# Patient Record
Sex: MALE | Race: WHITE | NOT HISPANIC OR LATINO | Employment: UNEMPLOYED | URBAN - METROPOLITAN AREA
[De-identification: names, ages, dates, MRNs, and addresses within clinical notes are randomized per-mention and may not be internally consistent; named-entity substitution may affect disease eponyms.]

---

## 2023-10-15 ENCOUNTER — HOSPITAL ENCOUNTER (INPATIENT)
Facility: HOSPITAL | Age: 53
LOS: 4 days | Discharge: LEFT BEFORE DISCHARGE | DRG: 560 | End: 2023-10-19
Attending: STUDENT IN AN ORGANIZED HEALTH CARE EDUCATION/TRAINING PROGRAM | Admitting: INTERNAL MEDICINE
Payer: COMMERCIAL

## 2023-10-15 ENCOUNTER — APPOINTMENT (OUTPATIENT)
Dept: RADIOLOGY | Facility: HOSPITAL | Age: 53
DRG: 560 | End: 2023-10-15
Payer: COMMERCIAL

## 2023-10-15 DIAGNOSIS — R53.81 DEBILITY: ICD-10-CM

## 2023-10-15 DIAGNOSIS — T84.59XS CHRONIC INFECTION OF KNEE JOINT PROSTHESIS, SEQUELA: ICD-10-CM

## 2023-10-15 DIAGNOSIS — Z96.659 CHRONIC INFECTION OF KNEE JOINT PROSTHESIS, SEQUELA: ICD-10-CM

## 2023-10-15 DIAGNOSIS — M00.9: Primary | ICD-10-CM

## 2023-10-15 LAB
ALBUMIN SERPL BCP-MCNC: 3 G/DL (ref 3.4–5)
ALP SERPL-CCNC: 150 U/L (ref 33–120)
ALT SERPL W P-5'-P-CCNC: 10 U/L (ref 7–52)
ANION GAP BLDV CALCULATED.4IONS-SCNC: 11 MMOL/L (ref 10–25)
ANION GAP SERPL CALC-SCNC: 16 MMOL/L (ref 10–20)
AST SERPL W P-5'-P-CCNC: 9 U/L (ref 9–39)
BASE EXCESS BLDV CALC-SCNC: -0.4 MMOL/L (ref -2–3)
BASOPHILS # BLD AUTO: 0.09 X10*3/UL (ref 0–0.1)
BASOPHILS NFR BLD AUTO: 0.7 %
BILIRUB SERPL-MCNC: 0.2 MG/DL (ref 0–1.2)
BODY TEMPERATURE: 37 DEGREES CELSIUS
BUN SERPL-MCNC: 30 MG/DL (ref 6–23)
CA-I BLDV-SCNC: 1.18 MMOL/L (ref 1.1–1.33)
CALCIUM SERPL-MCNC: 8.5 MG/DL (ref 8.6–10.6)
CHLORIDE BLDV-SCNC: 97 MMOL/L (ref 98–107)
CHLORIDE SERPL-SCNC: 92 MMOL/L (ref 98–107)
CO2 SERPL-SCNC: 23 MMOL/L (ref 21–32)
CREAT SERPL-MCNC: 1.87 MG/DL (ref 0.5–1.3)
CRP SERPL-MCNC: 8.52 MG/DL
EOSINOPHIL # BLD AUTO: 0.28 X10*3/UL (ref 0–0.7)
EOSINOPHIL NFR BLD AUTO: 2.2 %
ERYTHROCYTE [DISTWIDTH] IN BLOOD BY AUTOMATED COUNT: 15.5 % (ref 11.5–14.5)
ERYTHROCYTE [SEDIMENTATION RATE] IN BLOOD BY WESTERGREN METHOD: >130 MM/H (ref 0–30)
EST. AVERAGE GLUCOSE BLD GHB EST-MCNC: 375 MG/DL
GFR SERPL CREATININE-BSD FRML MDRD: 31 ML/MIN/1.73M*2
GLUCOSE BLD MANUAL STRIP-MCNC: 480 MG/DL (ref 74–99)
GLUCOSE BLDV-MCNC: 370 MG/DL (ref 74–99)
GLUCOSE SERPL-MCNC: 660 MG/DL (ref 74–99)
HBA1C MFR BLD: 14.7 %
HCO3 BLDV-SCNC: 24.8 MMOL/L (ref 22–26)
HCT VFR BLD AUTO: 36.6 % (ref 36–52)
HCT VFR BLD EST: 35 % (ref 36–52)
HGB BLD-MCNC: 11.4 G/DL (ref 12–17.5)
HGB BLDV-MCNC: 11.8 G/DL (ref 12–17.5)
IMM GRANULOCYTES # BLD AUTO: 0.17 X10*3/UL (ref 0–0.7)
IMM GRANULOCYTES NFR BLD AUTO: 1.3 % (ref 0–0.9)
LACTATE BLDV-SCNC: 1.5 MMOL/L (ref 0.4–2)
LYMPHOCYTES # BLD AUTO: 1.09 X10*3/UL (ref 1.2–4.8)
LYMPHOCYTES NFR BLD AUTO: 8.5 %
MCH RBC QN AUTO: 22.8 PG (ref 26–34)
MCHC RBC AUTO-ENTMCNC: 31.1 G/DL (ref 32–36)
MCV RBC AUTO: 73 FL (ref 80–100)
MONOCYTES # BLD AUTO: 0.86 X10*3/UL (ref 0.1–1)
MONOCYTES NFR BLD AUTO: 6.7 %
NEUTROPHILS # BLD AUTO: 10.39 X10*3/UL (ref 1.2–7.7)
NEUTROPHILS NFR BLD AUTO: 80.6 %
NRBC BLD-RTO: 0 /100 WBCS (ref 0–0)
OXYHGB MFR BLDV: 56.6 % (ref 45–75)
PCO2 BLDV: 42 MM HG (ref 41–51)
PH BLDV: 7.38 PH (ref 7.33–7.43)
PLATELET # BLD AUTO: 430 X10*3/UL (ref 150–450)
PMV BLD AUTO: 10.3 FL (ref 7.5–11.5)
PO2 BLDV: 35 MM HG (ref 35–45)
POTASSIUM BLDV-SCNC: 3.7 MMOL/L (ref 3.5–5.3)
POTASSIUM SERPL-SCNC: 4.7 MMOL/L (ref 3.5–5.3)
PROT SERPL-MCNC: 6.3 G/DL (ref 6.4–8.2)
RBC # BLD AUTO: 5.01 X10*6/UL (ref 4–5.9)
SAO2 % BLDV: 57 % (ref 45–75)
SODIUM BLDV-SCNC: 129 MMOL/L (ref 136–145)
SODIUM SERPL-SCNC: 126 MMOL/L (ref 136–145)
WBC # BLD AUTO: 12.9 X10*3/UL (ref 4.4–11.3)

## 2023-10-15 PROCEDURE — 80053 COMPREHEN METABOLIC PANEL: CPT | Mod: CMCLAB | Performed by: STUDENT IN AN ORGANIZED HEALTH CARE EDUCATION/TRAINING PROGRAM

## 2023-10-15 PROCEDURE — 1210000001 HC SEMI-PRIVATE ROOM DAILY

## 2023-10-15 PROCEDURE — 2500000001 HC RX 250 WO HCPCS SELF ADMINISTERED DRUGS (ALT 637 FOR MEDICARE OP)

## 2023-10-15 PROCEDURE — 89060 EXAM SYNOVIAL FLUID CRYSTALS: CPT | Mod: CMCLAB

## 2023-10-15 PROCEDURE — 85025 COMPLETE CBC W/AUTO DIFF WBC: CPT | Performed by: STUDENT IN AN ORGANIZED HEALTH CARE EDUCATION/TRAINING PROGRAM

## 2023-10-15 PROCEDURE — 82947 ASSAY GLUCOSE BLOOD QUANT: CPT | Mod: CMCLAB

## 2023-10-15 PROCEDURE — 36415 COLL VENOUS BLD VENIPUNCTURE: CPT | Mod: CMCLAB | Performed by: STUDENT IN AN ORGANIZED HEALTH CARE EDUCATION/TRAINING PROGRAM

## 2023-10-15 PROCEDURE — 73552 X-RAY EXAM OF FEMUR 2/>: CPT | Mod: RT,FY

## 2023-10-15 PROCEDURE — 2500000004 HC RX 250 GENERAL PHARMACY W/ HCPCS (ALT 636 FOR OP/ED)

## 2023-10-15 PROCEDURE — 87075 CULTR BACTERIA EXCEPT BLOOD: CPT | Mod: CMCLAB | Performed by: STUDENT IN AN ORGANIZED HEALTH CARE EDUCATION/TRAINING PROGRAM

## 2023-10-15 PROCEDURE — 2580000001 HC RX 258 IV SOLUTIONS: Performed by: STUDENT IN AN ORGANIZED HEALTH CARE EDUCATION/TRAINING PROGRAM

## 2023-10-15 PROCEDURE — 96360 HYDRATION IV INFUSION INIT: CPT

## 2023-10-15 PROCEDURE — 87040 BLOOD CULTURE FOR BACTERIA: CPT | Mod: CMCLAB | Performed by: STUDENT IN AN ORGANIZED HEALTH CARE EDUCATION/TRAINING PROGRAM

## 2023-10-15 PROCEDURE — 99285 EMERGENCY DEPT VISIT HI MDM: CPT | Mod: 25 | Performed by: STUDENT IN AN ORGANIZED HEALTH CARE EDUCATION/TRAINING PROGRAM

## 2023-10-15 PROCEDURE — 2500000002 HC RX 250 W HCPCS SELF ADMINISTERED DRUGS (ALT 637 FOR MEDICARE OP, ALT 636 FOR OP/ED): Performed by: STUDENT IN AN ORGANIZED HEALTH CARE EDUCATION/TRAINING PROGRAM

## 2023-10-15 PROCEDURE — 73564 X-RAY EXAM KNEE 4 OR MORE: CPT | Mod: RT,FY

## 2023-10-15 PROCEDURE — 83036 HEMOGLOBIN GLYCOSYLATED A1C: CPT | Mod: CMCLAB | Performed by: NURSE PRACTITIONER

## 2023-10-15 PROCEDURE — 73564 X-RAY EXAM KNEE 4 OR MORE: CPT | Mod: RIGHT SIDE | Performed by: STUDENT IN AN ORGANIZED HEALTH CARE EDUCATION/TRAINING PROGRAM

## 2023-10-15 PROCEDURE — 87205 SMEAR GRAM STAIN: CPT | Mod: CMCLAB

## 2023-10-15 PROCEDURE — 86140 C-REACTIVE PROTEIN: CPT | Mod: CMCLAB | Performed by: STUDENT IN AN ORGANIZED HEALTH CARE EDUCATION/TRAINING PROGRAM

## 2023-10-15 PROCEDURE — 99285 EMERGENCY DEPT VISIT HI MDM: CPT | Performed by: STUDENT IN AN ORGANIZED HEALTH CARE EDUCATION/TRAINING PROGRAM

## 2023-10-15 PROCEDURE — 85018 HEMOGLOBIN: CPT | Mod: CMCLAB

## 2023-10-15 PROCEDURE — 85652 RBC SED RATE AUTOMATED: CPT | Mod: CMCLAB | Performed by: STUDENT IN AN ORGANIZED HEALTH CARE EDUCATION/TRAINING PROGRAM

## 2023-10-15 PROCEDURE — 73552 X-RAY EXAM OF FEMUR 2/>: CPT | Mod: RIGHT SIDE | Performed by: RADIOLOGY

## 2023-10-15 PROCEDURE — 87075 CULTR BACTERIA EXCEPT BLOOD: CPT | Mod: CMCLAB

## 2023-10-15 PROCEDURE — 2500000001 HC RX 250 WO HCPCS SELF ADMINISTERED DRUGS (ALT 637 FOR MEDICARE OP): Performed by: STUDENT IN AN ORGANIZED HEALTH CARE EDUCATION/TRAINING PROGRAM

## 2023-10-15 RX ORDER — ACETAMINOPHEN 325 MG/1
650 TABLET ORAL EVERY 6 HOURS PRN
Status: DISCONTINUED | OUTPATIENT
Start: 2023-10-15 | End: 2023-10-19 | Stop reason: HOSPADM

## 2023-10-15 RX ORDER — DEXTROSE 50 % IN WATER (D50W) INTRAVENOUS SYRINGE
25
Status: DISCONTINUED | OUTPATIENT
Start: 2023-10-15 | End: 2023-10-19 | Stop reason: HOSPADM

## 2023-10-15 RX ORDER — SULFAMETHOXAZOLE AND TRIMETHOPRIM 800; 160 MG/1; MG/1
1 TABLET ORAL EVERY 12 HOURS
Status: DISCONTINUED | OUTPATIENT
Start: 2023-10-15 | End: 2023-10-19 | Stop reason: HOSPADM

## 2023-10-15 RX ORDER — CEPHALEXIN 500 MG/1
500 CAPSULE ORAL EVERY 12 HOURS SCHEDULED
Status: DISCONTINUED | OUTPATIENT
Start: 2023-10-15 | End: 2023-10-19 | Stop reason: HOSPADM

## 2023-10-15 RX ORDER — POLYETHYLENE GLYCOL 3350 17 G/17G
17 POWDER, FOR SOLUTION ORAL DAILY
Status: DISCONTINUED | OUTPATIENT
Start: 2023-10-16 | End: 2023-10-19 | Stop reason: HOSPADM

## 2023-10-15 RX ORDER — INSULIN LISPRO 100 [IU]/ML
0-10 INJECTION, SOLUTION INTRAVENOUS; SUBCUTANEOUS
Status: DISCONTINUED | OUTPATIENT
Start: 2023-10-16 | End: 2023-10-19 | Stop reason: HOSPADM

## 2023-10-15 RX ORDER — INSULIN GLARGINE 100 [IU]/ML
60 INJECTION, SOLUTION SUBCUTANEOUS NIGHTLY
Status: DISCONTINUED | OUTPATIENT
Start: 2023-10-15 | End: 2023-10-15

## 2023-10-15 RX ORDER — INSULIN LISPRO 100 [IU]/ML
10 INJECTION, SOLUTION INTRAVENOUS; SUBCUTANEOUS ONCE
Status: COMPLETED | OUTPATIENT
Start: 2023-10-15 | End: 2023-10-15

## 2023-10-15 RX ORDER — AMLODIPINE BESYLATE 5 MG/1
5 TABLET ORAL DAILY
Status: DISCONTINUED | OUTPATIENT
Start: 2023-10-16 | End: 2023-10-17

## 2023-10-15 RX ORDER — TRAMADOL HYDROCHLORIDE 50 MG/1
50 TABLET ORAL EVERY 6 HOURS PRN
Status: DISCONTINUED | OUTPATIENT
Start: 2023-10-15 | End: 2023-10-18

## 2023-10-15 RX ORDER — DEXTROSE MONOHYDRATE 100 MG/ML
0.3 INJECTION, SOLUTION INTRAVENOUS ONCE AS NEEDED
Status: DISCONTINUED | OUTPATIENT
Start: 2023-10-15 | End: 2023-10-19 | Stop reason: HOSPADM

## 2023-10-15 RX ORDER — PANTOPRAZOLE SODIUM 40 MG/1
40 TABLET, DELAYED RELEASE ORAL
Status: DISCONTINUED | OUTPATIENT
Start: 2023-10-16 | End: 2023-10-19 | Stop reason: HOSPADM

## 2023-10-15 RX ORDER — INSULIN LISPRO 100 [IU]/ML
0-20 INJECTION, SOLUTION INTRAVENOUS; SUBCUTANEOUS EVERY 4 HOURS
Status: DISCONTINUED | OUTPATIENT
Start: 2023-10-15 | End: 2023-10-16

## 2023-10-15 RX ORDER — GUAIFENESIN 100 MG/5ML
200 SOLUTION ORAL ONCE
Status: COMPLETED | OUTPATIENT
Start: 2023-10-15 | End: 2023-10-15

## 2023-10-15 RX ORDER — TRAMADOL HYDROCHLORIDE 50 MG/1
50 TABLET ORAL ONCE
Status: COMPLETED | OUTPATIENT
Start: 2023-10-15 | End: 2023-10-15

## 2023-10-15 RX ORDER — INSULIN GLARGINE 100 [IU]/ML
100 INJECTION, SOLUTION SUBCUTANEOUS NIGHTLY
Status: DISCONTINUED | OUTPATIENT
Start: 2023-10-16 | End: 2023-10-16

## 2023-10-15 RX ORDER — LIDOCAINE HYDROCHLORIDE AND EPINEPHRINE 10; 10 MG/ML; UG/ML
INJECTION, SOLUTION INFILTRATION; PERINEURAL
Status: DISPENSED
Start: 2023-10-15 | End: 2023-10-16

## 2023-10-15 RX ADMIN — INSULIN LISPRO 10 UNITS: 100 INJECTION, SOLUTION INTRAVENOUS; SUBCUTANEOUS at 19:07

## 2023-10-15 RX ADMIN — TRAMADOL HYDROCHLORIDE 50 MG: 50 TABLET, COATED ORAL at 19:07

## 2023-10-15 RX ADMIN — INSULIN GLARGINE 100 UNITS: 100 INJECTION, SOLUTION SUBCUTANEOUS at 21:03

## 2023-10-15 RX ADMIN — SODIUM CHLORIDE, POTASSIUM CHLORIDE, SODIUM LACTATE AND CALCIUM CHLORIDE 500 ML: 600; 310; 30; 20 INJECTION, SOLUTION INTRAVENOUS at 19:06

## 2023-10-15 RX ADMIN — SULFAMETHOXAZOLE AND TRIMETHOPRIM 1 TABLET: 800; 160 TABLET ORAL at 23:25

## 2023-10-15 RX ADMIN — CEPHALEXIN 500 MG: 250 CAPSULE ORAL at 23:25

## 2023-10-15 RX ADMIN — GUAIFENESIN 200 MG: 200 SOLUTION ORAL at 20:12

## 2023-10-15 ASSESSMENT — ENCOUNTER SYMPTOMS
NERVOUS/ANXIOUS: 1
NAUSEA: 0
SHORTNESS OF BREATH: 0
HEADACHES: 0
NUMBNESS: 0
SEIZURES: 0
PALPITATIONS: 0
CONSTIPATION: 0
ABDOMINAL PAIN: 0
COLOR CHANGE: 1
EYE PAIN: 1
CHILLS: 1
VOMITING: 0
HYPERACTIVE: 1
CHEST TIGHTNESS: 0
FLANK PAIN: 0
LIGHT-HEADEDNESS: 0
DIARRHEA: 0
AGITATION: 1
FREQUENCY: 0
COUGH: 0
FEVER: 1
JOINT SWELLING: 1

## 2023-10-15 ASSESSMENT — PAIN DESCRIPTION - LOCATION: LOCATION: KNEE

## 2023-10-15 ASSESSMENT — COLUMBIA-SUICIDE SEVERITY RATING SCALE - C-SSRS
1. IN THE PAST MONTH, HAVE YOU WISHED YOU WERE DEAD OR WISHED YOU COULD GO TO SLEEP AND NOT WAKE UP?: NO
2. HAVE YOU ACTUALLY HAD ANY THOUGHTS OF KILLING YOURSELF?: NO
6. HAVE YOU EVER DONE ANYTHING, STARTED TO DO ANYTHING, OR PREPARED TO DO ANYTHING TO END YOUR LIFE?: NO

## 2023-10-15 ASSESSMENT — PAIN - FUNCTIONAL ASSESSMENT: PAIN_FUNCTIONAL_ASSESSMENT: 0-10

## 2023-10-15 ASSESSMENT — PAIN SCALES - GENERAL: PAINLEVEL_OUTOF10: 10 - WORST POSSIBLE PAIN

## 2023-10-15 NOTE — ED TRIAGE NOTES
Chronic R. Knee pain x a long time states he consistently has pain and MRSA in that knee 2 yrs ago and was tx for it

## 2023-10-16 LAB
ABO GROUP (TYPE) IN BLOOD: NORMAL
ANION GAP BLDV CALCULATED.4IONS-SCNC: 10 MMOL/L (ref 10–25)
ANION GAP SERPL CALC-SCNC: 16 MMOL/L (ref 10–20)
ANTIBODY SCREEN: NORMAL
BASE EXCESS BLDV CALC-SCNC: 0.7 MMOL/L (ref -2–3)
BODY TEMPERATURE: 37 DEGREES CELSIUS
BUN SERPL-MCNC: 33 MG/DL (ref 6–23)
CA-I BLDV-SCNC: 1.17 MMOL/L (ref 1.1–1.33)
CALCIUM SERPL-MCNC: 8.9 MG/DL (ref 8.6–10.6)
CHLORIDE BLDV-SCNC: 100 MMOL/L (ref 98–107)
CHLORIDE SERPL-SCNC: 98 MMOL/L (ref 98–107)
CO2 SERPL-SCNC: 21 MMOL/L (ref 21–32)
CREAT SERPL-MCNC: 1.73 MG/DL (ref 0.5–1.3)
CRYSTALS FLD MICRO: NORMAL
ERYTHROCYTE [DISTWIDTH] IN BLOOD BY AUTOMATED COUNT: 15.7 % (ref 11.5–14.5)
GFR SERPL CREATININE-BSD FRML MDRD: 35 ML/MIN/1.73M*2
GLUCOSE BLD MANUAL STRIP-MCNC: 248 MG/DL (ref 74–99)
GLUCOSE BLD MANUAL STRIP-MCNC: 274 MG/DL (ref 74–99)
GLUCOSE BLD MANUAL STRIP-MCNC: 359 MG/DL (ref 74–99)
GLUCOSE BLD MANUAL STRIP-MCNC: 397 MG/DL (ref 74–99)
GLUCOSE BLD MANUAL STRIP-MCNC: 477 MG/DL (ref 74–99)
GLUCOSE BLD MANUAL STRIP-MCNC: 498 MG/DL (ref 74–99)
GLUCOSE BLDV-MCNC: 352 MG/DL (ref 74–99)
GLUCOSE SERPL-MCNC: 255 MG/DL (ref 74–99)
HCO3 BLDV-SCNC: 25.4 MMOL/L (ref 22–26)
HCT VFR BLD AUTO: 39.5 % (ref 36–52)
HCT VFR BLD EST: 37 % (ref 36–52)
HGB BLD-MCNC: 11.6 G/DL (ref 12–17.5)
HGB BLDV-MCNC: 12.2 G/DL (ref 12–17.5)
LACTATE BLDV-SCNC: 1.9 MMOL/L (ref 0.4–2)
MCH RBC QN AUTO: 22.9 PG (ref 26–34)
MCHC RBC AUTO-ENTMCNC: 29.4 G/DL (ref 32–36)
MCV RBC AUTO: 78 FL (ref 80–100)
NRBC BLD-RTO: 0 /100 WBCS (ref 0–0)
OXYHGB MFR BLDV: 71.3 % (ref 45–75)
PCO2 BLDV: 40 MM HG (ref 41–51)
PH BLDV: 7.41 PH (ref 7.33–7.43)
PLATELET # BLD AUTO: 379 X10*3/UL (ref 150–450)
PMV BLD AUTO: 11.1 FL (ref 7.5–11.5)
PO2 BLDV: 48 MM HG (ref 35–45)
POTASSIUM BLDV-SCNC: 4 MMOL/L (ref 3.5–5.3)
POTASSIUM SERPL-SCNC: 4.1 MMOL/L (ref 3.5–5.3)
RBC # BLD AUTO: 5.07 X10*6/UL (ref 4–5.9)
RH FACTOR (ANTIGEN D): NORMAL
SAO2 % BLDV: 73 % (ref 45–75)
SODIUM BLDV-SCNC: 131 MMOL/L (ref 136–145)
SODIUM SERPL-SCNC: 131 MMOL/L (ref 136–145)
WBC # BLD AUTO: 12.5 X10*3/UL (ref 4.4–11.3)

## 2023-10-16 PROCEDURE — 2500000002 HC RX 250 W HCPCS SELF ADMINISTERED DRUGS (ALT 637 FOR MEDICARE OP, ALT 636 FOR OP/ED): Performed by: INTERNAL MEDICINE

## 2023-10-16 PROCEDURE — 2500000004 HC RX 250 GENERAL PHARMACY W/ HCPCS (ALT 636 FOR OP/ED)

## 2023-10-16 PROCEDURE — 86901 BLOOD TYPING SEROLOGIC RH(D): CPT

## 2023-10-16 PROCEDURE — 82947 ASSAY GLUCOSE BLOOD QUANT: CPT | Mod: CMCLAB

## 2023-10-16 PROCEDURE — 2500000005 HC RX 250 GENERAL PHARMACY W/O HCPCS: Performed by: INTERNAL MEDICINE

## 2023-10-16 PROCEDURE — 84132 ASSAY OF SERUM POTASSIUM: CPT | Mod: CMCLAB

## 2023-10-16 PROCEDURE — 99221 1ST HOSP IP/OBS SF/LOW 40: CPT | Performed by: INTERNAL MEDICINE

## 2023-10-16 PROCEDURE — 36415 COLL VENOUS BLD VENIPUNCTURE: CPT | Mod: CMCLAB

## 2023-10-16 PROCEDURE — 85018 HEMOGLOBIN: CPT | Mod: CMCLAB

## 2023-10-16 PROCEDURE — 96372 THER/PROPH/DIAG INJ SC/IM: CPT

## 2023-10-16 PROCEDURE — 2500000001 HC RX 250 WO HCPCS SELF ADMINISTERED DRUGS (ALT 637 FOR MEDICARE OP)

## 2023-10-16 PROCEDURE — 99233 SBSQ HOSP IP/OBS HIGH 50: CPT | Performed by: INTERNAL MEDICINE

## 2023-10-16 PROCEDURE — 82805 BLOOD GASES W/O2 SATURATION: CPT | Mod: CMCLAB

## 2023-10-16 PROCEDURE — 36415 COLL VENOUS BLD VENIPUNCTURE: CPT

## 2023-10-16 PROCEDURE — 1100000001 HC PRIVATE ROOM DAILY

## 2023-10-16 PROCEDURE — 97166 OT EVAL MOD COMPLEX 45 MIN: CPT | Mod: GO

## 2023-10-16 PROCEDURE — 97530 THERAPEUTIC ACTIVITIES: CPT | Mod: GP

## 2023-10-16 PROCEDURE — 2500000002 HC RX 250 W HCPCS SELF ADMINISTERED DRUGS (ALT 637 FOR MEDICARE OP, ALT 636 FOR OP/ED)

## 2023-10-16 PROCEDURE — 82330 ASSAY OF CALCIUM: CPT | Mod: CMCLAB

## 2023-10-16 PROCEDURE — 86900 BLOOD TYPING SEROLOGIC ABO: CPT

## 2023-10-16 PROCEDURE — 2500000001 HC RX 250 WO HCPCS SELF ADMINISTERED DRUGS (ALT 637 FOR MEDICARE OP): Performed by: INTERNAL MEDICINE

## 2023-10-16 PROCEDURE — 96372 THER/PROPH/DIAG INJ SC/IM: CPT | Performed by: INTERNAL MEDICINE

## 2023-10-16 PROCEDURE — 97161 PT EVAL LOW COMPLEX 20 MIN: CPT | Mod: GP

## 2023-10-16 PROCEDURE — 83605 ASSAY OF LACTIC ACID: CPT | Mod: CMCLAB

## 2023-10-16 PROCEDURE — 85027 COMPLETE CBC AUTOMATED: CPT

## 2023-10-16 PROCEDURE — 80048 BASIC METABOLIC PNL TOTAL CA: CPT | Mod: CMCLAB

## 2023-10-16 RX ORDER — ASPIRIN 81 MG/1
1 TABLET ORAL DAILY
Status: ON HOLD | COMMUNITY
Start: 2011-05-20 | End: 2024-02-28 | Stop reason: ALTCHOICE

## 2023-10-16 RX ORDER — GUAIFENESIN 600 MG/1
1200 TABLET, EXTENDED RELEASE ORAL 2 TIMES DAILY PRN
Status: DISCONTINUED | OUTPATIENT
Start: 2023-10-16 | End: 2023-10-19 | Stop reason: HOSPADM

## 2023-10-16 RX ORDER — FLUTICASONE PROPIONATE 50 MCG
2 SPRAY, SUSPENSION (ML) NASAL DAILY PRN
COMMUNITY

## 2023-10-16 RX ORDER — TRAZODONE HYDROCHLORIDE 50 MG/1
50 TABLET ORAL NIGHTLY
Status: ON HOLD | COMMUNITY
End: 2024-02-17 | Stop reason: ALTCHOICE

## 2023-10-16 RX ORDER — INSULIN GLARGINE 100 [IU]/ML
100 INJECTION, SOLUTION SUBCUTANEOUS EVERY MORNING
COMMUNITY
End: 2024-02-01 | Stop reason: HOSPADM

## 2023-10-16 RX ORDER — ATORVASTATIN CALCIUM 40 MG/1
40 TABLET, FILM COATED ORAL DAILY
Status: ON HOLD | COMMUNITY
Start: 2019-08-15 | End: 2024-02-20 | Stop reason: SDUPTHER

## 2023-10-16 RX ORDER — LIDOCAINE 560 MG/1
1 PATCH PERCUTANEOUS; TOPICAL; TRANSDERMAL DAILY
Status: DISCONTINUED | OUTPATIENT
Start: 2023-10-16 | End: 2023-10-19 | Stop reason: HOSPADM

## 2023-10-16 RX ORDER — INSULIN LISPRO 100 [IU]/ML
10 INJECTION, SOLUTION INTRAVENOUS; SUBCUTANEOUS
Status: DISCONTINUED | OUTPATIENT
Start: 2023-10-16 | End: 2023-10-17

## 2023-10-16 RX ORDER — TRAZODONE HYDROCHLORIDE 50 MG/1
50 TABLET ORAL NIGHTLY PRN
Status: DISCONTINUED | OUTPATIENT
Start: 2023-10-16 | End: 2023-10-19 | Stop reason: HOSPADM

## 2023-10-16 RX ORDER — ORAL SEMAGLUTIDE 7 MG/1
7 TABLET ORAL
COMMUNITY
End: 2024-02-23 | Stop reason: HOSPADM

## 2023-10-16 RX ORDER — OMEPRAZOLE 20 MG/1
20 CAPSULE, DELAYED RELEASE ORAL
COMMUNITY
Start: 2021-03-30

## 2023-10-16 RX ORDER — INSULIN GLARGINE 100 [IU]/ML
100 INJECTION, SOLUTION SUBCUTANEOUS DAILY
Status: DISCONTINUED | OUTPATIENT
Start: 2023-10-16 | End: 2023-10-19 | Stop reason: HOSPADM

## 2023-10-16 RX ADMIN — TRAMADOL HYDROCHLORIDE 50 MG: 50 TABLET, COATED ORAL at 09:09

## 2023-10-16 RX ADMIN — TRAMADOL HYDROCHLORIDE 50 MG: 50 TABLET, COATED ORAL at 16:33

## 2023-10-16 RX ADMIN — INSULIN LISPRO 10 UNITS: 100 INJECTION, SOLUTION INTRAVENOUS; SUBCUTANEOUS at 17:42

## 2023-10-16 RX ADMIN — INSULIN LISPRO 12 UNITS: 100 INJECTION, SOLUTION INTRAVENOUS; SUBCUTANEOUS at 01:22

## 2023-10-16 RX ADMIN — INSULIN LISPRO 8 UNITS: 100 INJECTION, SOLUTION INTRAVENOUS; SUBCUTANEOUS at 04:36

## 2023-10-16 RX ADMIN — TRAMADOL HYDROCHLORIDE 50 MG: 50 TABLET, COATED ORAL at 22:42

## 2023-10-16 RX ADMIN — EMPAGLIFLOZIN 25 MG: 25 TABLET, FILM COATED ORAL at 14:02

## 2023-10-16 RX ADMIN — CEPHALEXIN 500 MG: 250 CAPSULE ORAL at 09:09

## 2023-10-16 RX ADMIN — TRAMADOL HYDROCHLORIDE 50 MG: 50 TABLET, COATED ORAL at 02:39

## 2023-10-16 RX ADMIN — INSULIN GLARGINE 100 UNITS: 100 INJECTION, SOLUTION SUBCUTANEOUS at 10:25

## 2023-10-16 RX ADMIN — SULFAMETHOXAZOLE AND TRIMETHOPRIM 1 TABLET: 800; 160 TABLET ORAL at 20:09

## 2023-10-16 RX ADMIN — LIDOCAINE 1 PATCH: 4 PATCH TOPICAL at 14:02

## 2023-10-16 RX ADMIN — PANTOPRAZOLE SODIUM 40 MG: 40 TABLET, DELAYED RELEASE ORAL at 09:09

## 2023-10-16 RX ADMIN — INSULIN LISPRO 10 UNITS: 100 INJECTION, SOLUTION INTRAVENOUS; SUBCUTANEOUS at 12:36

## 2023-10-16 RX ADMIN — INSULIN LISPRO 10 UNITS: 100 INJECTION, SOLUTION INTRAVENOUS; SUBCUTANEOUS at 14:02

## 2023-10-16 RX ADMIN — CEPHALEXIN 500 MG: 250 CAPSULE ORAL at 20:09

## 2023-10-16 RX ADMIN — SULFAMETHOXAZOLE AND TRIMETHOPRIM 1 TABLET: 800; 160 TABLET ORAL at 09:09

## 2023-10-16 RX ADMIN — Medication: at 06:30

## 2023-10-16 SDOH — SOCIAL STABILITY: SOCIAL INSECURITY: DO YOU FEEL UNSAFE GOING BACK TO THE PLACE WHERE YOU ARE LIVING?: NO

## 2023-10-16 SDOH — SOCIAL STABILITY: SOCIAL INSECURITY: ARE YOU OR HAVE YOU BEEN THREATENED OR ABUSED PHYSICALLY, EMOTIONALLY, OR SEXUALLY BY ANYONE?: NO

## 2023-10-16 SDOH — SOCIAL STABILITY: SOCIAL INSECURITY: ABUSE: ADULT

## 2023-10-16 SDOH — SOCIAL STABILITY: SOCIAL INSECURITY: ARE THERE ANY APPARENT SIGNS OF INJURIES/BEHAVIORS THAT COULD BE RELATED TO ABUSE/NEGLECT?: NO

## 2023-10-16 SDOH — SOCIAL STABILITY: SOCIAL INSECURITY: HAVE YOU HAD THOUGHTS OF HARMING ANYONE ELSE?: NO

## 2023-10-16 SDOH — SOCIAL STABILITY: SOCIAL INSECURITY: DOES ANYONE TRY TO KEEP YOU FROM HAVING/CONTACTING OTHER FRIENDS OR DOING THINGS OUTSIDE YOUR HOME?: NO

## 2023-10-16 SDOH — SOCIAL STABILITY: SOCIAL INSECURITY: WERE YOU ABLE TO COMPLETE ALL THE BEHAVIORAL HEALTH SCREENINGS?: YES

## 2023-10-16 SDOH — SOCIAL STABILITY: SOCIAL INSECURITY: HAS ANYONE EVER THREATENED TO HURT YOUR FAMILY OR YOUR PETS?: NO

## 2023-10-16 SDOH — SOCIAL STABILITY: SOCIAL INSECURITY: DO YOU FEEL ANYONE HAS EXPLOITED OR TAKEN ADVANTAGE OF YOU FINANCIALLY OR OF YOUR PERSONAL PROPERTY?: NO

## 2023-10-16 ASSESSMENT — LIFESTYLE VARIABLES
HOW OFTEN DO YOU HAVE A DRINK CONTAINING ALCOHOL: NEVER
AUDIT-C TOTAL SCORE: 0
HOW OFTEN DO YOU HAVE 6 OR MORE DRINKS ON ONE OCCASION: NEVER
HOW MANY STANDARD DRINKS CONTAINING ALCOHOL DO YOU HAVE ON A TYPICAL DAY: PATIENT DOES NOT DRINK
SKIP TO QUESTIONS 9-10: 1
AUDIT-C TOTAL SCORE: 0

## 2023-10-16 ASSESSMENT — ACTIVITIES OF DAILY LIVING (ADL)
DRESSING YOURSELF: NEEDS ASSISTANCE
ASSISTIVE_DEVICE: WALKER
HEARING - LEFT EAR: FUNCTIONAL
ADEQUATE_TO_COMPLETE_ADL: YES
WALKS IN HOME: NEEDS ASSISTANCE
ADL_ASSISTANCE: NEEDS ASSISTANCE
BATHING_ASSISTANCE: MODERATE
LACK_OF_TRANSPORTATION: NO
FEEDING YOURSELF: INDEPENDENT
TOILETING: NEEDS ASSISTANCE
BATHING: INDEPENDENT
JUDGMENT_ADEQUATE_SAFELY_COMPLETE_DAILY_ACTIVITIES: YES
PATIENT'S MEMORY ADEQUATE TO SAFELY COMPLETE DAILY ACTIVITIES?: YES
TOILETING_ASSISTANCE: ASSISTIVE DEVICE
HEARING - RIGHT EAR: FUNCTIONAL
BATHING_ASSISTANCE: MODERATE
GROOMING: INDEPENDENT

## 2023-10-16 ASSESSMENT — PAIN - FUNCTIONAL ASSESSMENT
PAIN_FUNCTIONAL_ASSESSMENT: 0-10

## 2023-10-16 ASSESSMENT — COGNITIVE AND FUNCTIONAL STATUS - GENERAL
DRESSING REGULAR LOWER BODY CLOTHING: A LOT
PATIENT BASELINE BEDBOUND: NO
TOILETING: A LOT
STANDING UP FROM CHAIR USING ARMS: A LITTLE
DAILY ACTIVITIY SCORE: 23
HELP NEEDED FOR BATHING: A LOT
WALKING IN HOSPITAL ROOM: A LITTLE
WALKING IN HOSPITAL ROOM: A LITTLE
MOBILITY SCORE: 18
WALKING IN HOSPITAL ROOM: A LITTLE
CLIMB 3 TO 5 STEPS WITH RAILING: TOTAL
CLIMB 3 TO 5 STEPS WITH RAILING: A LITTLE
PATIENT BASELINE BEDBOUND: NO
DAILY ACTIVITIY SCORE: 18
MOBILITY SCORE: 20
MOVING TO AND FROM BED TO CHAIR: A LITTLE
MOBILITY SCORE: 18
STANDING UP FROM CHAIR USING ARMS: A LITTLE
TOILETING: A LITTLE
MOVING TO AND FROM BED TO CHAIR: A LITTLE
STANDING UP FROM CHAIR USING ARMS: A LITTLE
MOVING TO AND FROM BED TO CHAIR: A LITTLE
CLIMB 3 TO 5 STEPS WITH RAILING: TOTAL

## 2023-10-16 ASSESSMENT — PAIN SCALES - GENERAL
PAINLEVEL_OUTOF10: 5 - MODERATE PAIN
PAINLEVEL_OUTOF10: 6
PAINLEVEL_OUTOF10: 7
PAINLEVEL_OUTOF10: 5 - MODERATE PAIN
PAINLEVEL_OUTOF10: 6
PAINLEVEL_OUTOF10: 8
PAINLEVEL_OUTOF10: 10 - WORST POSSIBLE PAIN

## 2023-10-16 ASSESSMENT — PATIENT HEALTH QUESTIONNAIRE - PHQ9
SUM OF ALL RESPONSES TO PHQ9 QUESTIONS 1 & 2: 0
2. FEELING DOWN, DEPRESSED OR HOPELESS: NOT AT ALL
1. LITTLE INTEREST OR PLEASURE IN DOING THINGS: NOT AT ALL

## 2023-10-16 ASSESSMENT — PAIN DESCRIPTION - DESCRIPTORS
DESCRIPTORS: THROBBING
DESCRIPTORS: THROBBING

## 2023-10-16 NOTE — CARE PLAN
The patient's goals for the shift include      The clinical goals for the shift include pain control    Over the shift, the patient remained safe and free of injury during night. Pain controlled with tramadol. Pt refused NPO even though there can be possible intervention explained. Pt said he will go home if not feeding him. Notified team and diet order changed to regular diet. No other distress noted. Resting quietly at this time.     Lisette Cardoza RN

## 2023-10-16 NOTE — PROGRESS NOTES
I met with Kody at the bedside regarding discharge planning and home going needs. Patient lives in his apartment alone, states that he had a home health aide from [facility] but he fired her. Patient states that he does use a wheelchair to help him get around.  Patient is pending PT/OT for recommendations. Will continue to follow with a safe discharge plan.

## 2023-10-16 NOTE — H&P
"History Of Present Illness  Kody Choi is a 53 y.o. adult presenting with right knee pain .     KODY CHOI is a 53 year old Male with T2DM, CAD s/p stenting and CABG (11/2020), Afib previously on Eliquis, HTN, DLD, RUPESH (not on CPAP), TIA, CKD, R TKA c/b recurrent  prosthetic joint infections s/p explant and antibiotic spacer placement (6/2019) and history of MSSA/MRSA chronic R knee infections on chronic  bactrim/keflex suppression who presented to  ED with right knee pain . The patient was here a few months ago 06/27/2023 for same issue and ortho recommended AKA which he refused and eventually cx were obtained and regimen was to start bactrim/keflex suppression.    He reported he is coming for his right knee infection \"he knows it\", he was tough to questions and kept mentioning he doesn;t care about who I am or what I do and he is not interested in talking through what happened and he only wants to go out of the ED to a room upstairs. From what I was able to obtain it seems like he had worsening pain and redness in his knee along with worsening swelling for the past few days. He also says he is not sure what medications he is on, he takes his aspirin on/off, his insulin on/off, not sure if he is on statin. He is on oral antibiotics and that is what he knows. On ROS he mentioned he had chest pain last week for which he mentioned to his PCP but nothing other than that happened, it was central, sharp, mild, constant that lasted for a week and resolved 3 days ago and is currently chest pain free.     PMHX: As mentioned  PSHX: As mentioned  FH: Cancer and DM runs in the family  SH:  Lives alone, just fired his C and is trying to set up new one  Denies tobacco, ETOH or illicit drug use    ED course:   Vitals:    10/15/23 1327   BP: 165/85   Pulse: 102   Resp: 18   Temp: 36.9 °C (98.4 °F)   SpO2: 94%     CBC: WBC 12.9, Hgb 11.4,    Chem: Glucose 660 (Na 126 corrected 134)  Cr 1.87, BUN 30   CRP 8.52 (baseline " around 8)   ESR >130 Was above 130 3 months ago   Femur/Knee XR:   IMPRESSION:  Redemonstration of findings concerning for hardware failure with  disassociation of the articulating arthroplasty components to there  intramedullary components. Findings are consistent with known chronic  infection.      Extensive soft tissue swelling, likely reflecting cellulitis.    IMPRESSION:  1. Postsurgical changes of total knee arthroplasty status post  explant with antibiotic spacer placement. Stable appearance of bony  fragmentation of the tibial and femoral intramedullary components  with posterior dislocation. No significant interval change. Large  joint effusion with diffuse soft tissue swelling again noted. Please  refer to separate report of dedicated same day knee x-rays for detail.        Past Medical History  Past Medical History:   Diagnosis Date    Personal history of other endocrine, nutritional and metabolic disease     History of type 2 diabetes mellitus    Unspecified astigmatism, bilateral 01/04/2016    Astigmatism, bilateral       Surgical History  Past Surgical History:   Procedure Laterality Date    KNEE SURGERY  10/29/2014    Knee Surgery        Social History  He has no history on file for tobacco use, alcohol use, and drug use.    Family History  No family history on file.     Allergies  Loratadine and Lisinopril    Review of Systems   Constitutional:  Positive for chills and fever.   HENT:  Negative for ear discharge and ear pain.    Eyes:  Positive for pain (has implants that he needs to fix).   Respiratory:  Negative for cough, chest tightness and shortness of breath.    Cardiovascular:  Negative for chest pain and palpitations.   Gastrointestinal:  Negative for abdominal pain, constipation, diarrhea, nausea and vomiting.   Genitourinary:  Negative for flank pain, frequency and urgency.   Musculoskeletal:  Positive for joint swelling.   Skin:  Positive for color change.   Neurological:  Negative for  "seizures, light-headedness, numbness and headaches.   Psychiatric/Behavioral:  Positive for agitation and behavioral problems. The patient is nervous/anxious and is hyperactive.           Physical Exam  Constitutional:       Appearance: Normal appearance. He is normal weight.   HENT:      Head: Normocephalic and atraumatic.      Mouth/Throat:      Mouth: Mucous membranes are moist.      Pharynx: Oropharynx is clear.   Eyes:      Extraocular Movements: Extraocular movements intact.      Conjunctiva/sclera: Conjunctivae normal.   Cardiovascular:      Rate and Rhythm: Normal rate and regular rhythm.      Heart sounds: No murmur heard.     No gallop.   Pulmonary:      Effort: Pulmonary effort is normal. No respiratory distress.      Breath sounds: Normal breath sounds. No wheezing.   Abdominal:      General: Bowel sounds are normal. There is no distension.      Palpations: Abdomen is soft.      Tenderness: There is no abdominal tenderness.   Musculoskeletal:      Cervical back: Normal range of motion and neck supple.   Skin:     General: Skin is warm and dry.      Findings: Erythema (swelling and severe tenderness along with redness noted on the right knee) present.   Neurological:      General: No focal deficit present.      Mental Status: He is alert and oriented to person, place, and time.            Last Recorded Vitals  Blood pressure 165/85, pulse 102, temperature 36.9 °C (98.4 °F), temperature source Oral, resp. rate 18, height 1.676 m (5' 6\"), weight 150 kg (330 lb), SpO2 94 %.    Relevant Results             Assessment/Plan   Active Problems:  There are no active Hospital Problems.      SAM GONZALEZ is a 53 year old Male with T2DM, CAD s/p stenting and CABG (11/2020), Afib previously on Eliquis, HTN, DLD, RUPESH (not on CPAP), TIA, CKD, R TKA c/b recurrent  prosthetic joint infections s/p explant and antibiotic spacer placement (6/2019) and history of MSSA/MRSA chronic R knee infections on chronic  bactrim/keflex " suppression who presented to  ED with right knee pain. Labs showed elevated ESR and CRP and XR knee showed Redemonstration of findings concerning for hardware failure with disassociation of the articulating arthroplasty components to there intramedullary components. Findings are consistent with known chronic infection.     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   - Admit to medicine  - Monitor VS  - Tylenol for mild pain  - Tramadol for moderate to severe  - PT/OT consult  - Ortho consult  - ID consult  - Keep keflex and bactrim for now, would not broaden (it would be beneficial if we can get a sample tomorrow if ortho decides to go for OR and the patient is currently stable with no reason to acutely broaden)    # Uncontrolled DM with hyperglycemia  - Insulin lantus 100 Units at bed time, will resume tonight with 50 units (will keep him NPO in case he needs surgery in the AM)  - ISS and accucheks  - Hold jardiuance    # GERD  - c/w omeprazole --> Pantoprazole    # HTN  - c/w amlodipine 5 mg po daily    F: prn   E: prn   N: Regular (refused diabetic and threatened to leave AMA(   A: PIV  DVT Ppx: Hold perioperatively   Code status: Full code  NOK: Friend Lyla Gaona (Other)  [phone]               Don Boss MD

## 2023-10-16 NOTE — PROGRESS NOTES
Occupational Therapy    Evaluation    Patient Name: Kody Choi  MRN: [id number]  Today's Date: 10/16/2023  Time Calculation  Start Time: 0842  Stop Time: 0900  Time Calculation (min): 18 min    Assessment  IP OT Assessment  OT Assessment: Pt presents with difficulty with ADLs, decreased strength, decreased balance, decreased endurance, and decreased functional mobility. Pt would benefit from continued OT services to maximize independence in all of these areas. Will continue to follow.  Prognosis: Good  Barriers to Discharge: Decreased caregiver support  Evaluation/Treatment Tolerance: Patient limited by fatigue, Patient limited by pain  Medical Staff Made Aware: Yes  End of Session Communication: Bedside nurse  End of Session Patient Position: Bed, 3 rail up, Alarm on  Plan:  Treatment Interventions: ADL retraining, Functional transfer training, Endurance training, Equipment evaluation/education, Compensatory technique education  OT Frequency: 2 times per week  OT Discharge Recommendations: Moderate intensity level of continued care  Equipment Recommended upon Discharge:  (hip kit)  OT Recommended Transfer Status: Moderate assist, Assist of 1  OT - OK to Discharge: Yes    Subjective   Current Problem:  1. Chronic infection of knee (CMS/HCC)        2. Chronic infection of knee joint prosthesis, sequela        3. Debility          General:  General  Reason for Referral: who presented to  ED with right knee pain  Past Medical History Relevant to Rehab: T2DM, CAD s/p stenting and CABG (11/2020), Afib,HTN, DLD, RUPESH (not on CPAP), TIA, CKD, R TKA c/b recurrent  prosthetic joint infections s/p explant and antibiotic spacer placement (6/2019) and history of MSSA/MRSA chronic R knee infections on chronic  bactrim/keflex suppression . The patient was here a few months ago 06/27/2023 for same issue and ortho recommended AKA which he refused  Family/Caregiver Present: No  Prior to Session Communication: Bedside nurse  Patient  Position Received: Bed, 3 rail up, Alarm off, not on at start of session  General Comment: EOB end of session, refused to wear gown  Precautions:  Medical Precautions: Fall precautions  Vital Signs:     Pain:  Pain Assessment  Pain Assessment: 0-10  Pain Score: 10 - Worst possible pain  Pain Location:  (RLE)    Objective   Cognition:  Overall Cognitive Status: Within Functional Limits  Orientation Level: Disoriented X4           Home Living:  Type of Home: Apartment  Lives With: Alone  Home Adaptive Equipment: Wheelchair-manual (shower chair, grab bars in bathroom)  Home Layout: One level (elevator)  Bathroom Shower/Tub: Tub only (step to enter tub, reports difficulty getting in and out of shower)   Prior Function:  Level of [place]: Independent with ADLs and functional transfers, slides to/from wheelchair  Receives Help From:  (reported had homecare RN but 'fired them')  ADL Assistance: Needs assistance, reports difficulty with LBD and bathing  Bath: Moderate  Toileting: Assistive device  Dressing: Moderate  Grooming:  (I)  Vocational: Unemployed  Leisure:  (wants to go to concert this week)  Hand Dominance: Right  IADL History:  IADL Comments: reports can order groceries thorugh insurance but they are hard to get, A driving, - pets  ADL:  Eating Assistance: Independent  Grooming Assistance: Independent (anticipated seated)  Bathing Assistance: Moderate (anticipated)  UE Dressing Assistance: Independent (anticipated)  LE Dressing Assistance: Minimal (anticipated AE)  Toileting Assistance with Device: Moderate (anticipated)  ADL Comments: reports cannot transfer to wheelchair this date 2/2 pain       Bed Mobility/Transfers: Bed Mobility  Bed Mobility: Yes  Bed Mobility 1  Level of Assistance 1:  (sup/sit CGA refused scooting or transfer to wheelchair)    IADL's:   IADL Comments: reports can order groceries thorugh insurance but they are hard to get, A driving, - pets  Vision: Vision - Basic Assessment  Current  Vision:  (reports visual deficits, no glasses present +blurry)  Sensation:  Light Touch:  (hypersensitivity RLE, +red, +edema)  Strength:  Strength Comments:  (BUE WFL)  Perception:     Coordination:  Movements are Fluid and Coordinated: Yes   Hand Function:  Hand Function  Gross Grasp: Functional  Extremities: RUE   RUE : Within Functional Limits and LUE   LUE: Within Functional Limits      Outcome Measures: [facility] Daily Activity  Putting on and taking off regular lower body clothing: A lot  Bathing (including washing, rinsing, drying): A lot  Putting on and taking off regular upper body clothing: None  Toileting, which includes using toilet, bedpan or urinal: A lot  Taking care of personal grooming such as brushing teeth: None  Eating Meals: None  Daily Activity - Total Score: 18         and Brief Confusion Assessment Method (bCAM)  CAM Result: CAM -  Education Documentation  Precautions, taught by Mariana Bello OT at 10/16/2023 10:24 AM.  Learner: Patient  Readiness: Acceptance  Method: Explanation  Response: Verbalizes Understanding, Needs Reinforcement    ADL Training, taught by Mariana Bello OT at 10/16/2023 10:24 AM.  Learner: Patient  Readiness: Acceptance  Method: Explanation  Response: Verbalizes Understanding, Needs Reinforcement    Education Comments  No comments found.      Goals:   Encounter Problems       Encounter Problems (Active)       ADLs       Patient will perform UB and LB bathing  with SBA , AE  (Progressing)       Start:  10/16/23    Expected End:  11/06/23            Patient with complete upper body dressing with independent level of assistance donning and doffing all UE clothes with no adaptive equipment  (Progressing)       Start:  10/16/23    Expected End:  11/06/23            Patient with complete lower body dressing with modified independent level of assistance donning and doffing all LE clothes  with PRN adaptive equipment  (Progressing)       Start:  10/16/23    Expected End:   11/06/23            Patient will complete daily grooming with independent level of assistance and PRN adaptive equipment  (Progressing)       Start:  10/16/23    Expected End:  11/06/23            Patient will complete toileting including hygiene clothing management/hygiene with stand by assist level of assistance and LRD. (Progressing)       Start:  10/16/23    Expected End:  11/06/23               MOBILITY       Patient will perform Functional mobility max Household distances/Community Distances with modified independent level of assistance and least restrictive device in order to improve safety and functional mobility. (Progressing)       Start:  10/16/23    Expected End:  11/06/23               TRANSFERS       Patient will perform bed mobility independent level of assistance and bed rails in order to improve safety and independence with mobility (Progressing)       Start:  10/16/23    Expected End:  11/06/23            Patient will complete functional transfer with least restrictive device with modified independent level of assistance. (Progressing)       Start:  10/16/23    Expected End:  11/06/23

## 2023-10-16 NOTE — PROGRESS NOTES
"Kody Choi is a 53 y.o. adult on day 1 of admission presenting with Chronic infection of knee (CMS/HCC).    Subjective   Sitting up at bedside. No distress.  Reports acute on chronic right knee pain and new redness.  Once again states that he will not have surgery.  States that he does not want changes in his diet.      Objective     General: Sitting up in bed without distress.  Moderate to severely obese.  Cooperative.  Skin: Erythema around right knee noted.  HEENT: Sclera is white.  Mucous membranes moist.  Cardiac: Regular rate and rhythm, S1/S2 somewhat distant.  Lungs: Clear to auscultation bilaterally, no wheezing or crackles, no accessory muscle use at rest.  Abdomen: Soft, nontender, severely obese abdomen, BS +  Extremities: No cyanosis.  Swelling of right leg, around knee, noted more compared to left.  Neurologic: Alert and oriented x3.  No focal deficits.  Psychiatric: Appropriate mood and behavior.  Currently no agitation.    Last Recorded Vitals  Blood pressure 153/83, pulse 107, temperature 36.4 °C (97.6 °F), temperature source Temporal, resp. rate 17, height 1.676 m (5' 6\"), weight 150 kg (330 lb), SpO2 94 %.  On room air.    Intake/Output last 3 Shifts:  I/O last 3 completed shifts:  In: 600 (4 mL/kg) [P.O.:600]  Out: - (0 mL/kg)   Weight: 149.7 kg     Relevant Results  amLODIPine, 5 mg, oral, Daily  cephalexin, 500 mg, oral, q12h SUDHIR  empagliflozin, 25 mg, oral, Daily  insulin glargine, 100 Units, subcutaneous, Daily  insulin lispro, 0-10 Units, subcutaneous, TID with meals  insulin lispro, 10 Units, subcutaneous, TID with meals  lidocaine, 1 patch, transdermal, Daily  pantoprazole, 40 mg, oral, Daily before breakfast  polyethylene glycol, 17 g, oral, Daily  sulfamethoxazole-trimethoprim, 1 tablet, oral, q12h           PRN medications: acetaminophen, dextrose 10 % in water (D10W), dextrose 10 % in water (D10W), dextrose, dextrose, glucagon, glucagon, guaiFENesin, traMADol, traZODone      [facility] " course:  SAM GONZALEZ is a 53 year old Male with T2DM, CAD s/p stenting and CABG (11/2020), Afib previously on Eliquis, HTN, DLD, RUPESH (not on CPAP), TIA, CKD, R TKA c/b recurrent  prosthetic joint infections s/p explant and antibiotic spacer placement (6/2019) and history of MSSA/MRSA chronic R knee infections on chronic  bactrim/keflex suppression who presented to  ED with right knee pain .  Orthopedic surgery saw the patient, aspirated the knee and sent for culture.  Their recommendation remains the same as previous which is recommended for AKA.  Patient still refusing surgery.  Infectious disease consulted.    Assessment/Plan     Possibly acute on chronic right knee prosthetic joint infection  -Continued on home oral Keflex and Bactrim.  Consult infectious disease for recommendations.  -Monitor for culture from knee aspirate orthopedic surgery had done.  -Tylenol as needed, add lidocaine patch to right knee.  Can continue tramadol as needed for severe pain for now.  -Repeat CBC in the morning.    Diabetes mellitus type 2  -Very poorly controlled.  Patient refuses to do diet control.  -Change home basal insulin Lantus back to 100 units in the morning.  Patient agreeable to scheduling prandial insulin with meals, start Humalog 10 units with meals.  Continue sliding scale insulin.  Restarted Jardiance 25 mg daily.    Hypertension  -Continue amlodipine 5 mg daily, restart Jardiance 25 mg daily.  -Systolic has been ranging 150s to 170s.  Will continue to monitor.    Chronic kidney disease stage IIIb  -Baseline creatinine for the last 2 years has been between 1.6-1.8.  Patient is currently at baseline with last creatinine 1.73.  Monitor as needed.    Hyponatremia  -Mild, sodium level 131.  Likely secondary to uncontrolled hyperglycemia.  -Monitor as needed.    Physical deconditioning  -Therapy has recommended skilled nursing facility, patient states that he is refusing.      Best Murphy MD

## 2023-10-16 NOTE — ED NOTES
Pharmacy Medication History Review    Kody Choi is a 53 y.o. adult admitted for Chronic infection of knee (CMS/[facility]). Pharmacy reviewed the patient's [id number] medications and allergies for accuracy.    The list below reflectives the updated PTA list. Please review each medication in order reconciliation for additional clarification and justification.    Prior to Admission Medications   Prescriptions Last Dose Informant Patient Reported? Taking?   aspirin 81 mg EC tablet  Other No No   Sig: Take 1 tablet (81 mg) by mouth once daily.   atorvastatin (Lipitor) 40 mg tablet Past Week Other, Self Yes Yes   Sig: Take 1 tablet (40 mg) by mouth once daily. Take in evening.   cephalexin (Keflex) 500 mg capsule Past Week Other, Self Yes Yes   Sig: TAKE 1 CAPSULE BY MOUTH EVERY 6 HOURS UNTIL 07/31/23. START TAKING TWICE DAILY STARTING ON 08/01/23. *Last filled 08/07/2023, 90 day supply; directions: 1 capsule PO BID*   empagliflozin (Jardiance) 25 mg Past Week Other, Self Yes Yes   Sig: Take 1 tablet (25 mg) by mouth once daily.   fluticasone (Flonase) 50 mcg/actuation nasal spray  Other, Self Yes Yes   Sig: Administer 2 sprays into each nostril once daily as needed for rhinitis. Shake gently. Before first use, prime pump. After use, clean tip and replace cap.   insulin glargine (Lantus) 100 unit/mL (3 mL) pen Past Week Self, Other Yes Yes   Sig: Inject 100 Units under the skin once daily in the morning. Take as directed per insulin instructions. *Unsure of official RX sig. Patient reported directions. Last filled 09/13/2023; 90 day supply*   omeprazole (PriLOSEC) 20 mg DR capsule   Yes Yes   Sig: Take 1 capsule (20 mg) by mouth once daily in the morning. Take before meals.   semaglutide (Rybelsus) 7 mg tablet Past Week Self, Other Yes Yes   Sig: Take 1 tablet (7 mg) by mouth early in the morning.. Take on empty stomach.   sulfamethoxazole-trimethoprim (Bactrim DS) 800-160 mg tablet Past Week Other, Self Yes Yes   Sig:  "TAKE 1 TABLET BY MOUTH EVERY 12 HOURS   traMADol (Ultram) 50 mg tablet Past Week Other, Self Yes Yes   Sig: TAKE 1 TABLET BY MOUTH EVERY 8 HOURS AS NEEDED FOR MODERATE PAIN   traZODone (Desyrel) 50 mg tablet Past Week Other, Self Yes Yes   Sig: Take 1 tablet (50 mg) by mouth once daily at bedtime. *Unsure of official RX sig. Patient reported directions; last filled 08/07/2023, 90 day supply*      Facility-Administered Medications: None      The list below reflectives the updated allergy list. Please review each documented allergy for additional clarification and justification.  Allergies  Reviewed by Chuy LugoD on 10/16/2023        Severity Reactions Comments    Loratadine High Cough, Palpitations A fib Palpitations    Pollen Extracts Not Specified Other, Runny nose Sneezing, nasal congestion    Lisinopril Low Cough \"Really bad cough\"        Sources:  ID follow up- [facility] 08/24/2023,  Discharge Summary 07/02/2023, Medication Dispense History (surescripts), [facility] (tramadol 50 mg-30 day supply 09/06/2023 and 07/11/2023), Patient Interview (theodore historian, showed home prescription medication bottles)      Below are additional concerns with the patient's PTA list.  -All medications, directions, and dosages were verified using patient recent prescription fill history records (most filled with [place] pharmacy on 08/24/2023 or later; 90 day supply) or  Discharge summary from 07/02/2023. Patient provided home prescription bottles to pharmacy, except trazodone and atorvastatin)  -Cough reported by patient as unrelieved with Mucinex given in ED. Requested different medication to alleviate productive-type cough symptoms   -Refills due for Jardiance 25 mg and omeprazole 20 mg (last filled 03/16/2023, 90 day supply; possibly non-compliant). Patient indicated they are still taking both medications daily.  -Tramadol recently filled for patient, as 90 tablets for 30 day supply on 09/06/2023. No " documents supporting change (past directions: 1 tablet Q6H PRN moderate pain). *Patient reported they are taking 3 tablets Q8H some days for severe pain, possible OVERUSE; OARRS fill history looks acceptable. Educate patient on safe/appropriate use of opioids.*  -Atorvastatin is taken in the evening per patient, and Lantus Solostar pens are injected daily in the morning.   -Aspirin is not being taken; patient willing to take if prescribed again.   -Last doses of medications taken reported by patient as the morning of   10/15/2023.     Edda Allen PharmD, [facility] PGY1 Pharmacy Resident  Pleas reach out via Epic Chat for questions, or if no response call [employer] or enosiX  [facility]

## 2023-10-16 NOTE — CARE PLAN
Problem: Balance  Goal: Sitting EOB 20 minutes with 0 UE support, Ind for static and dynamic sitting.  Standing 5 minutes with FWW, Ind.  Outcome: Progressing     Problem: Mobility  Goal: Ambulate 50 feet with FWW and Ind  Outcome: Progressing     Problem: Transfers  Goal: sit<>stand, bed<>chair with FWW, Ind  Outcome: Progressing

## 2023-10-16 NOTE — PROGRESS NOTES
Emergency Medicine Transition of Care Note.    I received Kody Choi in signout from Dr. Christiansen.  Please see the previous ED provider note for all HPI, PE and MDM up to the time of signout at 1900. This is in addition to the primary record.    In brief Kody Choi is an 53 y.o. adult presenting for   Chief Complaint   Patient presents with    Knee Pain     At the time of signout we were awaiting: ortho recs and labs    Diagnoses as of 10/16/23 0459   Chronic infection of knee (CMS/[facility])   Chronic infection of knee joint prosthesis, sequela   Debility       Medical Decision Making  Patient was seen in the ED for evaluation of worsening of chronic right knee prosthesis infection with acute pain and swelling.  Patient was acutely hypoglycemic upon being taken in my care.  He is not in DKA or HHS.  Patient had Chinese food for lunch and does not follow diabetic diet.  Patient became very upset that we were not giving him fluid and stated that we are doing nothing for him if we were not feeding him.  Explained to patient that he has been evaluated by orthopedics and they recommend ongoing IV antibiotics, he needs to be admitted for further evaluation and work-up however must have glycemic control of at least less than 400 before he is considered stable for bed in the floor.  Patient was given his home insulin regimen of 100 units Lantus as well as sliding scale after which she achieved blood glucose less than 400.  Patient remained asymptomatic throughout all of this.  Patient was given Robitussin for cough at patient's request and will be followed with Carprofen sliding scale.  Patient is agreeable with plan for admission, though expresses concerns that diabetic diet would not be a preferred.  Explained that importance of good glycemic control to assist with wound healing.  Patient expresses understanding and is agreeable for admission with no further questions.        Final diagnoses:   [M00.9] Chronic infection of knee  (CMS/[facility])   [T84.59XS, Z96.659] Chronic infection of knee joint prosthesis, sequela   [R53.81] Debility           Procedure  Procedures    Nikkie Judd, DO Nikkie Judd, DO  PGY-2 Emergency Medicine

## 2023-10-16 NOTE — CARE PLAN
Problem: ADLs  Goal: Patient will perform UB and LB bathing  with SBA , AE   Outcome: Progressing  Goal: Patient with complete upper body dressing with independent level of assistance donning and doffing all UE clothes with no adaptive equipment   Outcome: Progressing  Goal: Patient with complete lower body dressing with modified independent level of assistance donning and doffing all LE clothes  with PRN adaptive equipment   Outcome: Progressing  Goal: Patient will complete daily grooming with independent level of assistance and PRN adaptive equipment   Outcome: Progressing  Goal: Patient will complete toileting including hygiene clothing management/hygiene with stand by assist level of assistance and LRD.  Outcome: Progressing     Problem: TRANSFERS  Goal: Patient will perform bed mobility independent level of assistance and bed rails in order to improve safety and independence with mobility  Outcome: Progressing  Goal: Patient will complete functional transfer with least restrictive device with modified independent level of assistance.  Outcome: Progressing     Problem: MOBILITY  Goal: Patient will perform Functional mobility max Household distances/Community Distances with modified independent level of assistance and least restrictive device in order to improve safety and functional mobility.  Outcome: Progressing

## 2023-10-16 NOTE — CONSULTS
"Reason For Consult  Injury: Chronic R knee PJI    History Of Present Illness  Injury: Chronic R knee PJI  HPI: 53M (CAD, CANG, IDDM, AFib, CKD, sternum OM) s/p R TKA w Dr. 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 Oreilly p/w worsening R knee pain. History of draining sinus tract; closed at today’s presentation. Last seen by orthopaedics 6/27 R knee aspirate positive for GBS; refused R AKA at that time.      Past Medical History  He has a past medical history of Personal history of other endocrine, nutritional and metabolic disease and Unspecified astigmatism, bilateral (01/04/2016).    Surgical History  He has a past surgical history that includes Knee surgery (10/29/2014).     Social History  He has no history on file for tobacco use, alcohol use, and drug use.    Family History  No family history on file.     Allergies  Loratadine and Lisinopril     Physical Exam  Constitutional: Comfortable, NAD  HEENT: hearing and vision grossly intact, MMM  Resp: breathing comfortably   CV: extremities warm, well perfused  GI: abd soft  Neuro: AAOx3, sensory and motor grossly intact  Psych: Appropriate mood and affect    RLE:    -Skin intact, evidence of previously open draining sinus tracts (not draining at today's presentation). Prior incisions closed  - Moderate erythema over R knee.   -Tender to palpation over R knee  -Motor intact in DF/PF/EHL/FHL  -SILT in saph/sural/SPN/DPN distributions  -Foot warm, well perfused  -Compartments soft and compressible       Last Recorded Vitals  Blood pressure 155/78, pulse 78, temperature 36.9 °C (98.4 °F), temperature source Oral, resp. rate 12, height 1.676 m (5' 6\"), weight 150 kg (330 lb), SpO2 95 %.    Relevant Results      Scheduled medications  lidocaine-epinephrine, , ,   amLODIPine, 5 mg, oral, Daily  cephalexin, 500 mg, oral, q12h SUDHIR  insulin glargine, 100 Units, subcutaneous, Nightly  insulin lispro, 0-10 Units, subcutaneous, TID with " meals  insulin lispro, 0-20 Units, subcutaneous, q4h  pantoprazole, 40 mg, oral, Daily before breakfast  polyethylene glycol, 17 g, oral, Daily  sulfamethoxazole-trimethoprim, 1 tablet, oral, q12h      Continuous medications     PRN medications  PRN medications: lidocaine-epinephrine, acetaminophen, dextrose 10 % in water (D10W), dextrose 10 % in water (D10W), dextrose, dextrose, glucagon, glucagon, traMADol  Results for orders placed or performed during the hospital encounter of 10/15/23 (from the past 24 hour(s))   CBC and Auto Differential   Result Value Ref Range    WBC 12.9 (H) 4.4 - 11.3 x10*3/uL    nRBC 0.0 0.0 - 0.0 /100 WBCs    RBC 5.01 4.00 - 5.90 x10*6/uL    Hemoglobin 11.4 (L) 12.0 - 17.5 g/dL    Hematocrit 36.6 36.0 - 52.0 %    MCV 73 (L) 80 - 100 fL    MCH 22.8 (L) 26.0 - 34.0 pg    MCHC 31.1 (L) 32.0 - 36.0 g/dL    RDW 15.5 (H) 11.5 - 14.5 %    Platelets 430 150 - 450 x10*3/uL    MPV 10.3 7.5 - 11.5 fL    Neutrophils % 80.6 40.0 - 80.0 %    Immature Granulocytes %, Automated 1.3 (H) 0.0 - 0.9 %    Lymphocytes % 8.5 13.0 - 44.0 %    Monocytes % 6.7 2.0 - 10.0 %    Eosinophils % 2.2 0.0 - 6.0 %    Basophils % 0.7 0.0 - 2.0 %    Neutrophils Absolute 10.39 (H) 1.20 - 7.70 x10*3/uL    Immature Granulocytes Absolute, Automated 0.17 0.00 - 0.70 x10*3/uL    Lymphocytes Absolute 1.09 (L) 1.20 - 4.80 x10*3/uL    Monocytes Absolute 0.86 0.10 - 1.00 x10*3/uL    Eosinophils Absolute 0.28 0.00 - 0.70 x10*3/uL    Basophils Absolute 0.09 0.00 - 0.10 x10*3/uL   Comprehensive metabolic panel   Result Value Ref Range    Glucose 660 (HH) 74 - 99 mg/dL    Sodium 126 (L) 136 - 145 mmol/L    Potassium 4.7 3.5 - 5.3 mmol/L    Chloride 92 (L) 98 - 107 mmol/L    Bicarbonate 23 21 - 32 mmol/L    Anion Gap 16 10 - 20 mmol/L    Urea Nitrogen 30 (H) 6 - 23 mg/dL    Creatinine 1.87 (H) 0.50 - 1.30 mg/dL    eGFR 31 (L) >60 mL/min/1.73m*2    Calcium 8.5 (L) 8.6 - 10.6 mg/dL    Albumin 3.0 (L) 3.4 - 5.0 g/dL    Alkaline Phosphatase  150 (H) 33 - 120 U/L    Total Protein 6.3 (L) 6.4 - 8.2 g/dL    AST 9 9 - 39 U/L    Bilirubin, Total 0.2 0.0 - 1.2 mg/dL    ALT 10 7 - 52 U/L   C-reactive protein   Result Value Ref Range    C-Reactive Protein 8.52 (H) <1.00 mg/dL   Sedimentation rate, automated   Result Value Ref Range    Sedimentation Rate >130 (H) 0 - 30 mm/h   Blood Culture    Specimen: Peripheral Venipuncture; Blood culture   Result Value Ref Range    Blood Culture Loaded on Instrument - Culture in progress    Blood Culture    Specimen: Peripheral Venipuncture; Blood culture   Result Value Ref Range    Blood Culture Loaded on Instrument - Culture in progress    Hemoglobin A1c   Result Value Ref Range    Hemoglobin A1C 14.7 (H) see below %    Estimated Average Glucose 375 Not Established mg/dL   POCT GLUCOSE   Result Value Ref Range    POCT Glucose 480 (H) 74 - 99 mg/dL   Blood Gas Venous Full Panel Unsolicited   Result Value Ref Range    POCT pH, Venous 7.38 7.33 - 7.43 pH    POCT pCO2, Venous 42 41 - 51 mm Hg    POCT pO2, Venous 35 35 - 45 mm Hg    POCT SO2, Venous 57 45 - 75 %    POCT Oxy Hemoglobin, Venous 56.6 45.0 - 75.0 %    POCT Hematocrit Calculated, Venous 35.0 (L) 36.0 - 52.0 %    POCT Sodium, Venous 129 (L) 136 - 145 mmol/L    POCT Potassium, Venous 3.7 3.5 - 5.3 mmol/L    POCT Chloride, Venous 97 (L) 98 - 107 mmol/L    POCT Ionized Calicum, Venous 1.18 1.10 - 1.33 mmol/L    POCT Glucose, Venous 370 (H) 74 - 99 mg/dL    POCT Lactate, Venous 1.5 0.4 - 2.0 mmol/L    POCT Base Excess, Venous -0.4 -2.0 - 3.0 mmol/L    POCT HCO3 Calculated, Venous 24.8 22.0 - 26.0 mmol/L    POCT Hemoglobin, Venous 11.8 (L) 12.0 - 17.5 g/dL    POCT Anion Gap, Venous 11.0 10.0 - 25.0 mmol/L    Patient Temperature 37.0 degrees Celsius        Assessment/Plan     Injury: Chronic R knee PJI  HPI: 53M (CAD, CANG, IDDM, AFib, CKD, sternum OM) s/p R TKA w Dr. 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 Oreilly p/w worsening R  knee pain. History of draining sinus tract; closed at today’s presentation. Last seen by orthopaedics 6/27 R knee aspirate positive for GBS; refused R AKA at that time. Closed, NVI. WBC 12.9 CRP 8.5 ESR >130. XRs w failure of spacer. Aspirated for 2 cc bloody tinged fluid, crystals neg. Cell count canceled due to volume. Culture pending.     Plan:   Medicine admission for IV abx.   Continue previous recommendation of R AKA; patient refusing any operative intervention at this time.   Ortho available if patient elects for operative intervention  Ortho Trauma to follow peripherally      Keri Griffin MD    Patient will be followed by the Orthopaedic Trauma Team:  Padilla Gaona  Available via [employer]

## 2023-10-16 NOTE — CONSULTS
Consults  Referred by Best Murphy MD    Primary MD: Number Reassigned to [id number] (Inactive)    Reason For Consult  acute on chronic right prosthetic knee infection    History Of Present Illness  Kody Choi is a 53 y.o. 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 Oreilly p/w a couple of days hx of worsening R knee pain.      Past Medical History  He has a past medical history of Personal history of other endocrine, nutritional and metabolic disease and Unspecified astigmatism, bilateral (01/04/2016).    Surgical History  He has a past surgical history that includes Knee surgery (10/29/2014).     Social History     Occupational History    Not on file   Tobacco Use    Smoking status: Never     Passive exposure: Never    Smokeless tobacco: Never   Substance and Sexual Activity    Alcohol use: Not on file    Drug use: Not on file    Sexual activity: Not on file   Allergies  Loratadine, Pollen extracts, and Lisinopril      Objective  Range of Vitals (last 24 hours)  Heart Rate:  []   Temp:  [36.4 °C (97.6 °F)-36.6 °C (97.9 °F)]   Resp:  [12-17]   BP: (153-175)/()   SpO2:  [94 %-95 %]   Daily Weight  10/15/23 : 150 kg (330 lb)    Body mass index is 53.26 kg/m².     Physical Exam  General: alert, able to answer questions  HEENT: no conjunctival injection. anicteric.  CVS: RRR. Normal S1 and S2. No m/r/g.   Ext: No swelling of the LLE. Swelling and tender R knee without any erythema. No tenderness or erythema in R thigh or R below knee.  No discharge.  Neuro: Answers questions appropriately.  Integumentary: no obvious lesions   Rheumatologic: No joint swelling or edema     Relevant Results  Labs  Results from last 72 hours   Lab Units 10/16/23  0403 10/15/23  1728   WBC AUTO x10*3/uL 12.5* 12.9*   HEMOGLOBIN g/dL 11.6* 11.4*   HEMATOCRIT % 39.5 36.6   PLATELETS AUTO x10*3/uL 379 430   NEUTROS PCT AUTO %  --  80.6   LYMPHS PCT AUTO %  --  8.5   MONOS  "PCT AUTO %  --  6.7   EOS PCT AUTO %  --  2.2     Results from last 72 hours   Lab Units 10/16/23  0403 10/15/23  1728   SODIUM mmol/L 131* 126*   POTASSIUM mmol/L 4.1 4.7   CHLORIDE mmol/L 98 92*   CO2 mmol/L 21 23   BUN mg/dL 33* 30*   CREATININE mg/dL 1.73* 1.87*   GLUCOSE mg/dL 255* 660*   CALCIUM mg/dL 8.9 8.5*   ANION GAP mmol/L 16 16   EGFR mL/min/1.73m*2 35* 31*     Results from last 72 hours   Lab Units 10/15/23  1728   ALK PHOS U/L 150*   BILIRUBIN TOTAL mg/dL 0.2   PROTEIN TOTAL g/dL 6.3*   ALT U/L 10   AST U/L 9   ALBUMIN g/dL 3.0*     Estimated Creatinine Clearance (by C-G formula based on SCr of 1.73 mg/dL (H))  Female: 55.5 mL/min (A)  Male: 67.1 mL/min (A)  The patient&#39;s sex/gender information is inconclusive; review their information before proceeding.  C-Reactive Protein   Date Value Ref Range Status   10/15/2023 8.52 (H) <1.00 mg/dL Final     CRP   Date Value Ref Range Status   07/01/2023 8.40 (A) mg/dL Final     Comment:     REF VALUE  < 1.00     06/26/2023 15.57 (A) mg/dL Final     Comment:     REF VALUE  < 1.00       Sedimentation Rate   Date Value Ref Range Status   10/15/2023 >130 (H) 0 - 30 mm/h Final   06/28/2023 >130 (H) 0 - 20 mm/h Final   06/28/2023 CANCELED       Comment:     Result canceled by the ancillary.     No results found for: \"HIV1X2\", \"HIVCONF\", \"FUKQOX8AZ\"  No results found for: \"HEPCABINIT\", \"HEPCAB\", \"HCVPCRQUANT\"  Microbiology  10/15 Synovial culture Gram stain showing no organism: cell count canceled due to volume  10/15 Bcx NGTD    Assessment/Plan  #C/f R PJI   #Hx of MRSA PJI in 2019 with chronic suppression chepharaxin and bactrim    Patient developed worsening R knee in the last couple of days. Synovial tap and blood cultures have done. Primary team previously recommended R AKA, but patient has been not interested in surgical intervention at this point. Would recommend start IV vancomycin and will await the culture.    Recommendations  -Please start IV vancomycin " (pharmacy protocol)  -Will await the cultures    Discussed with Dr. Jalloh. Please text me via Epic chat if you have any questions or concerns regarding this patient. ID will continue to follow up this patient.    Baudilio Stallings MD  ID fellow [id number]  Team B Pager [phone]

## 2023-10-16 NOTE — NURSING NOTE
Patient refused NPO after admission. RN explained possible surgery or intervention but patient strongly wants to eat something threatening to leave if not. Informed the team and diet order changed into regular diet(not DM diet). 2 Sandwiches and several diet coke provided. Call light also refused.    Lisette Cardoza RN

## 2023-10-16 NOTE — PROGRESS NOTES
Physical Therapy    Physical Therapy Evaluation & Treatment    Patient Name: Kody Choi  MRN: [id number]  Today's Date: 10/16/2023   Time Calculation  Start Time: 1605  Stop Time: 1629  Time Calculation (min): 24 min    Assessment/Plan   PT Assessment  PT Assessment Results: Decreased strength, Decreased mobility, Impaired balance  Rehab Prognosis: Good  Evaluation/Treatment Tolerance: Patient tolerated treatment well  Medical Staff Made Aware: Yes  End of Session Communication: Bedside nurse  Assessment Comment: Pt appears close to his functional baseline, transferring and ambulating short distance with SBA. Would benefit from continued PT while in hospital to improve strength and balance, for full return to PLOF.  End of Session Patient Position:  (Sitting EOB, RN present)  IP OR SWING BED PT PLAN  Inpatient or Swing Bed: Inpatient  PT Plan  Treatment/Interventions: Bed mobility, Transfer training, Gait training, Balance training, Strengthening, Range of motion, Therapeutic exercise, Therapeutic activity  PT Plan: Skilled PT  PT Frequency: 3 times per week  PT Discharge Recommendations: Low intensity level of continued care  PT Recommended Transfer Status: Assist x1, Assistive device, Stand by assist  PT - OK to Discharge: Yes (Meaning pt has been evaluated and has a dc recc.  Pt should meet home going goals prior to dc home.)      Subjective     General Visit Information:  General  Reason for Referral: R knee pain, pt is again being reccomended for amputation but refusing.  Past Medical History Relevant to Rehab: T2DM, CAD s/p stenting and CABG (11/2020), Afib,HTN, DLD, RUPESH (not on CPAP), TIA, CKD, R TKA c/b recurrent  prosthetic joint infections s/p explant and antibiotic spacer placement (6/2019) and history of MSSA/MRSA chronic R knee infections . The patient was here a few months ago 06/27/2023 for same issue and ortho recommended AKA which he refused  Family/Caregiver Present: No  Prior to Session  Communication: Bedside nurse, Physician (Messaged ortho consult for WB status (WBAT))  Patient Position Received: Bed, 3 rail up, Alarm off, not on at start of session  General Comment: Supine.  Pt very pleasant, cooperative and agreeable to PT.  Reports feeling better since being restarted on his antibiotics.  Admits to missing some doses, and blames himself for his R knee pain getting aggravated.  Reports feeling he just needs a few more days to feel ok going home.  Today able to perform bed mobility, transfers and short distance ambultion with FWW and SBA.  Tolerated well.  Home Living:  Home Living  Type of Home: Apartment  Lives With: Alone  Home Adaptive Equipment: Wheelchair-manual  Home Layout: One level  Prior Level of Function:  Prior Function Per Pt/Caregiver Report  Level of [place]: Independent with ADLs and functional transfers  Ambulatory Assistance: Independent (household distances)  Vocational: On disability  Hand Dominance: Right  Precautions:  Precautions  Medical Precautions: Fall precautions    Objective   Pain:  Pain Assessment  Pain Assessment: 0-10  Pain Score: 6  Pain Type:  (Reporting pain manageable for mobility)  Cognition:  Cognition  Overall Cognitive Status: Within Functional Limits  Orientation Level: Oriented X4    General Assessments:    Activity Tolerance  Endurance: Tolerates 10 - 20 min exercise with multiple rests    Strength  Strength Comments: BUE, LLE WFL, RLE not formally tested but >or=3/5 based on active movement    Coordination  Movements are Fluid and Coordinated: Yes    Static Sitting Balance  Static Sitting-Level of Assistance: Independent  Dynamic Sitting Balance  Dynamic Sitting-Comments: Close Supervision    Static Standing Balance  Static Standing-Level of Assistance: Close supervision  Dynamic Standing Balance  Dynamic Standing-Comments: Close Supervision  Functional Assessments:       Bed Mobility  Bed Mobility: Yes  Bed Mobility 1  Bed Mobility 1: Supine to  sitting  Level of Assistance 1: Independent    Transfers  Transfer: Yes  Transfer 1  Transfer From 1: Sit to, Stand to  Transfer to 1: Sit  Transfer Device 1: Walker  Transfer Level of Assistance 1: Close supervision    Ambulation/Gait Training  Ambulation/Gait Training Performed: Yes  Ambulation/Gait Training 1  Surface 1: Level tile  Device 1: Rolling walker  Assistance 1: Close supervision  Quality of Gait 1:  (Decreased step length, pt with RLE contact on floor, but primarily keeping self NWB to limit pain)  Comments/Distance (ft) 1: 8 feet  Treatments:    Bed Mobility  Bed Mobility: Yes  Bed Mobility 1  Bed Mobility 1: Supine to sitting  Level of Assistance 1: Independent    Ambulation/Gait Training  Ambulation/Gait Training Performed: Yes  Ambulation/Gait Training 1  Surface 1: Level tile  Device 1: Rolling walker  Assistance 1: Close supervision  Quality of Gait 1:  (Decreased step length, pt with RLE contact on floor, but primarily keeping self NWB to limit pain)  Comments/Distance (ft) 1: 8 feet  Transfers  Transfer: Yes  Transfer 1  Transfer From 1: Sit to, Stand to  Transfer to 1: Sit  Transfer Device 1: Walker  Transfer Level of Assistance 1: Close supervision    Outcome Measures:    [facility] Basic Mobility  Turning from your back to your side while in a flat bed without using bedrails: None  Moving from lying on your back to sitting on the side of a flat bed without using bedrails: None  Moving to and from bed to chair (including a wheelchair): A little  Standing up from a chair using your arms (e.g. wheelchair or bedside chair): A little  To walk in hospital room: A little  Climbing 3-5 steps with railing: Total  Basic Mobility - Total Score: 18    Encounter Problems       Encounter Problems (Active)       Balance       Sitting EOB 20 minutes with 0 UE support, Ind for static and dynamic sitting.  Standing 5 minutes with FWW, Ind. (Progressing)       Start:  10/16/23    Expected End:  10/30/23                Mobility       Ambulate 50 feet with FWW and Ind (Progressing)       Start:  10/16/23    Expected End:  10/30/23            Goal 2       Start:  10/16/23    Expected End:  10/30/23               Safety          Transfers       sit<>stand, bed<>chair with FWW, Ind (Progressing)       Start:  10/16/23    Expected End:  10/30/23            Goal 2       Start:  10/16/23    Expected End:  10/30/23                   Education Documentation  Mobility Training, taught by Luis Cleary, PT at 10/16/2023  6:23 PM.  Learner: Patient  Readiness: Eager  Method: Explanation  Response: Verbalizes Understanding, Demonstrated Understanding    Education Comments  No comments found.

## 2023-10-17 LAB
GLUCOSE BLD MANUAL STRIP-MCNC: 332 MG/DL (ref 74–99)
GLUCOSE BLD MANUAL STRIP-MCNC: 373 MG/DL (ref 74–99)
GLUCOSE BLD MANUAL STRIP-MCNC: 398 MG/DL (ref 74–99)
GLUCOSE BLD MANUAL STRIP-MCNC: 515 MG/DL (ref 74–99)

## 2023-10-17 PROCEDURE — 2500000001 HC RX 250 WO HCPCS SELF ADMINISTERED DRUGS (ALT 637 FOR MEDICARE OP): Performed by: INTERNAL MEDICINE

## 2023-10-17 PROCEDURE — 99232 SBSQ HOSP IP/OBS MODERATE 35: CPT | Performed by: INTERNAL MEDICINE

## 2023-10-17 PROCEDURE — 1100000001 HC PRIVATE ROOM DAILY

## 2023-10-17 PROCEDURE — 82947 ASSAY GLUCOSE BLOOD QUANT: CPT | Mod: CMCLAB

## 2023-10-17 PROCEDURE — 80202 ASSAY OF VANCOMYCIN: CPT | Mod: CMCLAB | Performed by: INTERNAL MEDICINE

## 2023-10-17 PROCEDURE — 2500000001 HC RX 250 WO HCPCS SELF ADMINISTERED DRUGS (ALT 637 FOR MEDICARE OP)

## 2023-10-17 PROCEDURE — 96372 THER/PROPH/DIAG INJ SC/IM: CPT | Performed by: INTERNAL MEDICINE

## 2023-10-17 PROCEDURE — 2500000002 HC RX 250 W HCPCS SELF ADMINISTERED DRUGS (ALT 637 FOR MEDICARE OP, ALT 636 FOR OP/ED): Performed by: INTERNAL MEDICINE

## 2023-10-17 PROCEDURE — 2500000004 HC RX 250 GENERAL PHARMACY W/ HCPCS (ALT 636 FOR OP/ED): Performed by: INTERNAL MEDICINE

## 2023-10-17 PROCEDURE — 2500000005 HC RX 250 GENERAL PHARMACY W/O HCPCS: Performed by: INTERNAL MEDICINE

## 2023-10-17 PROCEDURE — 99233 SBSQ HOSP IP/OBS HIGH 50: CPT | Performed by: INTERNAL MEDICINE

## 2023-10-17 PROCEDURE — A4217 STERILE WATER/SALINE, 500 ML: HCPCS | Performed by: INTERNAL MEDICINE

## 2023-10-17 PROCEDURE — 2500000004 HC RX 250 GENERAL PHARMACY W/ HCPCS (ALT 636 FOR OP/ED)

## 2023-10-17 RX ORDER — AMLODIPINE BESYLATE 10 MG/1
10 TABLET ORAL DAILY
Status: DISCONTINUED | OUTPATIENT
Start: 2023-10-18 | End: 2023-10-19 | Stop reason: HOSPADM

## 2023-10-17 RX ORDER — INSULIN LISPRO 100 [IU]/ML
20 INJECTION, SOLUTION INTRAVENOUS; SUBCUTANEOUS
Status: DISCONTINUED | OUTPATIENT
Start: 2023-10-17 | End: 2023-10-18

## 2023-10-17 RX ORDER — INSULIN LISPRO 100 [IU]/ML
15 INJECTION, SOLUTION INTRAVENOUS; SUBCUTANEOUS
Status: DISCONTINUED | OUTPATIENT
Start: 2023-10-17 | End: 2023-10-17

## 2023-10-17 RX ADMIN — INSULIN LISPRO 10 UNITS: 100 INJECTION, SOLUTION INTRAVENOUS; SUBCUTANEOUS at 12:15

## 2023-10-17 RX ADMIN — INSULIN GLARGINE 100 UNITS: 100 INJECTION, SOLUTION SUBCUTANEOUS at 09:20

## 2023-10-17 RX ADMIN — TRAMADOL HYDROCHLORIDE 50 MG: 50 TABLET, COATED ORAL at 07:01

## 2023-10-17 RX ADMIN — CEPHALEXIN 500 MG: 250 CAPSULE ORAL at 09:24

## 2023-10-17 RX ADMIN — LIDOCAINE 1 PATCH: 4 PATCH TOPICAL at 09:24

## 2023-10-17 RX ADMIN — ACETAMINOPHEN 650 MG: 325 TABLET ORAL at 04:18

## 2023-10-17 RX ADMIN — INSULIN LISPRO 10 UNITS: 100 INJECTION, SOLUTION INTRAVENOUS; SUBCUTANEOUS at 17:00

## 2023-10-17 RX ADMIN — INSULIN LISPRO 8 UNITS: 100 INJECTION, SOLUTION INTRAVENOUS; SUBCUTANEOUS at 09:18

## 2023-10-17 RX ADMIN — PANTOPRAZOLE SODIUM 40 MG: 40 TABLET, DELAYED RELEASE ORAL at 07:00

## 2023-10-17 RX ADMIN — SULFAMETHOXAZOLE AND TRIMETHOPRIM 1 TABLET: 800; 160 TABLET ORAL at 09:24

## 2023-10-17 RX ADMIN — VANCOMYCIN HYDROCHLORIDE 1500 MG: 5 INJECTION, POWDER, LYOPHILIZED, FOR SOLUTION INTRAVENOUS at 17:41

## 2023-10-17 RX ADMIN — INSULIN LISPRO 10 UNITS: 100 INJECTION, SOLUTION INTRAVENOUS; SUBCUTANEOUS at 12:16

## 2023-10-17 RX ADMIN — INSULIN LISPRO 10 UNITS: 100 INJECTION, SOLUTION INTRAVENOUS; SUBCUTANEOUS at 09:20

## 2023-10-17 RX ADMIN — CEPHALEXIN 500 MG: 250 CAPSULE ORAL at 21:43

## 2023-10-17 RX ADMIN — EMPAGLIFLOZIN 25 MG: 25 TABLET, FILM COATED ORAL at 09:25

## 2023-10-17 RX ADMIN — SULFAMETHOXAZOLE AND TRIMETHOPRIM 1 TABLET: 800; 160 TABLET ORAL at 21:43

## 2023-10-17 RX ADMIN — INSULIN LISPRO 20 UNITS: 100 INJECTION, SOLUTION INTRAVENOUS; SUBCUTANEOUS at 17:40

## 2023-10-17 RX ADMIN — POLYETHYLENE GLYCOL 3350 17 G: 17 POWDER, FOR SOLUTION ORAL at 09:25

## 2023-10-17 ASSESSMENT — COGNITIVE AND FUNCTIONAL STATUS - GENERAL
DRESSING REGULAR LOWER BODY CLOTHING: A LITTLE
WALKING IN HOSPITAL ROOM: A LITTLE
MOVING TO AND FROM BED TO CHAIR: A LITTLE
MOBILITY SCORE: 20
STANDING UP FROM CHAIR USING ARMS: A LITTLE
TOILETING: A LITTLE
DRESSING REGULAR UPPER BODY CLOTHING: A LITTLE
CLIMB 3 TO 5 STEPS WITH RAILING: A LITTLE
HELP NEEDED FOR BATHING: A LITTLE

## 2023-10-17 ASSESSMENT — PAIN SCALES - GENERAL: PAINLEVEL_OUTOF10: 6

## 2023-10-17 ASSESSMENT — ACTIVITIES OF DAILY LIVING (ADL): LACK_OF_TRANSPORTATION: NO

## 2023-10-17 ASSESSMENT — PAIN - FUNCTIONAL ASSESSMENT: PAIN_FUNCTIONAL_ASSESSMENT: 0-10

## 2023-10-17 NOTE — CARE PLAN
The patient's goals for the shift include      The clinical goals for the shift include Pt's blood sugar will remain between 150-200 during night.    Over the shift, pt refused accucheck, v/s and blood work. Pain controlled with tylenol and tramadol. No other distress noted.     Lisette Cardoza RN

## 2023-10-17 NOTE — CARE PLAN
The patient's goals for the shift include      The clinical goals for the shift include patient will remain safe throughout shift

## 2023-10-17 NOTE — PROGRESS NOTES
Kody Choi is a 53 y.o. male on day 2 of admission presenting with Chronic infection of knee (CMS/HCC).    Subjective   Interval History: Patient was afebrile over the last 24 hours. No rash or diarrhea. Looked pt understood our micro process. Agreed with IV vancomycin when seen by our team.    Review of Systems    Objective   Range of Vitals (last 24 hours)  Heart Rate:  []   Temp:  [36.5 °C (97.7 °F)-36.6 °C (97.9 °F)]   Resp:  [17]   BP: (140-151)/(82-92)   SpO2:  [95 %-96 %]   Daily Weight  10/15/23 : 150 kg (330 lb)    Body mass index is 53.26 kg/m².    No erythema and tender surrounding R knee.    Microbiology  10/15 Synovial culture Gram stain showing no organism: cell count canceled due to volume  10/15 Bcx NGTD     Assessment/Plan  #C/f R PJI   #Hx of MRSA PJI in 2019 with chronic suppression chepharaxin and bactrim     Patient developed worsening R knee in the last couple of days. Synovial tap and blood cultures have done. Primary team previously recommended R AKA, but patient has been not interested in surgical intervention at this point. Would recommend continue IV vancomycin and will await the culture until morning on 10/18. We can stop antibiotics if the culture is negative at that time given no evidence of PJI.      Recommendations  -Please continue IV vancomycin (pharmacy protocol) until we check the update of the culture in the morning on 10/18      Discussed with Dr. Jalloh. Please text me via Epic chat if you have any questions or concerns regarding this patient. ID will continue to follow up this patient by 10/18.     Baudilio Stallings MD  ID fellow [id number]  Team B Pager [phone]

## 2023-10-17 NOTE — PROGRESS NOTES
"Kody Choi is a 53 y.o. adult on day 2 of admission presenting with Chronic infection of knee (CMS/HCC).    Subjective   Lying in bed. No distress.  Patient reports that his knee feels better after the lidocaine patch and Ace wrap.      Objective     General: Sitting up in bed without distress.  Moderate to severely obese.  Cooperative.  Skin: Erythema around right knee noted.  HEENT: Sclera is white.  Mucous membranes moist.  Cardiac: Regular rate and rhythm, S1/S2 somewhat distant.  Lungs: Clear to auscultation bilaterally, no wheezing or crackles, no accessory muscle use at rest.  Abdomen: Soft, nontender, severely obese abdomen, BS +  Extremities: No cyanosis.  Swelling of right leg, around knee, noted more compared to left.  Neurologic: Alert and oriented x3.  No focal deficits.  Psychiatric: Appropriate mood and behavior.  Currently no agitation.    Last Recorded Vitals  Blood pressure (!) 151/92, pulse 94, temperature 36.5 °C (97.7 °F), temperature source Temporal, resp. rate 17, height 1.676 m (5' 6\"), weight 150 kg (330 lb), SpO2 95 %.  On room air.    Intake/Output last 3 Shifts:  I/O last 3 completed shifts:  In: 600 (4 mL/kg) [P.O.:600]  Out: - (0 mL/kg)   Weight: 149.7 kg     Relevant Results  [START ON 10/18/2023] amLODIPine, 10 mg, oral, Daily  cephalexin, 500 mg, oral, q12h SUDHIR  empagliflozin, 25 mg, oral, Daily  insulin glargine, 100 Units, subcutaneous, Daily  insulin lispro, 0-10 Units, subcutaneous, TID with meals  insulin lispro, 20 Units, subcutaneous, TID with meals  lidocaine, 1 patch, transdermal, Daily  pantoprazole, 40 mg, oral, Daily before breakfast  polyethylene glycol, 17 g, oral, Daily  sulfamethoxazole-trimethoprim, 1 tablet, oral, q12h  vancomycin, 1,500 mg, intravenous, q24h           PRN medications: acetaminophen, dextrose 10 % in water (D10W), dextrose 10 % in water (D10W), dextrose, dextrose, glucagon, glucagon, guaiFENesin, traMADol, traZODone      Hospital course:  LISA, " SAM is a 53 year old Male with T2DM, CAD s/p stenting and CABG (11/2020), Afib previously on Eliquis, HTN, DLD, RUPESH (not on CPAP), TIA, CKD, R TKA c/b recurrent  prosthetic joint infections s/p explant and antibiotic spacer placement (6/2019) and history of MSSA/MRSA chronic R knee infections on chronic  bactrim/keflex suppression who presented to  ED with right knee pain .  Orthopedic surgery saw the patient, aspirated the knee and sent for culture.  Their recommendation remains the same as previous which is recommended for AKA.  Patient still refusing surgery.  Infectious disease consulted.  Pharmacy to dose IV vancomycin started.    Assessment/Plan     Possibly acute on chronic right knee prosthetic joint infection  -Continued on home oral Keflex and Bactrim.  Consult infectious disease.  Pharmacy to dose IV vancomycin ordered.  -Blood cultures x2 from 10/15 no growth to date.  Knee aspirate culture from 10/15 so far no growth.  -Tylenol as needed, add lidocaine patch to right knee.  Can continue tramadol as needed for severe pain for now.  -Repeat CBC tomorrow.  -Patient reports that he is refusing to go to a skilled nursing facility and he is refusing to have home IV antibiotics.    Diabetes mellitus type 2  -Very poorly controlled.  Patient refuses to do diet control.  -Continue home basal insulin Lantus back to 100 units in the morning.  Patient agreeable to scheduling prandial insulin with meals, change Humalog to 20 units with meals.  Continue sliding scale insulin.  Continue Jardiance 25 mg daily.    Hypertension  -Increase amlodipine to 10 mg daily, Jardiance 25 mg daily.   -Systolic has been ranging 150s to 160s.  Will continue to monitor.    Chronic kidney disease stage IIIb  -Baseline creatinine for the last 2 years has been between 1.6-1.8.  Patient is currently at baseline with last creatinine 1.73.  Monitor as needed.    Hyponatremia  -Mild, sodium level 131.  Likely secondary to uncontrolled  hyperglycemia.  -Monitor as needed.    Physical deconditioning  -Therapy has recommended home health care, but patient states that he does not need help setting it up at this time.      Best Murphy MD

## 2023-10-17 NOTE — PROGRESS NOTES
"Vancomycin Dosing by Pharmacy- INITIAL    Kody Choi is a 53 y.o. year old adult who Pharmacy has been consulted for vancomycin dosing for other prosthetic device infection . Based on the patient's indication and renal status this patient will be dosed based on a goal AUC of 500-600.     Renal function is currently CKD III.    Visit Vitals  BP (!) 151/92 (BP Location: Left arm, Patient Position: Sitting)   Pulse 94   Temp 36.5 °C (97.7 °F) (Temporal)   Resp 17        Lab Results   Component Value Date    CREATININE 1.73 (H) 10/16/2023    CREATININE 1.87 (H) 10/15/2023    CREATININE 1.60 (H) 07/01/2023    CREATININE 1.86 (H) 06/30/2023    CREATININE CANCELED 06/29/2023    CREATININE 1.72 (H) 06/29/2023        Patient weight is No results found for: \"PTWEIGHT\"    No results found for: \"CULTURE\"     I/O last 3 completed shifts:  In: 600 (4 mL/kg) [P.O.:600]  Out: - (0 mL/kg)   Weight: 149.7 kg   [unfilled]    Lab Results   Component Value Date    PATIENTTEMP 37.0 10/16/2023    PATIENTTEMP 37.0 10/15/2023          Assessment/Plan     Patient will not be given a loading dose.  Will initiate vancomycin maintenance,  1500 mg every 24 hours.    This dosing regimen is predicted by [employer] to result in the following pharmacokinetic parameters:  Loading dose: N/A  Regimen: 1500 mg IV every 24 hours.  Start time: 13:54 on 10/17/2023  Exposure target: AUC24 (range)400-600 mg/L.hr   AUC24,ss: 566 mg/L.hr  Probability of AUC24 > 400: 77 %  Ctrough,ss: 14.6 mg/L  Probability of Ctrough,ss > 20: 32 %  Probability of nephrotoxicity (Lodise EDIE 2009): 10 %    Follow-up level will be ordered on 10/18/23  at 1st AM labs unless clinically indicated sooner.  Will continue to monitor renal function daily while on vancomycin and order serum creatinine at least every 48 hours if not already ordered.  Follow for continued vancomycin needs, clinical response, and signs/symptoms of toxicity.       Yassine Washington, PharmD       "

## 2023-10-18 LAB
CREAT SERPL-MCNC: 2.09 MG/DL (ref 0.5–1.3)
ERYTHROCYTE [DISTWIDTH] IN BLOOD BY AUTOMATED COUNT: 15.9 % (ref 11.5–14.5)
GFR SERPL CREATININE-BSD FRML MDRD: 28 ML/MIN/1.73M*2
GLUCOSE BLD MANUAL STRIP-MCNC: 345 MG/DL (ref 74–99)
GLUCOSE BLD MANUAL STRIP-MCNC: 349 MG/DL (ref 74–99)
GLUCOSE BLD MANUAL STRIP-MCNC: 433 MG/DL (ref 74–99)
GLUCOSE BLD MANUAL STRIP-MCNC: 453 MG/DL (ref 74–99)
HCT VFR BLD AUTO: 37.6 % (ref 36–52)
HGB BLD-MCNC: 11 G/DL (ref 12–17.5)
MCH RBC QN AUTO: 22.6 PG (ref 26–34)
MCHC RBC AUTO-ENTMCNC: 29.3 G/DL (ref 32–36)
MCV RBC AUTO: 77 FL (ref 80–100)
NRBC BLD-RTO: 0 /100 WBCS (ref 0–0)
PLATELET # BLD AUTO: 459 X10*3/UL (ref 150–450)
PMV BLD AUTO: 10.5 FL (ref 7.5–11.5)
RBC # BLD AUTO: 4.86 X10*6/UL (ref 4–5.9)
VANCOMYCIN SERPL-MCNC: 7.8 UG/ML (ref 5–20)
VANCOMYCIN TROUGH SERPL-MCNC: 18 UG/ML (ref 5–20)
WBC # BLD AUTO: 11.4 X10*3/UL (ref 4.4–11.3)

## 2023-10-18 PROCEDURE — 2500000004 HC RX 250 GENERAL PHARMACY W/ HCPCS (ALT 636 FOR OP/ED)

## 2023-10-18 PROCEDURE — 82565 ASSAY OF CREATININE: CPT | Mod: CMCLAB | Performed by: INTERNAL MEDICINE

## 2023-10-18 PROCEDURE — 36415 COLL VENOUS BLD VENIPUNCTURE: CPT | Performed by: INTERNAL MEDICINE

## 2023-10-18 PROCEDURE — 99233 SBSQ HOSP IP/OBS HIGH 50: CPT | Performed by: INTERNAL MEDICINE

## 2023-10-18 PROCEDURE — 2500000002 HC RX 250 W HCPCS SELF ADMINISTERED DRUGS (ALT 637 FOR MEDICARE OP, ALT 636 FOR OP/ED): Performed by: STUDENT IN AN ORGANIZED HEALTH CARE EDUCATION/TRAINING PROGRAM

## 2023-10-18 PROCEDURE — 36415 COLL VENOUS BLD VENIPUNCTURE: CPT | Mod: CMCLAB | Performed by: INTERNAL MEDICINE

## 2023-10-18 PROCEDURE — 96372 THER/PROPH/DIAG INJ SC/IM: CPT | Performed by: STUDENT IN AN ORGANIZED HEALTH CARE EDUCATION/TRAINING PROGRAM

## 2023-10-18 PROCEDURE — 97116 GAIT TRAINING THERAPY: CPT | Mod: GP,CQ

## 2023-10-18 PROCEDURE — 80202 ASSAY OF VANCOMYCIN: CPT | Mod: CMCLAB | Performed by: INTERNAL MEDICINE

## 2023-10-18 PROCEDURE — 2500000005 HC RX 250 GENERAL PHARMACY W/O HCPCS: Performed by: INTERNAL MEDICINE

## 2023-10-18 PROCEDURE — 97110 THERAPEUTIC EXERCISES: CPT | Mod: GP,CQ

## 2023-10-18 PROCEDURE — 2500000001 HC RX 250 WO HCPCS SELF ADMINISTERED DRUGS (ALT 637 FOR MEDICARE OP): Performed by: INTERNAL MEDICINE

## 2023-10-18 PROCEDURE — 2500000001 HC RX 250 WO HCPCS SELF ADMINISTERED DRUGS (ALT 637 FOR MEDICARE OP)

## 2023-10-18 PROCEDURE — 82947 ASSAY GLUCOSE BLOOD QUANT: CPT

## 2023-10-18 PROCEDURE — 76937 US GUIDE VASCULAR ACCESS: CPT

## 2023-10-18 PROCEDURE — 96372 THER/PROPH/DIAG INJ SC/IM: CPT | Performed by: INTERNAL MEDICINE

## 2023-10-18 PROCEDURE — 2500000004 HC RX 250 GENERAL PHARMACY W/ HCPCS (ALT 636 FOR OP/ED): Performed by: INTERNAL MEDICINE

## 2023-10-18 PROCEDURE — 2500000002 HC RX 250 W HCPCS SELF ADMINISTERED DRUGS (ALT 637 FOR MEDICARE OP, ALT 636 FOR OP/ED): Performed by: INTERNAL MEDICINE

## 2023-10-18 PROCEDURE — 1100000001 HC PRIVATE ROOM DAILY

## 2023-10-18 PROCEDURE — 85027 COMPLETE CBC AUTOMATED: CPT | Mod: CMCLAB | Performed by: INTERNAL MEDICINE

## 2023-10-18 RX ORDER — OXYCODONE HYDROCHLORIDE 5 MG/1
5 TABLET ORAL EVERY 6 HOURS PRN
Status: DISCONTINUED | OUTPATIENT
Start: 2023-10-18 | End: 2023-10-18

## 2023-10-18 RX ORDER — HYDRALAZINE HYDROCHLORIDE 25 MG/1
25 TABLET, FILM COATED ORAL 2 TIMES DAILY
Status: DISCONTINUED | OUTPATIENT
Start: 2023-10-18 | End: 2023-10-19 | Stop reason: HOSPADM

## 2023-10-18 RX ORDER — OXYCODONE HYDROCHLORIDE 5 MG/1
5 TABLET ORAL EVERY 4 HOURS PRN
Status: DISCONTINUED | OUTPATIENT
Start: 2023-10-18 | End: 2023-10-19 | Stop reason: HOSPADM

## 2023-10-18 RX ORDER — INSULIN LISPRO 100 [IU]/ML
30 INJECTION, SOLUTION INTRAVENOUS; SUBCUTANEOUS
Status: DISCONTINUED | OUTPATIENT
Start: 2023-10-18 | End: 2023-10-19 | Stop reason: HOSPADM

## 2023-10-18 RX ORDER — INSULIN LISPRO 100 [IU]/ML
10 INJECTION, SOLUTION INTRAVENOUS; SUBCUTANEOUS ONCE
Status: COMPLETED | OUTPATIENT
Start: 2023-10-18 | End: 2023-10-18

## 2023-10-18 RX ADMIN — LIDOCAINE 1 PATCH: 4 PATCH TOPICAL at 04:34

## 2023-10-18 RX ADMIN — ACETAMINOPHEN 650 MG: 325 TABLET ORAL at 19:39

## 2023-10-18 RX ADMIN — INSULIN LISPRO 8 UNITS: 100 INJECTION, SOLUTION INTRAVENOUS; SUBCUTANEOUS at 12:21

## 2023-10-18 RX ADMIN — PANTOPRAZOLE SODIUM 40 MG: 40 TABLET, DELAYED RELEASE ORAL at 09:14

## 2023-10-18 RX ADMIN — INSULIN LISPRO 10 UNITS: 100 INJECTION, SOLUTION INTRAVENOUS; SUBCUTANEOUS at 23:36

## 2023-10-18 RX ADMIN — CEPHALEXIN 500 MG: 250 CAPSULE ORAL at 09:14

## 2023-10-18 RX ADMIN — INSULIN LISPRO 8 UNITS: 100 INJECTION, SOLUTION INTRAVENOUS; SUBCUTANEOUS at 17:26

## 2023-10-18 RX ADMIN — SULFAMETHOXAZOLE AND TRIMETHOPRIM 1 TABLET: 800; 160 TABLET ORAL at 09:14

## 2023-10-18 RX ADMIN — INSULIN LISPRO 30 UNITS: 100 INJECTION, SOLUTION INTRAVENOUS; SUBCUTANEOUS at 17:24

## 2023-10-18 RX ADMIN — INSULIN LISPRO 10 UNITS: 100 INJECTION, SOLUTION INTRAVENOUS; SUBCUTANEOUS at 09:18

## 2023-10-18 RX ADMIN — CEPHALEXIN 500 MG: 250 CAPSULE ORAL at 20:10

## 2023-10-18 RX ADMIN — EMPAGLIFLOZIN 25 MG: 25 TABLET, FILM COATED ORAL at 09:14

## 2023-10-18 RX ADMIN — DEXTROSE MONOHYDRATE 1.25 G: 50 INJECTION, SOLUTION INTRAVENOUS at 15:49

## 2023-10-18 RX ADMIN — INSULIN LISPRO 20 UNITS: 100 INJECTION, SOLUTION INTRAVENOUS; SUBCUTANEOUS at 12:18

## 2023-10-18 RX ADMIN — INSULIN LISPRO 20 UNITS: 100 INJECTION, SOLUTION INTRAVENOUS; SUBCUTANEOUS at 09:16

## 2023-10-18 RX ADMIN — SULFAMETHOXAZOLE AND TRIMETHOPRIM 1 TABLET: 800; 160 TABLET ORAL at 20:10

## 2023-10-18 RX ADMIN — TRAMADOL HYDROCHLORIDE 50 MG: 50 TABLET, COATED ORAL at 09:21

## 2023-10-18 RX ADMIN — INSULIN GLARGINE 100 UNITS: 100 INJECTION, SOLUTION SUBCUTANEOUS at 09:15

## 2023-10-18 ASSESSMENT — COGNITIVE AND FUNCTIONAL STATUS - GENERAL
TOILETING: A LITTLE
WALKING IN HOSPITAL ROOM: A LITTLE
DRESSING REGULAR LOWER BODY CLOTHING: A LITTLE
MOVING TO AND FROM BED TO CHAIR: A LITTLE
MOBILITY SCORE: 19
PERSONAL GROOMING: A LITTLE
DRESSING REGULAR UPPER BODY CLOTHING: A LITTLE
CLIMB 3 TO 5 STEPS WITH RAILING: A LITTLE
STANDING UP FROM CHAIR USING ARMS: A LITTLE
MOBILITY SCORE: 22
HELP NEEDED FOR BATHING: A LITTLE
WALKING IN HOSPITAL ROOM: A LITTLE
DAILY ACTIVITIY SCORE: 19
CLIMB 3 TO 5 STEPS WITH RAILING: A LOT

## 2023-10-18 ASSESSMENT — PAIN SCALES - GENERAL
PAINLEVEL_OUTOF10: 6
PAINLEVEL_OUTOF10: 5 - MODERATE PAIN
PAINLEVEL_OUTOF10: 10 - WORST POSSIBLE PAIN
PAINLEVEL_OUTOF10: 0 - NO PAIN
PAINLEVEL_OUTOF10: 10 - WORST POSSIBLE PAIN
PAINLEVEL_OUTOF10: 10 - WORST POSSIBLE PAIN

## 2023-10-18 ASSESSMENT — PAIN - FUNCTIONAL ASSESSMENT
PAIN_FUNCTIONAL_ASSESSMENT: 0-10
PAIN_FUNCTIONAL_ASSESSMENT: 0-10

## 2023-10-18 NOTE — PROGRESS NOTES
Kody Choi is a 53 y.o. male on day 3 of admission presenting with Chronic infection of knee (CMS/HCC).    Subjective   Interval History: Patient was afebrile over the last 24 hours. No rash or diarrhea.     Review of Systems    Objective   Range of Vitals (last 24 hours)  Heart Rate:  []   Temp:  [36.6 °C (97.9 °F)-36.8 °C (98.2 °F)]   Resp:  [14-17]   BP: (144-168)/(83-88)   SpO2:  [93 %-94 %]   Daily Weight  10/15/23 : 150 kg (330 lb)    Body mass index is 53.26 kg/m².    Microbiology  10/15 Synovial culture Gram stain showing no organism: Negative  10/15 Bcx NGTD     Assessment/Plan  #C/f R PJI   #Hx of MRSA PJI in 2019 with chronic suppression chepharaxin and bactrim     Patient developed worsening R knee in the last couple of days. Synovial tap and blood cultures have done. Primary team previously recommended R AKA, but patient has been not interested in surgical intervention at this point. The culture was negative on 10/18. We would recommend stop antibiotics.   Recommendations  -Please stop IV vancomycin (pharmacy protocol)   -Please resume cephalexin and bactrim as usual     Discussed with Dr. Jalloh. Please text me via Epic chat if you have any questions or concerns regarding this patient. ID will sign off.     Baudilio Stallings MD  ID [id number]  Team B Pager [phone]

## 2023-10-18 NOTE — PROGRESS NOTES
Physical Therapy    Physical Therapy Treatment    Patient Name: Kody Choi  MRN: [id number]  Today's Date: 10/18/2023  Time Calculation  Start Time: 1401  Stop Time: 1433  Time Calculation (min): 32 min       Assessment/Plan   PT Assessment  PT Assessment Results: Decreased strength, Decreased mobility, Impaired balance  Rehab Prognosis: Good  Evaluation/Treatment Tolerance: Patient limited by fatigue  Medical Staff Made Aware: Yes  End of Session Communication: Bedside nurse  Assessment Comment: Pt able to ambulate and complete transfers with SBA x1. Pt seems to be close to what he is at baseline in regards to mobility. Pt would continue to benefit from skilled PT services for the continued duration of his stay to improve strength, endurance and activity tolerance.  End of Session Patient Position: Bed, 3 rail up  PT Plan  Inpatient/Swing Bed or Outpatient: Inpatient  PT Plan  Treatment/Interventions: Bed mobility, Transfer training, Gait training, Balance training, Strengthening, Range of motion, Therapeutic exercise, Therapeutic activity  PT Plan: Skilled PT  PT Frequency: 3 times per week  PT Discharge Recommendations: Low intensity level of continued care  PT Recommended Transfer Status: Assist x1, Assistive device, Stand by assist  PT - OK to Discharge: Yes (Meaning pt has been evaluated and has a dc recc.  Pt should meet home going goals prior to dc home.)    General Visit Information:   PT  Visit  PT Received On: 10/18/23  Response to Previous Treatment: Patient with no complaints from previous session.  General  Reason for Referral: R knee pain, pt is again being reccomended for amputation but refusing.  Past Medical History Relevant to Rehab: T2DM, CAD s/p stenting and CABG (11/2020), Afib,HTN, DLD, RUPESH (not on CPAP), TIA, CKD, R TKA c/b recurrent  prosthetic joint infections s/p explant and antibiotic spacer placement (6/2019) and history of MSSA/MRSA chronic R knee infections . The patient was here a few  months ago 06/27/2023 for same issue and ortho recommended AKA which he refused  Patient Position Received:  (Sitting on EOB)  General Comment: Pt is pleasant and agreeable to therapy session. Pt vented about overall care at beginning of session-reiterating that his R LE infection needs to be better treated by this weekend so that he can attend [employer] concert on sunday. Pt expressed that he wants to be more in control of his care plan.    Subjective   Precautions:  Precautions  Medical Precautions: Fall precautions    Objective   Cognition:  Cognition  Overall Cognitive Status: Within Functional Limits  Orientation Level: Oriented X4    Activity Tolerance:  Activity Tolerance  Endurance: Tolerates 10 - 20 min exercise with multiple rests  Treatments:  Therapeutic Exercise  Therapeutic Exercise Performed: Yes  Therapeutic Exercise Activity 1: Seated Bilateral LE: ankle pumps, LAQ, marching, GS x10 each    Bed Mobility 1  Bed Mobility 1: Sitting to supine  Level of Assistance 1: Close supervision    Ambulation/Gait Training 1  Surface 1: Level tile  Device 1: Rolling walker  Assistance 1: Close supervision  Comments/Distance (ft) 1: 15 feet (Pt demonstrated an antalgic gait throughout bout. Pt was mostly NWB through R LE during bout due to Pt attempting to not cause increased pain.)  Transfer 1  Transfer From 1: Sit to, Stand to  Transfer Device 1: Walker  Transfer Level of Assistance 1: Close supervision    Outcome Measures:  [facility] Basic Mobility  Turning from your back to your side while in a flat bed without using bedrails: None  Moving from lying on your back to sitting on the side of a flat bed without using bedrails: None  Moving to and from bed to chair (including a wheelchair): A little  Standing up from a chair using your arms (e.g. wheelchair or bedside chair): A little  To walk in hospital room: A little  Climbing 3-5 steps with railing: A lot  Basic Mobility - Total Score: 19    Education  Documentation  Mobility Training, taught by Shi Ga PTA at 10/18/2023  3:06 PM.  Learner: Patient  Readiness: Acceptance  Method: Explanation  Response: Verbalizes Understanding    Education Comments  No comments found.      Encounter Problems       Encounter Problems (Active)       Balance       Sitting EOB 20 minutes with 0 UE support, Ind for static and dynamic sitting.  Standing 5 minutes with FWW, Ind. (Progressing)       Start:  10/16/23    Expected End:  10/30/23               Mobility       Ambulate 50 feet with FWW and Ind (Progressing)       Start:  10/16/23    Expected End:  10/30/23            Goal 2 (Progressing)       Start:  10/16/23    Expected End:  10/30/23               Safety          Transfers       sit<>stand, bed<>chair with FWW, Ind (Progressing)       Start:  10/16/23    Expected End:  10/30/23            Goal 2 (Progressing)       Start:  10/16/23    Expected End:  10/30/23

## 2023-10-18 NOTE — PROGRESS NOTES
"Kody Choi is a 53 y.o. adult on day 3 of admission presenting with Chronic infection of knee (CMS/HCC).    Subjective   Lying in bed. No distress.  Patient reported yesterday that he had improved pain control, today he complains that \" we are not doing enough to control his pain\".  Feels like pain is keeping him up at night.      Objective     General: Sitting up in bed without distress.  Moderate to severely obese.  Cooperative.  Observed patient eating pudding and tater tots.  Skin: Erythema around right knee noted.  HEENT: Sclera is white.  Mucous membranes moist.  Cardiac: Regular rate and rhythm, S1/S2 somewhat distant.  Lungs: Clear to auscultation bilaterally, no wheezing or crackles, no accessory muscle use at rest.  Abdomen: Soft, nontender, severely obese abdomen, BS +  Extremities: No cyanosis.  Patient currently has Ace wrap around right knee.  Neurologic: Alert and oriented x3.  No focal deficits.  Psychiatric: Appropriate mood and behavior.  Currently no agitation.    Last Recorded Vitals  Blood pressure 168/83, pulse 103, temperature 36.8 °C (98.2 °F), temperature source Temporal, resp. rate 17, height 1.676 m (5' 6\"), weight 150 kg (330 lb), SpO2 94 %.  On room air.    Intake/Output last 3 Shifts:  I/O last 3 completed shifts:  In: 500 (3.3 mL/kg) [IV Piggyback:500]  Out: 2300 (15.4 mL/kg) [Urine:2300 (0.4 mL/kg/hr)]  Weight: 149.7 kg     Relevant Results  amLODIPine, 10 mg, oral, Daily  cephalexin, 500 mg, oral, q12h SUDHIR  empagliflozin, 25 mg, oral, Daily  insulin glargine, 100 Units, subcutaneous, Daily  insulin lispro, 0-10 Units, subcutaneous, TID with meals  insulin lispro, 20 Units, subcutaneous, TID with meals  lidocaine, 1 patch, transdermal, Daily  pantoprazole, 40 mg, oral, Daily before breakfast  polyethylene glycol, 17 g, oral, Daily  sulfamethoxazole-trimethoprim, 1 tablet, oral, q12h  vancomycin, 1,250 mg, intravenous, q12h           PRN medications: acetaminophen, dextrose 10 % in " water (D10W), dextrose 10 % in water (D10W), dextrose, dextrose, glucagon, glucagon, guaiFENesin, traMADol, traZODone      Hospital course:  SAM GONZALEZ is a 53 year old Male with T2DM, CAD s/p stenting and CABG (11/2020), Afib previously on Eliquis, HTN, DLD, RUPESH (not on CPAP), TIA, CKD, R TKA c/b recurrent  prosthetic joint infections s/p explant and antibiotic spacer placement (6/2019) and history of MSSA/MRSA chronic R knee infections on chronic  bactrim/keflex suppression who presented to  ED with right knee pain .  Orthopedic surgery saw the patient, aspirated the knee and sent for culture.  Their recommendation remains the same as previous which is recommended for AKA.  Patient still refusing surgery.  Infectious disease consulted.  Pharmacy to dose IV vancomycin started.  Blood cultures x2 and knee aspirate fluid culture no growth to date.    Assessment/Plan     Possibly acute on chronic right knee prosthetic joint infection  -Continued on home oral Keflex and Bactrim.  Consult infectious disease.  Pharmacy to dose IV vancomycin ordered.  Patient currently complaining that we are not giving him any antibiotics.  I informed the patient that if he is focused on improving healthcare he would be better served to do things like control his diabetes.  I recommended to patient to listen to infectious disease recommendation and allow the pharmacist to dose the vancomycin.  -Blood cultures x2 from 10/15 no growth to date.  Knee aspirate culture from 10/15 so far no growth.  -Tylenol as needed, add lidocaine patch to right knee.  Due to decreased creatinine clearance will discontinue tramadol and changed to oxycodone.  Cannot use NSAIDs due to renal function.  -Repeat CBC improvement in leukocytosis.  -Patient reports that he is refusing to go to a skilled nursing facility right now because he has a concert coming up on Sunday and he refuses to go to skilled nursing facility, which she states she thinks he needs,  until he goes to his concert.  Patient also indicated yesterday that he would not accept home IV antibiotics.    Diabetes mellitus type 2  -Very poorly controlled, but slightly better than yesterday.  Patient refuses to do diet control.  Patient has very poor dietary control on observation.  -Continue home basal insulin Lantus back to 100 units in the morning. Change Humalog to 30 units with meals.  Continue sliding scale insulin.  Continue Jardiance 25 mg daily.    Hypertension  -Continue amlodipine to 10 mg daily, Jardiance 25 mg daily.   -Systolic has been ranging 140s to 160s.  Add hydralazine 25 mg twice a day.    Chronic kidney disease stage IIIb  -Baseline creatinine for the last 2 years has been between 1.6-1.8.  Patient is currently at baseline with last creatinine 1.73.  Monitor as needed.    Hyponatremia  -Mild, sodium level 131.  Likely secondary to uncontrolled hyperglycemia.  -Monitor as needed.    Physical deconditioning  -Therapy has recommended home health care, but patient states that he does not need help setting it up at this time.    Disposition: Patient keeps giving me specific target days like Friday or Saturday to be discharged in the hospital and then he plans to go to his concert on Sunday.  Patient states that if he still has concern for infection he will come back to be placed to skilled nursing facility.  I have informed the patient that this is not how healthcare is supposed to be done and that this concert should not be more important than his health.  I also informed the patient that how long he stays in the hospital depends on what we are doing and what infectious disease recommends to treat for his knee.    Best Murphy MD

## 2023-10-18 NOTE — PROGRESS NOTES
"Vancomycin Dosing by Pharmacy- FOLLOW UP    Kody Choi is a 53 y.o. year old adult who Pharmacy has been consulted for vancomycin dosing for bone and joint infection. Based on the patient's indication and renal status this patient is being dosed based on a goal AUC of 500-600.     Renal function is currently stable.    Current vancomycin dose: 1500 mg given every 24 hours    Estimated vancomycin AUC on current dose: 293 mg/L.hr     Visit Vitals  /83 (BP Location: Left arm, Patient Position: Sitting)   Pulse 103   Temp 36.8 °C (98.2 °F) (Temporal)   Resp 17        Lab Results   Component Value Date    CREATININE 1.73 (H) 10/16/2023    CREATININE 1.87 (H) 10/15/2023    CREATININE 1.60 (H) 07/01/2023    CREATININE 1.86 (H) 06/30/2023    CREATININE CANCELED 06/29/2023    CREATININE 1.72 (H) 06/29/2023        Patient weight is No results found for: \"PTWEIGHT\"    No results found for: \"CULTURE\"     I/O last 3 completed shifts:  In: 500 (3.3 mL/kg) [IV Piggyback:500]  Out: 2300 (15.4 mL/kg) [Urine:2300 (0.4 mL/kg/hr)]  Weight: 149.7 kg   [unfilled]    Lab Results   Component Value Date    PATIENTTEMP 37.0 10/16/2023    PATIENTTEMP 37.0 10/15/2023        Assessment/Plan    Below goal AUC. Orders placed for new vancomcyin regimen of 1250mg every 12 hours to begin at 1400.     This dosing regimen is predicted by [employer] to result in the following pharmacokinetic parameters:  Regimen: 1250 mg IV every 12 hours.  Start time: 12:24 on 10/18/2023  Exposure target: AUC24 (range)400-600 mg/L.hr   AUC24,ss: 488 mg/L.hr  Probability of AUC24 > 400: 99 %  Ctrough,ss: 13.1 mg/L  Probability of Ctrough,ss > 20: 0 %  Probability of nephrotoxicity (Lodise EDIE 2009): 8 %    The next level will be obtained on 10/19 with AM labs. May be obtained sooner if clinically indicated.   Will continue to monitor renal function daily while on vancomycin and order serum creatinine at least every 48 hours if not already ordered.  Follow for " continued vancomycin needs, clinical response, and signs/symptoms of toxicity.       Jeovany Ortiz, PharmD, BCPS

## 2023-10-19 VITALS
RESPIRATION RATE: 16 BRPM | HEIGHT: 66 IN | WEIGHT: 315 LBS | DIASTOLIC BLOOD PRESSURE: 73 MMHG | OXYGEN SATURATION: 95 % | TEMPERATURE: 97 F | BODY MASS INDEX: 50.62 KG/M2 | SYSTOLIC BLOOD PRESSURE: 153 MMHG | HEART RATE: 94 BPM

## 2023-10-19 LAB
BACTERIA BLD CULT: NORMAL
BACTERIA BLD CULT: NORMAL
BACTERIA FLD CULT: NORMAL
GRAM STN SPEC: NORMAL
GRAM STN SPEC: NORMAL

## 2023-10-19 PROCEDURE — 2500000004 HC RX 250 GENERAL PHARMACY W/ HCPCS (ALT 636 FOR OP/ED): Performed by: INTERNAL MEDICINE

## 2023-10-19 RX ADMIN — DEXTROSE MONOHYDRATE 1.25 G: 50 INJECTION, SOLUTION INTRAVENOUS at 03:45

## 2023-10-19 NOTE — CARE PLAN
The patient's goals for the shift include      The clinical goals for the shift include Patient will have controlled pain during shift

## 2023-10-19 NOTE — DISCHARGE SUMMARY
Hospital course:  SAM GONZALEZ is a 53 year old Male with T2DM, CAD s/p stenting and CABG (11/2020), Afib previously on Eliquis, HTN, DLD, RUPESH (not on CPAP), TIA, CKD, R TKA c/b recurrent  prosthetic joint infections s/p explant and antibiotic spacer placement (6/2019) and history of MSSA/MRSA chronic R knee infections on chronic  bactrim/keflex suppression who presented to  ED with right knee pain .  Orthopedic surgery saw the patient, aspirated the knee and sent for culture.  Their recommendation remains the same as previous which is recommended for AKA.  Patient still refusing surgery.  Infectious disease consulted.  Pharmacy to dose IV vancomycin started.  Blood cultures x2 and knee aspirate fluid culture no growth to date.  Patient refused to go to skilled nursing facility because he wanted to attend a concert first.  Pain was controlled with Tylenol as needed and lidocaine patch to knee.  Also had patient on small doses of oxycodone.  During his stay he had very poorly controlled diabetes with consistently high glucose due to poor dietary control.    I was notified at around 7 AM this morning on 10/19 that patient was demanding to leave AMA.  Patient has capacity and has been able to make his own decisions.  Patient's glucose has been horribly uncontrolled despite up titration of insulin.  Since there was concern for infection of the knee we want to gain better control of his diabetes.  Patient refused to have any form of diet control.  In the morning patient's glucose was in the high 400s and patient was still trying to eat snacks.  The nocturnist had been notified and his diet was changed to diabetic diet.  Patient became angry and agitated and demanded to leave.  I could not come right away to talk to patient, but I did inform the nurse that patient does have capacity and if he wishes to leave AMA then he may do so.  Patient left AMA.

## 2023-10-19 NOTE — NURSING NOTE
Patient signed AMA, doctor informed. IV removed, and patient assisted with getting dressed. Doctor informed that he needs to come to the floor to sign the AMA paperwork since he wasn't available to come and discuss with patient. Patient educated on the risks of leaving against medical advice

## 2024-01-04 ENCOUNTER — APPOINTMENT (OUTPATIENT)
Dept: INFECTIOUS DISEASES | Facility: CLINIC | Age: 54
End: 2024-01-04
Payer: COMMERCIAL

## 2024-01-11 ENCOUNTER — APPOINTMENT (OUTPATIENT)
Dept: INFECTIOUS DISEASES | Facility: CLINIC | Age: 54
End: 2024-01-11
Payer: COMMERCIAL

## 2024-01-25 ENCOUNTER — HOSPITAL ENCOUNTER (INPATIENT)
Facility: HOSPITAL | Age: 54
LOS: 7 days | Discharge: HOME | DRG: 549 | End: 2024-02-01
Attending: EMERGENCY MEDICINE | Admitting: INTERNAL MEDICINE
Payer: COMMERCIAL

## 2024-01-25 ENCOUNTER — APPOINTMENT (OUTPATIENT)
Dept: RADIOLOGY | Facility: HOSPITAL | Age: 54
DRG: 549 | End: 2024-01-25
Payer: COMMERCIAL

## 2024-01-25 DIAGNOSIS — Z79.4 TYPE 2 DIABETES MELLITUS WITH HYPERGLYCEMIA, WITH LONG-TERM CURRENT USE OF INSULIN (MULTI): ICD-10-CM

## 2024-01-25 DIAGNOSIS — M00.9 PYOGENIC ARTHRITIS OF RIGHT KNEE JOINT, DUE TO UNSPECIFIED ORGANISM (MULTI): Primary | ICD-10-CM

## 2024-01-25 DIAGNOSIS — R60.1 GENERALIZED EDEMA: ICD-10-CM

## 2024-01-25 DIAGNOSIS — L03.115 CELLULITIS OF RIGHT LOWER LIMB: ICD-10-CM

## 2024-01-25 DIAGNOSIS — E11.65 TYPE 2 DIABETES MELLITUS WITH HYPERGLYCEMIA, WITH LONG-TERM CURRENT USE OF INSULIN (MULTI): ICD-10-CM

## 2024-01-25 DIAGNOSIS — I50.9 HEART FAILURE, UNSPECIFIED HF CHRONICITY, UNSPECIFIED HEART FAILURE TYPE (MULTI): ICD-10-CM

## 2024-01-25 DIAGNOSIS — E11.65 HYPERGLYCEMIA DUE TO DIABETES MELLITUS (MULTI): ICD-10-CM

## 2024-01-25 LAB
ALBUMIN SERPL BCP-MCNC: 2.8 G/DL (ref 3.4–5)
ALP SERPL-CCNC: 132 U/L (ref 33–120)
ALT SERPL W P-5'-P-CCNC: 7 U/L (ref 7–52)
ANION GAP BLDV CALCULATED.4IONS-SCNC: 10 MMOL/L (ref 10–25)
ANION GAP SERPL CALC-SCNC: 12 MMOL/L (ref 10–20)
AST SERPL W P-5'-P-CCNC: 9 U/L (ref 9–39)
BASE EXCESS BLDV CALC-SCNC: 2.3 MMOL/L (ref -2–3)
BASOPHILS # BLD AUTO: 0.09 X10*3/UL (ref 0–0.1)
BASOPHILS NFR BLD AUTO: 0.7 %
BILIRUB SERPL-MCNC: 0.3 MG/DL (ref 0–1.2)
BODY TEMPERATURE: 37 DEGREES CELSIUS
BUN SERPL-MCNC: 14 MG/DL (ref 6–23)
CA-I BLDV-SCNC: 1.16 MMOL/L (ref 1.1–1.33)
CALCIUM SERPL-MCNC: 8.1 MG/DL (ref 8.6–10.6)
CHLORIDE BLDV-SCNC: 95 MMOL/L (ref 98–107)
CHLORIDE SERPL-SCNC: 94 MMOL/L (ref 98–107)
CO2 SERPL-SCNC: 25 MMOL/L (ref 21–32)
CREAT SERPL-MCNC: 1.18 MG/DL (ref 0.5–1.3)
CRP SERPL-MCNC: 9.28 MG/DL
EGFRCR SERPLBLD CKD-EPI 2021: 55 ML/MIN/1.73M*2
EOSINOPHIL # BLD AUTO: 0.51 X10*3/UL (ref 0–0.7)
EOSINOPHIL NFR BLD AUTO: 4 %
ERYTHROCYTE [DISTWIDTH] IN BLOOD BY AUTOMATED COUNT: 15.6 % (ref 11.5–14.5)
ERYTHROCYTE [SEDIMENTATION RATE] IN BLOOD BY WESTERGREN METHOD: >130 MM/H (ref 0–30)
GLUCOSE BLD MANUAL STRIP-MCNC: 375 MG/DL (ref 74–99)
GLUCOSE BLD MANUAL STRIP-MCNC: 457 MG/DL (ref 74–99)
GLUCOSE BLD MANUAL STRIP-MCNC: 486 MG/DL (ref 74–99)
GLUCOSE BLD MANUAL STRIP-MCNC: 514 MG/DL (ref 74–99)
GLUCOSE BLDV-MCNC: 514 MG/DL (ref 74–99)
GLUCOSE SERPL-MCNC: 528 MG/DL (ref 74–99)
HCO3 BLDV-SCNC: 27.3 MMOL/L (ref 22–26)
HCT VFR BLD AUTO: 32.9 % (ref 36–52)
HCT VFR BLD EST: 32 % (ref 36–52)
HGB BLD-MCNC: 11 G/DL (ref 12–17.5)
HGB BLDV-MCNC: 10.6 G/DL (ref 12–17.5)
IMM GRANULOCYTES # BLD AUTO: 0.09 X10*3/UL (ref 0–0.7)
IMM GRANULOCYTES NFR BLD AUTO: 0.7 % (ref 0–0.9)
INHALED O2 CONCENTRATION: 21 %
LACTATE BLDV-SCNC: 1.2 MMOL/L (ref 0.4–2)
LYMPHOCYTES # BLD AUTO: 1.6 X10*3/UL (ref 1.2–4.8)
LYMPHOCYTES NFR BLD AUTO: 12.5 %
MCH RBC QN AUTO: 24.6 PG (ref 26–34)
MCHC RBC AUTO-ENTMCNC: 33.4 G/DL (ref 32–36)
MCV RBC AUTO: 73 FL (ref 80–100)
MONOCYTES # BLD AUTO: 1 X10*3/UL (ref 0.1–1)
MONOCYTES NFR BLD AUTO: 7.8 %
NEUTROPHILS # BLD AUTO: 9.54 X10*3/UL (ref 1.2–7.7)
NEUTROPHILS NFR BLD AUTO: 74.3 %
NRBC BLD-RTO: 0 /100 WBCS (ref 0–0)
OXYHGB MFR BLDV: 90.6 % (ref 45–75)
PCO2 BLDV: 43 MM HG (ref 41–51)
PH BLDV: 7.41 PH (ref 7.33–7.43)
PLATELET # BLD AUTO: 432 X10*3/UL (ref 150–450)
PO2 BLDV: 65 MM HG (ref 35–45)
POTASSIUM BLDV-SCNC: 3.4 MMOL/L (ref 3.5–5.3)
POTASSIUM SERPL-SCNC: 3.5 MMOL/L (ref 3.5–5.3)
PROT SERPL-MCNC: 6.7 G/DL (ref 6.4–8.2)
RBC # BLD AUTO: 4.48 X10*6/UL (ref 4–5.9)
SAO2 % BLDV: 93 % (ref 45–75)
SODIUM BLDV-SCNC: 129 MMOL/L (ref 136–145)
SODIUM SERPL-SCNC: 127 MMOL/L (ref 136–145)
WBC # BLD AUTO: 12.8 X10*3/UL (ref 4.4–11.3)

## 2024-01-25 PROCEDURE — 73590 X-RAY EXAM OF LOWER LEG: CPT | Mod: RT

## 2024-01-25 PROCEDURE — 96361 HYDRATE IV INFUSION ADD-ON: CPT

## 2024-01-25 PROCEDURE — 73564 X-RAY EXAM KNEE 4 OR MORE: CPT | Mod: RIGHT SIDE | Performed by: RADIOLOGY

## 2024-01-25 PROCEDURE — 85025 COMPLETE CBC W/AUTO DIFF WBC: CPT | Performed by: STUDENT IN AN ORGANIZED HEALTH CARE EDUCATION/TRAINING PROGRAM

## 2024-01-25 PROCEDURE — 73564 X-RAY EXAM KNEE 4 OR MORE: CPT | Mod: RT

## 2024-01-25 PROCEDURE — 82947 ASSAY GLUCOSE BLOOD QUANT: CPT

## 2024-01-25 PROCEDURE — 96365 THER/PROPH/DIAG IV INF INIT: CPT

## 2024-01-25 PROCEDURE — 2500000004 HC RX 250 GENERAL PHARMACY W/ HCPCS (ALT 636 FOR OP/ED): Performed by: STUDENT IN AN ORGANIZED HEALTH CARE EDUCATION/TRAINING PROGRAM

## 2024-01-25 PROCEDURE — 2500000004 HC RX 250 GENERAL PHARMACY W/ HCPCS (ALT 636 FOR OP/ED)

## 2024-01-25 PROCEDURE — 99223 1ST HOSP IP/OBS HIGH 75: CPT

## 2024-01-25 PROCEDURE — 36415 COLL VENOUS BLD VENIPUNCTURE: CPT | Performed by: STUDENT IN AN ORGANIZED HEALTH CARE EDUCATION/TRAINING PROGRAM

## 2024-01-25 PROCEDURE — 87205 SMEAR GRAM STAIN: CPT

## 2024-01-25 PROCEDURE — 73610 X-RAY EXAM OF ANKLE: CPT | Mod: RT

## 2024-01-25 PROCEDURE — 73590 X-RAY EXAM OF LOWER LEG: CPT | Mod: RIGHT SIDE | Performed by: RADIOLOGY

## 2024-01-25 PROCEDURE — 80053 COMPREHEN METABOLIC PANEL: CPT | Performed by: STUDENT IN AN ORGANIZED HEALTH CARE EDUCATION/TRAINING PROGRAM

## 2024-01-25 PROCEDURE — 96374 THER/PROPH/DIAG INJ IV PUSH: CPT | Mod: 59

## 2024-01-25 PROCEDURE — 2060000001 HC INTERMEDIATE ICU ROOM DAILY

## 2024-01-25 PROCEDURE — 73610 X-RAY EXAM OF ANKLE: CPT | Mod: RIGHT SIDE | Performed by: RADIOLOGY

## 2024-01-25 PROCEDURE — 87040 BLOOD CULTURE FOR BACTERIA: CPT | Performed by: STUDENT IN AN ORGANIZED HEALTH CARE EDUCATION/TRAINING PROGRAM

## 2024-01-25 PROCEDURE — 2500000001 HC RX 250 WO HCPCS SELF ADMINISTERED DRUGS (ALT 637 FOR MEDICARE OP)

## 2024-01-25 PROCEDURE — 85652 RBC SED RATE AUTOMATED: CPT | Performed by: STUDENT IN AN ORGANIZED HEALTH CARE EDUCATION/TRAINING PROGRAM

## 2024-01-25 PROCEDURE — 84132 ASSAY OF SERUM POTASSIUM: CPT | Performed by: EMERGENCY MEDICINE

## 2024-01-25 PROCEDURE — 99285 EMERGENCY DEPT VISIT HI MDM: CPT | Performed by: EMERGENCY MEDICINE

## 2024-01-25 PROCEDURE — 2500000002 HC RX 250 W HCPCS SELF ADMINISTERED DRUGS (ALT 637 FOR MEDICARE OP, ALT 636 FOR OP/ED): Performed by: STUDENT IN AN ORGANIZED HEALTH CARE EDUCATION/TRAINING PROGRAM

## 2024-01-25 PROCEDURE — 96367 TX/PROPH/DG ADDL SEQ IV INF: CPT

## 2024-01-25 PROCEDURE — 86140 C-REACTIVE PROTEIN: CPT | Performed by: STUDENT IN AN ORGANIZED HEALTH CARE EDUCATION/TRAINING PROGRAM

## 2024-01-25 RX ORDER — INSULIN LISPRO 100 [IU]/ML
0-10 INJECTION, SOLUTION INTRAVENOUS; SUBCUTANEOUS
Status: DISCONTINUED | OUTPATIENT
Start: 2024-01-25 | End: 2024-01-25

## 2024-01-25 RX ORDER — ACETAMINOPHEN 325 MG/1
650 TABLET ORAL EVERY 6 HOURS PRN
Status: DISCONTINUED | OUTPATIENT
Start: 2024-01-25 | End: 2024-02-01 | Stop reason: HOSPADM

## 2024-01-25 RX ORDER — DEXTROSE MONOHYDRATE 100 MG/ML
0.3 INJECTION, SOLUTION INTRAVENOUS ONCE AS NEEDED
Status: DISCONTINUED | OUTPATIENT
Start: 2024-01-25 | End: 2024-01-29 | Stop reason: SDUPTHER

## 2024-01-25 RX ORDER — PANTOPRAZOLE SODIUM 20 MG/1
20 TABLET, DELAYED RELEASE ORAL
Status: DISCONTINUED | OUTPATIENT
Start: 2024-01-26 | End: 2024-02-01 | Stop reason: HOSPADM

## 2024-01-25 RX ORDER — FLUTICASONE PROPIONATE 50 MCG
2 SPRAY, SUSPENSION (ML) NASAL DAILY PRN
Status: DISCONTINUED | OUTPATIENT
Start: 2024-01-25 | End: 2024-02-01 | Stop reason: HOSPADM

## 2024-01-25 RX ORDER — TRAZODONE HYDROCHLORIDE 50 MG/1
50 TABLET ORAL NIGHTLY
Status: DISCONTINUED | OUTPATIENT
Start: 2024-01-25 | End: 2024-02-01 | Stop reason: HOSPADM

## 2024-01-25 RX ORDER — CEFTRIAXONE 1 G/50ML
1 INJECTION, SOLUTION INTRAVENOUS ONCE
Status: COMPLETED | OUTPATIENT
Start: 2024-01-25 | End: 2024-01-25

## 2024-01-25 RX ORDER — AMLODIPINE BESYLATE 5 MG/1
5 TABLET ORAL DAILY
Status: DISCONTINUED | OUTPATIENT
Start: 2024-01-26 | End: 2024-01-28

## 2024-01-25 RX ORDER — ATORVASTATIN CALCIUM 40 MG/1
40 TABLET, FILM COATED ORAL DAILY
Status: DISCONTINUED | OUTPATIENT
Start: 2024-01-26 | End: 2024-02-01 | Stop reason: HOSPADM

## 2024-01-25 RX ORDER — INSULIN LISPRO 100 [IU]/ML
0-15 INJECTION, SOLUTION INTRAVENOUS; SUBCUTANEOUS
Status: DISCONTINUED | OUTPATIENT
Start: 2024-01-26 | End: 2024-01-26

## 2024-01-25 RX ORDER — VANCOMYCIN HYDROCHLORIDE 1 G/200ML
1000 INJECTION, SOLUTION INTRAVENOUS EVERY 12 HOURS
Status: DISCONTINUED | OUTPATIENT
Start: 2024-01-25 | End: 2024-01-27

## 2024-01-25 RX ORDER — HYDROMORPHONE HYDROCHLORIDE 1 MG/ML
0.2 INJECTION, SOLUTION INTRAMUSCULAR; INTRAVENOUS; SUBCUTANEOUS
Status: DISCONTINUED | OUTPATIENT
Start: 2024-01-25 | End: 2024-01-29 | Stop reason: ALTCHOICE

## 2024-01-25 RX ORDER — TRAMADOL HYDROCHLORIDE 50 MG/1
50 TABLET ORAL EVERY 6 HOURS PRN
Status: DISCONTINUED | OUTPATIENT
Start: 2024-01-25 | End: 2024-01-29

## 2024-01-25 RX ORDER — DEXTROSE 50 % IN WATER (D50W) INTRAVENOUS SYRINGE
25
Status: DISCONTINUED | OUTPATIENT
Start: 2024-01-25 | End: 2024-01-29 | Stop reason: SDUPTHER

## 2024-01-25 RX ORDER — ASPIRIN 81 MG/1
81 TABLET ORAL DAILY
Status: DISCONTINUED | OUTPATIENT
Start: 2024-01-26 | End: 2024-02-01 | Stop reason: HOSPADM

## 2024-01-25 RX ORDER — INSULIN GLARGINE 100 [IU]/ML
50 INJECTION, SOLUTION SUBCUTANEOUS DAILY
Status: DISCONTINUED | OUTPATIENT
Start: 2024-01-26 | End: 2024-01-26

## 2024-01-25 RX ORDER — CEFTRIAXONE 2 G/50ML
2 INJECTION, SOLUTION INTRAVENOUS EVERY 24 HOURS
Status: DISCONTINUED | OUTPATIENT
Start: 2024-01-25 | End: 2024-01-27

## 2024-01-25 RX ORDER — INSULIN GLARGINE 100 [IU]/ML
100 INJECTION, SOLUTION SUBCUTANEOUS ONCE
Status: COMPLETED | OUTPATIENT
Start: 2024-01-25 | End: 2024-01-25

## 2024-01-25 RX ORDER — HYDROMORPHONE HYDROCHLORIDE 1 MG/ML
0.5 INJECTION, SOLUTION INTRAMUSCULAR; INTRAVENOUS; SUBCUTANEOUS ONCE
Status: COMPLETED | OUTPATIENT
Start: 2024-01-25 | End: 2024-01-25

## 2024-01-25 RX ORDER — INSULIN LISPRO 100 [IU]/ML
10 INJECTION, SOLUTION INTRAVENOUS; SUBCUTANEOUS ONCE
Status: COMPLETED | OUTPATIENT
Start: 2024-01-25 | End: 2024-01-25

## 2024-01-25 RX ADMIN — TRAMADOL HYDROCHLORIDE 50 MG: 50 TABLET, COATED ORAL at 22:03

## 2024-01-25 RX ADMIN — SODIUM CHLORIDE, POTASSIUM CHLORIDE, SODIUM LACTATE AND CALCIUM CHLORIDE 500 ML: 600; 310; 30; 20 INJECTION, SOLUTION INTRAVENOUS at 19:56

## 2024-01-25 RX ADMIN — INSULIN GLARGINE 100 UNITS: 100 INJECTION, SOLUTION SUBCUTANEOUS at 09:50

## 2024-01-25 RX ADMIN — CEFTRIAXONE SODIUM 2 G: 2 INJECTION, SOLUTION INTRAVENOUS at 19:56

## 2024-01-25 RX ADMIN — INSULIN LISPRO 10 UNITS: 100 INJECTION, SOLUTION INTRAVENOUS; SUBCUTANEOUS at 17:25

## 2024-01-25 RX ADMIN — SODIUM CHLORIDE, POTASSIUM CHLORIDE, SODIUM LACTATE AND CALCIUM CHLORIDE 1000 ML: 600; 310; 30; 20 INJECTION, SOLUTION INTRAVENOUS at 09:24

## 2024-01-25 RX ADMIN — INSULIN LISPRO 10 UNITS: 100 INJECTION, SOLUTION INTRAVENOUS; SUBCUTANEOUS at 13:48

## 2024-01-25 RX ADMIN — CEFTRIAXONE SODIUM 1 G: 1 INJECTION, SOLUTION INTRAVENOUS at 12:30

## 2024-01-25 RX ADMIN — HYDROMORPHONE HYDROCHLORIDE 0.5 MG: 1 INJECTION, SOLUTION INTRAMUSCULAR; INTRAVENOUS; SUBCUTANEOUS at 09:23

## 2024-01-25 RX ADMIN — INSULIN LISPRO 10 UNITS: 100 INJECTION, SOLUTION INTRAVENOUS; SUBCUTANEOUS at 13:51

## 2024-01-25 RX ADMIN — VANCOMYCIN HYDROCHLORIDE 1000 MG: 1 INJECTION, SOLUTION INTRAVENOUS at 13:47

## 2024-01-25 ASSESSMENT — COLUMBIA-SUICIDE SEVERITY RATING SCALE - C-SSRS
6. HAVE YOU EVER DONE ANYTHING, STARTED TO DO ANYTHING, OR PREPARED TO DO ANYTHING TO END YOUR LIFE?: NO
2. HAVE YOU ACTUALLY HAD ANY THOUGHTS OF KILLING YOURSELF?: NO
1. IN THE PAST MONTH, HAVE YOU WISHED YOU WERE DEAD OR WISHED YOU COULD GO TO SLEEP AND NOT WAKE UP?: NO

## 2024-01-25 ASSESSMENT — PAIN SCALES - GENERAL: PAINLEVEL_OUTOF10: 10 - WORST POSSIBLE PAIN

## 2024-01-25 ASSESSMENT — ENCOUNTER SYMPTOMS
VOMITING: 0
JOINT SWELLING: 1
ABDOMINAL PAIN: 0
NAUSEA: 0
COUGH: 1
FEVER: 0
CHILLS: 0
WOUND: 1

## 2024-01-25 ASSESSMENT — PAIN DESCRIPTION - PAIN TYPE: TYPE: CHRONIC PAIN

## 2024-01-25 ASSESSMENT — PAIN DESCRIPTION - LOCATION: LOCATION: KNEE

## 2024-01-25 ASSESSMENT — PAIN - FUNCTIONAL ASSESSMENT: PAIN_FUNCTIONAL_ASSESSMENT: 0-10

## 2024-01-25 ASSESSMENT — PAIN DESCRIPTION - ORIENTATION: ORIENTATION: RIGHT

## 2024-01-25 ASSESSMENT — PAIN DESCRIPTION - DESCRIPTORS: DESCRIPTORS: CRAMPING;DISCOMFORT;DULL

## 2024-01-25 NOTE — CONSULTS
ORTHOPAEDIC CONSULTATION     History Of Present Illness  53M (HF, uncontrolled DM2, CAD s/p CABG, A-fib on Eliquis, TKA c/b recurrent prosthetic joint infection s/p explant and antibiotic spacer placement w/ Denilson in 2019, on chronic Keflex suppression therapy) p/w worsening knee pain x2 weeks and draining sinus tract after reported fall 2 days prior. Seen by ortho on 10/16/23, recommended AKA at that time, patient refused.    Orthopaedic Problems/Injuries:  Chronic R PJI    Location: Painful at knee  Duration: Pain has been persistent since injury  Severity: 6 /10  Worsened by movement/Palpation, improved with rest and pain medication    Other Injuries: R ankle pain    Past medical history: per HPI; no history of blood clots  Past surgical history: per HPI, rest reviewed in EMR  Allergies: per EMR  Medications: per EMR  Social History: denies smoking, denies drinking, denies IVDU  Family History:  Non-contributory to this patient's acute surgical issue.    Review of Systems: 12 point ROS negative unless stated in HPI    Past Medical History  He has a past medical history of Personal history of other endocrine, nutritional and metabolic disease and Unspecified astigmatism, bilateral (01/04/2016).    Surgical History  He has a past surgical history that includes Knee surgery (10/29/2014).     Social History  He reports that he has never smoked. He has never been exposed to tobacco smoke. He has never used smokeless tobacco. No history on file for alcohol use and drug use.    Family History  No family history on file.     Allergies  Loratadine, Pollen extracts, and Lisinopril    Review of Systems  12 point ROS negative unless stated in HPI     Physical Exam  GEN - NAD, resting comfortably in hospital bed  HEENT - MMM, EOMI, NCAT  CV - RRR by peripheral palpation, limbs wwp  PULM - NWOB on RA  NEURO - DO spontaneously, CNs II - XII grossly intact  PSYCH - Appropriate mood and affect    RLE:   - skin with 1cm  "draining sinus tract anteriorly, indurated skin around knee. Tender at site of injury with painful ROM.  -Motor intact in DF/PF/EHL/FHL  -SILT in saph/sural/SPN/DPN distributions  -Foot wwp, 2+ DP/PT pulse, brisk cap refill  -Compartments soft and compressible, no pain with passive dorsiflexion       Last Recorded Vitals  Blood pressure 143/82, pulse 94, temperature 36.4 °C (97.5 °F), temperature source Skin, resp. rate 16, height 1.676 m (5' 6\"), weight 150 kg (330 lb), SpO2 98 %.    Relevant Results  Results for orders placed or performed during the hospital encounter of 01/25/24 (from the past 24 hour(s))   CBC and Auto Differential   Result Value Ref Range    WBC 12.8 (H) 4.4 - 11.3 x10*3/uL    nRBC 0.0 0.0 - 0.0 /100 WBCs    RBC 4.48 4.00 - 5.90 x10*6/uL    Hemoglobin 11.0 (L) 12.0 - 17.5 g/dL    Hematocrit 32.9 (L) 36.0 - 52.0 %    MCV 73 (L) 80 - 100 fL    MCH 24.6 (L) 26.0 - 34.0 pg    MCHC 33.4 32.0 - 36.0 g/dL    RDW 15.6 (H) 11.5 - 14.5 %    Platelets 432 150 - 450 x10*3/uL    Neutrophils % 74.3 40.0 - 80.0 %    Immature Granulocytes %, Automated 0.7 0.0 - 0.9 %    Lymphocytes % 12.5 13.0 - 44.0 %    Monocytes % 7.8 2.0 - 10.0 %    Eosinophils % 4.0 0.0 - 6.0 %    Basophils % 0.7 0.0 - 2.0 %    Neutrophils Absolute 9.54 (H) 1.20 - 7.70 x10*3/uL    Immature Granulocytes Absolute, Automated 0.09 0.00 - 0.70 x10*3/uL    Lymphocytes Absolute 1.60 1.20 - 4.80 x10*3/uL    Monocytes Absolute 1.00 0.10 - 1.00 x10*3/uL    Eosinophils Absolute 0.51 0.00 - 0.70 x10*3/uL    Basophils Absolute 0.09 0.00 - 0.10 x10*3/uL   Comprehensive metabolic panel   Result Value Ref Range    Glucose 528 (HH) 74 - 99 mg/dL    Sodium 127 (L) 136 - 145 mmol/L    Potassium 3.5 3.5 - 5.3 mmol/L    Chloride 94 (L) 98 - 107 mmol/L    Bicarbonate 25 21 - 32 mmol/L    Anion Gap 12 10 - 20 mmol/L    Urea Nitrogen 14 6 - 23 mg/dL    Creatinine 1.18 0.50 - 1.30 mg/dL    eGFR 55 (L) >60 mL/min/1.73m*2    Calcium 8.1 (L) 8.6 - 10.6 mg/dL    " Albumin 2.8 (L) 3.4 - 5.0 g/dL    Alkaline Phosphatase 132 (H) 33 - 120 U/L    Total Protein 6.7 6.4 - 8.2 g/dL    AST 9 9 - 39 U/L    Bilirubin, Total 0.3 0.0 - 1.2 mg/dL    ALT 7 7 - 52 U/L   C-reactive protein   Result Value Ref Range    C-Reactive Protein 9.28 (H) <1.00 mg/dL   Sedimentation rate, automated   Result Value Ref Range    Sedimentation Rate >130 (H) 0 - 30 mm/h   POCT GLUCOSE   Result Value Ref Range    POCT Glucose 514 (H) 74 - 99 mg/dL   POCT GLUCOSE   Result Value Ref Range    POCT Glucose 457 (H) 74 - 99 mg/dL       Imaging:  AP and lateral radiographs of the R knee display a broken antibiotic spacer, in similar alignment to prior films.       Assessment/Plan   53M (HF, uncontrolled DM2, CAD s/p CABG, A-fib on Eliquis, TKA c/b recurrent prosthetic joint infection s/p explant and antibiotic spacer placement w/ Denilson in 2019, on chronic Keflex suppression therapy) p/w worsening knee pain x2 weeks and draining sinus tract after reported fall 2 days prior. Seen by ortho on 10/16/23, recommended AKA at that time, patient refused. Labs today WBC 12.8, , CRP 9.28. XR’s with broken abx spacer, unchanged from prior imaging in Oct 2023. Aspirated for 1cc of sanguinous fluid, pending cultures.     Plan:  - Treatment for this condition would be AKA. Patient adamantly refusing at this time  - Will fu R ankle xrays  - NWB RLE  - DVT ppx per primary  - IV ABX per primary, patient reports history of MRSA but was only taking Keflex  - Orthopedics will be available should patient decide to proceed with AKA  - Patient may follow up with Dr. Conner for outpatient management    Consult seen and staffed within 30 minutes of notification.    Consult discussed with attending, Dr. Yasmani Epstein MD, PGY-1  Orthopaedic Surgery   Available via Epic Chat

## 2024-01-25 NOTE — ED PROVIDER NOTES
CC: Knee Pain (Chronic Pain, R knee. 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    Patient with chronic pain and swelling to his right knee, has previously been seen by orthopedic surgery with recommendations remaining the same which is AKA which patient has refused.  Had a fall a week ago, since then increased pain to the right knee.  Also with increased erythema, fluctuance, warmth to the anterior aspect, feels he had an abscess which spontaneously burst a week ago and has been draining.    No fevers or chills or other systemic symptoms reported at this time.  Baseline ambulates with assistance and with a wheelchair has had difficulty with ADLs due to increased pain and swelling.    Records Reviewed:  Recent available ED and inpatient notes reviewed in EMR.    PMHx/PSHx:  Per HPI.   - has a past medical history of Personal history of other endocrine, nutritional and metabolic disease and Unspecified astigmatism, bilateral.  - has a past surgical history that includes Knee surgery (10/29/2014).  - has Chronic infection of knee (CMS/[facility]) on their problem list.    Medications:  Reviewed in EMR. See EMR for complete list of medications and doses.    Allergies:  Loratadine, Pollen extracts, and Lisinopril    Social History:  - Tobacco:  reports that he has never smoked. He has never been exposed to tobacco smoke. He has never used smokeless tobacco.   - Alcohol:  has no history on file for alcohol use.   - Illicit Drugs:  has no  history on file for drug use.     ROS:  Per HPI.       ???????????????????????????????????????????????????????????????  Triage Vitals:  T 36.4 °C (97.5 °F)  HR 94  /82  RR 16  O2 98 % None (Room air)    Physical Exam  Constitutional:       Appearance: He is not toxic-appearing.      Comments: Disheveled   Eyes:      Conjunctiva/sclera: Conjunctivae normal.   Cardiovascular:      Rate and Rhythm: Normal rate and regular rhythm.      Heart sounds: Normal heart sounds.   Pulmonary:      Effort: Pulmonary effort is normal.      Breath sounds: Normal breath sounds. No wheezing.   Abdominal:      Palpations: Abdomen is soft.      Tenderness: There is no abdominal tenderness.   Musculoskeletal:         General: Swelling present.      Right lower leg: No edema.      Left lower leg: No edema.      Comments: Tenderness and swelling to the right knee, with about 6 cm area of erythema, fluctuance, and purulent drainage to the anterior aspect.   Neurological:      General: No focal deficit present.      Mental Status: He is oriented to person, place, and time.   Psychiatric:         Mood and Affect: Mood normal.         Behavior: Behavior normal.       ???????????????????????????????????????????????????????????????  Assessment and Plan:  53-year-old male with history of chronic right knee infection presents to the emergency department with concern for increased pain and swelling.  Presentation concerning for new superimposed infection, with evidence for abscess with active drainage.  Given his recent fall, will obtain x-rays of the right knee to rule out fracture, along with basic and infectious labs with repeat blood cultures.    ED Course:  Lab work demonstrates, elevation inflammatory markers, mild leukocytosis to 12.8.  Glucose in the 500s, was given his home Lantus, started on sliding scale insulin.  Blood gas without acidosis or evidence for developing DKA, is not tachypneic, no chest pain or abdominal  pain.    X-ray imaging with chronic changes.  Discussed with orthopedic surgery, performed arthrocentesis at bedside.  Given new purulence, was covered empirically with vancomycin and ceftriaxone.  Discussed with admissions coordinator, admitted to medicine for further management of acute on chronic right knee infection.    Social Determinants Limiting Care:  None identified    Disposition:  Admit to medicine    --  Lance Boyle MD  Emergency Medicine, PGY-3      Procedures ? SmartLinks last updated 1/25/2024 9:04 AM        Lance Boyle MD  Resident  01/25/24 1527

## 2024-01-25 NOTE — PROGRESS NOTES
"Vancomycin Dosing by Pharmacy- INITIAL    Kody Choi is a 53 y.o. year old adult who Pharmacy has been consulted for vancomycin dosing for cellulitis, skin and soft tissue. Based on the patient's indication and renal status this patient will be dosed based on a goal AUC of 400-600.     Renal function is currently stable.    Visit Vitals  /82 (BP Location: Left arm, Patient Position: Sitting)   Pulse 94   Temp 36.4 °C (97.5 °F) (Skin)   Resp 16        Lab Results   Component Value Date    CREATININE 1.18 01/25/2024    CREATININE 2.09 (H) 10/18/2023    CREATININE 1.73 (H) 10/16/2023    CREATININE 1.87 (H) 10/15/2023        Patient weight is No results found for: \"PTWEIGHT\"    No results found for: \"CULTURE\"     No intake/output data recorded.  [unfilled]    Lab Results   Component Value Date    PATIENTTEMP 37.0 01/25/2024    PATIENTTEMP 37.0 10/16/2023    PATIENTTEMP 37.0 10/15/2023          Assessment/Plan     Patient will not be given a loading dose.  Will initiate vancomycin maintenance,  1000 mg every 12 hours.    This dosing regimen is predicted by [employer] to result in the following pharmacokinetic parameters:  Regimen: 1000 mg IV every 12 hours.  Start time: 12:34 on 01/25/2024  Exposure target: AUC24 (range)400-600 mg/L.hr   AUC24,ss: 539 mg/L.hr  Probability of AUC24 > 400: 72 %  Ctrough,ss: 16 mg/L  Probability of Ctrough,ss > 20: 38 %  Probability of nephrotoxicity (Lodise EDIE 2009): 11 %      Follow-up level will be ordered on 1/26 with AM labs, unless clinically indicated sooner.  Will continue to monitor renal function daily while on vancomycin and order serum creatinine at least every 48 hours if not already ordered.  Follow for continued vancomycin needs, clinical response, and signs/symptoms of toxicity.       Jeovany Ortiz, PharmD, BCPS       "

## 2024-01-25 NOTE — H&P
"History Of Present Illness  Kody Choi is a 53 y.o. adult with PMH of uncontrolled T2DM, CAD s/p multiple stents and CABG, HFrEF (EF 35-40% 01/21), A fib, RUPESH, TKA in 2015, hx of multiple septic arthritis infections in R knee, presenting with 1 week of worsening R knee pain and drainage. Patient states he began feeling knee pain in his R knee starting around one week ago and swelling that he rates a 10/10 in severity. He is wheelchair bound and did not put weight on that leg but even had difficulty moving his leg on and off the wheelchair support. He states that one day after the pain started he was in his wheelchair when the chair \"gave out\" from underneath him and he fell to the ground hitting that right knee. He also endorses drainage from the front of the right knee that started a few weeks prior. He explained that his knee has been swollen for weeks and at one point he bent down and his knee spontaneously opened up and drained purulent fluid. He states that the pain has been constant but improves with tramadol.     Of note patient has had multiple infections of the R knee in the past including chronic MSSA/MRSA infections, on outpatient keflex which he states he takes consistently.     In the ED, patient was hemodynamically stable, afebrile. Xrays of R knee show no acute osseous injury, fragmentation of cement of the femoral and tibial components of the knee arthroplasty, soft tissue swelling greatest anteriorly. Orthopedics was consulted who took an aspiration sample from the knee and sent for cultures. Patient was then admitted for IV antibiotics       Past Medical History  Past Medical History:   Diagnosis Date    Personal history of other endocrine, nutritional and metabolic disease     History of type 2 diabetes mellitus    Unspecified astigmatism, bilateral 01/04/2016    Astigmatism, bilateral       Surgical History  Past Surgical History:   Procedure Laterality Date    KNEE SURGERY  10/29/2014    Knee " "Surgery        Social History  He reports that he has never smoked. He has never been exposed to tobacco smoke. He has never used smokeless tobacco. No history on file for alcohol use and drug use.    Family History  No family history on file.     Allergies  Loratadine, Pollen extracts, and Lisinopril    Review of Systems   Constitutional:  Negative for chills and fever.   Respiratory:  Positive for cough.    Cardiovascular:  Negative for chest pain and leg swelling.   Gastrointestinal:  Negative for abdominal pain, nausea and vomiting.   Musculoskeletal:  Positive for joint swelling.   Skin:  Positive for wound.   All other systems reviewed and are negative.       Physical Exam  Constitutional:       General: He is not in acute distress.     Appearance: He is obese.   HENT:      Head: Normocephalic.      Nose: Nose normal.   Eyes:      General: No scleral icterus.     Pupils: Pupils are equal, round, and reactive to light.   Cardiovascular:      Rate and Rhythm: Normal rate and regular rhythm.      Pulses: Normal pulses.      Heart sounds: No murmur heard.  Pulmonary:      Effort: Pulmonary effort is normal.      Breath sounds: Normal breath sounds. No wheezing.   Abdominal:      General: There is no distension.      Palpations: Abdomen is soft.      Tenderness: There is no abdominal tenderness.   Musculoskeletal:         General: Swelling, tenderness and signs of injury present.      Cervical back: Normal range of motion.      Right knee: Swelling present. Tenderness present.   Skin:     Findings: Rash present.   Neurological:      General: No focal deficit present.      Mental Status: He is alert and oriented to person, place, and time.   Psychiatric:         Behavior: Behavior normal.          Last Recorded Vitals  Blood pressure 144/78, pulse 87, temperature 36.4 °C (97.5 °F), temperature source Skin, resp. rate 16, height 1.676 m (5' 6\"), weight 150 kg (330 lb), SpO2 98 %.    Relevant Results    Scheduled " medications  insulin lispro, 0-10 Units, subcutaneous, TID with meals  vancomycin, 1,000 mg, intravenous, q12h      Continuous medications     PRN medications  PRN medications: dextrose 10 % in water (D10W), dextrose, glucagon  Results for orders placed or performed during the hospital encounter of 01/25/24 (from the past 24 hour(s))   CBC and Auto Differential   Result Value Ref Range    WBC 12.8 (H) 4.4 - 11.3 x10*3/uL    nRBC 0.0 0.0 - 0.0 /100 WBCs    RBC 4.48 4.00 - 5.90 x10*6/uL    Hemoglobin 11.0 (L) 12.0 - 17.5 g/dL    Hematocrit 32.9 (L) 36.0 - 52.0 %    MCV 73 (L) 80 - 100 fL    MCH 24.6 (L) 26.0 - 34.0 pg    MCHC 33.4 32.0 - 36.0 g/dL    RDW 15.6 (H) 11.5 - 14.5 %    Platelets 432 150 - 450 x10*3/uL    Neutrophils % 74.3 40.0 - 80.0 %    Immature Granulocytes %, Automated 0.7 0.0 - 0.9 %    Lymphocytes % 12.5 13.0 - 44.0 %    Monocytes % 7.8 2.0 - 10.0 %    Eosinophils % 4.0 0.0 - 6.0 %    Basophils % 0.7 0.0 - 2.0 %    Neutrophils Absolute 9.54 (H) 1.20 - 7.70 x10*3/uL    Immature Granulocytes Absolute, Automated 0.09 0.00 - 0.70 x10*3/uL    Lymphocytes Absolute 1.60 1.20 - 4.80 x10*3/uL    Monocytes Absolute 1.00 0.10 - 1.00 x10*3/uL    Eosinophils Absolute 0.51 0.00 - 0.70 x10*3/uL    Basophils Absolute 0.09 0.00 - 0.10 x10*3/uL   Comprehensive metabolic panel   Result Value Ref Range    Glucose 528 (HH) 74 - 99 mg/dL    Sodium 127 (L) 136 - 145 mmol/L    Potassium 3.5 3.5 - 5.3 mmol/L    Chloride 94 (L) 98 - 107 mmol/L    Bicarbonate 25 21 - 32 mmol/L    Anion Gap 12 10 - 20 mmol/L    Urea Nitrogen 14 6 - 23 mg/dL    Creatinine 1.18 0.50 - 1.30 mg/dL    eGFR 55 (L) >60 mL/min/1.73m*2    Calcium 8.1 (L) 8.6 - 10.6 mg/dL    Albumin 2.8 (L) 3.4 - 5.0 g/dL    Alkaline Phosphatase 132 (H) 33 - 120 U/L    Total Protein 6.7 6.4 - 8.2 g/dL    AST 9 9 - 39 U/L    Bilirubin, Total 0.3 0.0 - 1.2 mg/dL    ALT 7 7 - 52 U/L   C-reactive protein   Result Value Ref Range    C-Reactive Protein 9.28 (H) <1.00 mg/dL    Sedimentation rate, automated   Result Value Ref Range    Sedimentation Rate >130 (H) 0 - 30 mm/h   Blood Culture    Specimen: Peripheral Venipuncture; Blood culture   Result Value Ref Range    Blood Culture Loaded on Instrument - Culture in progress    Blood Culture    Specimen: Peripheral Venipuncture; Blood culture   Result Value Ref Range    Blood Culture Loaded on Instrument - Culture in progress    POCT GLUCOSE   Result Value Ref Range    POCT Glucose 514 (H) 74 - 99 mg/dL   POCT GLUCOSE   Result Value Ref Range    POCT Glucose 457 (H) 74 - 99 mg/dL   BLOOD GAS VENOUS FULL PANEL   Result Value Ref Range    POCT pH, Venous 7.41 7.33 - 7.43 pH    POCT pCO2, Venous 43 41 - 51 mm Hg    POCT pO2, Venous 65 (H) 35 - 45 mm Hg    POCT SO2, Venous 93 (H) 45 - 75 %    POCT Oxy Hemoglobin, Venous 90.6 (H) 45.0 - 75.0 %    POCT Hematocrit Calculated, Venous 32.0 (L) 36.0 - 52.0 %    POCT Sodium, Venous 129 (L) 136 - 145 mmol/L    POCT Potassium, Venous 3.4 (L) 3.5 - 5.3 mmol/L    POCT Chloride, Venous 95 (L) 98 - 107 mmol/L    POCT Ionized Calicum, Venous 1.16 1.10 - 1.33 mmol/L    POCT Glucose, Venous 514 (HH) 74 - 99 mg/dL    POCT Lactate, Venous 1.2 0.4 - 2.0 mmol/L    POCT Base Excess, Venous 2.3 -2.0 - 3.0 mmol/L    POCT HCO3 Calculated, Venous 27.3 (H) 22.0 - 26.0 mmol/L    POCT Hemoglobin, Venous 10.6 (L) 12.0 - 17.5 g/dL    POCT Anion Gap, Venous 10.0 10.0 - 25.0 mmol/L    Patient Temperature 37.0 degrees Celsius    FiO2 21 %   POCT GLUCOSE   Result Value Ref Range    POCT Glucose 486 (H) 74 - 99 mg/dL     XR ankle right 3+ views    Result Date: 1/25/2024  Interpreted By:  Omar Cameron, STUDY: Right ankle and tibia and fibula dated 1/25/2024.   INDICATION: Signs/Symptoms:pain s/p fall; Signs/Symptoms:pain sp fall   COMPARISON: Radiographs dated 01/11/2021.   ACCESSION NUMBER(S): [id number]; [id number]   ORDERING CLINICIAN: DONNA KRAMER   TECHNIQUE: Three views radiographs of the right ankle. Two views of  the right tibia and fibula.   FINDINGS: Bones are demineralized. Mild degenerative changes seen of the ankle. Mild degenerative changes seen of the midfoot. There is calcaneal enthesophyte formation. There are findings of the knee which are further discussed on the dedicated radiographs from the same date. No acute fracture or dislocation is evident.   No ankle joint effusion is evident. There is prominence of the soft tissues of the visualized lower extremity. There is soft tissue findings at the knee which are further discussed on dedicated radiographs from the same date. Vascular calcifications are seen in the soft tissues.       1. Generalized prominence of the visualized soft tissues which may represent lymphedema and/or cellulitis. 2. Please see knee radiograph report from the same date for further discussion of findings about the knee. 3. Other findings as above.   MACRO: None   Signed by: Omar Cameron 1/25/2024 12:38 PM Dictation workstation:   [id number]    XR tibia fibula right 2 views    Result Date: 1/25/2024  Interpreted By:  mOar Cameron, STUDY: Right ankle and tibia and fibula dated 1/25/2024.   INDICATION: Signs/Symptoms:pain s/p fall; Signs/Symptoms:pain sp fall   COMPARISON: Radiographs dated 01/11/2021.   ACCESSION NUMBER(S): [id number]; [id number]   ORDERING CLINICIAN: DONNA KRAMER   TECHNIQUE: Three views radiographs of the right ankle. Two views of the right tibia and fibula.   FINDINGS: Bones are demineralized. Mild degenerative changes seen of the ankle. Mild degenerative changes seen of the midfoot. There is calcaneal enthesophyte formation. There are findings of the knee which are further discussed on the dedicated radiographs from the same date. No acute fracture or dislocation is evident.   No ankle joint effusion is evident. There is prominence of the soft tissues of the visualized lower extremity. There is soft tissue findings at the knee which are further discussed on  dedicated radiographs from the same date. Vascular calcifications are seen in the soft tissues.       1. Generalized prominence of the visualized soft tissues which may represent lymphedema and/or cellulitis. 2. Please see knee radiograph report from the same date for further discussion of findings about the knee. 3. Other findings as above.   MACRO: None   Signed by: Omar Cameron 1/25/2024 12:38 PM Dictation workstation:   [id number]    XR knee right 4+ views    Result Date: 1/25/2024  Interpreted By:  Omar Cameron, STUDY: Right knee dated  1/25/2024.   INDICATION: Signs/Symptoms:chronic infection right knee, fall 1 week ago increased pain   COMPARISON: Radiographs dated 06/27/2023.   ACCESSION NUMBER(S): [id number]   ORDERING CLINICIAN: JEREMÍAS ELLIS   TECHNIQUE: Four views of the right knee.   FINDINGS: Significant deformity of the knee with multiple cement and bone fragments limits assessment for pathology. The bones are demineralized. There is redemonstration cement arthroplasty of the knee. There is redemonstration of fragmentation of the femoral cement the junction of the stem-condylar component. There is redemonstration of fragmentation of the cement of the tibial component at the stem plateau interface. There is redemonstration multiple cement fragments about the knee. Best seen on the lateral radiograph there is posterior translation of the tibia relative to the femur which is difficult to directly compare to the prior examination but likely similar. Soft tissue swelling is seen about the knee greatest anteriorly. There appears to be a small region of lucency along the skin/subcutaneous interface seen on the lateral radiograph. Vascular calcifications are seen over the soft tissues.       1. Limited exam. Although no acute osseous injury is evident if there remains concern for an acute osseous injury, CT is recommended. 2. Redemonstration of fragmentation of the cement of the femoral and tibial  components of the knee arthroplasty with associated posterior translation of the tibia relative to the femur which given differences in technique is likely similar. 3. Soft tissue swelling about the knee greatest anteriorly. There is a region of lucency at the skin subcutaneous interface of the anterior knee seen on the lateral radiograph. Knowledge of wound/ulcer in this location may be helpful.   MACRO: None   Signed by: Omar Cameron 1/25/2024 10:17 AM Dictation workstation:   [id number]        Assessment/Plan   Active Problems:  There are no active Hospital Problems.    Kody Choi is a 54 y/o M with PMH significant for uncontrolled T2DM, CAD s/p multiple stents and CABG, HFrEF (EF 35-40% 01/21), A fib, RUPESH, TKA in 2015, hx of multiple septic arthritis infections in R knee, presenting with 1 week of worsening R knee pain and drainage from sinus s/p fall two days prior to symptom onset. Likely septic arthritis in setting of leukocytosis and knee pain and drainage given history of repeated infections and poorly controlled diabetes. Ortho consulted, aspiration and cultures sent.       #Superimposed R knee infection  #Hx of septic arthritis of R knee, multiple episodes  #R TKA 2015 c/b recurrent prosthetic joint infection s/p explant and antibiotic spacer in 2019 on chronic keflex  #Leukocytosis  -Broken Abx spacer seen on Xrays  -WBC 12.8, , CRP 9.28  -On outpatient keflex for suppression  -Bactrim listed in home meds but patient unsure if taking  -Orthopedics consulted, recommend AKA however patient adamantly refusing at this time.  -IV vanc and ceftriaxone in the ED empirically   Plan  -Appreciate further ortho recs  -Continue IV abx with vancomycin and ceftriaxone  -pending cultures from aspirate   -Pending blood cultures  -PT/OT consult  -Consider ID consult for outpatient Abx optimization    #HFrEF (EF 35-40% 01/21)  #CAD s/p stents and CABG  -on home lisinopril and atorvastatin   Plan  -consider  repeat TTE  -continue home atorvastatin  -Start amlodipine 5 mg    -hold lisinopril  -consider GDMT optimization    #A fib, unsure if on eliquis  -Documented history of a fib.  -Eliquis not listed as a home med, unsure if patient has been taking or not  -Chart review states he is on eliquis   Plan  -Telemetry monitoring     #Type 2 diabetes mellitus, uncontrolled  -A1c 14.7 10/2023  -POCT 486 while in ED, reportedly drawn after eating however  -hold home jardiance and semaglutide  -patient reports taking 100 units Lantus daily at home  Plan  -Lantus 50 units and lispro 10 TID before meals  -SSI high intensity   -Consult to endocrinology and diabetic educator in AM  -repeat A1c  -IV fluids 500mL LR bolus    F: PRN  E: PRN  N: Regular diet   A: PIV    DVT prophylaxis: lovenox      Code Status: Full code  Emergency contact: Amira Espino (?) [phone]    Plan pending staffing in AM with attending    Ana Baumann MD

## 2024-01-26 LAB
ABO GROUP (TYPE) IN BLOOD: NORMAL
ALBUMIN SERPL BCP-MCNC: 2.7 G/DL (ref 3.4–5)
ANION GAP SERPL CALC-SCNC: 12 MMOL/L (ref 10–20)
ANTIBODY SCREEN: NORMAL
BASOPHILS # BLD AUTO: 0.09 X10*3/UL (ref 0–0.1)
BASOPHILS NFR BLD AUTO: 0.7 %
BUN SERPL-MCNC: 23 MG/DL (ref 6–23)
CALCIUM SERPL-MCNC: 8.2 MG/DL (ref 8.6–10.6)
CHLORIDE SERPL-SCNC: 98 MMOL/L (ref 98–107)
CO2 SERPL-SCNC: 26 MMOL/L (ref 21–32)
CREAT SERPL-MCNC: 1.29 MG/DL (ref 0.5–1.3)
EGFRCR SERPLBLD CKD-EPI 2021: 49 ML/MIN/1.73M*2
EOSINOPHIL # BLD AUTO: 0.52 X10*3/UL (ref 0–0.7)
EOSINOPHIL NFR BLD AUTO: 4.2 %
ERYTHROCYTE [DISTWIDTH] IN BLOOD BY AUTOMATED COUNT: 15.8 % (ref 11.5–14.5)
EST. AVERAGE GLUCOSE BLD GHB EST-MCNC: 430 MG/DL
GLUCOSE BLD MANUAL STRIP-MCNC: 347 MG/DL (ref 74–99)
GLUCOSE BLD MANUAL STRIP-MCNC: 399 MG/DL (ref 74–99)
GLUCOSE BLD MANUAL STRIP-MCNC: 505 MG/DL (ref 74–99)
GLUCOSE BLD MANUAL STRIP-MCNC: 558 MG/DL (ref 74–99)
GLUCOSE SERPL-MCNC: 394 MG/DL (ref 74–99)
HBA1C MFR BLD: 16.6 %
HCT VFR BLD AUTO: 33.3 % (ref 36–52)
HGB BLD-MCNC: 10.8 G/DL (ref 12–17.5)
HOLD SPECIMEN: NORMAL
IMM GRANULOCYTES # BLD AUTO: 0.1 X10*3/UL (ref 0–0.7)
IMM GRANULOCYTES NFR BLD AUTO: 0.8 % (ref 0–0.9)
LYMPHOCYTES # BLD AUTO: 1.56 X10*3/UL (ref 1.2–4.8)
LYMPHOCYTES NFR BLD AUTO: 12.7 %
MAGNESIUM SERPL-MCNC: 1.88 MG/DL (ref 1.6–2.4)
MCH RBC QN AUTO: 23.8 PG (ref 26–34)
MCHC RBC AUTO-ENTMCNC: 32.4 G/DL (ref 32–36)
MCV RBC AUTO: 74 FL (ref 80–100)
MONOCYTES # BLD AUTO: 0.92 X10*3/UL (ref 0.1–1)
MONOCYTES NFR BLD AUTO: 7.5 %
NEUTROPHILS # BLD AUTO: 9.11 X10*3/UL (ref 1.2–7.7)
NEUTROPHILS NFR BLD AUTO: 74.1 %
NRBC BLD-RTO: 0 /100 WBCS (ref 0–0)
PHOSPHATE SERPL-MCNC: 2.5 MG/DL (ref 2.5–4.9)
PLATELET # BLD AUTO: 394 X10*3/UL (ref 150–450)
POTASSIUM SERPL-SCNC: 3.4 MMOL/L (ref 3.5–5.3)
RBC # BLD AUTO: 4.53 X10*6/UL (ref 4–5.9)
RH FACTOR (ANTIGEN D): NORMAL
SODIUM SERPL-SCNC: 133 MMOL/L (ref 136–145)
VANCOMYCIN SERPL-MCNC: 15.9 UG/ML (ref 5–20)
WBC # BLD AUTO: 12.3 X10*3/UL (ref 4.4–11.3)

## 2024-01-26 PROCEDURE — 99233 SBSQ HOSP IP/OBS HIGH 50: CPT

## 2024-01-26 PROCEDURE — 2500000002 HC RX 250 W HCPCS SELF ADMINISTERED DRUGS (ALT 637 FOR MEDICARE OP, ALT 636 FOR OP/ED)

## 2024-01-26 PROCEDURE — 85025 COMPLETE CBC W/AUTO DIFF WBC: CPT

## 2024-01-26 PROCEDURE — 36415 COLL VENOUS BLD VENIPUNCTURE: CPT

## 2024-01-26 PROCEDURE — 83036 HEMOGLOBIN GLYCOSYLATED A1C: CPT

## 2024-01-26 PROCEDURE — 86901 BLOOD TYPING SEROLOGIC RH(D): CPT

## 2024-01-26 PROCEDURE — 83735 ASSAY OF MAGNESIUM: CPT

## 2024-01-26 PROCEDURE — 2500000004 HC RX 250 GENERAL PHARMACY W/ HCPCS (ALT 636 FOR OP/ED)

## 2024-01-26 PROCEDURE — 80202 ASSAY OF VANCOMYCIN: CPT

## 2024-01-26 PROCEDURE — 2500000001 HC RX 250 WO HCPCS SELF ADMINISTERED DRUGS (ALT 637 FOR MEDICARE OP)

## 2024-01-26 PROCEDURE — 87040 BLOOD CULTURE FOR BACTERIA: CPT

## 2024-01-26 PROCEDURE — 82565 ASSAY OF CREATININE: CPT

## 2024-01-26 PROCEDURE — 2060000001 HC INTERMEDIATE ICU ROOM DAILY

## 2024-01-26 PROCEDURE — 82947 ASSAY GLUCOSE BLOOD QUANT: CPT

## 2024-01-26 PROCEDURE — 99222 1ST HOSP IP/OBS MODERATE 55: CPT | Performed by: INTERNAL MEDICINE

## 2024-01-26 RX ORDER — DEXTROSE 50 % IN WATER (D50W) INTRAVENOUS SYRINGE
25
Status: DISCONTINUED | OUTPATIENT
Start: 2024-01-26 | End: 2024-01-29 | Stop reason: SDUPTHER

## 2024-01-26 RX ORDER — ENOXAPARIN SODIUM 100 MG/ML
40 INJECTION SUBCUTANEOUS DAILY
Status: DISCONTINUED | OUTPATIENT
Start: 2024-01-26 | End: 2024-01-26

## 2024-01-26 RX ORDER — INSULIN LISPRO 100 [IU]/ML
12 INJECTION, SOLUTION INTRAVENOUS; SUBCUTANEOUS ONCE
Status: COMPLETED | OUTPATIENT
Start: 2024-01-26 | End: 2024-01-26

## 2024-01-26 RX ORDER — INSULIN GLARGINE 100 [IU]/ML
100 INJECTION, SOLUTION SUBCUTANEOUS DAILY
Status: DISCONTINUED | OUTPATIENT
Start: 2024-01-27 | End: 2024-01-28

## 2024-01-26 RX ORDER — DEXTROSE MONOHYDRATE 100 MG/ML
0.3 INJECTION, SOLUTION INTRAVENOUS ONCE AS NEEDED
Status: DISCONTINUED | OUTPATIENT
Start: 2024-01-26 | End: 2024-01-29 | Stop reason: SDUPTHER

## 2024-01-26 RX ORDER — ENOXAPARIN SODIUM 100 MG/ML
60 INJECTION SUBCUTANEOUS 2 TIMES DAILY
Status: DISCONTINUED | OUTPATIENT
Start: 2024-01-26 | End: 2024-02-01 | Stop reason: HOSPADM

## 2024-01-26 RX ORDER — INSULIN LISPRO 100 [IU]/ML
0-5 INJECTION, SOLUTION INTRAVENOUS; SUBCUTANEOUS
Status: DISCONTINUED | OUTPATIENT
Start: 2024-01-26 | End: 2024-01-27

## 2024-01-26 RX ADMIN — INSULIN LISPRO 5 UNITS: 100 INJECTION, SOLUTION INTRAVENOUS; SUBCUTANEOUS at 14:16

## 2024-01-26 RX ADMIN — VANCOMYCIN HYDROCHLORIDE 1000 MG: 1 INJECTION, SOLUTION INTRAVENOUS at 00:31

## 2024-01-26 RX ADMIN — PANTOPRAZOLE SODIUM 20 MG: 20 TABLET, DELAYED RELEASE ORAL at 06:34

## 2024-01-26 RX ADMIN — VANCOMYCIN HYDROCHLORIDE 1000 MG: 1 INJECTION, SOLUTION INTRAVENOUS at 23:54

## 2024-01-26 RX ADMIN — VANCOMYCIN HYDROCHLORIDE 1000 MG: 1 INJECTION, SOLUTION INTRAVENOUS at 14:16

## 2024-01-26 RX ADMIN — AMLODIPINE BESYLATE 5 MG: 5 TABLET ORAL at 11:07

## 2024-01-26 RX ADMIN — ATORVASTATIN CALCIUM 40 MG: 40 TABLET, FILM COATED ORAL at 11:07

## 2024-01-26 RX ADMIN — TRAMADOL HYDROCHLORIDE 50 MG: 50 TABLET, COATED ORAL at 21:46

## 2024-01-26 RX ADMIN — INSULIN LISPRO 12 UNITS: 100 INJECTION, SOLUTION INTRAVENOUS; SUBCUTANEOUS at 22:13

## 2024-01-26 RX ADMIN — INSULIN LISPRO 5 UNITS: 100 INJECTION, SOLUTION INTRAVENOUS; SUBCUTANEOUS at 18:59

## 2024-01-26 RX ADMIN — TRAZODONE HYDROCHLORIDE 50 MG: 50 TABLET ORAL at 21:46

## 2024-01-26 RX ADMIN — CEFTRIAXONE SODIUM 2 G: 2 INJECTION, SOLUTION INTRAVENOUS at 18:55

## 2024-01-26 RX ADMIN — INSULIN LISPRO 12 UNITS: 100 INJECTION, SOLUTION INTRAVENOUS; SUBCUTANEOUS at 11:08

## 2024-01-26 SDOH — SOCIAL STABILITY: SOCIAL INSECURITY: DO YOU FEEL UNSAFE GOING BACK TO THE PLACE WHERE YOU ARE LIVING?: NO

## 2024-01-26 SDOH — SOCIAL STABILITY: SOCIAL INSECURITY: HAS ANYONE EVER THREATENED TO HURT YOUR FAMILY OR YOUR PETS?: NO

## 2024-01-26 SDOH — SOCIAL STABILITY: SOCIAL INSECURITY: DO YOU FEEL ANYONE HAS EXPLOITED OR TAKEN ADVANTAGE OF YOU FINANCIALLY OR OF YOUR PERSONAL PROPERTY?: NO

## 2024-01-26 SDOH — SOCIAL STABILITY: SOCIAL INSECURITY: HAVE YOU HAD THOUGHTS OF HARMING ANYONE ELSE?: NO

## 2024-01-26 SDOH — SOCIAL STABILITY: SOCIAL INSECURITY: DOES ANYONE TRY TO KEEP YOU FROM HAVING/CONTACTING OTHER FRIENDS OR DOING THINGS OUTSIDE YOUR HOME?: NO

## 2024-01-26 SDOH — SOCIAL STABILITY: SOCIAL INSECURITY: ARE THERE ANY APPARENT SIGNS OF INJURIES/BEHAVIORS THAT COULD BE RELATED TO ABUSE/NEGLECT?: NO

## 2024-01-26 SDOH — SOCIAL STABILITY: SOCIAL INSECURITY: ARE YOU OR HAVE YOU BEEN THREATENED OR ABUSED PHYSICALLY, EMOTIONALLY, OR SEXUALLY BY ANYONE?: NO

## 2024-01-26 SDOH — SOCIAL STABILITY: SOCIAL INSECURITY: ABUSE: ADULT

## 2024-01-26 SDOH — SOCIAL STABILITY: SOCIAL INSECURITY: WERE YOU ABLE TO COMPLETE ALL THE BEHAVIORAL HEALTH SCREENINGS?: YES

## 2024-01-26 ASSESSMENT — COGNITIVE AND FUNCTIONAL STATUS - GENERAL
CLIMB 3 TO 5 STEPS WITH RAILING: TOTAL
MOBILITY SCORE: 16
DAILY ACTIVITIY SCORE: 21
PERSONAL GROOMING: A LITTLE
TOILETING: A LOT
STANDING UP FROM CHAIR USING ARMS: A LOT
WALKING IN HOSPITAL ROOM: TOTAL

## 2024-01-26 ASSESSMENT — ACTIVITIES OF DAILY LIVING (ADL)
FEEDING YOURSELF: INDEPENDENT
TOILETING: NEEDS ASSISTANCE
HEARING - LEFT EAR: FUNCTIONAL
WALKS IN HOME: NEEDS ASSISTANCE
HEARING - RIGHT EAR: FUNCTIONAL
TOILETING: NEEDS ASSISTANCE
JUDGMENT_ADEQUATE_SAFELY_COMPLETE_DAILY_ACTIVITIES: YES
ASSISTIVE_DEVICE: WHEELCHAIR
HEARING - LEFT EAR: FUNCTIONAL
PATIENT'S MEMORY ADEQUATE TO SAFELY COMPLETE DAILY ACTIVITIES?: YES
GROOMING: INDEPENDENT
DRESSING YOURSELF: INDEPENDENT
LACK_OF_TRANSPORTATION: NO
BATHING: INDEPENDENT
DRESSING YOURSELF: INDEPENDENT
JUDGMENT_ADEQUATE_SAFELY_COMPLETE_DAILY_ACTIVITIES: YES
ADEQUATE_TO_COMPLETE_ADL: YES
BATHING: INDEPENDENT
PATIENT'S MEMORY ADEQUATE TO SAFELY COMPLETE DAILY ACTIVITIES?: YES
WALKS IN HOME: NEEDS ASSISTANCE
HEARING - RIGHT EAR: FUNCTIONAL
GROOMING: INDEPENDENT
ADEQUATE_TO_COMPLETE_ADL: YES
ASSISTIVE_DEVICE: WHEELCHAIR

## 2024-01-26 ASSESSMENT — LIFESTYLE VARIABLES
HOW MANY STANDARD DRINKS CONTAINING ALCOHOL DO YOU HAVE ON A TYPICAL DAY: 1 OR 2
AUDIT-C TOTAL SCORE: 2
SKIP TO QUESTIONS 9-10: 1
HOW OFTEN DO YOU HAVE A DRINK CONTAINING ALCOHOL: 2-4 TIMES A MONTH
AUDIT-C TOTAL SCORE: 2
HOW OFTEN DO YOU HAVE 6 OR MORE DRINKS ON ONE OCCASION: NEVER
SUBSTANCE_ABUSE_PAST_12_MONTHS: NO
PRESCIPTION_ABUSE_PAST_12_MONTHS: NO

## 2024-01-26 ASSESSMENT — ENCOUNTER SYMPTOMS
FEVER: 0
CHILLS: 0
SHORTNESS OF BREATH: 0
ABDOMINAL PAIN: 0
COUGH: 0

## 2024-01-26 ASSESSMENT — PATIENT HEALTH QUESTIONNAIRE - PHQ9
1. LITTLE INTEREST OR PLEASURE IN DOING THINGS: NOT AT ALL
2. FEELING DOWN, DEPRESSED OR HOPELESS: NOT AT ALL
SUM OF ALL RESPONSES TO PHQ9 QUESTIONS 1 & 2: 0

## 2024-01-26 NOTE — HOSPITAL COURSE
"Kody Choi is a 53 y.o. adult with PMH of uncontrolled T2DM, CAD s/p multiple stents and CABG, HFrEF (EF 35-40% 01/21), A fib, RUPESH, TKA in 2015, hx of multiple septic arthritis infections in R knee, presenting with 1 week of worsening R knee pain and drainage. Patient states he began feeling knee pain in his R knee starting around one week ago and swelling that he rates a 10/10 in severity. He is wheelchair bound and did not put weight on that leg but even had difficulty moving his leg on and off the wheelchair support. He states that one day after the pain started he was in his wheelchair when the chair \"gave out\" from underneath him and he fell to the ground hitting that right knee. He also endorses drainage from the front of the right knee that started a few weeks prior. He explained that his knee has been swollen for weeks and at one point he bent down and his knee spontaneously opened up and drained purulent fluid. He states that the pain has been constant but improves with tramadol. Of note patient has had multiple infections of the R knee in the past including chronic MSSA/MRSA infections, on outpatient keflex which he states he takes consistently.      In the ED, patient was hemodynamically stable, afebrile. Xrays of R knee show no acute osseous injury, fragmentation of cement of the femoral and tibial components of the knee arthroplasty, soft tissue swelling greatest anteriorly. Orthopedics was consulted who took an aspiration sample from the knee and sent for cultures. Patient was then admitted for IV antibiotics. While admitted, patient continued to refuse the idea of above-knee-amputation stating \"you will not take my leg away from me, its staying with me.\" He was only interested in receiving IV antibiotics to treat his infection. Infectious disease was consulted who stated that the likelihood of the patient's infection improving solely with antibiotics was not high and that they recommended amputation " "as well. Patient acknowledged the risks of not getting his leg amputated including worsening of the infection leading to sepsis, septic shock and possible death. He was recommended by physical therapy to go to a SNF however patient declined stating \"I'm not in the right place right now for that, I have too much going on.\" Patient also was found have significantly uncontrolled blood sugar management with sugars in the 400s-500s despite taking 100 units of lantus at home. Patient was frustrated after being placed on a diabetic diet and changing of his insulin regimen to 80 units lantus BID with prandial and sliding scale lispro, however sugars did improve to mid 100s. He had an Echo that showed worsening LVEF to 30% down from 35% in 2021 and multiple areas of hypokinesis/akinesis. Patient encouraged to start GDMT however not agreeable at this time to beta blockers.     Infectious disease recommended patient would need continued IV antibiotics for treatment which would require a PICC line to be placed. However, patient was not agreeable to having line placed, saying he did not want to deal with outpatient IV infusions. Decision was made given his refusal for operative and IV antibiotic management to discharge patient on daptomycin 800 mg daily for total 6 weeks to end March 7th, 2024 and doxycycline 100 mg BID likely indefinitely. Patient was again reminded of the risks of leaving without IV antibiotics and surgical management of R knee infection and patient acknowledges and understands the risks of leaving.    "

## 2024-01-26 NOTE — PROGRESS NOTES
"Vancomycin Dosing by Pharmacy- FOLLOW UP    Kody Choi is a 53 y.o. year old adult who Pharmacy has been consulted for vancomycin dosing for cellulitis, skin and soft tissue. Based on the patient's indication and renal status this patient is being dosed based on a goal AUC of 400-600.     Renal function is currently stable.    Current vancomycin dose: 1,000 mg given every 12 hours    Estimated vancomycin AUC on current dose: 569 mg/L.hr     Visit Vitals  BP (!) 150/96   Pulse 94   Temp 36.2 °C (97.2 °F)   Resp 18        Lab Results   Component Value Date    CREATININE 1.29 01/26/2024    CREATININE 1.18 01/25/2024    CREATININE 2.09 (H) 10/18/2023    CREATININE 1.73 (H) 10/16/2023        Patient weight is No results found for: \"PTWEIGHT\"    No results found for: \"CULTURE\"     No intake/output data recorded.  [unfilled]    Lab Results   Component Value Date    PATIENTTEMP 37.0 01/25/2024    PATIENTTEMP 37.0 10/16/2023    PATIENTTEMP 37.0 10/15/2023        Assessment/Plan    Within goal AUC range. Continue current vancomycin regimen.    This dosing regimen is predicted by [employer] to result in the following pharmacokinetic parameters:    Loading dose: N/A  Regimen: 1000 mg IV every 12 hours.  Start time: 12:31 on 01/26/2024  Exposure target: AUC24 (range)400-600 mg/L.hr   AUC24,ss: 569 mg/L.hr  Probability of AUC24 > 400: 85 %  Ctrough,ss: 18.6 mg/L  Probability of Ctrough,ss > 20: 44 %  Probability of nephrotoxicity (Lodise EDIE 2009): 15 %      The next level will be obtained on 1/28 with first AM labs. May be obtained sooner if clinically indicated.   Will continue to monitor renal function daily while on vancomycin and order serum creatinine at least every 48 hours if not already ordered.  Follow for continued vancomycin needs, clinical response, and signs/symptoms of toxicity.       Yaima Barboza, PharmD           "

## 2024-01-26 NOTE — CONSULTS
Inpatient consult to Infectious Diseases  Consult performed by: Roger Gr MD  Consult ordered by: Ramesh Gunter MD        Referred by Ramesh Gunter MD     Primary MD: Number Reassigned to [id number] (Inactive)    Reason For Consult  PJI.  Septic arthritis on right knee    History Of Present Illness  Kody Choi is a 53 y.o. male presenting with pain swelling discharge from right knee.    Hx of CAD afib, uncontrolled DM , s/p right knee arthroplasty in 2015.  He presented on 4/12/2019 with sign/sx of PJI with culture growing MSSA as well as MSSA bacteremia .   Had surgery ( Excisional debridement arthrotomy of right total knee with revision of poly component tibia. Placement of biodegradable antibiotic beads) on 4/12/19 . Was treated with 6 weeks cefazolin followed by oral cephalexin ( which he didn't take), then returned on 6/5/19 with the same sx with MSSA. Had hardware explant with  antibiotic infused cement spacer  on 6/17/19 and put on cefazolin, ( unclear whether he was taking oral suppressive medicine after that ).  Had CABG surgery on 11/25/2020.   He presented in January 2021 with knee pain swelling and joint fluid grew MRSA and was recommended  with cefepime and vancomycin for 6 weeks.  But he refused PICC line and discharged with oral ciprofloxacin and doxycycline which he didn't take . He was also suggested AKA which was refused.  Then returned to hospital on 2/21/2023 with the same sx and was treated with vancomycin  . Then returned to hospital on 4/16/21 ,  refused to see ortho and ID made the same recommendation but this time 8 weeks of vancomycin .  He was discharged to SNF.   He was seen by ID for follow up on 9/16 and antibiotic was switched to oral doxycycline for chronic suppression,  he presented to [facility] on 2/20/23 with MRSA bacteremia  . Was treated with vancomycin in the hospital and discharged with oral TMP-SMX . Readmitted to [facility] in June 2023 for substernal abscess and sternal wire was  removed ( it was thought substernal abscess was secondary to MRSA bacteremia).   He returned to  on 7/2/23 with right knee infection, AKA was suggested and declined,  joint culture grew group B streptococcus and was treated with vancomycin and piperacillin-tazobactam and discharged with cephalexin and TMP-SMX .    TMP-SMX was discontinued by his PCP and been on cephalexin ( started as QID then BID) .   He came in this time with right knee pain swelling discharge on 1/25/24.  Blood sugar was over 500.  White blood cell count 12.8, ESR> 130.  C-reactive protein 9.28.   afebrile .   Arthrocentesis done at ER was sent.  Culture is pending ,  put on vancomycin and piperacillin-tazobactam,     Pt was seen by ortho at ER and the only option from ortho would be AKA which he refused .       Past Medical History  He has a past medical history of Personal history of other endocrine, nutritional and metabolic disease and Unspecified astigmatism, bilateral (01/04/2016).    Surgical History  He has a past surgical history that includes Knee surgery (10/29/2014).     Social History     Occupational History    Not on file   Tobacco Use    Smoking status: Never     Passive exposure: Never    Smokeless tobacco: Never   Substance and Sexual Activity    Alcohol use: Not on file    Drug use: Not on file    Sexual activity: Not on file     Travel History   Travel since 12/26/23    No documented travel since 12/26/23            Pets: unknown  Hobbies: unknown    Family History  No family history on file.  Allergies  Loratadine, Pollen extracts, and Lisinopril     Immunization History   Administered Date(s) Administered    Moderna SARS-CoV-2 Vaccination 09/09/2021    Pfizer Purple Cap SARS-CoV-2 12/22/2020, 01/12/2021     Medications  Home medications:  Medications Prior to Admission   Medication Sig Dispense Refill Last Dose    amLODIPine (Norvasc) 5 mg tablet TAKE 1 TABLET BY MOUTH ONCE DAILY 30 tablet 3     aspirin 81 mg EC tablet  Take 1 tablet (81 mg) by mouth once daily.       atorvastatin (Lipitor) 40 mg tablet Take 1 tablet (40 mg) by mouth once daily. Take in evening.       cephalexin (Keflex) 500 mg capsule TAKE 1 CAPSULE BY MOUTH EVERY 6 HOURS UNTIL 07/31/23. START TAKING TWICE DAILY STARTING ON 08/01/23. 120 capsule 3     cephalexin (Keflex) 500 mg capsule TAKE 1 CAPSULE BY MOUTH EVERY 6 HOURS UNTIL 7/31/23. START TAKING TAKE 1 CAPSULE TWO TIMES A DAY STARTING ON 8/1/23. 120 capsule 3     empagliflozin (Jardiance) 25 mg Take 1 tablet (25 mg) by mouth once daily.       fluticasone (Flonase) 50 mcg/actuation nasal spray Administer 2 sprays into each nostril once daily as needed for rhinitis. Shake gently. Before first use, prime pump. After use, clean tip and replace cap.       insulin glargine (Lantus) 100 unit/mL (3 mL) pen Inject 100 Units under the skin once daily in the morning. Take as directed per insulin instructions.       omeprazole (PriLOSEC) 20 mg DR capsule Take 1 capsule (20 mg) by mouth once daily in the morning. Take before meals.       semaglutide (Rybelsus) 7 mg tablet Take 1 tablet (7 mg) by mouth early in the morning.. Take on empty stomach.       sulfamethoxazole-trimethoprim (Bactrim DS) 800-160 mg tablet TAKE 1 TABLET BY MOUTH EVERY 12 HOURS 60 tablet 3     traZODone (Desyrel) 50 mg tablet Take 1 tablet (50 mg) by mouth once daily at bedtime.        Current medications:  Scheduled medications  amLODIPine, 5 mg, oral, Daily  aspirin, 81 mg, oral, Daily  atorvastatin, 40 mg, oral, Daily  cefTRIAXone, 2 g, intravenous, q24h  enoxaparin, 60 mg, subcutaneous, BID  [START ON 1/27/2024] insulin glargine, 100 Units, subcutaneous, Daily  insulin lispro, 0-5 Units, subcutaneous, TID with meals  pantoprazole, 20 mg, oral, Daily before breakfast  traZODone, 50 mg, oral, Nightly  vancomycin, 1,000 mg, intravenous, q12h      Continuous medications     PRN medications  PRN medications: acetaminophen, dextrose 10 % in water  (D10W), dextrose 10 % in water (D10W), dextrose, dextrose, fluticasone, glucagon, glucagon, HYDROmorphone, traMADol    Review of Systems   Constitutional:  Negative for chills and fever.   Respiratory:  Negative for cough and shortness of breath.    Cardiovascular:  Negative for chest pain.   Gastrointestinal:  Negative for abdominal pain.        Objective  Range of Vitals (last 24 hours)  Heart Rate:  []   Temp:  [36 °C (96.8 °F)-36.6 °C (97.9 °F)]   Resp:  [16-20]   BP: (130-152)/(85-96)   SpO2:  [96 %-100 %]   Daily Weight  01/25/24 : 150 kg (330 lb)    Body mass index is 53.26 kg/m².     Physical Exam  Vitals reviewed.   Constitutional:       General: He is not in acute distress.     Appearance: He is not ill-appearing.   Cardiovascular:      Rate and Rhythm: Normal rate and regular rhythm.   Pulmonary:      Effort: Pulmonary effort is normal.      Breath sounds: Normal breath sounds.   Abdominal:      Tenderness: There is no abdominal tenderness.   Musculoskeletal:      Comments: Tender red swelling with induration with open wound with yellow discharge on right knee   Neurological:      Mental Status: He is alert.        Media Information    Document Information    Misc Clinical: Clinical Unknown   Right knee   01/26/2024 09:24   Attached To:   Hospital Encounter on 1/25/24   Source Information    Roger Gr MD  [facility]        Relevant Results  Outside Hospital Results  Yes - [facility]  Labs  Results from last 72 hours   Lab Units 01/26/24  0413 01/25/24  0920   WBC AUTO x10*3/uL 12.3* 12.8*   HEMOGLOBIN g/dL 10.8* 11.0*   HEMATOCRIT % 33.3* 32.9*   PLATELETS AUTO x10*3/uL 394 432   NEUTROS PCT AUTO % 74.1 74.3   LYMPHS PCT AUTO % 12.7 12.5   MONOS PCT AUTO % 7.5 7.8   EOS PCT AUTO % 4.2 4.0     Results from last 72 hours   Lab Units 01/26/24  0413 01/25/24  0920   SODIUM mmol/L 133* 127*   POTASSIUM mmol/L 3.4* 3.5   CHLORIDE mmol/L 98 94*   CO2 mmol/L 26 25   BUN mg/dL 23 14   CREATININE mg/dL 1.29 1.18    GLUCOSE mg/dL 394* 528*   CALCIUM mg/dL 8.2* 8.1*   ANION GAP mmol/L 12 12   EGFR mL/min/1.73m*2 49* 55*   PHOSPHORUS mg/dL 2.5  --      Results from last 72 hours   Lab Units 01/26/24  0413 01/25/24  0920   ALK PHOS U/L  --  132*   BILIRUBIN TOTAL mg/dL  --  0.3   PROTEIN TOTAL g/dL  --  6.7   ALT U/L  --  7   AST U/L  --  9   ALBUMIN g/dL 2.7* 2.8*     Estimated Creatinine Clearance (by C-G formula based on SCr of 1.29 mg/dL)  Female: 74.4 mL/min  Male: 90 mL/min  The patient&#39;s sex/gender information is inconclusive; review their information before proceeding.  C-Reactive Protein   Date Value Ref Range Status   01/25/2024 9.28 (H) <1.00 mg/dL Final   10/15/2023 8.52 (H) <1.00 mg/dL Final     CRP   Date Value Ref Range Status   07/01/2023 8.40 (A) mg/dL Final     Comment:     REF VALUE  < 1.00       Sedimentation Rate   Date Value Ref Range Status   01/25/2024 >130 (H) 0 - 30 mm/h Final   10/15/2023 >130 (H) 0 - 30 mm/h Final   06/28/2023 >130 (H) 0 - 20 mm/h Final       1/25/24  right knee x ray  Limited exam. Although no acute osseous injury is evident if there  remains concern for an acute osseous injury, CT is recommended.  2. Redemonstration of fragmentation of the cement of the femoral and  tibial components of the knee arthroplasty with associated posterior  translation of the tibia relative to the femur which given  differences in technique is likely similar.  3. Soft tissue swelling about the knee greatest anteriorly. There is  a region of lucency at the skin subcutaneous interface of the  anterior knee seen on the lateral radiograph. Knowledge of  wound/ulcer in this location may be helpful.        Temp Readings from Last 5 Encounters:   01/26/24 36.6 °C (97.9 °F)   10/19/23 36.1 °C (97 °F)   06/10/21 36.8 °C (98.2 °F)       Estimated Creatinine Clearance (by C-G formula based on SCr of 1.29 mg/dL)  Female: 74.4 mL/min  Male: 90 mL/min  The patient&#39;s sex/gender information is inconclusive;  review their information before proceeding.    Microbiology  1/25  Blood culture  no growth   1/25  sterile fluid from right knee  culture pending , gram stain no organism seen,   no PMN seen  1/26  blood culture no growth       Previous microbiology  4/11/19 knee fluid  MSSA  4/12/19  blood culture MSSA   6/5/19   knee fluid  MSSA   9/19/19 MRSA screen positive  1/11/21  knee fluid MRSA   4/12/21  knee fluid  MRSA   7/15/21  knee fluid MRSA   6/27/23  knee fluid group B streptococcus  resistant to clindamycin and erythromycin and susceptible to penicillin      Antibiotic history   Ceftriaxone   1/25~  Vancomycin   1/25~        INFECTIOUS SYNOPSIS AND ASSESSMENT  54yo male with hx of knee PJI  with antibiotic spacer .   Multiple admission with PJI with MSSA, MRSA and group B streptococcus ( which is common with diabetic patients ) .  Issue with compliance with oral suppressive antibiotic.   Strongly refusing AKA .  Presenting with septic knee arthritis.  Arthrocentesis didn't show PMN or organism which can suggest possible cellulitis instead of septic knee.   His DM has been in poor control with A1c 16.6 .  Given his hx of MRSA and streptococcus knee infection,  vancomycin and ceftriaxone are reasonable choice for now.     Right knee infection with open wound with discharge  Hx of recurrent PJI      -  fragmented cements and bone fragments , didn't get 2nd stage surgery after the 1st stage in 2019 )       - previous joint fluid culture with MSSA. MRSA, GBS      - the only definite treatment option is AKA as per ortho which he strongly refuses  Non compliance to oral suppressive antibiotic in the past.       - most recent episode recommended cephalexin and TMP-SMX but TMP-SMX was discontinued by PCP for unclear reason  Uncontrolled DM       RECOMMENDATION   CONTINUE vancomycin with AUC target 400~600  CONTINUE ceftriaxone 2 gram q 24 hours  Follow up sterile fluid culture   Tighter blood sugar control  Find out why  his pcp recommended him against taking TMP-SMX         ID will follow in the morning.  The case was discussed with my attending , Dr. Lawrence Jalloh who agreed with my assessment and plan.    LINDA BROWN.  M.D /  ID consult TEAM B  Infectious disease fellow PGY-4  Reach me through [employer] instead of paging if possible ( especially during noon conference )  Or page me through Team B  [phone]

## 2024-01-26 NOTE — PROGRESS NOTES
01/26/24 1626   Discharge Planning   Living Arrangements Alone   Support Systems Friends/neighbors   Assistance Needed Per patinet independent prior to admission; denies home oxygen; per patient active with [employer] for [facility] weekly unsur of hours; owns wheelchair DME for mobility   Type of Residence Private residence   Number of Stairs to Enter Residence 0   Number of Stairs Within Residence 0  (Elevator access)   Do you have animals or pets at home? No   Who is requesting discharge planning? Provider   Home or Post Acute Services In home services   Type of Home Care Services Home nursing visits   Patient expects to be discharged to: home with HC for infusion   Does the patient need discharge transport arranged? No   Financial Resource Strain   How hard is it for you to pay for the very basics like food, housing, medical care, and heating? Not hard   Transportation Needs   In the past 12 months, has lack of transportation kept you from medical appointments or from getting medications? no   In the past 12 months, has lack of transportation kept you from meetings, work, or from getting things needed for daily living? No         Transitional Care Coordinator Note: TCC met with patient introduced self and patient to discuss discharge planning and complete assessment. Patient confirmed address listed as current ([address] [place] [address]); alternate/emergency contact Lyla (friend) [phone]; patient's contact [phone]. Per medical team patient is not medically ready for discharge pending ID recommendation. Pending ID rec patient may require homecare for IV infusions. TCC informed and educated patient on possible discharge dispo. Patient stated he does not want to discharge home with IV infusion for he does not feel he can do it himself and does not have learner. TCC discussed possible SNF as option, patient stated he is agreeable to SNF, want to return to [facility] as he has  been there in the past. TCC awaiting updates from [facility] team.       Catherine Osuna RN BSN   Transitional Care Coordinator

## 2024-01-26 NOTE — PROGRESS NOTES
"Kody Choi is a 53 y.o. adult on day 1 of admission presenting with Pyogenic arthritis of right knee joint, due to unspecified organism (CMS/HCC).    Subjective   NAEON    Patient seen this AM frustrated as he was being transported originally to a two-patient room and was adamently refusing to be roomed with someone else. Patient was also endorsing frustration regarding his pain regimen, but says that the tramadol did help his knee feel better. He denies fever, chills, chest pain, shortness of breath, abdominal pain, nausea vomiting. His knee is still draining pus and is still swollen.       Objective     Physical Exam  Constitutional:       General: He is not in acute distress.     Appearance: He is obese. He is not toxic-appearing.   HENT:      Head: Normocephalic.      Mouth/Throat:      Mouth: Mucous membranes are moist.      Pharynx: Oropharynx is clear.   Eyes:      Pupils: Pupils are equal, round, and reactive to light.   Cardiovascular:      Rate and Rhythm: Normal rate and regular rhythm.      Pulses: Normal pulses.      Heart sounds: No murmur heard.  Pulmonary:      Effort: Pulmonary effort is normal.      Breath sounds: Normal breath sounds. No wheezing.   Abdominal:      Palpations: Abdomen is soft.      Tenderness: There is no abdominal tenderness.   Musculoskeletal:         General: Swelling, tenderness and signs of injury present.      Cervical back: Normal range of motion.   Skin:     General: Skin is warm.      Findings: Rash present.   Neurological:      General: No focal deficit present.      Mental Status: He is alert and oriented to person, place, and time.   Psychiatric:         Behavior: Behavior is uncooperative.       Last Recorded Vitals  Blood pressure 147/89, pulse 103, temperature 36.6 °C (97.9 °F), resp. rate 18, height 1.676 m (5' 6\"), weight 150 kg (330 lb), SpO2 97 %.  Intake/Output last 3 Shifts:  No intake/output data recorded.    Relevant Results              Scheduled " medications  amLODIPine, 5 mg, oral, Daily  aspirin, 81 mg, oral, Daily  atorvastatin, 40 mg, oral, Daily  cefTRIAXone, 2 g, intravenous, q24h  enoxaparin, 40 mg, subcutaneous, Daily  [START ON 1/27/2024] insulin glargine, 100 Units, subcutaneous, Daily  insulin lispro, 0-5 Units, subcutaneous, TID with meals  pantoprazole, 20 mg, oral, Daily before breakfast  traZODone, 50 mg, oral, Nightly  vancomycin, 1,000 mg, intravenous, q12h      Continuous medications     PRN medications  PRN medications: acetaminophen, dextrose 10 % in water (D10W), dextrose 10 % in water (D10W), dextrose, dextrose, fluticasone, glucagon, glucagon, HYDROmorphone, traMADol  Results for orders placed or performed during the hospital encounter of 01/25/24 (from the past 24 hour(s))   POCT GLUCOSE   Result Value Ref Range    POCT Glucose 486 (H) 74 - 99 mg/dL   POCT GLUCOSE   Result Value Ref Range    POCT Glucose 375 (H) 74 - 99 mg/dL   Vancomycin   Result Value Ref Range    Vancomycin 15.9 5.0 - 20.0 ug/mL   CBC and Auto Differential   Result Value Ref Range    WBC 12.3 (H) 4.4 - 11.3 x10*3/uL    nRBC 0.0 0.0 - 0.0 /100 WBCs    RBC 4.53 4.00 - 5.90 x10*6/uL    Hemoglobin 10.8 (L) 12.0 - 17.5 g/dL    Hematocrit 33.3 (L) 36.0 - 52.0 %    MCV 74 (L) 80 - 100 fL    MCH 23.8 (L) 26.0 - 34.0 pg    MCHC 32.4 32.0 - 36.0 g/dL    RDW 15.8 (H) 11.5 - 14.5 %    Platelets 394 150 - 450 x10*3/uL    Neutrophils % 74.1 40.0 - 80.0 %    Immature Granulocytes %, Automated 0.8 0.0 - 0.9 %    Lymphocytes % 12.7 13.0 - 44.0 %    Monocytes % 7.5 2.0 - 10.0 %    Eosinophils % 4.2 0.0 - 6.0 %    Basophils % 0.7 0.0 - 2.0 %    Neutrophils Absolute 9.11 (H) 1.20 - 7.70 x10*3/uL    Immature Granulocytes Absolute, Automated 0.10 0.00 - 0.70 x10*3/uL    Lymphocytes Absolute 1.56 1.20 - 4.80 x10*3/uL    Monocytes Absolute 0.92 0.10 - 1.00 x10*3/uL    Eosinophils Absolute 0.52 0.00 - 0.70 x10*3/uL    Basophils Absolute 0.09 0.00 - 0.10 x10*3/uL   Renal function panel    Result Value Ref Range    Glucose 394 (H) 74 - 99 mg/dL    Sodium 133 (L) 136 - 145 mmol/L    Potassium 3.4 (L) 3.5 - 5.3 mmol/L    Chloride 98 98 - 107 mmol/L    Bicarbonate 26 21 - 32 mmol/L    Anion Gap 12 10 - 20 mmol/L    Urea Nitrogen 23 6 - 23 mg/dL    Creatinine 1.29 0.50 - 1.30 mg/dL    eGFR 49 (L) >60 mL/min/1.73m*2    Calcium 8.2 (L) 8.6 - 10.6 mg/dL    Phosphorus 2.5 2.5 - 4.9 mg/dL    Albumin 2.7 (L) 3.4 - 5.0 g/dL   Magnesium   Result Value Ref Range    Magnesium 1.88 1.60 - 2.40 mg/dL   PST Top   Result Value Ref Range    Extra Tube Hold for add-ons.    POCT GLUCOSE   Result Value Ref Range    POCT Glucose 347 (H) 74 - 99 mg/dL     XR ankle right 3+ views    Result Date: 1/25/2024  Interpreted By:  Omar Cameron, STUDY: Right ankle and tibia and fibula dated 1/25/2024.   INDICATION: Signs/Symptoms:pain s/p fall; Signs/Symptoms:pain sp fall   COMPARISON: Radiographs dated 01/11/2021.   ACCESSION NUMBER(S): [id number]; [id number]   ORDERING CLINICIAN: DONNA KRAMER   TECHNIQUE: Three views radiographs of the right ankle. Two views of the right tibia and fibula.   FINDINGS: Bones are demineralized. Mild degenerative changes seen of the ankle. Mild degenerative changes seen of the midfoot. There is calcaneal enthesophyte formation. There are findings of the knee which are further discussed on the dedicated radiographs from the same date. No acute fracture or dislocation is evident.   No ankle joint effusion is evident. There is prominence of the soft tissues of the visualized lower extremity. There is soft tissue findings at the knee which are further discussed on dedicated radiographs from the same date. Vascular calcifications are seen in the soft tissues.       1. Generalized prominence of the visualized soft tissues which may represent lymphedema and/or cellulitis. 2. Please see knee radiograph report from the same date for further discussion of findings about the knee. 3. Other findings as  above.   MACRO: None   Signed by: Omar Cameron 1/25/2024 12:38 PM Dictation workstation:   [id number]    XR tibia fibula right 2 views    Result Date: 1/25/2024  Interpreted By:  Omar Cameron, STUDY: Right ankle and tibia and fibula dated 1/25/2024.   INDICATION: Signs/Symptoms:pain s/p fall; Signs/Symptoms:pain sp fall   COMPARISON: Radiographs dated 01/11/2021.   ACCESSION NUMBER(S): [id number]; [id number]   ORDERING CLINICIAN: DONNA KRAMER   TECHNIQUE: Three views radiographs of the right ankle. Two views of the right tibia and fibula.   FINDINGS: Bones are demineralized. Mild degenerative changes seen of the ankle. Mild degenerative changes seen of the midfoot. There is calcaneal enthesophyte formation. There are findings of the knee which are further discussed on the dedicated radiographs from the same date. No acute fracture or dislocation is evident.   No ankle joint effusion is evident. There is prominence of the soft tissues of the visualized lower extremity. There is soft tissue findings at the knee which are further discussed on dedicated radiographs from the same date. Vascular calcifications are seen in the soft tissues.       1. Generalized prominence of the visualized soft tissues which may represent lymphedema and/or cellulitis. 2. Please see knee radiograph report from the same date for further discussion of findings about the knee. 3. Other findings as above.   MACRO: None   Signed by: Omar Cameron 1/25/2024 12:38 PM Dictation workstation:   [id number]    XR knee right 4+ views    Result Date: 1/25/2024  Interpreted By:  Omar Cameron, STUDY: Right knee dated  1/25/2024.   INDICATION: Signs/Symptoms:chronic infection right knee, fall 1 week ago increased pain   COMPARISON: Radiographs dated 06/27/2023.   ACCESSION NUMBER(S): [id number]   ORDERING CLINICIAN: JEREMÍAS ELLIS   TECHNIQUE: Four views of the right knee.   FINDINGS: Significant deformity of the knee with multiple cement and  bone fragments limits assessment for pathology. The bones are demineralized. There is redemonstration cement arthroplasty of the knee. There is redemonstration of fragmentation of the femoral cement the junction of the stem-condylar component. There is redemonstration of fragmentation of the cement of the tibial component at the stem plateau interface. There is redemonstration multiple cement fragments about the knee. Best seen on the lateral radiograph there is posterior translation of the tibia relative to the femur which is difficult to directly compare to the prior examination but likely similar. Soft tissue swelling is seen about the knee greatest anteriorly. There appears to be a small region of lucency along the skin/subcutaneous interface seen on the lateral radiograph. Vascular calcifications are seen over the soft tissues.       1. Limited exam. Although no acute osseous injury is evident if there remains concern for an acute osseous injury, CT is recommended. 2. Redemonstration of fragmentation of the cement of the femoral and tibial components of the knee arthroplasty with associated posterior translation of the tibia relative to the femur which given differences in technique is likely similar. 3. Soft tissue swelling about the knee greatest anteriorly. There is a region of lucency at the skin subcutaneous interface of the anterior knee seen on the lateral radiograph. Knowledge of wound/ulcer in this location may be helpful.   MACRO: None   Signed by: Omar Cameron 1/25/2024 10:17 AM Dictation workstation:   [id number]          Assessment/Plan   Principal Problem:    Pyogenic arthritis of right knee joint, due to unspecified organism (CMS/[facility])    Kody Choi is a 52 y/o M with PMH significant for uncontrolled T2DM, CAD s/p multiple stents and CABG, HFrEF (EF 35-40% 01/21), A fib, RUPESH, TKA in 2015, hx of multiple septic arthritis infections in R knee, presenting with 1 week of worsening R knee pain and  drainage from sinus s/p fall two days prior to symptom onset. Likely septic arthritis in setting of leukocytosis and knee pain and drainage given history of repeated infections and poorly controlled diabetes. Ortho consulted, aspiration and cultures sent.     #Superimposed R knee infection  #Hx of septic arthritis of R knee, multiple episodes  #R TKA 2015 c/b recurrent prosthetic joint infection s/p explant and antibiotic spacer in 2019 on chronic keflex  #Leukocytosis  -Broken Abx spacer seen on Xrays  -WBC 12.8, , CRP 9.28  -On outpatient keflex for suppression  -Bactrim listed in home meds but patient unsure if taking  -Orthopedics consulted, recommend AKA however patient adamantly refusing at this time.  -IV vanc and ceftriaxone in the ED empirically   Plan  -Appreciate further ortho recs  -Continue IV abx with vancomycin and ceftriaxone  -pending cultures from aspirate   -Blood cultures x2: NGTD x1 day,   -PT/OT consulted, appreciate recs  -Consult to ID placed, appreciate recs     #HFrEF (EF 35-40% 01/21)  #CAD s/p stents and CABG  -on home lisinopril and atorvastatin   -per patient he does not endorse cardiac symptoms at this time, does not report any new LE swelling  Plan  -consider repeat TTE  -continue home atorvastatin  -Start amlodipine 5 mg    -hold lisinopril  -consider GDMT optimization     #A fib, unsure if on eliquis  -Documented history of a fib.  -Eliquis not listed as a home med, unsure if patient has been taking or not  -Chart review states he is on eliquis   Plan  -Telemetry monitoring      #Type 2 diabetes mellitus, uncontrolled  -A1c 14.7 10/2023  -POCT 486 while in ED, reportedly drawn after eating however  -hold home jardiance and semaglutide  -patient reports taking 100 units Lantus daily at home  -s/p 500 ml bolus  Plan  -Lantus 100 units   -SSI low intensity, titrate as needed  -Patient refusing endocrinology consult at this time  -Repeat A1c pending     F: PRN  E: PRN  N: Regular  diet   A: PIV     DVT prophylaxis: lovenox        Code Status: Full code  Emergency contact: Amira Espino (?) [phone]           Ana Baumann MD

## 2024-01-26 NOTE — PROGRESS NOTES
Physical Therapy                 Therapy Communication Note    Patient Name: Kody Choi  MRN: [id number]  Today's Date: 1/26/2024     Discipline: Physical Therapy    Missed Visit Reason: Missed Visit Reason: Patient refused (Pt notes having spent 3 days in ED and not having any sleep. Now wanting to remain in bed and not attempt mobility/gait. Will continue to follow as pt allows.)    Missed Time: Attempt    Comment:

## 2024-01-27 LAB
ALBUMIN SERPL BCP-MCNC: 2.6 G/DL (ref 3.4–5)
ALBUMIN SERPL BCP-MCNC: 2.7 G/DL (ref 3.4–5)
ANION GAP SERPL CALC-SCNC: 12 MMOL/L (ref 10–20)
ANION GAP SERPL CALC-SCNC: 13 MMOL/L (ref 10–20)
BASOPHILS # BLD AUTO: 0.07 X10*3/UL (ref 0–0.1)
BASOPHILS NFR BLD AUTO: 0.7 %
BUN SERPL-MCNC: 21 MG/DL (ref 6–23)
BUN SERPL-MCNC: 23 MG/DL (ref 6–23)
CALCIUM SERPL-MCNC: 8.1 MG/DL (ref 8.6–10.6)
CALCIUM SERPL-MCNC: 8.1 MG/DL (ref 8.6–10.6)
CHLORIDE SERPL-SCNC: 100 MMOL/L (ref 98–107)
CHLORIDE SERPL-SCNC: 100 MMOL/L (ref 98–107)
CO2 SERPL-SCNC: 24 MMOL/L (ref 21–32)
CO2 SERPL-SCNC: 25 MMOL/L (ref 21–32)
CREAT SERPL-MCNC: 1.17 MG/DL (ref 0.5–1.3)
CREAT SERPL-MCNC: 1.28 MG/DL (ref 0.5–1.3)
EGFRCR SERPLBLD CKD-EPI 2021: 50 ML/MIN/1.73M*2
EGFRCR SERPLBLD CKD-EPI 2021: 55 ML/MIN/1.73M*2
EOSINOPHIL # BLD AUTO: 0.5 X10*3/UL (ref 0–0.7)
EOSINOPHIL NFR BLD AUTO: 5 %
ERYTHROCYTE [DISTWIDTH] IN BLOOD BY AUTOMATED COUNT: 15.9 % (ref 11.5–14.5)
GLUCOSE BLD MANUAL STRIP-MCNC: 203 MG/DL (ref 74–99)
GLUCOSE BLD MANUAL STRIP-MCNC: 353 MG/DL (ref 74–99)
GLUCOSE BLD MANUAL STRIP-MCNC: 381 MG/DL (ref 74–99)
GLUCOSE BLD MANUAL STRIP-MCNC: 471 MG/DL (ref 74–99)
GLUCOSE BLD MANUAL STRIP-MCNC: 498 MG/DL (ref 74–99)
GLUCOSE BLD MANUAL STRIP-MCNC: 504 MG/DL (ref 74–99)
GLUCOSE SERPL-MCNC: 268 MG/DL (ref 74–99)
GLUCOSE SERPL-MCNC: 407 MG/DL (ref 74–99)
HCT VFR BLD AUTO: 34.3 % (ref 36–52)
HGB BLD-MCNC: 10.7 G/DL (ref 12–17.5)
IMM GRANULOCYTES # BLD AUTO: 0.06 X10*3/UL (ref 0–0.7)
IMM GRANULOCYTES NFR BLD AUTO: 0.6 % (ref 0–0.9)
LYMPHOCYTES # BLD AUTO: 1.38 X10*3/UL (ref 1.2–4.8)
LYMPHOCYTES NFR BLD AUTO: 13.8 %
MAGNESIUM SERPL-MCNC: 1.84 MG/DL (ref 1.6–2.4)
MCH RBC QN AUTO: 24.4 PG (ref 26–34)
MCHC RBC AUTO-ENTMCNC: 31.2 G/DL (ref 32–36)
MCV RBC AUTO: 78 FL (ref 80–100)
MONOCYTES # BLD AUTO: 0.63 X10*3/UL (ref 0.1–1)
MONOCYTES NFR BLD AUTO: 6.3 %
NEUTROPHILS # BLD AUTO: 7.34 X10*3/UL (ref 1.2–7.7)
NEUTROPHILS NFR BLD AUTO: 73.6 %
NRBC BLD-RTO: 0 /100 WBCS (ref 0–0)
PHOSPHATE SERPL-MCNC: 3 MG/DL (ref 2.5–4.9)
PHOSPHATE SERPL-MCNC: 3.4 MG/DL (ref 2.5–4.9)
PLATELET # BLD AUTO: 423 X10*3/UL (ref 150–450)
POTASSIUM SERPL-SCNC: 3.5 MMOL/L (ref 3.5–5.3)
POTASSIUM SERPL-SCNC: 3.9 MMOL/L (ref 3.5–5.3)
RBC # BLD AUTO: 4.38 X10*6/UL (ref 4–5.9)
SODIUM SERPL-SCNC: 132 MMOL/L (ref 136–145)
SODIUM SERPL-SCNC: 134 MMOL/L (ref 136–145)
WBC # BLD AUTO: 10 X10*3/UL (ref 4.4–11.3)

## 2024-01-27 PROCEDURE — 80069 RENAL FUNCTION PANEL: CPT

## 2024-01-27 PROCEDURE — 2500000002 HC RX 250 W HCPCS SELF ADMINISTERED DRUGS (ALT 637 FOR MEDICARE OP, ALT 636 FOR OP/ED)

## 2024-01-27 PROCEDURE — 2500000004 HC RX 250 GENERAL PHARMACY W/ HCPCS (ALT 636 FOR OP/ED)

## 2024-01-27 PROCEDURE — 2500000001 HC RX 250 WO HCPCS SELF ADMINISTERED DRUGS (ALT 637 FOR MEDICARE OP)

## 2024-01-27 PROCEDURE — 36415 COLL VENOUS BLD VENIPUNCTURE: CPT

## 2024-01-27 PROCEDURE — 99233 SBSQ HOSP IP/OBS HIGH 50: CPT | Performed by: INTERNAL MEDICINE

## 2024-01-27 PROCEDURE — 85025 COMPLETE CBC W/AUTO DIFF WBC: CPT

## 2024-01-27 PROCEDURE — 76937 US GUIDE VASCULAR ACCESS: CPT

## 2024-01-27 PROCEDURE — 2060000001 HC INTERMEDIATE ICU ROOM DAILY

## 2024-01-27 PROCEDURE — 82947 ASSAY GLUCOSE BLOOD QUANT: CPT

## 2024-01-27 PROCEDURE — 83735 ASSAY OF MAGNESIUM: CPT

## 2024-01-27 RX ORDER — DEXTROSE 50 % IN WATER (D50W) INTRAVENOUS SYRINGE
25
Status: DISCONTINUED | OUTPATIENT
Start: 2024-01-27 | End: 2024-01-29 | Stop reason: SDUPTHER

## 2024-01-27 RX ORDER — LANOLIN ALCOHOL/MO/W.PET/CERES
400 CREAM (GRAM) TOPICAL ONCE
Status: COMPLETED | OUTPATIENT
Start: 2024-01-27 | End: 2024-01-27

## 2024-01-27 RX ORDER — INSULIN LISPRO 100 [IU]/ML
0-10 INJECTION, SOLUTION INTRAVENOUS; SUBCUTANEOUS
Status: DISCONTINUED | OUTPATIENT
Start: 2024-01-27 | End: 2024-02-01 | Stop reason: HOSPADM

## 2024-01-27 RX ORDER — DEXTROSE MONOHYDRATE 100 MG/ML
0.3 INJECTION, SOLUTION INTRAVENOUS ONCE AS NEEDED
Status: DISCONTINUED | OUTPATIENT
Start: 2024-01-27 | End: 2024-01-29 | Stop reason: SDUPTHER

## 2024-01-27 RX ORDER — INSULIN LISPRO 100 [IU]/ML
15 INJECTION, SOLUTION INTRAVENOUS; SUBCUTANEOUS ONCE
Status: COMPLETED | OUTPATIENT
Start: 2024-01-27 | End: 2024-01-27

## 2024-01-27 RX ORDER — DEXTROSE MONOHYDRATE 100 MG/ML
0.3 INJECTION, SOLUTION INTRAVENOUS ONCE AS NEEDED
Status: DISCONTINUED | OUTPATIENT
Start: 2024-01-27 | End: 2024-02-01 | Stop reason: HOSPADM

## 2024-01-27 RX ORDER — POTASSIUM CHLORIDE 14.9 MG/ML
20 INJECTION INTRAVENOUS
Status: DISPENSED | OUTPATIENT
Start: 2024-01-27 | End: 2024-01-27

## 2024-01-27 RX ORDER — MAGNESIUM SULFATE HEPTAHYDRATE 40 MG/ML
2 INJECTION, SOLUTION INTRAVENOUS ONCE
Status: DISCONTINUED | OUTPATIENT
Start: 2024-01-27 | End: 2024-01-27

## 2024-01-27 RX ORDER — DEXTROSE 50 % IN WATER (D50W) INTRAVENOUS SYRINGE
25
Status: DISCONTINUED | OUTPATIENT
Start: 2024-01-27 | End: 2024-02-01 | Stop reason: HOSPADM

## 2024-01-27 RX ORDER — POTASSIUM CHLORIDE 20 MEQ/1
20 TABLET, EXTENDED RELEASE ORAL ONCE
Status: COMPLETED | OUTPATIENT
Start: 2024-01-27 | End: 2024-01-27

## 2024-01-27 RX ORDER — INSULIN LISPRO 100 [IU]/ML
6 INJECTION, SOLUTION INTRAVENOUS; SUBCUTANEOUS
Status: DISCONTINUED | OUTPATIENT
Start: 2024-01-27 | End: 2024-01-29

## 2024-01-27 RX ORDER — INSULIN LISPRO 100 [IU]/ML
10 INJECTION, SOLUTION INTRAVENOUS; SUBCUTANEOUS ONCE
Status: COMPLETED | OUTPATIENT
Start: 2024-01-27 | End: 2024-01-27

## 2024-01-27 RX ADMIN — VANCOMYCIN HYDROCHLORIDE 1000 MG: 1 INJECTION, SOLUTION INTRAVENOUS at 13:11

## 2024-01-27 RX ADMIN — TRAZODONE HYDROCHLORIDE 50 MG: 50 TABLET ORAL at 22:26

## 2024-01-27 RX ADMIN — INSULIN GLARGINE 100 UNITS: 100 INJECTION, SOLUTION SUBCUTANEOUS at 08:30

## 2024-01-27 RX ADMIN — INSULIN LISPRO 6 UNITS: 100 INJECTION, SOLUTION INTRAVENOUS; SUBCUTANEOUS at 16:57

## 2024-01-27 RX ADMIN — POTASSIUM CHLORIDE 20 MEQ: 1500 TABLET, EXTENDED RELEASE ORAL at 18:02

## 2024-01-27 RX ADMIN — INSULIN LISPRO 15 UNITS: 100 INJECTION, SOLUTION INTRAVENOUS; SUBCUTANEOUS at 02:11

## 2024-01-27 RX ADMIN — ATORVASTATIN CALCIUM 40 MG: 40 TABLET, FILM COATED ORAL at 08:28

## 2024-01-27 RX ADMIN — INSULIN LISPRO 10 UNITS: 100 INJECTION, SOLUTION INTRAVENOUS; SUBCUTANEOUS at 16:56

## 2024-01-27 RX ADMIN — POTASSIUM CHLORIDE 20 MEQ: 14.9 INJECTION, SOLUTION INTRAVENOUS at 15:25

## 2024-01-27 RX ADMIN — AMLODIPINE BESYLATE 5 MG: 5 TABLET ORAL at 08:28

## 2024-01-27 RX ADMIN — Medication 400 MG: at 18:02

## 2024-01-27 RX ADMIN — INSULIN LISPRO 10 UNITS: 100 INJECTION, SOLUTION INTRAVENOUS; SUBCUTANEOUS at 22:28

## 2024-01-27 RX ADMIN — Medication 2 G: at 20:00

## 2024-01-27 ASSESSMENT — COGNITIVE AND FUNCTIONAL STATUS - GENERAL
MOBILITY SCORE: 14
CLIMB 3 TO 5 STEPS WITH RAILING: TOTAL
STANDING UP FROM CHAIR USING ARMS: A LOT
DAILY ACTIVITIY SCORE: 21
PERSONAL GROOMING: A LITTLE
MOVING TO AND FROM BED TO CHAIR: A LOT
TOILETING: A LOT
WALKING IN HOSPITAL ROOM: TOTAL

## 2024-01-27 ASSESSMENT — PAIN SCALES - GENERAL
PAINLEVEL_OUTOF10: 0 - NO PAIN
PAINLEVEL_OUTOF10: 0 - NO PAIN

## 2024-01-27 ASSESSMENT — PAIN SCALES - WONG BAKER: WONGBAKER_NUMERICALRESPONSE: NO HURT

## 2024-01-27 NOTE — CARE PLAN
The patient's goals for the shift include      The clinical goals for the shift include bs under 200      Pt up at bedside this morning. Pt refused regular insulin, baby aspirin, and lovenox this morning.

## 2024-01-27 NOTE — CARE PLAN
The patient's goals for the shift include      The clinical goals for the shift include  to have a bs level under 200

## 2024-01-27 NOTE — NURSING NOTE
Pt BS is 558. This nurse notified Dr. Durham of finding. New order of 12 units of lispro and stat RFP was placed. Pt received 12 units of coverage but refused the RFP lab draw. This nurse educated pt on the risk of not receiving RFP blood work. Pt continues to refuse. Dr. Durham notified and made aware. Will recheck bs in a hour.

## 2024-01-27 NOTE — PROGRESS NOTES
"Kody Choi is a 53 y.o. adult on day 2 of admission presenting with Pyogenic arthritis of right knee joint, due to unspecified organism (CMS/HCC).    Subjective   NAEON. Pt seems to refuse some doses of insulin, does not want to be on a diabetic diet.          Objective     Physical Exam    Constitutional:       General: He is not in acute distress.     Appearance: He is obese. He is not toxic-appearing.   HENT:      Head: Normocephalic.      Mouth/Throat:      Mouth: Mucous membranes are moist.      Pharynx: Oropharynx is clear.   Eyes:      Pupils: Pupils are equal, round, and reactive to light.   Cardiovascular:      Rate and Rhythm: Normal rate and regular rhythm.      Pulses: Normal pulses.      Heart sounds: No murmur heard.  Pulmonary:      Effort: Pulmonary effort is normal.      Breath sounds: Normal breath sounds. No wheezing.   Abdominal:      Palpations: Abdomen is soft.      Tenderness: There is no abdominal tenderness.   Musculoskeletal:         General: Swelling, tenderness and signs of injury present.      Cervical back: Normal range of motion.   Skin:     General: Skin is warm.      Findings: Rash present.   Neurological:      General: No focal deficit present.      Mental Status: He is alert and oriented to person, place, and time.     Last Recorded Vitals  Blood pressure 129/84, pulse 101, temperature 36.2 °C (97.2 °F), resp. rate 17, height 1.676 m (5' 6\"), weight 150 kg (330 lb), SpO2 95 %.  Intake/Output last 3 Shifts:  I/O last 3 completed shifts:  In: - (0 mL/kg)   Out: 1070 (7.1 mL/kg) [Urine:1070 (0.2 mL/kg/hr)]  Weight: 149.7 kg     Relevant Results    Scheduled medications  amLODIPine, 5 mg, oral, Daily  aspirin, 81 mg, oral, Daily  atorvastatin, 40 mg, oral, Daily  cefTRIAXone, 2 g, intravenous, q24h  enoxaparin, 60 mg, subcutaneous, BID  insulin glargine, 100 Units, subcutaneous, Daily  insulin lispro, 0-10 Units, subcutaneous, TID with meals  insulin lispro, 6 Units, subcutaneous, TID " with meals  magnesium sulfate, 2 g, intravenous, Once  pantoprazole, 20 mg, oral, Daily before breakfast  potassium chloride, 20 mEq, intravenous, q2h  traZODone, 50 mg, oral, Nightly  vancomycin, 1,000 mg, intravenous, q12h      Continuous medications     PRN medications  PRN medications: acetaminophen, dextrose 10 % in water (D10W), dextrose 10 % in water (D10W), dextrose 10 % in water (D10W), dextrose 10 % in water (D10W), dextrose 10 % in water (D10W), dextrose, dextrose, dextrose, dextrose, dextrose, fluticasone, glucagon, glucagon, glucagon, glucagon, glucagon, HYDROmorphone, traMADol    Results for orders placed or performed during the hospital encounter of 01/25/24 (from the past 24 hour(s))   POCT GLUCOSE   Result Value Ref Range    POCT Glucose 505 (H) 74 - 99 mg/dL   POCT GLUCOSE   Result Value Ref Range    POCT Glucose 558 (H) 74 - 99 mg/dL   POCT GLUCOSE   Result Value Ref Range    POCT Glucose 504 (H) 74 - 99 mg/dL   Renal function panel   Result Value Ref Range    Glucose 407 (H) 74 - 99 mg/dL    Sodium 132 (L) 136 - 145 mmol/L    Potassium 3.9 3.5 - 5.3 mmol/L    Chloride 100 98 - 107 mmol/L    Bicarbonate 24 21 - 32 mmol/L    Anion Gap 12 10 - 20 mmol/L    Urea Nitrogen 23 6 - 23 mg/dL    Creatinine 1.17 0.50 - 1.30 mg/dL    eGFR 55 (L) >60 mL/min/1.73m*2    Calcium 8.1 (L) 8.6 - 10.6 mg/dL    Phosphorus 3.4 2.5 - 4.9 mg/dL    Albumin 2.6 (L) 3.4 - 5.0 g/dL   POCT GLUCOSE   Result Value Ref Range    POCT Glucose 203 (H) 74 - 99 mg/dL   CBC and Auto Differential   Result Value Ref Range    WBC 10.0 4.4 - 11.3 x10*3/uL    nRBC 0.0 0.0 - 0.0 /100 WBCs    RBC 4.38 4.00 - 5.90 x10*6/uL    Hemoglobin 10.7 (L) 12.0 - 17.5 g/dL    Hematocrit 34.3 (L) 36.0 - 52.0 %    MCV 78 (L) 80 - 100 fL    MCH 24.4 (L) 26.0 - 34.0 pg    MCHC 31.2 (L) 32.0 - 36.0 g/dL    RDW 15.9 (H) 11.5 - 14.5 %    Platelets 423 150 - 450 x10*3/uL    Neutrophils % 73.6 40.0 - 80.0 %    Immature Granulocytes %, Automated 0.6 0.0 - 0.9 %     Lymphocytes % 13.8 13.0 - 44.0 %    Monocytes % 6.3 2.0 - 10.0 %    Eosinophils % 5.0 0.0 - 6.0 %    Basophils % 0.7 0.0 - 2.0 %    Neutrophils Absolute 7.34 1.20 - 7.70 x10*3/uL    Immature Granulocytes Absolute, Automated 0.06 0.00 - 0.70 x10*3/uL    Lymphocytes Absolute 1.38 1.20 - 4.80 x10*3/uL    Monocytes Absolute 0.63 0.10 - 1.00 x10*3/uL    Eosinophils Absolute 0.50 0.00 - 0.70 x10*3/uL    Basophils Absolute 0.07 0.00 - 0.10 x10*3/uL   Renal function panel   Result Value Ref Range    Glucose 268 (H) 74 - 99 mg/dL    Sodium 134 (L) 136 - 145 mmol/L    Potassium 3.5 3.5 - 5.3 mmol/L    Chloride 100 98 - 107 mmol/L    Bicarbonate 25 21 - 32 mmol/L    Anion Gap 13 10 - 20 mmol/L    Urea Nitrogen 21 6 - 23 mg/dL    Creatinine 1.28 0.50 - 1.30 mg/dL    eGFR 50 (L) >60 mL/min/1.73m*2    Calcium 8.1 (L) 8.6 - 10.6 mg/dL    Phosphorus 3.0 2.5 - 4.9 mg/dL    Albumin 2.7 (L) 3.4 - 5.0 g/dL   Magnesium   Result Value Ref Range    Magnesium 1.84 1.60 - 2.40 mg/dL   POCT GLUCOSE   Result Value Ref Range    POCT Glucose 353 (H) 74 - 99 mg/dL   POCT GLUCOSE   Result Value Ref Range    POCT Glucose 381 (H) 74 - 99 mg/dL       Assessment/Plan   Principal Problem:    Pyogenic arthritis of right knee joint, due to unspecified organism (CMS/[facility])    Kody Choi is a 54 y/o M with PMH significant for uncontrolled T2DM, CAD s/p multiple stents and CABG, HFrEF (EF 35-40% 01/21), A fib, RUPESH, TKA in 2015, hx of multiple septic arthritis infections in R knee, presenting with 1 week of worsening R knee pain and drainage from sinus s/p fall two days prior to symptom onset. Likely septic arthritis in setting of leukocytosis and knee pain and drainage given history of repeated infections and poorly controlled diabetes. Ortho consulted, aspiration and cultures sent.    Updates 1/27:   -BG is elevated this AM, spoke with nurse who said that pt refuses a lot of his insulin doses   -Lantus 100 units pt did receive this AM   -SSI increased    -prandial coverage with lispro 6u tid added    -spoke with pharmacy, will change antibiotics to normal saline (instead of mixed with dextrose)   -knee aspiration - NGTD      #Superimposed R knee infection  #Hx of septic arthritis of R knee, multiple episodes  #R TKA 2015 c/b recurrent prosthetic joint infection s/p explant and antibiotic spacer in 2019 on chronic keflex  #Leukocytosis  -Broken Abx spacer seen on Xrays  -WBC 12.8, , CRP 9.28  -On outpatient keflex for suppression  -Bactrim listed in home meds but patient unsure if taking  -Orthopedics consulted, recommend AKA however patient adamantly refusing at this time.  -IV vanc and ceftriaxone in the ED empirically   Plan  -Appreciate further ortho recs  -Continue IV abx with vancomycin and ceftriaxone  -pending cultures from aspirate   -Blood cultures x2: NGTD x1 day,   -PT/OT consulted, appreciate recs  -Consult to ID placed, appreciate recs     #HFrEF (EF 35-40% 01/21)  #CAD s/p stents and CABG  -on home lisinopril and atorvastatin   -per patient he does not endorse cardiac symptoms at this time, does not report any new LE swelling  Plan  -consider repeat TTE  -continue home atorvastatin  -Start amlodipine 5 mg    -hold lisinopril  -consider GDMT optimization     #A fib, unsure if on eliquis  -Documented history of a fib.  -Eliquis not listed as a home med, unsure if patient has been taking or not  -Chart review states he is on eliquis   Plan  -Telemetry monitoring      #Type 2 diabetes mellitus, uncontrolled  -A1c 14.7 10/2023  -POCT 486 while in ED, reportedly drawn after eating however  -hold home jardiance and semaglutide  -patient reports taking 100 units Lantus daily at home  -s/p 500 ml bolus  Plan  -Lantus 100 units   -SSI low intensity, titrate as needed - increased on 1/27   -prandial coverage with lispro 6u tid added 1/27   -Patient refusing endocrinology consult at this time  -Repeat A1c 16.6     F: PRN  E: PRN  N: Regular diet (pt  refusing diabetic diet at this time)   A: PIV     DVT prophylaxis: lovenox        Code Status: Full code  Emergency contact: Amira Espino (?) [phone]         Keyonna King MD  IM PGY-2

## 2024-01-27 NOTE — NURSING NOTE
Pt was refusing blood draws throughout shift. This nurse encourage and further educated pt on the importance of lab draw. Pt showed and verbalize understanding of education. Lab was drawn and sent to lab. MD made aware.

## 2024-01-27 NOTE — NURSING NOTE
Pt bs recheck at 0001 was 504. This nurse reported reading to Dr. Durham. New order for stat RFP draw. Pt refuses lab work at this time stating that he feels its unnecessary to collect. This nurse educated and encourage the importance of RFP lab draw. Pt continues to refuse and appears to be very noncompliant to care. Dr. Durham notified and made aware.

## 2024-01-28 LAB
ALBUMIN SERPL BCP-MCNC: 2.8 G/DL (ref 3.4–5)
ALP SERPL-CCNC: 124 U/L (ref 33–120)
ALT SERPL W P-5'-P-CCNC: 7 U/L (ref 7–52)
ANION GAP SERPL CALC-SCNC: 15 MMOL/L (ref 10–20)
AST SERPL W P-5'-P-CCNC: 8 U/L (ref 9–39)
BACTERIA FLD CULT: NORMAL
BASOPHILS # BLD AUTO: 0.08 X10*3/UL (ref 0–0.1)
BASOPHILS NFR BLD AUTO: 0.8 %
BILIRUB SERPL-MCNC: 0.2 MG/DL (ref 0–1.2)
BUN SERPL-MCNC: 27 MG/DL (ref 6–23)
CALCIUM SERPL-MCNC: 8.3 MG/DL (ref 8.6–10.6)
CHLORIDE SERPL-SCNC: 99 MMOL/L (ref 98–107)
CO2 SERPL-SCNC: 22 MMOL/L (ref 21–32)
CREAT SERPL-MCNC: 1.13 MG/DL (ref 0.5–1.3)
EGFRCR SERPLBLD CKD-EPI 2021: 58 ML/MIN/1.73M*2
EOSINOPHIL # BLD AUTO: 0.44 X10*3/UL (ref 0–0.7)
EOSINOPHIL NFR BLD AUTO: 4.4 %
ERYTHROCYTE [DISTWIDTH] IN BLOOD BY AUTOMATED COUNT: 16 % (ref 11.5–14.5)
GLUCOSE BLD MANUAL STRIP-MCNC: 406 MG/DL (ref 74–99)
GLUCOSE BLD MANUAL STRIP-MCNC: 413 MG/DL (ref 74–99)
GLUCOSE BLD MANUAL STRIP-MCNC: 421 MG/DL (ref 74–99)
GLUCOSE BLD MANUAL STRIP-MCNC: 431 MG/DL (ref 74–99)
GLUCOSE BLD MANUAL STRIP-MCNC: 444 MG/DL (ref 74–99)
GLUCOSE SERPL-MCNC: 362 MG/DL (ref 74–99)
GRAM STN SPEC: NORMAL
GRAM STN SPEC: NORMAL
HCT VFR BLD AUTO: 32.4 % (ref 36–52)
HGB BLD-MCNC: 9.8 G/DL (ref 12–17.5)
IMM GRANULOCYTES # BLD AUTO: 0.08 X10*3/UL (ref 0–0.7)
IMM GRANULOCYTES NFR BLD AUTO: 0.8 % (ref 0–0.9)
LYMPHOCYTES # BLD AUTO: 1.56 X10*3/UL (ref 1.2–4.8)
LYMPHOCYTES NFR BLD AUTO: 15.6 %
MAGNESIUM SERPL-MCNC: 1.86 MG/DL (ref 1.6–2.4)
MCH RBC QN AUTO: 23.6 PG (ref 26–34)
MCHC RBC AUTO-ENTMCNC: 30.2 G/DL (ref 32–36)
MCV RBC AUTO: 78 FL (ref 80–100)
MONOCYTES # BLD AUTO: 0.85 X10*3/UL (ref 0.1–1)
MONOCYTES NFR BLD AUTO: 8.5 %
NEUTROPHILS # BLD AUTO: 7.01 X10*3/UL (ref 1.2–7.7)
NEUTROPHILS NFR BLD AUTO: 69.9 %
NRBC BLD-RTO: 0 /100 WBCS (ref 0–0)
PHOSPHATE SERPL-MCNC: 3 MG/DL (ref 2.5–4.9)
PLATELET # BLD AUTO: 405 X10*3/UL (ref 150–450)
POTASSIUM SERPL-SCNC: 4 MMOL/L (ref 3.5–5.3)
PROT SERPL-MCNC: 6.7 G/DL (ref 6.4–8.2)
RBC # BLD AUTO: 4.15 X10*6/UL (ref 4–5.9)
SODIUM SERPL-SCNC: 132 MMOL/L (ref 136–145)
VANCOMYCIN SERPL-MCNC: 17 UG/ML (ref 5–20)
WBC # BLD AUTO: 10 X10*3/UL (ref 4.4–11.3)

## 2024-01-28 PROCEDURE — 80202 ASSAY OF VANCOMYCIN: CPT

## 2024-01-28 PROCEDURE — 36415 COLL VENOUS BLD VENIPUNCTURE: CPT

## 2024-01-28 PROCEDURE — 85025 COMPLETE CBC W/AUTO DIFF WBC: CPT

## 2024-01-28 PROCEDURE — 80053 COMPREHEN METABOLIC PANEL: CPT

## 2024-01-28 PROCEDURE — 99233 SBSQ HOSP IP/OBS HIGH 50: CPT | Performed by: INTERNAL MEDICINE

## 2024-01-28 PROCEDURE — 2500000001 HC RX 250 WO HCPCS SELF ADMINISTERED DRUGS (ALT 637 FOR MEDICARE OP)

## 2024-01-28 PROCEDURE — 84100 ASSAY OF PHOSPHORUS: CPT

## 2024-01-28 PROCEDURE — 2500000004 HC RX 250 GENERAL PHARMACY W/ HCPCS (ALT 636 FOR OP/ED)

## 2024-01-28 PROCEDURE — 83735 ASSAY OF MAGNESIUM: CPT

## 2024-01-28 PROCEDURE — 2500000004 HC RX 250 GENERAL PHARMACY W/ HCPCS (ALT 636 FOR OP/ED): Performed by: INTERNAL MEDICINE

## 2024-01-28 PROCEDURE — 99232 SBSQ HOSP IP/OBS MODERATE 35: CPT | Performed by: INTERNAL MEDICINE

## 2024-01-28 PROCEDURE — 2060000001 HC INTERMEDIATE ICU ROOM DAILY

## 2024-01-28 PROCEDURE — A4217 STERILE WATER/SALINE, 500 ML: HCPCS | Performed by: INTERNAL MEDICINE

## 2024-01-28 PROCEDURE — 82947 ASSAY GLUCOSE BLOOD QUANT: CPT

## 2024-01-28 PROCEDURE — 2500000002 HC RX 250 W HCPCS SELF ADMINISTERED DRUGS (ALT 637 FOR MEDICARE OP, ALT 636 FOR OP/ED)

## 2024-01-28 RX ORDER — INSULIN GLARGINE 100 [IU]/ML
70 INJECTION, SOLUTION SUBCUTANEOUS 2 TIMES DAILY
Status: DISCONTINUED | OUTPATIENT
Start: 2024-01-28 | End: 2024-01-29

## 2024-01-28 RX ORDER — LOSARTAN POTASSIUM 50 MG/1
25 TABLET ORAL DAILY
Status: DISCONTINUED | OUTPATIENT
Start: 2024-01-28 | End: 2024-02-01 | Stop reason: HOSPADM

## 2024-01-28 RX ADMIN — TRAZODONE HYDROCHLORIDE 50 MG: 50 TABLET ORAL at 21:49

## 2024-01-28 RX ADMIN — INSULIN LISPRO 10 UNITS: 100 INJECTION, SOLUTION INTRAVENOUS; SUBCUTANEOUS at 17:11

## 2024-01-28 RX ADMIN — ACETAMINOPHEN 650 MG: 325 TABLET ORAL at 02:23

## 2024-01-28 RX ADMIN — ATORVASTATIN CALCIUM 40 MG: 40 TABLET, FILM COATED ORAL at 10:03

## 2024-01-28 RX ADMIN — INSULIN LISPRO 10 UNITS: 100 INJECTION, SOLUTION INTRAVENOUS; SUBCUTANEOUS at 09:45

## 2024-01-28 RX ADMIN — TRAMADOL HYDROCHLORIDE 50 MG: 50 TABLET, COATED ORAL at 02:23

## 2024-01-28 RX ADMIN — ACETAMINOPHEN 650 MG: 325 TABLET ORAL at 17:51

## 2024-01-28 RX ADMIN — INSULIN GLARGINE 70 UNITS: 100 INJECTION, SOLUTION SUBCUTANEOUS at 10:03

## 2024-01-28 RX ADMIN — VANCOMYCIN HYDROCHLORIDE 1250 MG: 5 INJECTION, POWDER, LYOPHILIZED, FOR SOLUTION INTRAVENOUS at 17:11

## 2024-01-28 RX ADMIN — TRAMADOL HYDROCHLORIDE 50 MG: 50 TABLET, COATED ORAL at 11:05

## 2024-01-28 RX ADMIN — TRAMADOL HYDROCHLORIDE 50 MG: 50 TABLET, COATED ORAL at 17:51

## 2024-01-28 RX ADMIN — VANCOMYCIN HYDROCHLORIDE 1 G: 1 INJECTION, POWDER, LYOPHILIZED, FOR SOLUTION INTRAVENOUS at 02:09

## 2024-01-28 RX ADMIN — INSULIN LISPRO 6 UNITS: 100 INJECTION, SOLUTION INTRAVENOUS; SUBCUTANEOUS at 17:12

## 2024-01-28 RX ADMIN — INSULIN LISPRO 6 UNITS: 100 INJECTION, SOLUTION INTRAVENOUS; SUBCUTANEOUS at 09:46

## 2024-01-28 RX ADMIN — INSULIN GLARGINE 70 UNITS: 100 INJECTION, SOLUTION SUBCUTANEOUS at 21:29

## 2024-01-28 RX ADMIN — INSULIN LISPRO 10 UNITS: 100 INJECTION, SOLUTION INTRAVENOUS; SUBCUTANEOUS at 11:47

## 2024-01-28 RX ADMIN — Medication 2 G: at 21:31

## 2024-01-28 RX ADMIN — INSULIN LISPRO 6 UNITS: 100 INJECTION, SOLUTION INTRAVENOUS; SUBCUTANEOUS at 11:49

## 2024-01-28 ASSESSMENT — COGNITIVE AND FUNCTIONAL STATUS - GENERAL
STANDING UP FROM CHAIR USING ARMS: A LOT
TOILETING: A LITTLE
WALKING IN HOSPITAL ROOM: TOTAL
TOILETING: A LOT
MOBILITY SCORE: 14
DAILY ACTIVITIY SCORE: 22
MOBILITY SCORE: 14
PERSONAL GROOMING: A LITTLE
DAILY ACTIVITIY SCORE: 21
WALKING IN HOSPITAL ROOM: TOTAL
PERSONAL GROOMING: A LITTLE
CLIMB 3 TO 5 STEPS WITH RAILING: TOTAL
MOVING TO AND FROM BED TO CHAIR: A LOT
CLIMB 3 TO 5 STEPS WITH RAILING: TOTAL
STANDING UP FROM CHAIR USING ARMS: A LOT
MOVING TO AND FROM BED TO CHAIR: A LOT

## 2024-01-28 ASSESSMENT — ENCOUNTER SYMPTOMS
DIAPHORESIS: 0
CONFUSION: 0
CHILLS: 0
FEVER: 0
SHORTNESS OF BREATH: 0
COUGH: 0
ABDOMINAL PAIN: 0
DIARRHEA: 0

## 2024-01-28 ASSESSMENT — PAIN DESCRIPTION - ORIENTATION: ORIENTATION: RIGHT

## 2024-01-28 ASSESSMENT — PAIN DESCRIPTION - LOCATION: LOCATION: KNEE

## 2024-01-28 ASSESSMENT — PAIN SCALES - GENERAL
PAINLEVEL_OUTOF10: 0 - NO PAIN
PAINLEVEL_OUTOF10: 0 - NO PAIN
PAINLEVEL_OUTOF10: 6

## 2024-01-28 ASSESSMENT — PAIN SCALES - WONG BAKER: WONGBAKER_NUMERICALRESPONSE: NO HURT

## 2024-01-28 ASSESSMENT — PAIN - FUNCTIONAL ASSESSMENT: PAIN_FUNCTIONAL_ASSESSMENT: 0-10

## 2024-01-28 NOTE — PROGRESS NOTES
"Vancomycin Dosing by Pharmacy- FOLLOW UP    Kody Choi is a 53 y.o. year old adult who Pharmacy has been consulted for vancomycin dosing for osteomyelitis/septic arthritis. Based on the patient's indication and renal status this patient is being dosed based on a goal AUC of 400-600.     Renal function is currently stable.    Current vancomycin dose: 1000 mg given every 12 hours    Estimated vancomycin AUC on current dose: 408 mg/L.hr     Visit Vitals  /83   Pulse 108   Temp 36.5 °C (97.7 °F)   Resp 18        Lab Results   Component Value Date    CREATININE 1.13 01/28/2024    CREATININE 1.28 01/27/2024    CREATININE 1.17 01/27/2024    CREATININE 1.29 01/26/2024        Patient weight is No results found for: \"PTWEIGHT\"    No results found for: \"CULTURE\"     I/O last 3 completed shifts:  In: 440 (2.9 mL/kg) [P.O.:440]  Out: 770 (5.1 mL/kg) [Urine:770 (0.1 mL/kg/hr)]  Weight: 149.7 kg   [unfilled]    Lab Results   Component Value Date    PATIENTTEMP 37.0 01/25/2024    PATIENTTEMP 37.0 10/16/2023    PATIENTTEMP 37.0 10/15/2023        Assessment/Plan    Within lower end of AUC goal. However, as indication is septic arthritis, will increase regimen 1250 mg Q12hr to target AUC closer to 500.     This dosing regimen is predicted by [employer] to result in the following pharmacokinetic parameters:  Loading dose: N/A  Regimen: 1250 mg IV every 12 hours.  Start time: 14:09 on 01/28/2024  Exposure target: AUC24 (range)400-600 mg/L.hr   AUC24,ss: 510 mg/L.hr  Probability of AUC24 > 400: 100 %  Ctrough,ss: 14.5 mg/L  Probability of Ctrough,ss > 20: 0 %  Probability of nephrotoxicity (Lodise EDIE 2009): 10 %      The next level will be obtained on 1/30 at am draw. May be obtained sooner if clinically indicated.   Will continue to monitor renal function daily while on vancomycin and order serum creatinine at least every 48 hours if not already ordered.  Follow for continued vancomycin needs, clinical response, and " signs/symptoms of toxicity.       Maureen Jauregui, PharmD

## 2024-01-28 NOTE — PROGRESS NOTES
"Kody Choi is a 53 y.o. adult on day 3 of admission presenting with Pyogenic arthritis of right knee joint, due to unspecified organism (CMS/HCC).    Subjective   NAEON. Pt stated that he is trying to better control his diet (ordering fruits and vegetables to eat). We discussed changes to his insulin regimen and discussed carb controlled diet (90g) and pt was amenable to both.          Objective     Physical Exam    Constitutional:       General: He is not in acute distress.     Appearance: He is obese. He is not toxic-appearing.   HENT:      Head: Normocephalic.      Mouth/Throat:      Mouth: Mucous membranes are moist.      Pharynx: Oropharynx is clear.   Eyes:      Pupils: Pupils are equal, round, and reactive to light.   Cardiovascular:      Rate and Rhythm: Normal rate and regular rhythm.      Pulses: Normal pulses.      Heart sounds: No murmur heard.  Pulmonary:      Effort: Pulmonary effort is normal.      Breath sounds: Normal breath sounds. No wheezing.   Abdominal:      Palpations: Abdomen is soft.      Tenderness: There is no abdominal tenderness.   Musculoskeletal:         General: Swelling, tenderness and signs of injury present.      Cervical back: Normal range of motion.   Skin:     General: Skin is warm.      Findings: Rash present.   Neurological:      General: No focal deficit present.      Mental Status: He is alert and oriented to person, place, and time.     Last Recorded Vitals  Blood pressure 144/83, pulse 108, temperature 36.5 °C (97.7 °F), resp. rate 18, height 1.676 m (5' 6\"), weight 150 kg (330 lb), SpO2 96 %.  Intake/Output last 3 Shifts:  I/O last 3 completed shifts:  In: 440 (2.9 mL/kg) [P.O.:440]  Out: 770 (5.1 mL/kg) [Urine:770 (0.1 mL/kg/hr)]  Weight: 149.7 kg     Relevant Results    Scheduled medications  aspirin, 81 mg, oral, Daily  atorvastatin, 40 mg, oral, Daily  cefTRIAXone, 2 g, intravenous, q24h  enoxaparin, 60 mg, subcutaneous, BID  insulin glargine, 70 Units, subcutaneous, " BID  insulin lispro, 0-10 Units, subcutaneous, TID with meals  insulin lispro, 6 Units, subcutaneous, TID with meals  losartan, 25 mg, oral, Daily  pantoprazole, 20 mg, oral, Daily before breakfast  traZODone, 50 mg, oral, Nightly  vancomycin, 1,250 mg, intravenous, q12h      Continuous medications     PRN medications  PRN medications: acetaminophen, dextrose 10 % in water (D10W), dextrose 10 % in water (D10W), dextrose 10 % in water (D10W), dextrose 10 % in water (D10W), dextrose 10 % in water (D10W), dextrose 10 % in water (D10W), dextrose, dextrose, dextrose, dextrose, dextrose, dextrose, fluticasone, glucagon, glucagon, glucagon, glucagon, glucagon, glucagon, HYDROmorphone, traMADol    Results for orders placed or performed during the hospital encounter of 01/25/24 (from the past 24 hour(s))   POCT GLUCOSE   Result Value Ref Range    POCT Glucose 471 (H) 74 - 99 mg/dL   POCT GLUCOSE   Result Value Ref Range    POCT Glucose 498 (H) 74 - 99 mg/dL   CBC and Auto Differential   Result Value Ref Range    WBC 10.0 4.4 - 11.3 x10*3/uL    nRBC 0.0 0.0 - 0.0 /100 WBCs    RBC 4.15 4.00 - 5.90 x10*6/uL    Hemoglobin 9.8 (L) 12.0 - 17.5 g/dL    Hematocrit 32.4 (L) 36.0 - 52.0 %    MCV 78 (L) 80 - 100 fL    MCH 23.6 (L) 26.0 - 34.0 pg    MCHC 30.2 (L) 32.0 - 36.0 g/dL    RDW 16.0 (H) 11.5 - 14.5 %    Platelets 405 150 - 450 x10*3/uL    Neutrophils % 69.9 40.0 - 80.0 %    Immature Granulocytes %, Automated 0.8 0.0 - 0.9 %    Lymphocytes % 15.6 13.0 - 44.0 %    Monocytes % 8.5 2.0 - 10.0 %    Eosinophils % 4.4 0.0 - 6.0 %    Basophils % 0.8 0.0 - 2.0 %    Neutrophils Absolute 7.01 1.20 - 7.70 x10*3/uL    Immature Granulocytes Absolute, Automated 0.08 0.00 - 0.70 x10*3/uL    Lymphocytes Absolute 1.56 1.20 - 4.80 x10*3/uL    Monocytes Absolute 0.85 0.10 - 1.00 x10*3/uL    Eosinophils Absolute 0.44 0.00 - 0.70 x10*3/uL    Basophils Absolute 0.08 0.00 - 0.10 x10*3/uL   Magnesium   Result Value Ref Range    Magnesium 1.86 1.60 -  2.40 mg/dL   Vancomycin   Result Value Ref Range    Vancomycin 17.0 5.0 - 20.0 ug/mL   Comprehensive Metabolic Panel   Result Value Ref Range    Glucose 362 (H) 74 - 99 mg/dL    Sodium 132 (L) 136 - 145 mmol/L    Potassium 4.0 3.5 - 5.3 mmol/L    Chloride 99 98 - 107 mmol/L    Bicarbonate 22 21 - 32 mmol/L    Anion Gap 15 10 - 20 mmol/L    Urea Nitrogen 27 (H) 6 - 23 mg/dL    Creatinine 1.13 0.50 - 1.30 mg/dL    eGFR 58 (L) >60 mL/min/1.73m*2    Calcium 8.3 (L) 8.6 - 10.6 mg/dL    Albumin 2.8 (L) 3.4 - 5.0 g/dL    Alkaline Phosphatase 124 (H) 33 - 120 U/L    Total Protein 6.7 6.4 - 8.2 g/dL    AST 8 (L) 9 - 39 U/L    Bilirubin, Total 0.2 0.0 - 1.2 mg/dL    ALT 7 7 - 52 U/L   Phosphorus   Result Value Ref Range    Phosphorus 3.0 2.5 - 4.9 mg/dL   POCT GLUCOSE   Result Value Ref Range    POCT Glucose 431 (H) 74 - 99 mg/dL   POCT GLUCOSE   Result Value Ref Range    POCT Glucose 444 (H) 74 - 99 mg/dL   POCT GLUCOSE   Result Value Ref Range    POCT Glucose 421 (H) 74 - 99 mg/dL       Assessment/Plan   Principal Problem:    Pyogenic arthritis of right knee joint, due to unspecified organism (CMS/[facility])    Kody Choi is a 52 y/o M with PMH significant for uncontrolled T2DM, CAD s/p multiple stents and CABG, HFrEF (EF 35-40% 01/21), A fib, RUPESH, TKA in 2015, hx of multiple septic arthritis infections in R knee, presenting with 1 week of worsening R knee pain and drainage from sinus s/p fall two days prior to symptom onset. Likely septic arthritis in setting of leukocytosis and knee pain and drainage given history of repeated infections and poorly controlled diabetes. Ortho consulted, aspiration and cultures sent.     Updates 1/28:   -BG continues to be elevated. Plan: changed lantus to 70 BID (increased based on prior 24 hour requirements) + will continue increased SSI + will continue prandial lispro coverage 6u   -spoke with pharmacy, changed antibiotics to normal saline (instead of mixed with dextrose) on 1/27   -knee  aspiration - NGTD   -blood cx - NGTD   -TTE ordered (last TTE showed HFrEF w EF 35-40% in 1/2021)  -stopped amlodipine and switched to losartan (GDMT)      #Superimposed R knee infection  #Hx of septic arthritis of R knee, multiple episodes  #R TKA 2015 c/b recurrent prosthetic joint infection s/p explant and antibiotic spacer in 2019 on chronic keflex  #Leukocytosis  -Broken Abx spacer seen on Xrays  -WBC 12.8, , CRP 9.28  -On outpatient keflex for suppression  -Bactrim listed in home meds but patient unsure if taking  -Orthopedics consulted, recommend AKA however patient adamantly refusing at this time.  -IV vanc and ceftriaxone in the ED empirically   Plan  -Appreciate further ortho recs  -Continue IV abx with vancomycin and ceftriaxone  -pending cultures from aspirate   -Blood cultures x2: NGTD x1 day,   -PT/OT consulted, appreciate recs  -Consult to ID placed, appreciate recs     #HFrEF (EF 35-40% 01/21)  #CAD s/p stents and CABG  -on home lisinopril and atorvastatin   -per patient he does not endorse cardiac symptoms at this time, does not report any new LE swelling  Plan  -consider repeat TTE  -continue home atorvastatin  -Start amlodipine 5 mg    -hold lisinopril  -consider GDMT optimization     #A fib, unsure if on eliquis  -Documented history of a fib.  -Eliquis not listed as a home med, unsure if patient has been taking or not  -Chart review states he is on eliquis   Plan  -Telemetry monitoring      #Type 2 diabetes mellitus, uncontrolled  -A1c 14.7 10/2023  -POCT 486 while in ED, reportedly drawn after eating however  -hold home jardiance and semaglutide  -patient reports taking 100 units Lantus daily at home  -s/p 500 ml bolus  Plan  -Lantus 100 units   -SSI low intensity, titrate as needed - increased on 1/27   -prandial coverage with lispro 6u tid added 1/27   -Patient refusing endocrinology consult at this time  -Repeat A1c 16.6     F: PRN  E: PRN  N: Regular diet (pt refusing diabetic diet at  this time)   A: PIV     DVT prophylaxis: lovenox        Code Status: Full code  Emergency contact: Amira Espino (?) [phone]         Keyonna King MD  IM PGY-2

## 2024-01-28 NOTE — CARE PLAN
The patient's goals for the shift include      The clinical goals for the shift include pt is progressing towards this goal but refused regular insulin today

## 2024-01-29 ENCOUNTER — APPOINTMENT (OUTPATIENT)
Dept: CARDIOLOGY | Facility: HOSPITAL | Age: 54
DRG: 549 | End: 2024-01-29
Payer: COMMERCIAL

## 2024-01-29 LAB
ALBUMIN SERPL BCP-MCNC: 2.8 G/DL (ref 3.4–5)
ALP SERPL-CCNC: 127 U/L (ref 33–120)
ALT SERPL W P-5'-P-CCNC: 10 U/L (ref 7–52)
ANION GAP SERPL CALC-SCNC: 14 MMOL/L (ref 10–20)
AST SERPL W P-5'-P-CCNC: 12 U/L (ref 9–39)
BACTERIA BLD CULT: NORMAL
BACTERIA BLD CULT: NORMAL
BASOPHILS # BLD AUTO: 0.08 X10*3/UL (ref 0–0.1)
BASOPHILS NFR BLD AUTO: 0.8 %
BILIRUB SERPL-MCNC: 0.2 MG/DL (ref 0–1.2)
BUN SERPL-MCNC: 31 MG/DL (ref 6–23)
CALCIUM SERPL-MCNC: 8.5 MG/DL (ref 8.6–10.6)
CHLORIDE SERPL-SCNC: 100 MMOL/L (ref 98–107)
CO2 SERPL-SCNC: 22 MMOL/L (ref 21–32)
CREAT SERPL-MCNC: 1.34 MG/DL (ref 0.5–1.3)
EGFRCR SERPLBLD CKD-EPI 2021: 47 ML/MIN/1.73M*2
EOSINOPHIL # BLD AUTO: 0.38 X10*3/UL (ref 0–0.7)
EOSINOPHIL NFR BLD AUTO: 3.7 %
ERYTHROCYTE [DISTWIDTH] IN BLOOD BY AUTOMATED COUNT: 15.9 % (ref 11.5–14.5)
GLUCOSE BLD MANUAL STRIP-MCNC: 194 MG/DL (ref 74–99)
GLUCOSE BLD MANUAL STRIP-MCNC: 260 MG/DL (ref 74–99)
GLUCOSE BLD MANUAL STRIP-MCNC: 296 MG/DL (ref 74–99)
GLUCOSE BLD MANUAL STRIP-MCNC: 350 MG/DL (ref 74–99)
GLUCOSE SERPL-MCNC: 284 MG/DL (ref 74–99)
HCT VFR BLD AUTO: 32 % (ref 36–52)
HGB BLD-MCNC: 9.5 G/DL (ref 12–17.5)
IMM GRANULOCYTES # BLD AUTO: 0.07 X10*3/UL (ref 0–0.7)
IMM GRANULOCYTES NFR BLD AUTO: 0.7 % (ref 0–0.9)
LYMPHOCYTES # BLD AUTO: 1.28 X10*3/UL (ref 1.2–4.8)
LYMPHOCYTES NFR BLD AUTO: 12.3 %
MAGNESIUM SERPL-MCNC: 1.85 MG/DL (ref 1.6–2.4)
MCH RBC QN AUTO: 23.5 PG (ref 26–34)
MCHC RBC AUTO-ENTMCNC: 29.7 G/DL (ref 32–36)
MCV RBC AUTO: 79 FL (ref 80–100)
MONOCYTES # BLD AUTO: 0.84 X10*3/UL (ref 0.1–1)
MONOCYTES NFR BLD AUTO: 8.1 %
NEUTROPHILS # BLD AUTO: 7.74 X10*3/UL (ref 1.2–7.7)
NEUTROPHILS NFR BLD AUTO: 74.4 %
NRBC BLD-RTO: 0 /100 WBCS (ref 0–0)
PHOSPHATE SERPL-MCNC: 3.5 MG/DL (ref 2.5–4.9)
PLATELET # BLD AUTO: 410 X10*3/UL (ref 150–450)
POTASSIUM SERPL-SCNC: 4.3 MMOL/L (ref 3.5–5.3)
PROT SERPL-MCNC: 6.8 G/DL (ref 6.4–8.2)
RBC # BLD AUTO: 4.05 X10*6/UL (ref 4–5.9)
SODIUM SERPL-SCNC: 132 MMOL/L (ref 136–145)
WBC # BLD AUTO: 10.4 X10*3/UL (ref 4.4–11.3)

## 2024-01-29 PROCEDURE — 2500000004 HC RX 250 GENERAL PHARMACY W/ HCPCS (ALT 636 FOR OP/ED)

## 2024-01-29 PROCEDURE — 82947 ASSAY GLUCOSE BLOOD QUANT: CPT

## 2024-01-29 PROCEDURE — 2500000004 HC RX 250 GENERAL PHARMACY W/ HCPCS (ALT 636 FOR OP/ED): Performed by: INTERNAL MEDICINE

## 2024-01-29 PROCEDURE — 99222 1ST HOSP IP/OBS MODERATE 55: CPT | Performed by: STUDENT IN AN ORGANIZED HEALTH CARE EDUCATION/TRAINING PROGRAM

## 2024-01-29 PROCEDURE — 2060000001 HC INTERMEDIATE ICU ROOM DAILY

## 2024-01-29 PROCEDURE — 99233 SBSQ HOSP IP/OBS HIGH 50: CPT | Performed by: INTERNAL MEDICINE

## 2024-01-29 PROCEDURE — A4217 STERILE WATER/SALINE, 500 ML: HCPCS | Performed by: INTERNAL MEDICINE

## 2024-01-29 PROCEDURE — 84100 ASSAY OF PHOSPHORUS: CPT

## 2024-01-29 PROCEDURE — 80053 COMPREHEN METABOLIC PANEL: CPT

## 2024-01-29 PROCEDURE — 97161 PT EVAL LOW COMPLEX 20 MIN: CPT | Mod: GP

## 2024-01-29 PROCEDURE — 2500000002 HC RX 250 W HCPCS SELF ADMINISTERED DRUGS (ALT 637 FOR MEDICARE OP, ALT 636 FOR OP/ED)

## 2024-01-29 PROCEDURE — 85025 COMPLETE CBC W/AUTO DIFF WBC: CPT

## 2024-01-29 PROCEDURE — 2500000001 HC RX 250 WO HCPCS SELF ADMINISTERED DRUGS (ALT 637 FOR MEDICARE OP)

## 2024-01-29 PROCEDURE — 83735 ASSAY OF MAGNESIUM: CPT

## 2024-01-29 PROCEDURE — 99232 SBSQ HOSP IP/OBS MODERATE 35: CPT | Performed by: INTERNAL MEDICINE

## 2024-01-29 PROCEDURE — 36415 COLL VENOUS BLD VENIPUNCTURE: CPT

## 2024-01-29 PROCEDURE — 76937 US GUIDE VASCULAR ACCESS: CPT

## 2024-01-29 RX ORDER — INSULIN LISPRO 100 [IU]/ML
10 INJECTION, SOLUTION INTRAVENOUS; SUBCUTANEOUS
Status: DISCONTINUED | OUTPATIENT
Start: 2024-01-29 | End: 2024-01-29

## 2024-01-29 RX ORDER — INSULIN LISPRO 100 [IU]/ML
12 INJECTION, SOLUTION INTRAVENOUS; SUBCUTANEOUS
Status: DISCONTINUED | OUTPATIENT
Start: 2024-01-29 | End: 2024-01-29

## 2024-01-29 RX ORDER — TRAMADOL HYDROCHLORIDE 50 MG/1
100 TABLET ORAL EVERY 8 HOURS PRN
Status: DISCONTINUED | OUTPATIENT
Start: 2024-01-29 | End: 2024-02-01 | Stop reason: HOSPADM

## 2024-01-29 RX ORDER — INSULIN LISPRO 100 [IU]/ML
25 INJECTION, SOLUTION INTRAVENOUS; SUBCUTANEOUS
Status: DISCONTINUED | OUTPATIENT
Start: 2024-01-30 | End: 2024-02-01 | Stop reason: HOSPADM

## 2024-01-29 RX ORDER — INSULIN GLARGINE 100 [IU]/ML
80 INJECTION, SOLUTION SUBCUTANEOUS 2 TIMES DAILY
Status: DISCONTINUED | OUTPATIENT
Start: 2024-01-29 | End: 2024-02-01 | Stop reason: HOSPADM

## 2024-01-29 RX ADMIN — ENOXAPARIN SODIUM 60 MG: 100 INJECTION SUBCUTANEOUS at 08:07

## 2024-01-29 RX ADMIN — TRAMADOL HYDROCHLORIDE 100 MG: 50 TABLET, COATED ORAL at 17:18

## 2024-01-29 RX ADMIN — LOSARTAN POTASSIUM 25 MG: 50 TABLET, FILM COATED ORAL at 08:06

## 2024-01-29 RX ADMIN — INSULIN LISPRO 6 UNITS: 100 INJECTION, SOLUTION INTRAVENOUS; SUBCUTANEOUS at 13:17

## 2024-01-29 RX ADMIN — ATORVASTATIN CALCIUM 40 MG: 40 TABLET, FILM COATED ORAL at 08:06

## 2024-01-29 RX ADMIN — TRAZODONE HYDROCHLORIDE 50 MG: 50 TABLET ORAL at 20:51

## 2024-01-29 RX ADMIN — TRAMADOL HYDROCHLORIDE 50 MG: 50 TABLET, COATED ORAL at 01:10

## 2024-01-29 RX ADMIN — VANCOMYCIN HYDROCHLORIDE 1250 MG: 5 INJECTION, POWDER, LYOPHILIZED, FOR SOLUTION INTRAVENOUS at 17:18

## 2024-01-29 RX ADMIN — INSULIN GLARGINE 70 UNITS: 100 INJECTION, SOLUTION SUBCUTANEOUS at 08:11

## 2024-01-29 RX ADMIN — INSULIN LISPRO 6 UNITS: 100 INJECTION, SOLUTION INTRAVENOUS; SUBCUTANEOUS at 08:12

## 2024-01-29 RX ADMIN — INSULIN LISPRO 10 UNITS: 100 INJECTION, SOLUTION INTRAVENOUS; SUBCUTANEOUS at 13:16

## 2024-01-29 RX ADMIN — INSULIN LISPRO 6 UNITS: 100 INJECTION, SOLUTION INTRAVENOUS; SUBCUTANEOUS at 08:15

## 2024-01-29 RX ADMIN — ACETAMINOPHEN 650 MG: 325 TABLET ORAL at 17:18

## 2024-01-29 RX ADMIN — INSULIN GLARGINE 80 UNITS: 100 INJECTION, SOLUTION SUBCUTANEOUS at 20:51

## 2024-01-29 RX ADMIN — VANCOMYCIN HYDROCHLORIDE 1250 MG: 5 INJECTION, POWDER, LYOPHILIZED, FOR SOLUTION INTRAVENOUS at 05:08

## 2024-01-29 RX ADMIN — ACETAMINOPHEN 650 MG: 325 TABLET ORAL at 01:10

## 2024-01-29 RX ADMIN — Medication 2 G: at 21:03

## 2024-01-29 ASSESSMENT — PAIN - FUNCTIONAL ASSESSMENT
PAIN_FUNCTIONAL_ASSESSMENT: 0-10
PAIN_FUNCTIONAL_ASSESSMENT: 0-10

## 2024-01-29 ASSESSMENT — COGNITIVE AND FUNCTIONAL STATUS - GENERAL
MOVING TO AND FROM BED TO CHAIR: A LITTLE
CLIMB 3 TO 5 STEPS WITH RAILING: TOTAL
WALKING IN HOSPITAL ROOM: A LITTLE
STANDING UP FROM CHAIR USING ARMS: A LITTLE
MOBILITY SCORE: 18

## 2024-01-29 ASSESSMENT — PAIN SCALES - GENERAL
PAINLEVEL_OUTOF10: 10 - WORST POSSIBLE PAIN
PAINLEVEL_OUTOF10: 5 - MODERATE PAIN
PAINLEVEL_OUTOF10: 7
PAINLEVEL_OUTOF10: 0 - NO PAIN

## 2024-01-29 ASSESSMENT — ENCOUNTER SYMPTOMS
FEVER: 0
CHILLS: 0
ARTHRALGIAS: 1
DIAPHORESIS: 0

## 2024-01-29 ASSESSMENT — ACTIVITIES OF DAILY LIVING (ADL): ADL_ASSISTANCE: INDEPENDENT

## 2024-01-29 NOTE — PROGRESS NOTES
"Kody Choi is a 53 y.o. adult on day 4 of admission presenting with Pyogenic arthritis of right knee joint, due to unspecified organism (CMS/HCC).    Subjective   NAEON.     Pt stated that he is trying to better control his diet (ordering fruits and vegetables to eat) but feels frustrated that we are not meeting him half way. States he did no agree to diabetic diet.     Patient still endorses pain in his R knee and his calf and the swelling is still present. He states the drainage of pus has stopped however. Denies fever, chills, chest pain, shortness of breath, abdominal pain.         Objective     Physical Exam    Constitutional:       General: He is not in acute distress.     Appearance: He is obese. He is not toxic-appearing.   HENT:      Head: Normocephalic.      Mouth/Throat:      Mouth: Mucous membranes are moist.      Pharynx: Oropharynx is clear.   Eyes:      Pupils: Pupils are equal, round, and reactive to light.   Cardiovascular:      Rate and Rhythm: Normal rate and regular rhythm.      Pulses: Normal pulses.      Heart sounds: No murmur heard.  Pulmonary:      Effort: Pulmonary effort is normal.      Breath sounds: Normal breath sounds. No wheezing.   Abdominal:      Palpations: Abdomen is soft.      Tenderness: There is no abdominal tenderness.   Musculoskeletal:         General: Swelling, tenderness and signs of injury present.      Cervical back: Normal range of motion.   Skin:     General: Skin is warm.      Findings: Rash present. Edema present   Neurological:      General: No focal deficit present.      Mental Status: He is alert and oriented to person, place, and time.     Last Recorded Vitals  Blood pressure 113/77, pulse 92, temperature 35.9 °C (96.6 °F), temperature source Temporal, resp. rate 17, height 1.676 m (5' 6\"), weight 150 kg (330 lb), SpO2 95 %.  Intake/Output last 3 Shifts:  I/O last 3 completed shifts:  In: 460 (3.1 mL/kg) [P.O.:460]  Out: 1450 (9.7 mL/kg) [Urine:1450 (0.3 " mL/kg/hr)]  Weight: 149.7 kg     Relevant Results    Scheduled medications  aspirin, 81 mg, oral, Daily  atorvastatin, 40 mg, oral, Daily  cefTRIAXone, 2 g, intravenous, q24h  enoxaparin, 60 mg, subcutaneous, BID  insulin glargine, 70 Units, subcutaneous, BID  insulin lispro, 0-10 Units, subcutaneous, TID with meals  insulin lispro, 6 Units, subcutaneous, TID with meals  losartan, 25 mg, oral, Daily  pantoprazole, 20 mg, oral, Daily before breakfast  traZODone, 50 mg, oral, Nightly  vancomycin, 1,250 mg, intravenous, q12h      Continuous medications     PRN medications  PRN medications: acetaminophen, dextrose 10 % in water (D10W), dextrose 10 % in water (D10W), dextrose 10 % in water (D10W), dextrose 10 % in water (D10W), dextrose 10 % in water (D10W), dextrose 10 % in water (D10W), dextrose, dextrose, dextrose, dextrose, dextrose, dextrose, fluticasone, glucagon, glucagon, glucagon, glucagon, glucagon, glucagon, HYDROmorphone, traMADol    Results for orders placed or performed during the hospital encounter of 01/25/24 (from the past 24 hour(s))   POCT GLUCOSE   Result Value Ref Range    POCT Glucose 431 (H) 74 - 99 mg/dL   POCT GLUCOSE   Result Value Ref Range    POCT Glucose 444 (H) 74 - 99 mg/dL   POCT GLUCOSE   Result Value Ref Range    POCT Glucose 421 (H) 74 - 99 mg/dL   POCT GLUCOSE   Result Value Ref Range    POCT Glucose 413 (H) 74 - 99 mg/dL   POCT GLUCOSE   Result Value Ref Range    POCT Glucose 406 (H) 74 - 99 mg/dL       Assessment/Plan   Principal Problem:    Pyogenic arthritis of right knee joint, due to unspecified organism (CMS/[facility])    Kody Choi is a 54 y/o M with PMH significant for uncontrolled T2DM, CAD s/p multiple stents and CABG, HFrEF (EF 35-40% 01/21), A fib, RUPESH, TKA in 2015, hx of multiple septic arthritis infections in R knee, presenting with 1 week of worsening R knee pain and drainage from sinus s/p fall two days prior to symptom onset. Likely septic arthritis in setting of  leukocytosis and knee pain and drainage given history of repeated infections and poorly controlled diabetes. Ortho consulted, aspiration and cultures sent.     Updates 1/29/2024 :   -ID recommends continuing IV vancomycin (dose per pharmacy) and ceftriaxone 2g q24h  -ID recommends repeat arthrocentesis   -BG continues to be elevated to 400s on POCT.   -Consult to endocrinology today, discussed with patient who is amenable to speaking with endo   -Changed lantus to 80 BID (increased based on prior 24 hour requirements) + will continue increased SSI + will continue prandial lispro coverage 10u   -spoke with pharmacy, changed antibiotics to normal saline (instead of mixed with dextrose) on 1/27   -knee aspiration 01/25- NGTD   -blood cx 01/25- NGTD   -TTE ordered (last TTE showed HFrEF w EF 35-40% in 1/2021), patient refused 01/29 however amenable to doing it tomorrow 01/30  -stopped amlodipine and switched to losartan (GDMT)      #Superimposed R knee infection  #Hx of septic arthritis of R knee, multiple episodes  #R TKA 2015 c/b recurrent prosthetic joint infection s/p explant and antibiotic spacer in 2019 on chronic keflex  #Leukocytosis  -Broken Abx spacer seen on Xrays  -WBC 12.8, , CRP 9.28  -On outpatient keflex for suppression  -Bactrim listed in home meds but patient unsure if taking  -Orthopedics consulted, recommend AKA however patient adamantly refusing at this time.  -IV vanc and ceftriaxone in the ED empirically   Plan  -Appreciate further ortho recs  -Continue IV abx with vancomycin and ceftriaxone  -pending cultures from aspirate   -Blood cultures x2: NGTD x1 day,   -PT/OT consulted, appreciate recs  -Consult to ID placed, appreciate recs     #HFrEF (EF 35-40% 01/21)  #CAD s/p stents and CABG  -on home lisinopril and atorvastatin   -per patient he does not endorse cardiac symptoms at this time, does not report any new LE swelling  Plan  -consider repeat TTE  -continue home atorvastatin  -Start  amlodipine 5 mg    -hold lisinopril  -consider GDMT optimization     #A fib, unsure if on eliquis  -Documented history of a fib.  -Eliquis not listed as a home med, unsure if patient has been taking or not  -Chart review states he is on eliquis   Plan  -Telemetry monitoring      #Type 2 diabetes mellitus, uncontrolled  -A1c 14.7 10/2023  -POCT 486 while in ED, reportedly drawn after eating however  -hold home jardiance and semaglutide  -patient reports taking 100 units Lantus daily at home  -s/p 500 ml bolus  Plan  -Lantus 100 units   -SSI low intensity, titrate as needed - increased on 1/27   -prandial coverage with lispro 6u tid added 1/27   -Patient refusing endocrinology consult at this time  -Repeat A1c 16.6     F: PRN  E: PRN  N: Regular diet (pt refusing diabetic diet at this time)   A: PIV     DVT prophylaxis: lovenox        Code Status: Full code  Emergency contact: Amira Espino (?) [phone]         Ana Baumann MD  IM PGY-2

## 2024-01-29 NOTE — PROGRESS NOTES
Physical Therapy    Physical Therapy Evaluation    Patient Name: Kody Choi  MRN: [id number]  Today's Date: 1/29/2024   Time Calculation  Start Time: 0815  Stop Time: 0834  Time Calculation (min): 19 min    Assessment/Plan   PT Assessment  PT Assessment Results: Decreased strength, Decreased range of motion, Decreased endurance, Decreased mobility, Impaired balance, Impaired judgement, Decreased safety awareness, Decreased skin integrity, Pain  Rehab Prognosis: Good  Barriers to Discharge: insight into problems  Evaluation/Treatment Tolerance: Patient limited by pain  Medical Staff Made Aware: Yes  Barriers to Participation: Comorbidities, Coping skills, Insight into problems, Premorbid level of function  End of Session Communication: Bedside nurse  Assessment Comment: Pt presents with deficits in RLE strength, endurance, balance, and overall functional mobility. Pt reports recent falls and progressive difficulties with functional mobility at home. Pt will benefit from continued skilled PT to address ongoing deficits.  End of Session Patient Position:  (up in wheelchair)  IP OR SWING BED PT PLAN  Inpatient or Swing Bed: Inpatient  PT Plan  Treatment/Interventions: Bed mobility, Transfer training, Gait training, Stair training, Balance training, Strengthening, Range of motion, Endurance training, Therapeutic exercise, Therapeutic activity, Positioning, Wheelchair management  PT Plan: Skilled PT  PT Frequency: 3 times per week  PT Discharge Recommendations: Moderate intensity level of continued care  Equipment Recommended upon Discharge: Slide board (Discussed with pt recommendation for wheelchair cushion, hospital bed, and shower chair.)  PT - OK to Discharge: Yes      Subjective   General Visit Information:  Reason for Referral: p/w 1 week of worsening R knee pain and drainage from sinus s/p fall two days prior to symptom onset  Past Medical History Relevant to Rehab: uncontrolled T2DM, CAD s/p multiple stents and  CABG, HFrEF (EF 35-40% 01/21), A fib, RUPESH, TKA in 2015, hx of multiple septic arthritis infections in R knee  Prior to Session Communication: Bedside nurse  Patient Position Received:  (in wheelchair)  General Comment: Pt agreeable to PT evaluation   Home Living:  Home Living  Type of Home: Apartment  Lives With: Alone  Home Adaptive Equipment: Walker rolling or standard, Wheelchair-manual (2 rollaors)  Home Access: Elevator  Bathroom Shower/Tub: Tub/shower unit  Prior Level of Function:  Prior Function Per Pt/Caregiver Report  Level of [place]: Independent with ADLs and functional transfers  ADL Assistance: Independent  Homemaking Assistance: Independent  Ambulatory Assistance:  (reports he is non-ambulatory)  Vocational: On disability  Leisure: planning trip to [place], reports he recently came into $13k from his friend's life insurance policy and will refuse to go to SNF or [place] because he needs to go on [place] trip.  Precautions:  Precautions  Medical Precautions: Fall precautions  Vital Signs:       Objective   Lines/Tubes/Drains:     Continuous Medications/Drips:     Oxygen:    Pain:  Pain Assessment  Pain Assessment: 0-10  Pain Score: 5 - Moderate pain  Pain Type: Chronic pain  Pain Location: Knee  Pain Orientation: Right  Pain Interventions: Repositioned  Cognition:  Cognition  Overall Cognitive Status: Within Functional Limits      Extremity/Trunk Assessments:  Strength:  Strength Comments: BUE WFL; LLE WFL  RUE   RUE : Within Functional Limits  LUE   LUE: Within Functional Limits  RLE   RLE : Exceptions to WFL  Strength RLE  R Hip Flexion: 1/5  R Knee Flexion: 1/5  R Knee Extension: 1/5  R Ankle Dorsiflexion: 3/5  R Ankle Plantar Flexion: 3/5  LLE   LLE : Within Functional Limits    General Assessments:  Strength  Strength Comments: BUE WFL; LLE WFL      Activity Tolerance  Endurance: Tolerates 10 - 20 min exercise with multiple rests  Activity Tolerance Comments: Limited by R knee pain  Sensation  Light  Touch: No apparent deficits  Coordination  Movements are Fluid and Coordinated: Yes  Static Sitting Balance  Static Sitting-Balance Support: Feet supported  Static Sitting-Level of Assistance: Close supervision  Static Sitting-Comment/Number of Minutes: 3  Static Standing Balance  Static Standing-Balance Support: Bilateral upper extremity supported  Static Standing-Level of Assistance: Contact guard  Static Standing-Comment/Number of Minutes: 1    Functional Assessments:  Bed Mobility  Bed Mobility: No  Transfers  Transfer: Yes  Transfer 1  Transfer From 1: Wheelchair to, Stand to  Transfer to 1: Stand, Wheelchair  Technique 1: Stand to sit, Sit to stand  Transfer Device 1: Walker  Transfer Level of Assistance 1: Contact guard  Trials/Comments 1: cues for hand placement  Transfers 2  Transfer From 2: Stand to, Bed to  Transfer to 2: Stand, Bed  Technique 2: Sit to stand, Stand to sit  Transfer Device 2: Walker  Transfer Level of Assistance 2: Minimum assistance  Trials/Comments 2: cues for hand placement  Ambulation/Gait Training  Ambulation/Gait Training Performed: Yes  Ambulation/Gait Training 1  Surface 1: Level tile  Device 1: Rolling walker  Assistance 1: Contact guard  Quality of Gait 1: Antalgic (toe touch WBing through RLE, mostly hopping/shuffling on LLE)  Comments/Distance (ft) 1: 4ft bed <> wheelchair  Stairs  Stairs: No       Outcome Measures:  [facility] Basic Mobility  Turning from your back to your side while in a flat bed without using bedrails: None  Moving from lying on your back to sitting on the side of a flat bed without using bedrails: None  Moving to and from bed to chair (including a wheelchair): A little  Standing up from a chair using your arms (e.g. wheelchair or bedside chair): A little  To walk in hospital room: A little  Climbing 3-5 steps with railing: Total  Basic Mobility - Total Score: 18                               Encounter Problems       Encounter Problems (Active)       Balance        STG - Maintains static standing balance with upper extremity support >3 minutes with SBA        Start:  01/29/24    Expected End:  02/12/24       INTERVENTIONS:  1. Practice standing with minimal support.  2. Educate patient about standing tolerance.  3. Educate patient about independence with gait, transfers, and ADL's.  4. Educate patient about use of assistive device.  5. Educate patient about self-directed care.            Mobility       STG - Patient will propel the wheelchair >50ft independently        Start:  01/29/24    Expected End:  02/12/24            STG - Patient will ambulate >10ft using LRAD SBA        Start:  01/29/24    Expected End:  02/12/24               Transfers       Pt will complete all functional transfers independently with appropriate DME       Start:  01/29/24    Expected End:  02/12/24                   Education Documentation  Mobility Training, taught by Sydnie Handley PT at 1/29/2024 10:09 AM.  Learner: Patient  Readiness: Nonacceptance  Method: Explanation  Response: Needs Reinforcement    Education Comments  No comments found.            01/29/24 at 10:11 AM   Sydnie Handley PT   Rehab Office: [phone]

## 2024-01-29 NOTE — PROGRESS NOTES
Kody Choi is a 53 y.o. male on day 3 of admission presenting with Pyogenic arthritis of right knee joint, due to unspecified organism (CMS/HCC).    Subjective   Interval History: afebrile but his right knee swollen up huge         Review of Systems   Constitutional:  Negative for chills, diaphoresis and fever.   Respiratory:  Negative for cough and shortness of breath.    Gastrointestinal:  Negative for abdominal pain and diarrhea.   Skin:  Negative for rash.   Psychiatric/Behavioral:  Negative for confusion.        Objective   Range of Vitals (last 24 hours)  Heart Rate:  []   Temp:  [36.2 °C (97.2 °F)-36.5 °C (97.7 °F)]   Resp:  [17-18]   BP: (103-144)/(61-88)   SpO2:  [95 %-98 %]   Daily Weight  01/25/24 : 150 kg (330 lb)    Body mass index is 53.26 kg/m².    Physical Exam  Vitals reviewed.   Constitutional:       General: He is not in acute distress.     Appearance: He is not ill-appearing.   HENT:      Mouth/Throat:      Mouth: Mucous membranes are moist.   Eyes:      Conjunctiva/sclera: Conjunctivae normal.   Cardiovascular:      Rate and Rhythm: Normal rate and regular rhythm.      Pulses: Normal pulses.      Heart sounds: Normal heart sounds. No murmur heard.  Pulmonary:      Effort: Pulmonary effort is normal.      Breath sounds: Normal breath sounds.   Abdominal:      General: Abdomen is flat. Bowel sounds are normal.   Musculoskeletal:         General: No swelling.      Comments: Right knee swollen with erythema and induration with local warmth, tenderness   Lymphadenopathy:      Cervical: No cervical adenopathy.   Skin:     General: Skin is warm.      Findings: No rash.   Neurological:      General: No focal deficit present.      Mental Status: He is alert.   Psychiatric:         Mood and Affect: Mood normal.        Media Information    Document Information    Misc Clinical: Clinical Unknown   Right knee   01/28/2024 10:34   Attached To:   Hospital Encounter on 1/25/24   Source Information    Roger  SHAYY Gr MD  [facility]           Relevant Results  Labs  Results from last 72 hours   Lab Units 01/28/24  0748 01/27/24  0908 01/26/24  0413   WBC AUTO x10*3/uL 10.0 10.0 12.3*   HEMOGLOBIN g/dL 9.8* 10.7* 10.8*   HEMATOCRIT % 32.4* 34.3* 33.3*   PLATELETS AUTO x10*3/uL 405 423 394   NEUTROS PCT AUTO % 69.9 73.6 74.1   LYMPHS PCT AUTO % 15.6 13.8 12.7   MONOS PCT AUTO % 8.5 6.3 7.5   EOS PCT AUTO % 4.4 5.0 4.2     Results from last 72 hours   Lab Units 01/28/24  0748 01/27/24  0908 01/27/24  0231   SODIUM mmol/L 132* 134* 132*   POTASSIUM mmol/L 4.0 3.5 3.9   CHLORIDE mmol/L 99 100 100   CO2 mmol/L 22 25 24   BUN mg/dL 27* 21 23   CREATININE mg/dL 1.13 1.28 1.17   GLUCOSE mg/dL 362* 268* 407*   CALCIUM mg/dL 8.3* 8.1* 8.1*   ANION GAP mmol/L 15 13 12   EGFR mL/min/1.73m*2 58* 50* 55*   PHOSPHORUS mg/dL 3.0 3.0 3.4     Results from last 72 hours   Lab Units 01/28/24  0748 01/27/24  0908 01/27/24  0231   ALK PHOS U/L 124*  --   --    BILIRUBIN TOTAL mg/dL 0.2  --   --    PROTEIN TOTAL g/dL 6.7  --   --    ALT U/L 7  --   --    AST U/L 8*  --   --    ALBUMIN g/dL 2.8* 2.7* 2.6*     Estimated Creatinine Clearance (by C-G formula based on SCr of 1.13 mg/dL)  Female: 84.9 mL/min  Male: 102.7 mL/min  The patient&#39;s sex/gender information is inconclusive; review their information before proceeding.  C-Reactive Protein   Date Value Ref Range Status   01/25/2024 9.28 (H) <1.00 mg/dL Final   10/15/2023 8.52 (H) <1.00 mg/dL Final     CRP   Date Value Ref Range Status   07/01/2023 8.40 (A) mg/dL Final     Comment:     REF VALUE  < 1.00           Temp Readings from Last 5 Encounters:   01/28/24 36.2 °C (97.2 °F)   10/19/23 36.1 °C (97 °F)   06/10/21 36.8 °C (98.2 °F)       Estimated Creatinine Clearance (by C-G formula based on SCr of 1.13 mg/dL)  Female: 84.9 mL/min  Male: 102.7 mL/min  The patient&#39;s sex/gender information is inconclusive; review their information before proceeding.    Microbiology    1/25  blood culture no  growth   1/25 knee joint fluid no organism seen  1/26  blood culture no growth     Previous microbiology--------------------------------------------------------------------  4/11/19 knee fluid  MSSA  4/12/19  blood culture MSSA   6/5/19   knee fluid  MSSA   9/19/19 MRSA screen positive  1/11/21  knee fluid MRSA   4/12/21  knee fluid  MRSA   7/15/21  knee fluid MRSA   6/27/23  knee fluid group B streptococcus  resistant to clindamycin and erythromycin and susceptible to penicillin      Antibiotic history     Vancomycin 1/25 ~  Ceftriaxone 1/27      INFECTIOUS SYNOPSIS AND ASSESSMENT  52yo male with hx of knee PJI  with antibiotic spacer .   Multiple admission with PJI with MSSA, MRSA and group B streptococcus ( which is common with diabetic patients ) .  Issue with compliance with oral suppressive antibiotic.   Strongly refusing AKA .  Presenting with septic knee arthritis.  Arthrocentesis didn't show PMN or organism    His DM has been in poor control with A1c 16.6 .  Given his hx of MRSA and streptococcus knee infection,  empirically put on  vancomycin and ceftriaxone . But his knee infection is getting worse .  Need better source control .  My attending spoke to him and now he is changing his mind considering AKA      Right knee infection with open wound with discharge  Hx of recurrent PJI      -  fragmented cements and bone fragments , didn't get 2nd stage surgery after the 1st stage in 2019 )       - previous joint fluid culture with MSSA. MRSA, GBS      - the only definite treatment option is AKA as per ortho which he has been refusing  Non compliance to oral suppressive antibiotic in the past.       - most recent episode recommended cephalexin and TMP-SMX but TMP-SMX was discontinued by PCP for unclear reason  Uncontrolled DM       RECOMMENDATION    Call ortho and have them to re engage him for surgical option   Need to repeat arthrocentesis   CONTINUE vancomycin ( pharmacy dosing ) and ceftriaxone 2 gram q 24  hours       ID will follow in the morning.  The case was discussed with my attending , Dr. Lawrence Jalloh who agreed with my assessment and plan.    LINDA BROWN.  M.D /  ID consult TEAM B  Infectious disease fellow PGY-4  Reach me through [employer] instead of paging if possible ( especially during noon conference )  Or page me through [employer]  [phone]

## 2024-01-29 NOTE — CONSULTS
Inpatient consult to Endocrinology  Consult performed by: Letty Alexandre MD  Consult ordered by: Ramesh Gunter MD  Reason for consult: T2DM        Reason For Consult  T2DM    History Of Present Illness  Kody Choi is a 53 y.o. adult with PMH of uncontrolled T2DM, CAD s/p multiple stents and CABG, HFrEF (EF 35-40% 01/21), A fib, RUPESH, TKA in 2015, hx of multiple septic arthritis infections in R knee who presented on 1/25 with knee pain, again found to have septic arthritis currently on broad spectrum antibiotics. Recommended to have AKA by ID which patient has been adamantly refusing.    Endocrinology consulted for management of T2DM    Endocrinologist: None, managed by PCP (Dr. Eugene)  Last A1c: 16.6% (1/26/24)   Home regimen: Empagliflozin 25mg daily, Insulin glargine 100 units QAM, semaglutide 7mg PO daily  Accuchecks: Does not check his BGs at home   Hypoglycemia: None recently  Complications:   -Retinopathy: Unknown, no recent eye exam  -Nephropathy: Yes, last albumin/Cr 470.2 (6/3/20), last Cr 1.13 and eGFR 58 (1/28/24)  -Neuropathy: Likely  Cr GFR: 1.13, eGFR 58 (1/28/24)   Statin: Atorvastatin 40mg QHS   Ace: Lisinopril   ASA: ASA 81mg daily    Current Diet: Regular  Hospital regimen: Inulin glargine 80 units BID, Insulin lispro 10 units TID with meals, Insulin lispro sliding scale #2 TID w/ meals  Steroids: None    Today:  -Pt seen at bedside sitting in wheelchair. Reports his R knee hurts. He was very hesitant to make changes to his insulin regimen despite education that the uncontrolled diabetes is worsening his infection.    See HPI for ROS  Reviewed 10 points and neg except stated above       Past Medical History  He has a past medical history of Personal history of other endocrine, nutritional and metabolic disease and Unspecified astigmatism, bilateral (01/04/2016).    Surgical History  He has a past surgical history that includes Knee surgery (10/29/2014).     Social History  He reports that he  "has never smoked. He has never been exposed to tobacco smoke. He has never used smokeless tobacco. No history on file for alcohol use and drug use.    Family History  No family history on file.     Allergies  Loratadine, Pollen extracts, and Lisinopril    Review of Systems: 10 point ROS neg unless stated above     Physical Exam  General: Alert, oriented x 3. No acute distress. Sitting only in underwear at bedside in wheelchair  HEENT: EOMI, PERRL. Oral cavity mucosa moist  Lungs: No increased WOB   Abd: soft, obese  Ext: Very swollen, disfigured R knee  Skin: warm, dry.  Neuro: AOx3, no focal deficits  Psych: Appeared frustrated      ROS, PMH, FH/SH, surgical history and allergies have been reviewed.    Last Recorded Vitals  Blood pressure 120/76, pulse 97, temperature 35 °C (95 °F), resp. rate 17, height 1.676 m (5' 6\"), weight 150 kg (330 lb), SpO2 96 %.    Scheduled medications  aspirin, 81 mg, oral, Daily  atorvastatin, 40 mg, oral, Daily  cefTRIAXone, 2 g, intravenous, q24h  enoxaparin, 60 mg, subcutaneous, BID  insulin glargine, 80 Units, subcutaneous, BID  insulin lispro, 0-10 Units, subcutaneous, TID with meals  [START ON 1/30/2024] insulin lispro, 25 Units, subcutaneous, TID with meals  losartan, 25 mg, oral, Daily  pantoprazole, 20 mg, oral, Daily before breakfast  traZODone, 50 mg, oral, Nightly  vancomycin, 1,250 mg, intravenous, q12h      Continuous medications     PRN medications  PRN medications: acetaminophen, dextrose 10 % in water (D10W), dextrose, fluticasone, glucagon, traMADol     Relevant Results  Results from last 7 days   Lab Units 01/29/24  1713 01/29/24  1136 01/29/24  0957 01/29/24  0811 01/28/24  2031 01/28/24  1612 01/28/24  0810 01/28/24  0748 01/27/24  1159 01/27/24  0908 01/27/24  0802 01/27/24  0231 01/26/24  1049 01/26/24  0413   POCT GLUCOSE mg/dL 194* 260*  --  296* 406* 413*   < >  --    < >  --    < >  --    < >  --    GLUCOSE mg/dL  --   --  284*  --   --   --   --  362*  --  " 268*  --  407*  --  394*    < > = values in this interval not displayed.        Results for orders placed or performed during the hospital encounter of 01/25/24 (from the past 24 hour(s))   POCT GLUCOSE   Result Value Ref Range    POCT Glucose 406 (H) 74 - 99 mg/dL   POCT GLUCOSE   Result Value Ref Range    POCT Glucose 296 (H) 74 - 99 mg/dL   CBC and Auto Differential   Result Value Ref Range    WBC 10.4 4.4 - 11.3 x10*3/uL    nRBC 0.0 0.0 - 0.0 /100 WBCs    RBC 4.05 4.00 - 5.90 x10*6/uL    Hemoglobin 9.5 (L) 12.0 - 17.5 g/dL    Hematocrit 32.0 (L) 36.0 - 52.0 %    MCV 79 (L) 80 - 100 fL    MCH 23.5 (L) 26.0 - 34.0 pg    MCHC 29.7 (L) 32.0 - 36.0 g/dL    RDW 15.9 (H) 11.5 - 14.5 %    Platelets 410 150 - 450 x10*3/uL    Neutrophils % 74.4 40.0 - 80.0 %    Immature Granulocytes %, Automated 0.7 0.0 - 0.9 %    Lymphocytes % 12.3 13.0 - 44.0 %    Monocytes % 8.1 2.0 - 10.0 %    Eosinophils % 3.7 0.0 - 6.0 %    Basophils % 0.8 0.0 - 2.0 %    Neutrophils Absolute 7.74 (H) 1.20 - 7.70 x10*3/uL    Immature Granulocytes Absolute, Automated 0.07 0.00 - 0.70 x10*3/uL    Lymphocytes Absolute 1.28 1.20 - 4.80 x10*3/uL    Monocytes Absolute 0.84 0.10 - 1.00 x10*3/uL    Eosinophils Absolute 0.38 0.00 - 0.70 x10*3/uL    Basophils Absolute 0.08 0.00 - 0.10 x10*3/uL   Magnesium   Result Value Ref Range    Magnesium 1.85 1.60 - 2.40 mg/dL   Comprehensive Metabolic Panel   Result Value Ref Range    Glucose 284 (H) 74 - 99 mg/dL    Sodium 132 (L) 136 - 145 mmol/L    Potassium 4.3 3.5 - 5.3 mmol/L    Chloride 100 98 - 107 mmol/L    Bicarbonate 22 21 - 32 mmol/L    Anion Gap 14 10 - 20 mmol/L    Urea Nitrogen 31 (H) 6 - 23 mg/dL    Creatinine 1.34 (H) 0.50 - 1.30 mg/dL    eGFR 47 (L) >60 mL/min/1.73m*2    Calcium 8.5 (L) 8.6 - 10.6 mg/dL    Albumin 2.8 (L) 3.4 - 5.0 g/dL    Alkaline Phosphatase 127 (H) 33 - 120 U/L    Total Protein 6.8 6.4 - 8.2 g/dL    AST 12 9 - 39 U/L    Bilirubin, Total 0.2 0.0 - 1.2 mg/dL    ALT 10 7 - 52 U/L    Phosphorus   Result Value Ref Range    Phosphorus 3.5 2.5 - 4.9 mg/dL   POCT GLUCOSE   Result Value Ref Range    POCT Glucose 260 (H) 74 - 99 mg/dL   POCT GLUCOSE   Result Value Ref Range    POCT Glucose 194 (H) 74 - 99 mg/dL         Assessment/Plan   Principal Problem:    Pyogenic arthritis of right knee joint, due to unspecified organism (CMS/[facility])    Kody Choi is a 53 y.o. adult with PMH of uncontrolled T2DM, CAD s/p multiple stents and CABG, HFrEF (EF 35-40% 01/21), A fib, RUPESH, TKA in 2015, hx of multiple septic arthritis infections in R knee who presented on 1/25 with knee pain, again found to have septic arthritis currently on broad spectrum antibiotics. Recommended to have AKA by ID which patient has been adamantly refusing.    Endocrinology consulted for management of T2DM    Endocrinologist: None, managed by PCP (Dr. Eugene)  Last A1c: 16.6% (1/26/24)   Home regimen: Empagliflozin 25mg daily, Insulin glargine 100 units QAM, semaglutide 7mg PO daily  Accuchecks: Does not check his BGs at home     Current Diet: Regular  Steroids: None    Today: Patient refusing significant changes in his insulin regimen. Would ideally aim for more even distribution of basal/bolus however patient did already agree to current basal change from baseline. Patient is agreeable to increasing bolus regimen as below. Patient not agreeable to diabetic diet. Also discussed need for better BG control as this is likely worsening his infection, patient endorsed understanding.    #T2DM, uncontrolled  -Goal -180  -c/w insulin glargine 80 units BID  -INCREASE insulin lispro from 10 units to 25 units TID w/ meals  -c/w insulin lispro sliding scale #2 TID w/ meals  -Hold home semaglutide  -Hold home empagliflozin  -Accuchecks ACHS  -Hypoglycemia protocol   -Recommend diabetic diet if patient agreeable (not agreeable to this today)    Tentative discharge recommendations:  -Discussed potential for mealtime insulin at home with patient. He  "was not sure and would like to revisit this at another time. May consider changing to mixed insulin such that patient gets some prandial coverage with minimal injections per day.   -Likely restart home semaglutide 7mg daily  -Decision regarding empagliflozin TBD   -Accuchecks ACHS (patient not currently checking BG at home), needs new glucometer. Please ensure new glucometer ordered at discharge along with testing supplies:  --->please order glucose testing supplies for QID glucose measurement, 90 days = 1 glucose meter, 400 lancets and 400 strips  ---> please order alcohol swabs  ---> please order lancets   **write in comment section on all supplies ordered: \"substitutions may be made by pharmacist depending on insurance coverage and what the pharmacy has available\"      Will see if patient is willing to follow-up with endocrinology. Otherwise, patient may continue to follow with PCP Dr. Eugene.     Endocrine will continue to follow.  Plan communicated with primary team via secure chat.  Please message on secure chat (8AM-5PM) or page [phone] with any questions or concerns.  Patient seen, discussed, and examined with Dr. Nelson Schumacher who agrees with the management plan.       "

## 2024-01-29 NOTE — PROGRESS NOTES
Occupational Therapy                 Therapy Communication Note    Patient Name: Kody Choi  MRN: [id number]  Today's Date: 1/29/2024     Discipline: Occupational Therapy    Missed Visit Reason: Missed Visit Reason: Patient refused @ 11:48 despite max verbal encouragement. Will reattempt OT eval as schedule permits.    Missed Time: Attempt

## 2024-01-29 NOTE — CARE PLAN
Problem: Skin  Goal: Decreased wound size/increased tissue granulation at next dressing change  Outcome: Progressing  Goal: Promote skin healing  Outcome: Progressing     Problem: Fall/Injury  Goal: Not fall by end of shift  Outcome: Progressing   The patient's goals for the shift include      The clinical goals for the shift include elevating leg while in bed

## 2024-01-29 NOTE — PROGRESS NOTES
Kody Choi is a 53 y.o. male on day 4 of admission presenting with Pyogenic arthritis of right knee joint, due to unspecified organism (CMS/HCC).    Subjective   Interval History: knee looks worse . Now redness spreading to thigh and leg     pt still refusing surgical intervention    Review of Systems   Constitutional:  Negative for chills, diaphoresis and fever.   Musculoskeletal:  Positive for arthralgias.       Objective   Range of Vitals (last 24 hours)  Heart Rate:  []   Temp:  [35.7 °C (96.3 °F)-36.5 °C (97.7 °F)]   Resp:  [17-18]   BP: (113-144)/(69-90)   SpO2:  [95 %-97 %]   Daily Weight  01/25/24 : 150 kg (330 lb)    Body mass index is 53.26 kg/m².    Physical Exam  Vitals reviewed.   Constitutional:       General: He is not in acute distress.     Appearance: He is not ill-appearing.   Neurological:      Mental Status: He is alert.     Large red swelling on right knee with erythema on leg and thigh        Relevant Results  Labs  Results from last 72 hours   Lab Units 01/28/24  0748 01/27/24  0908   WBC AUTO x10*3/uL 10.0 10.0   HEMOGLOBIN g/dL 9.8* 10.7*   HEMATOCRIT % 32.4* 34.3*   PLATELETS AUTO x10*3/uL 405 423   NEUTROS PCT AUTO % 69.9 73.6   LYMPHS PCT AUTO % 15.6 13.8   MONOS PCT AUTO % 8.5 6.3   EOS PCT AUTO % 4.4 5.0     Results from last 72 hours   Lab Units 01/28/24  0748 01/27/24  0908 01/27/24  0231   SODIUM mmol/L 132* 134* 132*   POTASSIUM mmol/L 4.0 3.5 3.9   CHLORIDE mmol/L 99 100 100   CO2 mmol/L 22 25 24   BUN mg/dL 27* 21 23   CREATININE mg/dL 1.13 1.28 1.17   GLUCOSE mg/dL 362* 268* 407*   CALCIUM mg/dL 8.3* 8.1* 8.1*   ANION GAP mmol/L 15 13 12   EGFR mL/min/1.73m*2 58* 50* 55*   PHOSPHORUS mg/dL 3.0 3.0 3.4     Results from last 72 hours   Lab Units 01/28/24  0748 01/27/24  0908 01/27/24  0231   ALK PHOS U/L 124*  --   --    BILIRUBIN TOTAL mg/dL 0.2  --   --    PROTEIN TOTAL g/dL 6.7  --   --    ALT U/L 7  --   --    AST U/L 8*  --   --    ALBUMIN g/dL 2.8* 2.7* 2.6*      Estimated Creatinine Clearance (by C-G formula based on SCr of 1.13 mg/dL)  Female: 84.9 mL/min  Male: 102.7 mL/min  The patient&#39;s sex/gender information is inconclusive; review their information before proceeding.  C-Reactive Protein   Date Value Ref Range Status   01/25/2024 9.28 (H) <1.00 mg/dL Final   10/15/2023 8.52 (H) <1.00 mg/dL Final     CRP   Date Value Ref Range Status   07/01/2023 8.40 (A) mg/dL Final     Comment:     REF VALUE  < 1.00         Temp Readings from Last 5 Encounters:   01/29/24 36.8 °C (98.2 °F) (Temporal)   10/19/23 36.1 °C (97 °F)   06/10/21 36.8 °C (98.2 °F)       Estimated Creatinine Clearance (by C-G formula based on SCr of 1.34 mg/dL (H))  Female: 71.6 mL/min (A)  Male: 86.6 mL/min (A)  The patient&#39;s sex/gender information is inconclusive; review their information before proceeding.    Microbiology    1/25  blood culture no growth   1/25 knee joint fluid no organism seen  1/26  blood culture no growth      Previous microbiology--------------------------------------------------------------------  4/11/19 knee fluid  MSSA  4/12/19  blood culture MSSA   6/5/19   knee fluid  MSSA   9/19/19 MRSA screen positive  1/11/21  knee fluid MRSA   4/12/21  knee fluid  MRSA   7/15/21  knee fluid MRSA   6/27/23  knee fluid group B streptococcus  resistant to clindamycin and erythromycin and susceptible to penicillin         Antibiotic history     Vancomycin 1/25 ~  Ceftriaxone 1/27      INFECTIOUS SYNOPSIS AND ASSESSMENT  54yo male with hx of knee PJI  with antibiotic spacer .   Multiple admission with PJI with MSSA, MRSA and group B streptococcus ( which is common with diabetic patients ) .  Issue with compliance with oral suppressive antibiotic.   Strongly refusing AKA .  Presenting with septic knee arthritis.  Arthrocentesis didn't show PMN or organism    His DM has been in poor control with A1c 16.6 .  Given his hx of MRSA and streptococcus knee infection,  empirically put on   vancomycin and ceftriaxone . But his knee infection is getting worse     Right knee septic arthritis with rapidly spreading cellulitis     Hx of recurrent PJI      -  still has fragmented cements and bone fragments , didn't get 2nd stage surgery after the 1st stage in 2019 . Since then , had multiple hospitalization due to knee infection      - previous joint fluid culture with MSSA. MRSA, GBS      - the only definite treatment option is AKA as per ortho which he has been refusing adamently     Non compliance to oral suppressive antibiotic in the past.       - most recent episode recommended cephalexin and TMP-SMX but TMP-SMX was discontinued by another provider for unclear reason as per patient report     Uncontrolled DM     RECOMMENDATION     Need to reinforce the definite need for surgical intervention : the consequence can be even death   Get knee CT or MRI    Have ortho to re engage   CONTINUE current antibiotic with vancomycin and ceftriaxone but it is not working without wash out         ID will follow in the morning.  The case was discussed with my attending , Dr. Lawrence Jalloh who agreed with my assessment and plan.    LINDA BROWN.  M.D /  ID consult TEAM B  Infectious disease fellow PGY-4  Reach me through [employer] instead of paging if possible ( especially during noon conference )  Or page me through Team B  [phone]

## 2024-01-30 ENCOUNTER — APPOINTMENT (OUTPATIENT)
Dept: CARDIOLOGY | Facility: HOSPITAL | Age: 54
DRG: 549 | End: 2024-01-30
Payer: COMMERCIAL

## 2024-01-30 LAB
AORTIC VALVE PEAK VELOCITY: 1.29 M/S
AV PEAK GRADIENT: 6.7 MMHG
AVA (PEAK VEL): 3.12 CM2
BACTERIA BLD CULT: NORMAL
BACTERIA BLD CULT: NORMAL
EJECTION FRACTION APICAL 4 CHAMBER: 26.9
EJECTION FRACTION: 28 %
GLUCOSE BLD MANUAL STRIP-MCNC: 127 MG/DL (ref 74–99)
GLUCOSE BLD MANUAL STRIP-MCNC: 192 MG/DL (ref 74–99)
GLUCOSE BLD MANUAL STRIP-MCNC: 262 MG/DL (ref 74–99)
GLUCOSE BLD MANUAL STRIP-MCNC: 310 MG/DL (ref 74–99)
LEFT ATRIUM VOLUME AREA LENGTH INDEX BSA: 37.8 ML/M2
LEFT VENTRICLE INTERNAL DIMENSION DIASTOLE: 5.5 CM (ref 3.5–6)
LEFT VENTRICULAR OUTFLOW TRACT DIAMETER: 2.2 CM
MITRAL VALVE E/A RATIO: 1.64
MITRAL VALVE E/E' RATIO: 13.46
RIGHT VENTRICLE FREE WALL PEAK S': 5.87 CM/S
RIGHT VENTRICLE PEAK SYSTOLIC PRESSURE: 46.9 MMHG
TRICUSPID ANNULAR PLANE SYSTOLIC EXCURSION: 0.8 CM
VANCOMYCIN SERPL-MCNC: 21.7 UG/ML (ref 5–20)

## 2024-01-30 PROCEDURE — 99231 SBSQ HOSP IP/OBS SF/LOW 25: CPT | Performed by: STUDENT IN AN ORGANIZED HEALTH CARE EDUCATION/TRAINING PROGRAM

## 2024-01-30 PROCEDURE — 99232 SBSQ HOSP IP/OBS MODERATE 35: CPT | Performed by: INTERNAL MEDICINE

## 2024-01-30 PROCEDURE — 36415 COLL VENOUS BLD VENIPUNCTURE: CPT

## 2024-01-30 PROCEDURE — 82947 ASSAY GLUCOSE BLOOD QUANT: CPT

## 2024-01-30 PROCEDURE — A4217 STERILE WATER/SALINE, 500 ML: HCPCS | Performed by: INTERNAL MEDICINE

## 2024-01-30 PROCEDURE — 2500000001 HC RX 250 WO HCPCS SELF ADMINISTERED DRUGS (ALT 637 FOR MEDICARE OP)

## 2024-01-30 PROCEDURE — 93306 TTE W/DOPPLER COMPLETE: CPT | Performed by: INTERNAL MEDICINE

## 2024-01-30 PROCEDURE — 2500000004 HC RX 250 GENERAL PHARMACY W/ HCPCS (ALT 636 FOR OP/ED)

## 2024-01-30 PROCEDURE — 2060000001 HC INTERMEDIATE ICU ROOM DAILY

## 2024-01-30 PROCEDURE — 93306 TTE W/DOPPLER COMPLETE: CPT

## 2024-01-30 PROCEDURE — 80202 ASSAY OF VANCOMYCIN: CPT

## 2024-01-30 PROCEDURE — 2500000004 HC RX 250 GENERAL PHARMACY W/ HCPCS (ALT 636 FOR OP/ED): Performed by: INTERNAL MEDICINE

## 2024-01-30 PROCEDURE — 99233 SBSQ HOSP IP/OBS HIGH 50: CPT | Performed by: INTERNAL MEDICINE

## 2024-01-30 RX ORDER — VANCOMYCIN HYDROCHLORIDE 1 G/200ML
1 INJECTION, SOLUTION INTRAVENOUS EVERY 12 HOURS
Status: DISCONTINUED | OUTPATIENT
Start: 2024-01-31 | End: 2024-02-01

## 2024-01-30 RX ADMIN — VANCOMYCIN HYDROCHLORIDE 1250 MG: 5 INJECTION, POWDER, LYOPHILIZED, FOR SOLUTION INTRAVENOUS at 17:45

## 2024-01-30 RX ADMIN — TRAMADOL HYDROCHLORIDE 100 MG: 50 TABLET, COATED ORAL at 00:12

## 2024-01-30 RX ADMIN — INSULIN LISPRO 6 UNITS: 100 INJECTION, SOLUTION INTRAVENOUS; SUBCUTANEOUS at 13:07

## 2024-01-30 RX ADMIN — ACETAMINOPHEN 650 MG: 325 TABLET ORAL at 00:12

## 2024-01-30 RX ADMIN — ATORVASTATIN CALCIUM 40 MG: 40 TABLET, FILM COATED ORAL at 08:13

## 2024-01-30 RX ADMIN — Medication 2 G: at 21:06

## 2024-01-30 RX ADMIN — INSULIN LISPRO 8 UNITS: 100 INJECTION, SOLUTION INTRAVENOUS; SUBCUTANEOUS at 08:17

## 2024-01-30 RX ADMIN — INSULIN LISPRO 25 UNITS: 100 INJECTION, SOLUTION INTRAVENOUS; SUBCUTANEOUS at 17:43

## 2024-01-30 RX ADMIN — INSULIN LISPRO 25 UNITS: 100 INJECTION, SOLUTION INTRAVENOUS; SUBCUTANEOUS at 08:13

## 2024-01-30 RX ADMIN — INSULIN GLARGINE 80 UNITS: 100 INJECTION, SOLUTION SUBCUTANEOUS at 08:13

## 2024-01-30 RX ADMIN — ACETAMINOPHEN 650 MG: 325 TABLET ORAL at 08:57

## 2024-01-30 RX ADMIN — TRAZODONE HYDROCHLORIDE 50 MG: 50 TABLET ORAL at 21:07

## 2024-01-30 RX ADMIN — LOSARTAN POTASSIUM 25 MG: 50 TABLET, FILM COATED ORAL at 08:13

## 2024-01-30 RX ADMIN — TRAMADOL HYDROCHLORIDE 100 MG: 50 TABLET, COATED ORAL at 08:57

## 2024-01-30 RX ADMIN — VANCOMYCIN HYDROCHLORIDE 1250 MG: 5 INJECTION, POWDER, LYOPHILIZED, FOR SOLUTION INTRAVENOUS at 05:01

## 2024-01-30 RX ADMIN — PERFLUTREN 2 ML OF DILUTION: 6.52 INJECTION, SUSPENSION INTRAVENOUS at 10:51

## 2024-01-30 RX ADMIN — INSULIN GLARGINE 80 UNITS: 100 INJECTION, SOLUTION SUBCUTANEOUS at 21:07

## 2024-01-30 RX ADMIN — ACETAMINOPHEN 650 MG: 325 TABLET ORAL at 21:19

## 2024-01-30 RX ADMIN — INSULIN LISPRO 25 UNITS: 100 INJECTION, SOLUTION INTRAVENOUS; SUBCUTANEOUS at 13:06

## 2024-01-30 RX ADMIN — INSULIN LISPRO 2 UNITS: 100 INJECTION, SOLUTION INTRAVENOUS; SUBCUTANEOUS at 17:45

## 2024-01-30 ASSESSMENT — ENCOUNTER SYMPTOMS
FATIGUE: 0
FEVER: 0
CHILLS: 0

## 2024-01-30 ASSESSMENT — PAIN SCALES - GENERAL
PAINLEVEL_OUTOF10: 10 - WORST POSSIBLE PAIN
PAINLEVEL_OUTOF10: 5 - MODERATE PAIN
PAINLEVEL_OUTOF10: 10 - WORST POSSIBLE PAIN
PAINLEVEL_OUTOF10: 7

## 2024-01-30 ASSESSMENT — COGNITIVE AND FUNCTIONAL STATUS - GENERAL
MOBILITY SCORE: 18
TOILETING: A LITTLE
DRESSING REGULAR UPPER BODY CLOTHING: A LITTLE
STANDING UP FROM CHAIR USING ARMS: A LITTLE
PERSONAL GROOMING: A LITTLE
DRESSING REGULAR LOWER BODY CLOTHING: A LITTLE
DAILY ACTIVITIY SCORE: 18
WALKING IN HOSPITAL ROOM: A LITTLE
HELP NEEDED FOR BATHING: A LITTLE
CLIMB 3 TO 5 STEPS WITH RAILING: TOTAL
EATING MEALS: A LITTLE
MOVING TO AND FROM BED TO CHAIR: A LITTLE

## 2024-01-30 ASSESSMENT — PAIN - FUNCTIONAL ASSESSMENT
PAIN_FUNCTIONAL_ASSESSMENT: 0-10

## 2024-01-30 NOTE — PROGRESS NOTES
"Kody Choi is a 53 y.o. adult on day 5 of admission presenting with Pyogenic arthritis of right knee joint, due to unspecified organism (CMS/HCC).    Subjective   NAEON.     Pt stated that he is trying to better control his diet (ordering fruits and vegetables to eat) but is extremely frustrated repeating that \"you're starving me\" multiple times despite being on max carbs allowed for diabetic diet.     Patient states the pain in his R knee and shin are worse today and that there is significant pain in his right calf. He feels his knee and his leg are more red than the day prior. He does not endorse fevers or chills and feels he will be able to keep things under control regarding his sugars and infection.     Patient has been adamantly refusing the idea of an amputation stating that \"you will not take my leg, my leg is staying with me\". He appears to have capacity to make medical decisions. He continually states his desire to receive antibiotics only for his knee and that he will get better despite worsening clinical picture.        Objective     Physical Exam  Constitutional:       General: He is not in acute distress.     Appearance: He is obese.   HENT:      Head: Normocephalic.      Nose: Nose normal.      Mouth/Throat:      Mouth: Mucous membranes are moist.      Pharynx: Oropharynx is clear.   Eyes:      General: No scleral icterus.     Pupils: Pupils are equal, round, and reactive to light.   Cardiovascular:      Rate and Rhythm: Normal rate and regular rhythm.      Pulses: Normal pulses.      Heart sounds: No murmur heard.  Pulmonary:      Effort: Pulmonary effort is normal.      Breath sounds: No wheezing.   Abdominal:      Palpations: Abdomen is soft.      Tenderness: There is no abdominal tenderness.   Musculoskeletal:         General: Tenderness and signs of injury present.      Cervical back: Normal range of motion.      Right knee: Swelling and erythema present.      Right lower leg: Edema present.      " "Left lower leg: Edema present.      Comments: Significant swelling of R knee with open wound on anterior knee that is draining pus.    Skin:     Capillary Refill: Capillary refill takes less than 2 seconds.      Comments: Significant erythema of R knee now extending in to R lower leg    Neurological:      General: No focal deficit present.      Mental Status: He is alert and oriented to person, place, and time.         Last Recorded Vitals  Blood pressure 108/68, pulse 96, temperature 35.3 °C (95.6 °F), temperature source Temporal, resp. rate 18, height 1.676 m (5' 6\"), weight 150 kg (330 lb), SpO2 92 %.  Intake/Output last 3 Shifts:  I/O last 3 completed shifts:  In: 460 (3.1 mL/kg) [P.O.:460]  Out: 1980 (13.2 mL/kg) [Urine:1980 (0.4 mL/kg/hr)]  Weight: 149.7 kg     Relevant Results    Scheduled medications  aspirin, 81 mg, oral, Daily  atorvastatin, 40 mg, oral, Daily  cefTRIAXone, 2 g, intravenous, q24h  enoxaparin, 60 mg, subcutaneous, BID  insulin glargine, 80 Units, subcutaneous, BID  insulin lispro, 0-10 Units, subcutaneous, TID with meals  insulin lispro, 25 Units, subcutaneous, TID with meals  losartan, 25 mg, oral, Daily  pantoprazole, 20 mg, oral, Daily before breakfast  traZODone, 50 mg, oral, Nightly  vancomycin, 1,250 mg, intravenous, q12h      Continuous medications     PRN medications  PRN medications: acetaminophen, dextrose 10 % in water (D10W), dextrose, fluticasone, glucagon, traMADol    Results for orders placed or performed during the hospital encounter of 01/25/24 (from the past 24 hour(s))   POCT GLUCOSE   Result Value Ref Range    POCT Glucose 296 (H) 74 - 99 mg/dL   CBC and Auto Differential   Result Value Ref Range    WBC 10.4 4.4 - 11.3 x10*3/uL    nRBC 0.0 0.0 - 0.0 /100 WBCs    RBC 4.05 4.00 - 5.90 x10*6/uL    Hemoglobin 9.5 (L) 12.0 - 17.5 g/dL    Hematocrit 32.0 (L) 36.0 - 52.0 %    MCV 79 (L) 80 - 100 fL    MCH 23.5 (L) 26.0 - 34.0 pg    MCHC 29.7 (L) 32.0 - 36.0 g/dL    RDW 15.9 (H) " 11.5 - 14.5 %    Platelets 410 150 - 450 x10*3/uL    Neutrophils % 74.4 40.0 - 80.0 %    Immature Granulocytes %, Automated 0.7 0.0 - 0.9 %    Lymphocytes % 12.3 13.0 - 44.0 %    Monocytes % 8.1 2.0 - 10.0 %    Eosinophils % 3.7 0.0 - 6.0 %    Basophils % 0.8 0.0 - 2.0 %    Neutrophils Absolute 7.74 (H) 1.20 - 7.70 x10*3/uL    Immature Granulocytes Absolute, Automated 0.07 0.00 - 0.70 x10*3/uL    Lymphocytes Absolute 1.28 1.20 - 4.80 x10*3/uL    Monocytes Absolute 0.84 0.10 - 1.00 x10*3/uL    Eosinophils Absolute 0.38 0.00 - 0.70 x10*3/uL    Basophils Absolute 0.08 0.00 - 0.10 x10*3/uL   Magnesium   Result Value Ref Range    Magnesium 1.85 1.60 - 2.40 mg/dL   Comprehensive Metabolic Panel   Result Value Ref Range    Glucose 284 (H) 74 - 99 mg/dL    Sodium 132 (L) 136 - 145 mmol/L    Potassium 4.3 3.5 - 5.3 mmol/L    Chloride 100 98 - 107 mmol/L    Bicarbonate 22 21 - 32 mmol/L    Anion Gap 14 10 - 20 mmol/L    Urea Nitrogen 31 (H) 6 - 23 mg/dL    Creatinine 1.34 (H) 0.50 - 1.30 mg/dL    eGFR 47 (L) >60 mL/min/1.73m*2    Calcium 8.5 (L) 8.6 - 10.6 mg/dL    Albumin 2.8 (L) 3.4 - 5.0 g/dL    Alkaline Phosphatase 127 (H) 33 - 120 U/L    Total Protein 6.8 6.4 - 8.2 g/dL    AST 12 9 - 39 U/L    Bilirubin, Total 0.2 0.0 - 1.2 mg/dL    ALT 10 7 - 52 U/L   Phosphorus   Result Value Ref Range    Phosphorus 3.5 2.5 - 4.9 mg/dL   POCT GLUCOSE   Result Value Ref Range    POCT Glucose 260 (H) 74 - 99 mg/dL   POCT GLUCOSE   Result Value Ref Range    POCT Glucose 194 (H) 74 - 99 mg/dL   POCT GLUCOSE   Result Value Ref Range    POCT Glucose 350 (H) 74 - 99 mg/dL       Assessment/Plan   Principal Problem:    Pyogenic arthritis of right knee joint, due to unspecified organism (CMS/HCC)    Kody Choi is a 54 y/o M with PMH significant for uncontrolled T2DM, CAD s/p multiple stents and CABG, HFrEF (EF 35-40% 01/21), A fib, RUPESH, TKA in 2015, hx of multiple septic arthritis infections in R knee, presenting with 1 week of worsening R knee  pain and drainage from sinus s/p fall two days prior to symptom onset. Likely septic arthritis in setting of leukocytosis and knee pain and drainage given history of repeated infections and poorly controlled diabetes. Ortho consulted, aspiration and cultures sent.     Updates 1/30/2024:   -Patient still refusing AKA, has capacity   -ID recommends continuing IV vancomycin (dose per pharmacy) and ceftriaxone 2g q24h  -ID recommends CT or MRI of R knee. CT knee arthrogram ordered   -BG continues to be elevated to 300s on POCT.   -Endo recommends continuing Lantus 80 units BID, and increased Prandial lispro to 25 units TID before meals  -Changed lantus to 80 BID (increased based on prior 24 hour requirements) + will continue increased SSI + will continue prandial lispro coverage 25u   -spoke with pharmacy, changed antibiotics to normal saline (instead of mixed with dextrose) on 1/27   -knee aspiration 01/25- NGTD   -blood cx 01/25- NGTD   -TTE ordered (last TTE showed HFrEF w EF 35-40% in 1/2021), patient refused 01/29 however amenable to doing it today 01/30  -stopped amlodipine and switched to losartan (GDMT)      #Superimposed R knee infection  #Hx of septic arthritis of R knee, multiple episodes  #R TKA 2015 c/b recurrent prosthetic joint infection s/p explant and antibiotic spacer in 2019 on chronic keflex  #Leukocytosis  -Broken Abx spacer seen on Xrays  -WBC 12.8, , CRP 9.28  -On outpatient keflex for suppression  -Bactrim listed in home meds but patient unsure if taking  -Orthopedics consulted, recommend AKA however patient adamantly refusing at this time after multiple attempts at discussion.  -IV vanc and ceftriaxone in the ED empirically, continued per ID recs   -knee aspirate (-)  Plan  -Appreciate further ortho recs  -Continue IV abx with vancomycin and ceftriaxone   -Blood cultures x2: NGTD x1 day,   -PT recommends SNF     #HFrEF (EF 35-40% 01/21)  #CAD s/p stents and CABG  -on home lisinopril and  atorvastatin   -per patient he does not endorse cardiac symptoms at this time, does not report any new LE swelling  Plan  - repeat TTE  -continue home atorvastatin  -Start amlodipine 5 mg    -hold lisinopril  -consider GDMT optimization     #A fib, unsure if on eliquis  -Documented history of a fib.  -Eliquis not listed as a home med, unsure if patient has been taking or not  -Chart review states he is on eliquis   Plan  -Telemetry monitoring      #Type 2 diabetes mellitus, uncontrolled  -A1c 14.7 10/2023  -POCT 486 while in ED, reportedly drawn after eating however  -hold home jardiance and semaglutide  -patient reports taking 100 units Lantus daily at home  -s/p 500 ml bolus  -endocrinology consulted  Plan  -Lantus 80 units BID   -SSI low intensity, titrate as needed - increased on 1/29  -prandial coverage with lispro 25u tid per endo recs   -Repeat A1c 16.6     F: PRN  E: PRN  N: Regular diet (pt refusing diabetic diet at this time)   A: PIV     DVT prophylaxis: lovenox        Code Status: Full code  Emergency contact: Amira Espino (?) [phone]         Ana Baumann MD  IM PGY-2

## 2024-01-30 NOTE — PROGRESS NOTES
Physical Therapy                 Therapy Communication Note    Patient Name: Kody Choi  MRN: [id number]  Today's Date: 1/30/2024     Discipline: Physical Therapy    Missed Visit Reason: Missed Visit Reason:  (pt off floor)    Missed Time: Attempt    Comment:

## 2024-01-30 NOTE — CARE PLAN
The patient's goals for the shift include      The clinical goals for the shift include Patient will remain free of falls throughout shift      Problem: Fall/Injury  Goal: Not fall by end of shift  1/29/2024 2258 by Marla Brewster RN  Outcome: Progressing  1/29/2024 2258 by Marla Brewster RN  Outcome: Progressing  Goal: Be free from injury by end of the shift  1/29/2024 2258 by Marla Brewster RN  Outcome: Progressing  1/29/2024 2258 by Marla Brewster RN  Outcome: Progressing  Goal: Verbalize understanding of personal risk factors for fall in the hospital  1/29/2024 2258 by Marla Brewster RN  Outcome: Progressing  1/29/2024 2258 by Marla Brewster RN  Outcome: Progressing  Goal: Verbalize understanding of risk factor reduction measures to prevent injury from fall in the home  1/29/2024 2258 by Marla Brewster RN  Outcome: Progressing  1/29/2024 2258 by Marla Brewster RN  Outcome: Progressing  Goal: Use assistive devices by end of the shift  1/29/2024 2258 by Marla Brewster RN  Outcome: Progressing  1/29/2024 2258 by Marla Brewster RN  Outcome: Progressing  Goal: Pace activities to prevent fatigue by end of the shift  1/29/2024 2258 by Marla Brewster RN  Outcome: Progressing  1/29/2024 2258 by Marla Brewster RN  Outcome: Progressing

## 2024-01-30 NOTE — PROGRESS NOTES
Occupational Therapy                 Therapy Communication Note    Patient Name: Kody Choi  MRN: [id number]  Today's Date: 1/30/2024     Discipline: Occupational Therapy    Missed Visit Reason: Missed Visit Reason:  (pt off floor at procedure @ 11:08 (1800 CR), will reattempt OT eval as schedule permits)    Missed Time: Attempt

## 2024-01-30 NOTE — PROGRESS NOTES
Kody Choi is a 53 y.o. male on day 5 of admission presenting with Pyogenic arthritis of right knee joint, due to unspecified organism (CMS/[facility]).    Subjective   Interval History: pain is 10 out of 10.  More pain on shin than knee itself.  No fever.   Sugar still over 300 this morning ,  he feels regretful that he didn't take care of himself missing insulin and antibiotic at home.   Still refusing AKA but willing to get other surgical procedure such as I/D .  He understand the antibiotic alone will not treat his knee.   He had multiple hospitalizations with right knee infection but never been this bad.           Review of Systems   Constitutional:  Negative for chills, fatigue and fever.       Objective   Range of Vitals (last 24 hours)  Heart Rate:  []   Temp:  [35 °C (95 °F)-36.8 °C (98.2 °F)]   Resp:  [17-18]   BP: (107-126)/(47-80)   SpO2:  [92 %-96 %]   Daily Weight  01/25/24 : 150 kg (330 lb)    Body mass index is 53.26 kg/m².    Physical Exam  Constitutional:       General: He is in acute distress.   Musculoskeletal:         General: Swelling present.      Right lower leg: Edema present.   Neurological:      Mental Status: He is alert.          Media Information    Document Information       Media Information    Document Information      Relevant Results  Labs  Results from last 72 hours   Lab Units 01/29/24  0957 01/28/24  0748   WBC AUTO x10*3/uL 10.4 10.0   HEMOGLOBIN g/dL 9.5* 9.8*   HEMATOCRIT % 32.0* 32.4*   PLATELETS AUTO x10*3/uL 410 405   NEUTROS PCT AUTO % 74.4 69.9   LYMPHS PCT AUTO % 12.3 15.6   MONOS PCT AUTO % 8.1 8.5   EOS PCT AUTO % 3.7 4.4     Results from last 72 hours   Lab Units 01/29/24  0957 01/28/24  0748   SODIUM mmol/L 132* 132*   POTASSIUM mmol/L 4.3 4.0   CHLORIDE mmol/L 100 99   CO2 mmol/L 22 22   BUN mg/dL 31* 27*   CREATININE mg/dL 1.34* 1.13   GLUCOSE mg/dL 284* 362*   CALCIUM mg/dL 8.5* 8.3*   ANION GAP mmol/L 14 15   EGFR mL/min/1.73m*2 47* 58*   PHOSPHORUS mg/dL 3.5 3.0      Results from last 72 hours   Lab Units 01/29/24  0957 01/28/24  0748   ALK PHOS U/L 127* 124*   BILIRUBIN TOTAL mg/dL 0.2 0.2   PROTEIN TOTAL g/dL 6.8 6.7   ALT U/L 10 7   AST U/L 12 8*   ALBUMIN g/dL 2.8* 2.8*     Estimated Creatinine Clearance (by C-G formula based on SCr of 1.34 mg/dL (H))  Female: 71.6 mL/min (A)  Male: 86.6 mL/min (A)  The patient&#39;s sex/gender information is inconclusive; review their information before proceeding.  C-Reactive Protein   Date Value Ref Range Status   01/25/2024 9.28 (H) <1.00 mg/dL Final   10/15/2023 8.52 (H) <1.00 mg/dL Final     CRP   Date Value Ref Range Status   07/01/2023 8.40 (A) mg/dL Final     Comment:     REF VALUE  < 1.00         Temp Readings from Last 5 Encounters:   01/30/24 36.2 °C (97.2 °F) (Temporal)   10/19/23 36.1 °C (97 °F)   06/10/21 36.8 °C (98.2 °F)       Estimated Creatinine Clearance (by C-G formula based on SCr of 1.34 mg/dL (H))  Female: 71.6 mL/min (A)  Male: 86.6 mL/min (A)  The patient&#39;s sex/gender information is inconclusive; review their information before proceeding.    Microbiology    1/25  blood culture no growth   1/25 knee joint fluid no organism seen  1/26  blood culture no growth      Previous microbiology--------------------------------------------------------------------  4/11/19 knee fluid  MSSA  4/12/19  blood culture MSSA   6/5/19   knee fluid  MSSA   9/19/19 MRSA screen positive  1/11/21  knee fluid MRSA   4/12/21  knee fluid  MRSA   7/15/21  knee fluid MRSA   6/27/23  knee fluid group B streptococcus  resistant to clindamycin and erythromycin and susceptible to penicillin      Antibiotic history   Vancomycin 1/25 ~  Ceftriaxone 1/27~        INFECTIOUS SYNOPSIS AND ASSESSMENT  52yo male with hx of knee PJI  with antibiotic spacer .   Multiple admission with PJI with MSSA, MRSA and group B streptococcus ( which is common with diabetic patients ) .  Issue with compliance with oral suppressive antibiotic.   Strongly refusing  AKA .  Presenting with septic knee arthritis.  Arthrocentesis didn't show PMN or organism    His DM has been in poor control with A1c 16.6 .  Given his hx of MRSA and streptococcus knee infection,  empirically put on  vancomycin and ceftriaxone . But his knee infection is getting worse with spreading cellulitis on leg and distal thigh.       still refusing AKA . CT has been ordered     Right knee infection with open wound with discharge  Hx of recurrent PJI      -  fragmented cements and bone fragments , didn't get 2nd stage surgery after the 1st stage in 2019 )       - previous joint fluid culture with MSSA. MRSA, GBS      - the only definite treatment option is AKA as per ortho which he has been refusing  Non compliance to oral suppressive antibiotic in the past.       - most recent episode recommended cephalexin and TMP-SMX but TMP-SMX was discontinued by PCP for unclear reason  Uncontrolled DM       RECOMMENDATION    CONTINUE vancomycin - pharmacy dosing  CONTINUE ceftriaxone 2 gram q 24 hours       ID will follow in the morning.  The case was discussed with my attending , Dr. Lawrence Jalloh who agreed with my assessment and plan.    LINDA BROWN.  M.D /  ID consult TEAM B  Infectious disease fellow PGY-4  Reach me through [employer] instead of paging if possible ( especially during noon conference )  Or page me through Team B  [phone]

## 2024-01-31 ENCOUNTER — APPOINTMENT (OUTPATIENT)
Dept: RADIOLOGY | Facility: HOSPITAL | Age: 54
DRG: 549 | End: 2024-01-31
Payer: COMMERCIAL

## 2024-01-31 LAB
ALBUMIN SERPL BCP-MCNC: 2.8 G/DL (ref 3.4–5)
ALP SERPL-CCNC: 185 U/L (ref 33–120)
ALT SERPL W P-5'-P-CCNC: 36 U/L (ref 7–52)
ANION GAP SERPL CALC-SCNC: 14 MMOL/L (ref 10–20)
AST SERPL W P-5'-P-CCNC: 48 U/L (ref 9–39)
BASOPHILS # BLD AUTO: 0.08 X10*3/UL (ref 0–0.1)
BASOPHILS NFR BLD AUTO: 0.9 %
BILIRUB SERPL-MCNC: 0.2 MG/DL (ref 0–1.2)
BUN SERPL-MCNC: 51 MG/DL (ref 6–23)
CALCIUM SERPL-MCNC: 8.7 MG/DL (ref 8.6–10.6)
CHLORIDE SERPL-SCNC: 105 MMOL/L (ref 98–107)
CO2 SERPL-SCNC: 22 MMOL/L (ref 21–32)
CREAT SERPL-MCNC: 1.46 MG/DL (ref 0.5–1.3)
EGFRCR SERPLBLD CKD-EPI 2021: 42 ML/MIN/1.73M*2
EOSINOPHIL # BLD AUTO: 0.3 X10*3/UL (ref 0–0.7)
EOSINOPHIL NFR BLD AUTO: 3.3 %
ERYTHROCYTE [DISTWIDTH] IN BLOOD BY AUTOMATED COUNT: 16 % (ref 11.5–14.5)
GLUCOSE BLD MANUAL STRIP-MCNC: 111 MG/DL (ref 74–99)
GLUCOSE BLD MANUAL STRIP-MCNC: 184 MG/DL (ref 74–99)
GLUCOSE BLD MANUAL STRIP-MCNC: 198 MG/DL (ref 74–99)
GLUCOSE BLD MANUAL STRIP-MCNC: 211 MG/DL (ref 74–99)
GLUCOSE SERPL-MCNC: 100 MG/DL (ref 74–99)
HCT VFR BLD AUTO: 30.7 % (ref 36–52)
HGB BLD-MCNC: 8.9 G/DL (ref 12–17.5)
IMM GRANULOCYTES # BLD AUTO: 0.05 X10*3/UL (ref 0–0.7)
IMM GRANULOCYTES NFR BLD AUTO: 0.5 % (ref 0–0.9)
LYMPHOCYTES # BLD AUTO: 1.19 X10*3/UL (ref 1.2–4.8)
LYMPHOCYTES NFR BLD AUTO: 13 %
MAGNESIUM SERPL-MCNC: 2.01 MG/DL (ref 1.6–2.4)
MCH RBC QN AUTO: 23.5 PG (ref 26–34)
MCHC RBC AUTO-ENTMCNC: 29 G/DL (ref 32–36)
MCV RBC AUTO: 81 FL (ref 80–100)
MONOCYTES # BLD AUTO: 0.72 X10*3/UL (ref 0.1–1)
MONOCYTES NFR BLD AUTO: 7.9 %
NEUTROPHILS # BLD AUTO: 6.79 X10*3/UL (ref 1.2–7.7)
NEUTROPHILS NFR BLD AUTO: 74.4 %
NRBC BLD-RTO: 0 /100 WBCS (ref 0–0)
PHOSPHATE SERPL-MCNC: 4.2 MG/DL (ref 2.5–4.9)
PLATELET # BLD AUTO: 454 X10*3/UL (ref 150–450)
POTASSIUM SERPL-SCNC: 4.2 MMOL/L (ref 3.5–5.3)
PROT SERPL-MCNC: 6.2 G/DL (ref 6.4–8.2)
RBC # BLD AUTO: 3.78 X10*6/UL (ref 4–5.9)
SODIUM SERPL-SCNC: 137 MMOL/L (ref 136–145)
WBC # BLD AUTO: 9.1 X10*3/UL (ref 4.4–11.3)

## 2024-01-31 PROCEDURE — 2500000001 HC RX 250 WO HCPCS SELF ADMINISTERED DRUGS (ALT 637 FOR MEDICARE OP)

## 2024-01-31 PROCEDURE — 80053 COMPREHEN METABOLIC PANEL: CPT

## 2024-01-31 PROCEDURE — 83735 ASSAY OF MAGNESIUM: CPT

## 2024-01-31 PROCEDURE — 97166 OT EVAL MOD COMPLEX 45 MIN: CPT | Mod: GO | Performed by: OCCUPATIONAL THERAPIST

## 2024-01-31 PROCEDURE — 93971 EXTREMITY STUDY: CPT

## 2024-01-31 PROCEDURE — 99233 SBSQ HOSP IP/OBS HIGH 50: CPT | Performed by: INTERNAL MEDICINE

## 2024-01-31 PROCEDURE — 93971 EXTREMITY STUDY: CPT | Performed by: RADIOLOGY

## 2024-01-31 PROCEDURE — 36415 COLL VENOUS BLD VENIPUNCTURE: CPT

## 2024-01-31 PROCEDURE — 84100 ASSAY OF PHOSPHORUS: CPT

## 2024-01-31 PROCEDURE — 2500000004 HC RX 250 GENERAL PHARMACY W/ HCPCS (ALT 636 FOR OP/ED)

## 2024-01-31 PROCEDURE — 97530 THERAPEUTIC ACTIVITIES: CPT | Mod: GP,CQ

## 2024-01-31 PROCEDURE — 2060000001 HC INTERMEDIATE ICU ROOM DAILY

## 2024-01-31 PROCEDURE — 82947 ASSAY GLUCOSE BLOOD QUANT: CPT

## 2024-01-31 PROCEDURE — 99232 SBSQ HOSP IP/OBS MODERATE 35: CPT | Performed by: INTERNAL MEDICINE

## 2024-01-31 PROCEDURE — 85025 COMPLETE CBC W/AUTO DIFF WBC: CPT

## 2024-01-31 RX ORDER — METOPROLOL SUCCINATE 25 MG/1
25 TABLET, EXTENDED RELEASE ORAL DAILY
Status: DISCONTINUED | OUTPATIENT
Start: 2024-01-31 | End: 2024-02-01 | Stop reason: HOSPADM

## 2024-01-31 RX ADMIN — LOSARTAN POTASSIUM 25 MG: 50 TABLET, FILM COATED ORAL at 08:52

## 2024-01-31 RX ADMIN — VANCOMYCIN HYDROCHLORIDE 1 G: 1 INJECTION, SOLUTION INTRAVENOUS at 21:20

## 2024-01-31 RX ADMIN — TRAMADOL HYDROCHLORIDE 100 MG: 50 TABLET, COATED ORAL at 17:18

## 2024-01-31 RX ADMIN — INSULIN LISPRO 4 UNITS: 100 INJECTION, SOLUTION INTRAVENOUS; SUBCUTANEOUS at 17:29

## 2024-01-31 RX ADMIN — INSULIN LISPRO 25 UNITS: 100 INJECTION, SOLUTION INTRAVENOUS; SUBCUTANEOUS at 17:29

## 2024-01-31 RX ADMIN — Medication 2 G: at 19:43

## 2024-01-31 RX ADMIN — ATORVASTATIN CALCIUM 40 MG: 40 TABLET, FILM COATED ORAL at 08:52

## 2024-01-31 RX ADMIN — VANCOMYCIN HYDROCHLORIDE 1 G: 1 INJECTION, SOLUTION INTRAVENOUS at 06:25

## 2024-01-31 RX ADMIN — ASPIRIN 81 MG: 81 TABLET, COATED ORAL at 08:50

## 2024-01-31 RX ADMIN — INSULIN GLARGINE 80 UNITS: 100 INJECTION, SOLUTION SUBCUTANEOUS at 08:50

## 2024-01-31 RX ADMIN — TRAZODONE HYDROCHLORIDE 50 MG: 50 TABLET ORAL at 21:20

## 2024-01-31 RX ADMIN — INSULIN GLARGINE 80 UNITS: 100 INJECTION, SOLUTION SUBCUTANEOUS at 21:19

## 2024-01-31 ASSESSMENT — COGNITIVE AND FUNCTIONAL STATUS - GENERAL
MOBILITY SCORE: 15
MOVING FROM LYING ON BACK TO SITTING ON SIDE OF FLAT BED WITH BEDRAILS: A LITTLE
DRESSING REGULAR UPPER BODY CLOTHING: A LOT
MOVING TO AND FROM BED TO CHAIR: A LITTLE
TOILETING: A LITTLE
WALKING IN HOSPITAL ROOM: A LITTLE
HELP NEEDED FOR BATHING: A LOT
DRESSING REGULAR LOWER BODY CLOTHING: TOTAL
STANDING UP FROM CHAIR USING ARMS: A LITTLE
CLIMB 3 TO 5 STEPS WITH RAILING: TOTAL
PERSONAL GROOMING: A LITTLE
CLIMB 3 TO 5 STEPS WITH RAILING: TOTAL
DAILY ACTIVITIY SCORE: 15
MOVING FROM LYING ON BACK TO SITTING ON SIDE OF FLAT BED WITH BEDRAILS: A LITTLE
TURNING FROM BACK TO SIDE WHILE IN FLAT BAD: A LITTLE
HELP NEEDED FOR BATHING: A LOT
TURNING FROM BACK TO SIDE WHILE IN FLAT BAD: A LITTLE
MOVING TO AND FROM BED TO CHAIR: A LITTLE
WALKING IN HOSPITAL ROOM: A LOT
DRESSING REGULAR UPPER BODY CLOTHING: A LOT
MOBILITY SCORE: 16
PERSONAL GROOMING: A LITTLE
STANDING UP FROM CHAIR USING ARMS: A LITTLE
DAILY ACTIVITIY SCORE: 14
TOILETING: A LOT
DRESSING REGULAR LOWER BODY CLOTHING: TOTAL

## 2024-01-31 ASSESSMENT — PAIN SCALES - GENERAL
PAINLEVEL_OUTOF10: 0 - NO PAIN
PAINLEVEL_OUTOF10: 10 - WORST POSSIBLE PAIN
PAINLEVEL_OUTOF10: 0 - NO PAIN

## 2024-01-31 ASSESSMENT — ACTIVITIES OF DAILY LIVING (ADL): ADL_ASSISTANCE: INDEPENDENT

## 2024-01-31 ASSESSMENT — ENCOUNTER SYMPTOMS
CHILLS: 0
DIAPHORESIS: 0
FEVER: 0

## 2024-01-31 NOTE — PROGRESS NOTES
Kody Choi is a 53 y.o. adult on day 6 of admission presenting with Pyogenic arthritis of right knee joint, due to unspecified organism (CMS/HCC).    Transitional Care Coordination Progress Note: 1/31/24 13:00  Patient discussed during interdisciplinary rounds.   Plan per Medical/Surgical team: Medically pt is not ready for discharge. consuls (ID and Ortho) weighing in on a plan for his knee. Discharge disposition: TBD  Status: Inpatient   Payer:Medicare Advantage   Potential Barriers: Patient refusing recommendations of care  ADOD: TBD

## 2024-01-31 NOTE — PROGRESS NOTES
"Kody Choi is a 53 y.o. adult on day 6 of admission presenting with Pyogenic arthritis of right knee joint, due to unspecified organism (CMS/HCC).    Subjective   NAEON.     Pt stated that he is trying to better control his diet (ordering fruits and vegetables to eat) but is extremely frustrated repeating that \"you're starving me\" multiple times despite being on max carbs allowed for diabetic diet. His sugars have been better controlled, in the low 110s on POCT this AM. Patient did endorse some shaking and cold feeling this AM which he states is what he normally feels when he is hypoglycemic.     Patient states the pain in his R knee and shin are still excruciating today and that there is significant pain in his right calf. He feels his knee and his leg are more red than the day prior. He does not endorse fevers or chills at this time. He does not feel his pain is well controlled and his mobility is significantly limited.    Patient has been adamantly refusing the idea of an amputation stating that \"you will not take my leg, my leg is staying with me\". He appears to have capacity to make medical decisions. He continually states his desire to receive antibiotics only for his knee and that he will get better despite worsening clinical picture. He also refuses the recommendation to go to SNF for rehab, stating he has \"too much going on in life right now\" to go. He also has declined the desire for IV antibiotics outpatient and would like  oral medications for his infections.       Objective     Physical Exam  Constitutional:       General: He is not in acute distress.     Appearance: He is obese.   HENT:      Head: Normocephalic.      Nose: Nose normal.      Mouth/Throat:      Mouth: Mucous membranes are moist.      Pharynx: Oropharynx is clear.   Eyes:      General: No scleral icterus.     Pupils: Pupils are equal, round, and reactive to light.   Cardiovascular:      Rate and Rhythm: Normal rate and regular rhythm.     " " Pulses: Normal pulses.      Heart sounds: No murmur heard.  Pulmonary:      Effort: Pulmonary effort is normal.      Breath sounds: No wheezing.   Abdominal:      Palpations: Abdomen is soft.      Tenderness: There is no abdominal tenderness.   Musculoskeletal:         General: Tenderness and signs of injury present.      Cervical back: Normal range of motion.      Right knee: Swelling and erythema present.      Right lower leg: Edema present.      Left lower leg: Edema present.      Comments: Significant swelling of R knee with open wound on anterior knee that is draining pus.    Skin:     Capillary Refill: Capillary refill takes less than 2 seconds.      Comments: Significant erythema of R knee now extending in to R lower leg    Neurological:      General: No focal deficit present.      Mental Status: He is alert and oriented to person, place, and time.       Last Recorded Vitals  Blood pressure 102/68, pulse 89, temperature 36.4 °C (97.5 °F), temperature source Temporal, resp. rate 18, height 1.676 m (5' 6\"), weight 150 kg (330 lb), SpO2 97 %.  Intake/Output last 3 Shifts:  I/O last 3 completed shifts:  In: 840 (5.6 mL/kg) [I.V.:840 (5.6 mL/kg)]  Out: 2830 (18.9 mL/kg) [Urine:2830 (0.5 mL/kg/hr)]  Weight: 149.7 kg     Relevant Results    Scheduled medications  aspirin, 81 mg, oral, Daily  atorvastatin, 40 mg, oral, Daily  cefTRIAXone, 2 g, intravenous, q24h  enoxaparin, 60 mg, subcutaneous, BID  insulin glargine, 80 Units, subcutaneous, BID  insulin lispro, 0-10 Units, subcutaneous, TID with meals  insulin lispro, 25 Units, subcutaneous, TID with meals  losartan, 25 mg, oral, Daily  pantoprazole, 20 mg, oral, Daily before breakfast  traZODone, 50 mg, oral, Nightly  vancomycin, 1 g, intravenous, q12h      Continuous medications     PRN medications  PRN medications: acetaminophen, dextrose 10 % in water (D10W), dextrose, fluticasone, glucagon, traMADol    Results for orders placed or performed during the hospital " encounter of 01/25/24 (from the past 24 hour(s))   POCT GLUCOSE   Result Value Ref Range    POCT Glucose 310 (H) 74 - 99 mg/dL   Transthoracic Echo (TTE) Complete   Result Value Ref Range    AV pk leigha 1.29 m/s    LVOT diam 2.20 cm    LV biplane EF 28 %    MV avg E/e' ratio 13.46     MV E/A ratio 1.64     LA vol index A/L 37.8 ml/m2    Tricuspid annular plane systolic excursion 0.8 cm    RV free wall pk S' 5.87 cm/s    LVIDd 5.50 cm    RVSP 46.9 mmHg    Aortic Valve Area by Continuity of Peak Velocity 3.12 cm2    AV pk grad 6.7 mmHg    LV A4C EF 26.9    POCT GLUCOSE   Result Value Ref Range    POCT Glucose 262 (H) 74 - 99 mg/dL   Vancomycin   Result Value Ref Range    Vancomycin 21.7 (H) 5.0 - 20.0 ug/mL   POCT GLUCOSE   Result Value Ref Range    POCT Glucose 192 (H) 74 - 99 mg/dL   POCT GLUCOSE   Result Value Ref Range    POCT Glucose 127 (H) 74 - 99 mg/dL       Assessment/Plan   Principal Problem:    Pyogenic arthritis of right knee joint, due to unspecified organism (CMS/[facility])    Kody Choi is a 54 y/o M with PMH significant for uncontrolled T2DM, CAD s/p multiple stents and CABG, HFrEF (EF 35-40% 01/21), A fib, RUPESH, TKA in 2015, hx of multiple septic arthritis infections in R knee, presenting with 1 week of worsening R knee pain and drainage from sinus s/p fall two days prior to symptom onset. Likely septic arthritis in setting of leukocytosis and knee pain and drainage given history of repeated infections and poorly controlled diabetes. Ortho consulted, aspiration and cultures sent.     Updates 01/31/2024  -Follow up vascular duplex BLE results  -Cancelled Knee CT as it will not be beneficial or add further information regarding treatment. Only option for treatment offered at this point from orthopedics standpoint is above-knee-amputation  -Patient is refusing IV antibiotics and rehab  -Will follow up with ID regarding recommendation for antibiotics given patient is refusing IV antibiotics outpatient  -TTE 01/30  shows reduced EF 30%, Predominant LAD wall motion abnormality with akinesis of the apex and H of anteroseptum as well as hypokinesis in the lateral wall   -Patient will require cardiology follow up outpatient  - Will start patient on metoprolol tartrate 25 mg daily for GDMT  -Blood sugars better controlled today, POCTs in the 110s in AM. Will reach out to endocrinology regarding dosing recommendations for insulin  -PT recommends SNF however patient is refusing    Updates 1/30/2024:   -Patient still refusing AKA, has capacity   -ID recommends continuing IV vancomycin (dose per pharmacy) and ceftriaxone 2g q24h  -ID recommends CT or MRI of R knee. CT knee arthrogram ordered   -BG continues to be elevated to 300s on POCT.   -Endo recommends continuing Lantus 80 units BID, and increased Prandial lispro to 25 units TID before meals  -Changed lantus to 80 BID (increased based on prior 24 hour requirements) + will continue increased SSI + will continue prandial lispro coverage 25u   -spoke with pharmacy, changed antibiotics to normal saline (instead of mixed with dextrose) on 1/27   -knee aspiration 01/25- NGTD   -blood cx 01/25- NGTD   -TTE ordered (last TTE showed HFrEF w EF 35-40% in 1/2021), patient refused 01/29 however amenable to doing it today 01/30  -stopped amlodipine and switched to losartan (GDMT)      #Superimposed R knee infection  #Hx of septic arthritis of R knee, multiple episodes  #R TKA 2015 c/b recurrent prosthetic joint infection s/p explant and antibiotic spacer in 2019 on chronic keflex  #Leukocytosis  -Broken Abx spacer seen on Xrays  -WBC 12.8, , CRP 9.28  -On outpatient keflex for suppression  -Bactrim listed in home meds but patient unsure if taking  -Orthopedics consulted, recommend AKA however patient adamantly refusing at this time after multiple attempts at discussion.  -IV vanc and ceftriaxone in the ED empirically, continued per ID recs   -knee aspirate (-)  Plan  -Appreciate further  ortho recs  -Continue IV abx with vancomycin and ceftriaxone   -Blood cultures x2: NGTD x1 day,   -PT recommends SNF     #HFrEF (EF 35-40% 01/21)  #CAD s/p stents and CABG  -on home lisinopril and atorvastatin   -per patient he does not endorse cardiac symptoms at this time, does not report any new LE swelling  Plan  - repeat TTE  -continue home atorvastatin  -Start amlodipine 5 mg    -hold lisinopril  -consider GDMT optimization     #A fib, unsure if on eliquis  -Documented history of a fib.  -Eliquis not listed as a home med, unsure if patient has been taking or not  -Chart review states he is on eliquis   Plan  -Telemetry monitoring      #Type 2 diabetes mellitus, uncontrolled  -A1c 14.7 10/2023  -POCT 486 while in ED, reportedly drawn after eating however  -hold home jardiance and semaglutide  -patient reports taking 100 units Lantus daily at home  -s/p 500 ml bolus  -endocrinology consulted  Plan  -Lantus 80 units BID   -SSI low intensity, titrate as needed - increased on 1/29  -prandial coverage with lispro 25u tid per endo recs   -Repeat A1c 16.6     F: PRN  E: PRN  N: Regular diet (pt refusing diabetic diet at this time)   A: PIV     DVT prophylaxis: lovenox        Code Status: Full code  Emergency contact: Amira Espino (?) [phone]         Ana Baumann MD  IM PGY-2

## 2024-01-31 NOTE — PROGRESS NOTES
Physical Therapy    Physical Therapy Treatment    Patient Name: Kody Choi  MRN: [id number]  Today's Date: 1/31/2024  Time Calculation  Start Time: 1345  Stop Time: 1359  Time Calculation (min): 14 min       Assessment/Plan   PT Assessment  PT Assessment Results: Decreased strength, Decreased endurance, Impaired balance, Decreased mobility  End of Session Communication: Bedside nurse  End of Session Patient Position:  (Pt left with transport for ultrasound.)     PT Plan  Treatment/Interventions: Bed mobility, Transfer training, Gait training, Stair training, Balance training, Strengthening, Range of motion, Endurance training, Therapeutic exercise, Therapeutic activity, Positioning, Wheelchair management  PT Plan: Skilled PT  PT Frequency: 3 times per week  PT Discharge Recommendations: Moderate intensity level of continued care  Equipment Recommended upon Discharge: Slide board (Discussed with pt recommendation for wheelchair cushion, hospital bed, and shower chair.)  PT - OK to Discharge: Yes      General Visit Information:   PT  Visit  PT Received On: 01/31/24  General  Prior to Session Communication: Bedside nurse  General Comment: Pt in wc upon arrival. Pt agreeable to therapy after encouragement.    Subjective   Precautions:  Precautions  Medical Precautions: Fall precautions  Vital Signs:       Objective   Pain:     Cognition:       Activity Tolerance:  Activity Tolerance  Endurance: Tolerates 10 - 20 min exercise with multiple rests  Treatments:  Ambulation/Gait Training  Ambulation/Gait Training Performed: No (Pt refused.)  Transfers  Transfer: Yes  Transfer 1  Transfer From 1: Wheelchair to  Transfer to 1: Stand  Technique 1: Sit to stand  Transfer Device 1: Walker  Transfer Level of Assistance 1: Close supervision  Transfers 2  Transfer From 2: Stand to  Transfer to 2: Wheelchair  Technique 2: Stand to sit  Transfer Device 2: Walker  Transfer Level of Assistance 2: Close supervision  Trials/Comments 2: Pt  sits rapidly and requires cuing to use UEs to control descent.  Transfers 3  Transfer From 3: Wheelchair to  Transfer to 3: Bed  Technique 3: Stand pivot  Transfer Device 3: Walker  Transfer Level of Assistance 3: Distant supervision  Trials/Comments 3: Pt with difficulty WB through R LE.    Wheelchair Activities  Propulsion: Yes  Propulsion Type 1: Manual  Level 1: Level tile  Method 1: Right upper extremity, Left upper extremity, Left lower extremity  Level of Assistance 1: Close supervision  Description/Details 1: 45' x2 with seated rest break between trials.    Outcome Measures:  [facility] Basic Mobility  Turning from your back to your side while in a flat bed without using bedrails: A little  Moving from lying on your back to sitting on the side of a flat bed without using bedrails: A little  Moving to and from bed to chair (including a wheelchair): A little  Standing up from a chair using your arms (e.g. wheelchair or bedside chair): A little  To walk in hospital room: A little  Climbing 3-5 steps with railing: Total  Basic Mobility - Total Score: 16    Education Documentation  Mobility Training, taught by Ne Harris PTA at 1/31/2024  2:48 PM.  Learner: Patient  Readiness: Acceptance  Method: Explanation  Response: Verbalizes Understanding    Education Comments  No comments found.        OP EDUCATION:       Encounter Problems       Encounter Problems (Active)       Balance       STG - Maintains static standing balance with upper extremity support >3 minutes with SBA  (Progressing)       Start:  01/29/24    Expected End:  02/12/24       INTERVENTIONS:  1. Practice standing with minimal support.  2. Educate patient about standing tolerance.  3. Educate patient about independence with gait, transfers, and ADL's.  4. Educate patient about use of assistive device.  5. Educate patient about self-directed care.            Mobility       STG - Patient will propel the wheelchair >50ft independently  (Progressing)        Start:  01/29/24    Expected End:  02/12/24            STG - Patient will ambulate >10ft using LRAD SBA  (Progressing)       Start:  01/29/24    Expected End:  02/12/24               Transfers       Pt will complete all functional transfers independently with appropriate DME (Progressing)       Start:  01/29/24    Expected End:  02/12/24

## 2024-01-31 NOTE — PROGRESS NOTES
Occupational Therapy    Evaluation    Patient Name: Kody Choi  MRN: [id number]  Today's Date: 1/31/2024  Time Calculation  Start Time: 1038  Stop Time: 1054  Time Calculation (min): 16 min        Assessment:  OT Assessment: Pt presents to OT with increased falls risk, decreased safety and independence with functional transfers and mobility and impaired ADL performance.  Pt will continue to benefit from skilled OT services to address deficits and facilitate safe return to PLOF and home environment.   Prognosis: Fair  Barriers to Discharge: Decreased caregiver support  Evaluation/Treatment Tolerance:  (Self limiting, otherwise tolerated well)  Medical Staff Made Aware: Yes  End of Session Communication: Bedside nurse  End of Session Patient Position:  (remains up in wheelchair)  OT Assessment Results: Decreased ADL status, Decreased safe judgment during ADL, Decreased cognition, Decreased endurance, Decreased functional mobility, Decreased IADLs  Prognosis: Fair  Barriers to Discharge: Decreased caregiver support  Evaluation/Treatment Tolerance:  (Self limiting, otherwise tolerated well)  Medical Staff Made Aware: Yes  Plan:  Treatment Interventions: ADL retraining, Functional transfer training, UE strengthening/ROM, Endurance training, Patient/family training, Compensatory technique education, Equipment evaluation/education  OT Frequency: 2 times per week  OT Discharge Recommendations: Moderate intensity level of continued care  OT Recommended Transfer Status: Assist of 1  OT - OK to Discharge: Yes  Treatment Interventions: ADL retraining, Functional transfer training, UE strengthening/ROM, Endurance training, Patient/family training, Compensatory technique education, Equipment evaluation/education    Subjective   Current Problem:  1. Pyogenic arthritis of right knee joint, due to unspecified organism (CMS/HCC)  Lower extremity venous duplex right    Lower extremity venous duplex right      2. Hyperglycemia due to  "diabetes mellitus (CMS/HCC)        3. Heart failure, unspecified HF chronicity, unspecified heart failure type (CMS/HCC)  Transthoracic Echo (TTE) Complete    Transthoracic Echo (TTE) Complete      4. Generalized edema  Transthoracic Echo (TTE) Complete    Transthoracic Echo (TTE) Complete      5. Cellulitis of right lower limb  Lower extremity venous duplex right    Lower extremity venous duplex right        General:  General  Reason for Referral: wrosening R knee pain and drainage from sinus s/p fall two days prior to symptom onset  Past Medical History Relevant to Rehab: uncontrolled T2DM, CAD s/p multiple stents and CABG, HFrEF (EF 35-40% 01/21), A fib, RUPESH, TKA in 2015, hx of multiple septic arthritis infections in R knee  Prior to Session Communication: Bedside nurse  Patient Position Received:  (received in wheelchair)  General Comment: recieved up in wheelchair, wearing only undergarments, agreeable to OT eval and dressing tasks  Precautions:  Medical Precautions: Fall precautions     Pain:  Pain Assessment  Pain Score:  (pt did not c/o pain)    Objective   Cognition:  Overall Cognitive Status: Within Functional Limits  Orientation Level: Oriented X4  Cognition Comments: Pt demonstrates poor insight and decreased safety awareness overall; pt c/o being \"starved\" that his \"sugar was 110\" and \"she won't let me eat\"! \"Children in [place] eat better than I do here!\" Re-education and redirection provided with limited recpetiveness  Insight: Severe  Impulsive: Mildly      Home Living:  Type of Home: Apartment  Lives With: Alone  Home Adaptive Equipment: Walker rolling or standard, Wheelchair-manual (rollator)  Home Layout: One level  Home Access: Elevator  Bathroom Shower/Tub: Tub/shower unit  Bathroom Equipment: Grab bars in shower, Shower chair with back  Prior Function:  Level of [place]: Independent with ADLs and functional transfers  ADL Assistance: Independent  Homemaking Assistance: " Independent  Ambulatory Assistance: Independent (w/c)  Vocational: On disability     ADL:  UE Dressing Assistance: Moderate  UE Dressing Deficit:  (donning gown seated with setup)  LE Dressing Assistance: Maximal  LE Dressing Deficit: Don/doff R sock, Don/doff L sock (pt received with socks too small, only covering toes, required max A to doff B socks, seated in wheelchair and max A to don L sock, total A to don R sock)  Activity Tolerance:  Endurance: Tolerates 10 - 20 min exercise with multiple rests  Activity Tolerance Comments: somewhat self limiting, difficult to redirect  Bed Mobility/Transfers: Bed Mobility  Bed Mobility: No    Transfers  Transfer: Yes  Transfer 1  Technique 1: Stand to sit, Sit to stand  Transfer Device 1: Walker  Transfer Level of Assistance 1: Contact guard  Trials/Comments 1: min cues for safety, setup, wheelchair locks, hand placement, completing with CGA; poor eccentric control for lowering and increased agitation with cueing for safety    Ambulation/Gait Training:  Ambulation/Gait Training  Ambulation/Gait Training Performed: No     Sensation:  Light Touch: No apparent deficits  Strength:  Strength Comments: [place] WFL  Perception:  Inattention/Neglect: Appears intact  Coordination:  Movements are Fluid and Coordinated: Yes   Hand Function:  Gross Grasp: Functional  Coordination: Functional  Extremities: RUE   RUE : Within Functional Limits and LUE   LUE: Within Functional Limits    Outcome Measures:[facility] Daily Activity  Putting on and taking off regular lower body clothing: Total  Bathing (including washing, rinsing, drying): A lot  Putting on and taking off regular upper body clothing: A lot  Toileting, which includes using toilet, bedpan or urinal: A lot  Taking care of personal grooming such as brushing teeth: A little  Eating Meals: None  Daily Activity - Total Score: 14   and Brief Confusion Assessment Method (bCAM)  CAM Result: CAM -    Education Documentation  Precautions, taught  by Christy Harris OT at 1/31/2024  1:42 PM.  Learner: Patient  Readiness: Acceptance  Method: Explanation  Response: Needs Reinforcement    ADL Training, taught by Christy Harris OT at 1/31/2024  1:42 PM.  Learner: Patient  Readiness: Acceptance  Method: Explanation  Response: Needs Reinforcement    Education Comments  No comments found.      Goals:  Encounter Problems       Encounter Problems (Active)       ADLs       Patient will demonstrate lower body dressing with modified independent level of assistancedonning and doffing all LE clothes  with PRN adaptive equipment        Start:  01/31/24    Expected End:  02/21/24                 COGNITION/SAFETY       Patient will demonstrate improved insight and safety awareness by completion of functional transitions and ADL routine with 0 cues for safety.       Start:  01/31/24    Expected End:  02/21/24       ORIENTATION              TRANSFERS       Patient will demonstrate functional transfers with least restrictive device with modified independent level of assistance.        Start:  01/31/24    Expected End:  02/21/24 01/31/24 at 1:54 PM   Christy Harris OT   Rehab Office: [phone]

## 2024-01-31 NOTE — CARE PLAN
Problem: Fall/Injury  Goal: Not fall by end of shift  Outcome: Progressing  Goal: Be free from injury by end of the shift  Outcome: Progressing     Problem: Pain  Goal: Takes deep breaths with improved pain control throughout the shift  Outcome: Progressing   The patient's goals for the shift include      The clinical goals for the shift include free from falls throughout shift

## 2024-01-31 NOTE — PROGRESS NOTES
"Vancomycin Dosing by Pharmacy- FOLLOW UP    Kody Choi is a 53 y.o. year old adult who Pharmacy has been consulted for vancomycin dosing for osteomyelitis/septic arthritis. Based on the patient's indication and renal status this patient is being dosed based on a goal AUC of 400-600.     Renal function is currently slightly declining. There is no renal function panel today since patient declined lab draw. Will reassess renal function tomorrow.     Current vancomycin dose: 1,250 mg given every 12 hours    Estimated vancomycin AUC on current dose: 645 mg/L.hr     Visit Vitals  /70 (BP Location: Right arm, Patient Position: Lying)   Pulse 92   Temp 36.5 °C (97.7 °F) (Temporal)   Resp 18        Lab Results   Component Value Date    CREATININE 1.34 (H) 01/29/2024    CREATININE 1.13 01/28/2024    CREATININE 1.28 01/27/2024    CREATININE 1.17 01/27/2024        Patient weight is No results found for: \"PTWEIGHT\"    No results found for: \"CULTURE\"     I/O last 3 completed shifts:  In: 840 (5.6 mL/kg) [I.V.:840 (5.6 mL/kg)]  Out: 2830 (18.9 mL/kg) [Urine:2830 (0.5 mL/kg/hr)]  Weight: 149.7 kg   [unfilled]    Lab Results   Component Value Date    PATIENTTEMP 37.0 01/25/2024    PATIENTTEMP 37.0 10/16/2023    PATIENTTEMP 37.0 10/15/2023        Assessment/Plan    Above goal AUC. Orders placed for new vancomcyin regimen of 1,000 every 12 hours to begin at 0700 on 01/31.    This dosing regimen is predicted by [employer] to result in the following pharmacokinetic parameters:    Loading dose: N/A  Regimen: 1000 mg IV every 12 hours.  Start time: 05:45 on 01/31/2024  Exposure target: AUC24 (range)400-600 mg/L.hr   AUC24,ss: 518 mg/L.hr  Probability of AUC24 > 400: 100 %  Ctrough,ss: 16.4 mg/L  Probability of Ctrough,ss > 20: 1 %  Probability of nephrotoxicity (Lodise EDIE 2009): 12 %    The next level will be obtained on 02/01 at 0500. May be obtained sooner if clinically indicated.   Will continue to monitor renal function daily " while on vancomycin and order serum creatinine at least every 48 hours if not already ordered.  Follow for continued vancomycin needs, clinical response, and signs/symptoms of toxicity.       Mira Ferrer, PharmD

## 2024-02-01 ENCOUNTER — PHARMACY VISIT (OUTPATIENT)
Dept: PHARMACY | Facility: CLINIC | Age: 54
End: 2024-02-01
Payer: COMMERCIAL

## 2024-02-01 VITALS
TEMPERATURE: 97.9 F | HEIGHT: 66 IN | OXYGEN SATURATION: 95 % | RESPIRATION RATE: 18 BRPM | HEART RATE: 92 BPM | WEIGHT: 315 LBS | DIASTOLIC BLOOD PRESSURE: 70 MMHG | SYSTOLIC BLOOD PRESSURE: 105 MMHG | BODY MASS INDEX: 50.62 KG/M2

## 2024-02-01 LAB
ALBUMIN SERPL BCP-MCNC: 2.7 G/DL (ref 3.4–5)
ALP SERPL-CCNC: 199 U/L (ref 33–120)
ALT SERPL W P-5'-P-CCNC: 35 U/L (ref 7–52)
ANION GAP SERPL CALC-SCNC: 13 MMOL/L (ref 10–20)
AST SERPL W P-5'-P-CCNC: 35 U/L (ref 9–39)
BASOPHILS # BLD AUTO: 0.08 X10*3/UL (ref 0–0.1)
BASOPHILS NFR BLD AUTO: 1.2 %
BILIRUB SERPL-MCNC: 0.1 MG/DL (ref 0–1.2)
BUN SERPL-MCNC: 58 MG/DL (ref 6–23)
CALCIUM SERPL-MCNC: 9 MG/DL (ref 8.6–10.6)
CHLORIDE SERPL-SCNC: 106 MMOL/L (ref 98–107)
CO2 SERPL-SCNC: 23 MMOL/L (ref 21–32)
CREAT SERPL-MCNC: 1.42 MG/DL (ref 0.5–1.3)
EGFRCR SERPLBLD CKD-EPI 2021: 44 ML/MIN/1.73M*2
EOSINOPHIL # BLD AUTO: 0.32 X10*3/UL (ref 0–0.7)
EOSINOPHIL NFR BLD AUTO: 4.7 %
ERYTHROCYTE [DISTWIDTH] IN BLOOD BY AUTOMATED COUNT: 15.9 % (ref 11.5–14.5)
GLUCOSE SERPL-MCNC: 71 MG/DL (ref 74–99)
HCT VFR BLD AUTO: 28.9 % (ref 36–52)
HGB BLD-MCNC: 8.9 G/DL (ref 12–17.5)
IMM GRANULOCYTES # BLD AUTO: 0.03 X10*3/UL (ref 0–0.7)
IMM GRANULOCYTES NFR BLD AUTO: 0.4 % (ref 0–0.9)
LYMPHOCYTES # BLD AUTO: 1.52 X10*3/UL (ref 1.2–4.8)
LYMPHOCYTES NFR BLD AUTO: 22.2 %
MAGNESIUM SERPL-MCNC: 2.15 MG/DL (ref 1.6–2.4)
MCH RBC QN AUTO: 23.7 PG (ref 26–34)
MCHC RBC AUTO-ENTMCNC: 30.8 G/DL (ref 32–36)
MCV RBC AUTO: 77 FL (ref 80–100)
MONOCYTES # BLD AUTO: 0.8 X10*3/UL (ref 0.1–1)
MONOCYTES NFR BLD AUTO: 11.7 %
NEUTROPHILS # BLD AUTO: 4.11 X10*3/UL (ref 1.2–7.7)
NEUTROPHILS NFR BLD AUTO: 59.8 %
NRBC BLD-RTO: 0 /100 WBCS (ref 0–0)
PHOSPHATE SERPL-MCNC: 4.7 MG/DL (ref 2.5–4.9)
PLATELET # BLD AUTO: 453 X10*3/UL (ref 150–450)
POTASSIUM SERPL-SCNC: 4 MMOL/L (ref 3.5–5.3)
PROT SERPL-MCNC: 6.3 G/DL (ref 6.4–8.2)
RBC # BLD AUTO: 3.75 X10*6/UL (ref 4–5.9)
SODIUM SERPL-SCNC: 138 MMOL/L (ref 136–145)
WBC # BLD AUTO: 6.9 X10*3/UL (ref 4.4–11.3)

## 2024-02-01 PROCEDURE — 2500000001 HC RX 250 WO HCPCS SELF ADMINISTERED DRUGS (ALT 637 FOR MEDICARE OP)

## 2024-02-01 PROCEDURE — 2500000004 HC RX 250 GENERAL PHARMACY W/ HCPCS (ALT 636 FOR OP/ED): Mod: JZ

## 2024-02-01 PROCEDURE — RXMED WILLOW AMBULATORY MEDICATION CHARGE

## 2024-02-01 PROCEDURE — 85025 COMPLETE CBC W/AUTO DIFF WBC: CPT

## 2024-02-01 PROCEDURE — 36415 COLL VENOUS BLD VENIPUNCTURE: CPT

## 2024-02-01 PROCEDURE — 80053 COMPREHEN METABOLIC PANEL: CPT

## 2024-02-01 PROCEDURE — 82947 ASSAY GLUCOSE BLOOD QUANT: CPT

## 2024-02-01 PROCEDURE — 99232 SBSQ HOSP IP/OBS MODERATE 35: CPT | Performed by: STUDENT IN AN ORGANIZED HEALTH CARE EDUCATION/TRAINING PROGRAM

## 2024-02-01 PROCEDURE — 83735 ASSAY OF MAGNESIUM: CPT

## 2024-02-01 PROCEDURE — 99239 HOSP IP/OBS DSCHRG MGMT >30: CPT | Performed by: INTERNAL MEDICINE

## 2024-02-01 PROCEDURE — 84100 ASSAY OF PHOSPHORUS: CPT

## 2024-02-01 RX ORDER — INSULIN LISPRO 100 [IU]/ML
25 INJECTION, SOLUTION INTRAVENOUS; SUBCUTANEOUS
Qty: 15 ML | Refills: 2 | Status: ON HOLD | OUTPATIENT
Start: 2024-02-01 | End: 2024-02-21 | Stop reason: SDUPTHER

## 2024-02-01 RX ORDER — DOXYCYCLINE 100 MG/1
100 CAPSULE ORAL 2 TIMES DAILY
Qty: 60 CAPSULE | Refills: 3 | Status: SHIPPED | OUTPATIENT
Start: 2024-02-01 | End: 2024-02-23 | Stop reason: HOSPADM

## 2024-02-01 RX ORDER — INSULIN GLARGINE 100 [IU]/ML
80 INJECTION, SOLUTION SUBCUTANEOUS 2 TIMES DAILY
Qty: 48 ML | Refills: 3 | Status: SHIPPED | OUTPATIENT
Start: 2024-02-01 | End: 2024-02-01 | Stop reason: SDUPTHER

## 2024-02-01 RX ORDER — INSULIN GLARGINE 100 [IU]/ML
70 INJECTION, SOLUTION SUBCUTANEOUS 2 TIMES DAILY
Qty: 30 ML | Refills: 2 | Status: ON HOLD | OUTPATIENT
Start: 2024-02-01 | End: 2024-02-21 | Stop reason: SDUPTHER

## 2024-02-01 RX ORDER — METOPROLOL SUCCINATE 25 MG/1
25 TABLET, EXTENDED RELEASE ORAL DAILY
Qty: 6 TABLET | Refills: 0 | Status: ON HOLD | OUTPATIENT
Start: 2024-02-01 | End: 2024-02-20

## 2024-02-01 RX ORDER — LOSARTAN POTASSIUM 25 MG/1
25 TABLET ORAL DAILY
Qty: 30 TABLET | Refills: 3 | Status: SHIPPED | OUTPATIENT
Start: 2024-02-01 | End: 2024-02-23 | Stop reason: HOSPADM

## 2024-02-01 RX ORDER — INSULIN LISPRO 100 [IU]/ML
25 INJECTION, SOLUTION INTRAVENOUS; SUBCUTANEOUS
Qty: 22.5 ML | Refills: 2 | Status: CANCELLED | OUTPATIENT
Start: 2024-02-01 | End: 2024-05-01

## 2024-02-01 RX ORDER — ACETAMINOPHEN 325 MG/1
650 TABLET ORAL EVERY 6 HOURS PRN
Qty: 30 TABLET | Refills: 0 | Status: ON HOLD | OUTPATIENT
Start: 2024-02-01 | End: 2024-02-21

## 2024-02-01 RX ORDER — PEN NEEDLE, DIABETIC 30 GX3/16"
NEEDLE, DISPOSABLE MISCELLANEOUS
Qty: 100 EACH | Refills: 1 | Status: SHIPPED | OUTPATIENT
Start: 2024-02-01 | End: 2025-01-31

## 2024-02-01 RX ADMIN — INSULIN GLARGINE 80 UNITS: 100 INJECTION, SOLUTION SUBCUTANEOUS at 08:58

## 2024-02-01 RX ADMIN — DAPTOMYCIN 800 MG: 500 INJECTION, POWDER, LYOPHILIZED, FOR SOLUTION INTRAVENOUS at 09:03

## 2024-02-01 RX ADMIN — ACETAMINOPHEN 650 MG: 325 TABLET ORAL at 02:29

## 2024-02-01 RX ADMIN — INSULIN LISPRO 25 UNITS: 100 INJECTION, SOLUTION INTRAVENOUS; SUBCUTANEOUS at 13:21

## 2024-02-01 RX ADMIN — ATORVASTATIN CALCIUM 40 MG: 40 TABLET, FILM COATED ORAL at 08:58

## 2024-02-01 RX ADMIN — INSULIN LISPRO 2 UNITS: 100 INJECTION, SOLUTION INTRAVENOUS; SUBCUTANEOUS at 13:23

## 2024-02-01 RX ADMIN — TRAMADOL HYDROCHLORIDE 100 MG: 50 TABLET, COATED ORAL at 02:29

## 2024-02-01 RX ADMIN — LOSARTAN POTASSIUM 25 MG: 50 TABLET, FILM COATED ORAL at 08:58

## 2024-02-01 ASSESSMENT — PAIN - FUNCTIONAL ASSESSMENT: PAIN_FUNCTIONAL_ASSESSMENT: 0-10

## 2024-02-01 ASSESSMENT — PAIN SCALES - GENERAL
PAINLEVEL_OUTOF10: 3
PAINLEVEL_OUTOF10: 9

## 2024-02-01 NOTE — CONSULTS
Vancomycin Dosing by Pharmacy- Cessation of Therapy    Consult to pharmacy for vancomycin dosing has been discontinued by the prescriber, pharmacy will sign off at this time.    Please call pharmacy if there are further questions or re-enter a consult if vancomycin is resumed.     Kait Bedolla, PharmD

## 2024-02-01 NOTE — CARE PLAN
Problem: Skin  Goal: Decreased wound size/increased tissue granulation at next dressing change  Outcome: Progressing  Goal: Participates in plan/prevention/treatment measures  Outcome: Progressing     Problem: Fall/Injury  Goal: Not fall by end of shift  Outcome: Progressing   The patient's goals for the shift include      The clinical goals for the shift include free from falls throughout shift

## 2024-02-01 NOTE — PROGRESS NOTES
Kody Choi is a 53 y.o. male on day 6 of admission presenting with Pyogenic arthritis of right knee joint, due to unspecified organism (CMS/HCC).    Subjective   Interval History: knee no change,  afebrile .  Pt refuses AKA.  Ortho doesn't think that surgical intervention other than AKA will be helpful.  Also doesn't think imaging his knee is useful .   Pt refuses SNF.  Also refuses home IV therapy.         Review of Systems   Constitutional:  Negative for chills, diaphoresis and fever.   Skin:  Negative for rash.       Objective   Range of Vitals (last 24 hours)  Heart Rate:  [78-99]   Temp:  [36.2 °C (97.2 °F)-37.1 °C (98.8 °F)]   Resp:  [18-19]   BP: (101-123)/(66-78)   SpO2:  [95 %-99 %]   Daily Weight  01/25/24 : 150 kg (330 lb)    Body mass index is 53.26 kg/m².    Physical Exam  Vitals reviewed.   Constitutional:       General: He is not in acute distress.     Appearance: He is not ill-appearing.   Musculoskeletal:      Comments: Tender erythematous indurated swelling on right knee with erythema on shin    Neurological:      Mental Status: He is alert.       Relevant Results  Labs  Results from last 72 hours   Lab Units 01/31/24  0739 01/29/24  0957   WBC AUTO x10*3/uL 9.1 10.4   HEMOGLOBIN g/dL 8.9* 9.5*   HEMATOCRIT % 30.7* 32.0*   PLATELETS AUTO x10*3/uL 454* 410   NEUTROS PCT AUTO % 74.4 74.4   LYMPHS PCT AUTO % 13.0 12.3   MONOS PCT AUTO % 7.9 8.1   EOS PCT AUTO % 3.3 3.7     Results from last 72 hours   Lab Units 01/31/24  0739 01/29/24  0957   SODIUM mmol/L 137 132*   POTASSIUM mmol/L 4.2 4.3   CHLORIDE mmol/L 105 100   CO2 mmol/L 22 22   BUN mg/dL 51* 31*   CREATININE mg/dL 1.46* 1.34*   GLUCOSE mg/dL 100* 284*   CALCIUM mg/dL 8.7 8.5*   ANION GAP mmol/L 14 14   EGFR mL/min/1.73m*2 42* 47*   PHOSPHORUS mg/dL 4.2 3.5     Results from last 72 hours   Lab Units 01/31/24  0739 01/29/24  0957   ALK PHOS U/L 185* 127*   BILIRUBIN TOTAL mg/dL 0.2 0.2   PROTEIN TOTAL g/dL 6.2* 6.8   ALT U/L 36 10   AST U/L  48* 12   ALBUMIN g/dL 2.8* 2.8*     Estimated Creatinine Clearance (by C-G formula based on SCr of 1.46 mg/dL (H))  Female: 65.7 mL/min (A)  Male: 79.5 mL/min (A)  The patient&#39;s sex/gender information is inconclusive; review their information before proceeding.  C-Reactive Protein   Date Value Ref Range Status   01/25/2024 9.28 (H) <1.00 mg/dL Final   10/15/2023 8.52 (H) <1.00 mg/dL Final     CRP   Date Value Ref Range Status   07/01/2023 8.40 (A) mg/dL Final     Comment:     REF VALUE  < 1.00       Temp Readings from Last 5 Encounters:   01/31/24 36.6 °C (97.9 °F)   10/19/23 36.1 °C (97 °F)   06/10/21 36.8 °C (98.2 °F)       Estimated Creatinine Clearance (by C-G formula based on SCr of 1.46 mg/dL (H))  Female: 65.7 mL/min (A)  Male: 79.5 mL/min (A)  The patient&#39;s sex/gender information is inconclusive; review their information before proceeding.    Microbiology    1/25  blood culture no growth   1/25 knee joint fluid no organism seen  1/26  blood culture no growth      Previous microbiology--------------------------------------------------------------------  4/11/19 knee fluid  MSSA  4/12/19  blood culture MSSA   6/5/19   knee fluid  MSSA   9/19/19 MRSA screen positive  1/11/21  knee fluid MRSA   4/12/21  knee fluid  MRSA   7/15/21  knee fluid MRSA   6/27/23  knee fluid group B streptococcus  resistant to clindamycin and erythromycin and susceptible to penicillin      Antibiotic history     Vancomycin 1/25 ~  Ceftriaxone 1/27~      INFECTIOUS SYNOPSIS AND ASSESSMENT  52yo male with uncontrolled DM (A1c 16.6 ) and  hx of knee PJI with pieces of antibiotic spacer .   Multiple admission with PJI with MSSA, MRSA and group B streptococcus ( which is common with diabetic patients ) .  Issue with compliance with oral suppressive antibiotic.   Strongly refusing AKA .  Presenting with septic knee arthritis.  Arthrocentesis didn't show PMN or organism   been treated with vancomycin and ceftriaxone given previous  culture report without clinical improvement.    AKA is still refused.   Also refuses SNF or home IV therapy.     recurrent PJI of right knee       - fragmented cements and bone fragments , didn't get 2nd stage surgery after the 1st stage in 2019        - previous joint fluid culture with MSSA. MRSA, GBS      -  refuses AKA (the only definite treatment option  as per ortho)      - also refuses SNF placement or home IV therapy              RECOMMENDATION     SWITCH vancomycin / ceftriaxone to DAPTOMYCIN 8mg/kg by adjusted body weight ( = 800mg ) q 24 hours given worsening kidney function : total duration 6 weeks  , tentative end date  March 7. 2024   Discharge with doxycycline 100mg BID ( most likely indefinitely )  IF  he still refuses to go with home IV or SNF   Please get weekly lab including CBC, CMP, and CRP, CPK  and send the result to Fax  , attn to Dr. Noah Goodwin.  ID will arrange outpatient follow up appt with Dr. Goodwin           ID will sign off.   Please contact me if you have any questions  The case was discussed with my attending , Dr. Lawrence Jalloh who agreed with my assessment and plan.    LINDA BROWN.  M.D /  ID consult TEAM B  Infectious disease fellow PGY-4  Another ID fellow, Homero Martin will take my place from tomorrow   Reach him  through [employer] instead of paging if possible ( especially during noon conference )  Or page  Team B  [phone]

## 2024-02-01 NOTE — PROGRESS NOTES
"Kody Choi is a 53 y.o. adult on day 7 of admission presenting with Pyogenic arthritis of right knee joint, due to unspecified organism (CMS/HCC).    Subjective   Patient reports appetite is good. He is frustrated with the diabetic diet. Upon review of potential homegoing insulin plan, patient is agreeable to 2 shots of long-acting daily as well as mealtime insulin.    I have reviewed histories, allergies and medications have been reviewed and there are no changes       Objective   Review of Systems: 10 point ROS neg unless stated above  Physical Exam  General: Alert, oriented x 3. No acute distress.  HEENT: EOMI, PERRL. Oral cavity mucosa moist  Lungs: No increased WOB   Abd: soft, obese  Ext: Very swollen, disfigured R knee   Skin: warm, dry.  Neuro: AOx3, no focal deficits  Psych: Appeared frustrated     Last Recorded Vitals  Blood pressure 105/70, pulse 92, temperature 36.6 °C (97.9 °F), temperature source Temporal, resp. rate 18, height 1.676 m (5' 6\"), weight 150 kg (330 lb), SpO2 95 %.  Intake/Output last 3 Shifts:  I/O last 3 completed shifts:  In: 240 (1.6 mL/kg) [P.O.:240]  Out: 750 (5 mL/kg) [Urine:750 (0.1 mL/kg/hr)]  Weight: 149.7 kg     Scheduled medications  aspirin, 81 mg, oral, Daily  atorvastatin, 40 mg, oral, Daily  daptomycin, 8 mg/kg (Adjusted), intravenous, q24h SUDHIR  enoxaparin, 60 mg, subcutaneous, BID  insulin glargine, 80 Units, subcutaneous, BID  insulin lispro, 0-10 Units, subcutaneous, TID with meals  insulin lispro, 25 Units, subcutaneous, TID with meals  losartan, 25 mg, oral, Daily  metoprolol succinate XL, 25 mg, oral, Daily  pantoprazole, 20 mg, oral, Daily before breakfast  traZODone, 50 mg, oral, Nightly      Continuous medications     PRN medications  PRN medications: acetaminophen, dextrose 10 % in water (D10W), dextrose, fluticasone, glucagon, traMADol     Relevant Results  Results from last 7 days   Lab Units 02/01/24  0828 01/31/24 2023 01/31/24  1645 01/31/24  1153 " 01/31/24  0817 01/31/24  0739 01/30/24 2031 01/29/24  1136 01/29/24  0957 01/28/24  0810 01/28/24  0748 01/27/24  1159 01/27/24  0908   POCT GLUCOSE mg/dL  --  198* 211* 184* 111*  --  127*   < >  --    < >  --    < >  --    GLUCOSE mg/dL 71*  --   --   --   --  100*  --   --  284*  --  362*  --  268*    < > = values in this interval not displayed.       Results for orders placed or performed during the hospital encounter of 01/25/24 (from the past 24 hour(s))   POCT GLUCOSE   Result Value Ref Range    POCT Glucose 211 (H) 74 - 99 mg/dL   POCT GLUCOSE   Result Value Ref Range    POCT Glucose 198 (H) 74 - 99 mg/dL   CBC and Auto Differential   Result Value Ref Range    WBC 6.9 4.4 - 11.3 x10*3/uL    nRBC 0.0 0.0 - 0.0 /100 WBCs    RBC 3.75 (L) 4.00 - 5.90 x10*6/uL    Hemoglobin 8.9 (L) 12.0 - 17.5 g/dL    Hematocrit 28.9 (L) 36.0 - 52.0 %    MCV 77 (L) 80 - 100 fL    MCH 23.7 (L) 26.0 - 34.0 pg    MCHC 30.8 (L) 32.0 - 36.0 g/dL    RDW 15.9 (H) 11.5 - 14.5 %    Platelets 453 (H) 150 - 450 x10*3/uL    Neutrophils % 59.8 40.0 - 80.0 %    Immature Granulocytes %, Automated 0.4 0.0 - 0.9 %    Lymphocytes % 22.2 13.0 - 44.0 %    Monocytes % 11.7 2.0 - 10.0 %    Eosinophils % 4.7 0.0 - 6.0 %    Basophils % 1.2 0.0 - 2.0 %    Neutrophils Absolute 4.11 1.20 - 7.70 x10*3/uL    Immature Granulocytes Absolute, Automated 0.03 0.00 - 0.70 x10*3/uL    Lymphocytes Absolute 1.52 1.20 - 4.80 x10*3/uL    Monocytes Absolute 0.80 0.10 - 1.00 x10*3/uL    Eosinophils Absolute 0.32 0.00 - 0.70 x10*3/uL    Basophils Absolute 0.08 0.00 - 0.10 x10*3/uL   Magnesium   Result Value Ref Range    Magnesium 2.15 1.60 - 2.40 mg/dL   Comprehensive Metabolic Panel   Result Value Ref Range    Glucose 71 (L) 74 - 99 mg/dL    Sodium 138 136 - 145 mmol/L    Potassium 4.0 3.5 - 5.3 mmol/L    Chloride 106 98 - 107 mmol/L    Bicarbonate 23 21 - 32 mmol/L    Anion Gap 13 10 - 20 mmol/L    Urea Nitrogen 58 (H) 6 - 23 mg/dL    Creatinine 1.42 (H) 0.50 - 1.30  mg/dL    eGFR 44 (L) >60 mL/min/1.73m*2    Calcium 9.0 8.6 - 10.6 mg/dL    Albumin 2.7 (L) 3.4 - 5.0 g/dL    Alkaline Phosphatase 199 (H) 33 - 120 U/L    Total Protein 6.3 (L) 6.4 - 8.2 g/dL    AST 35 9 - 39 U/L    Bilirubin, Total 0.1 0.0 - 1.2 mg/dL    ALT 35 7 - 52 U/L   Phosphorus   Result Value Ref Range    Phosphorus 4.7 2.5 - 4.9 mg/dL             Assessment/Plan   Principal Problem:    Pyogenic arthritis of right knee joint, due to unspecified organism (CMS/[facility])    Kody Choi is a 53 y.o. adult with PMH of uncontrolled T2DM, CAD s/p multiple stents and CABG, HFrEF (EF 35-40% 01/21), A fib, RUPESH, TKA in 2015, hx of multiple septic arthritis infections in R knee who presented on 1/25 with knee pain, again found to have septic arthritis currently on broad spectrum antibiotics. Recommended to have AKA by ID which patient has been adamantly refusing.     Endocrinology consulted for management of T2DM     Endocrinologist: None, managed by PCP (Dr. Eugene)  Last A1c: 16.6% (1/26/24)   Home regimen: Empagliflozin 25mg daily, Insulin glargine 100 units QAM, semaglutide 7mg PO daily  Accuchecks: Does not check his BGs at home      Current Diet: Regular  Steroids: None    Update 2/1: Patient planned for discharge home today. He is agreeable to BID glargine as well as prandial insulin. Patient reports he is in need of a glucometer.      #T2DM, uncontrolled  -Goal -180  -DECREASE insulin glargine from 80 units to 70 units BID  -c/w insulin lispro 25 units TID w/ meals  -c/w insulin lispro sliding scale #2 TID w/ meals  -Hold home semaglutide  -Hold home empagliflozin  -Accuchecks ACHS  -Hypoglycemia protocol   -Recommend diabetic diet if patient agreeable      DISCHARGE RECOMMENDATIONS:   -Restart home semaglutide 7mg PO daily  -Restart home empagliflozin 25mg daily  -Accuchecks ACHS   -Insulin glargine 70 units BID   -Insulin lispro 25 units TID w/ meals     For M2B purposes:  - please order insulin  "glargine/basaglar U-100 insulin pen \"inject 70 units under the skin once twice per day\" 30 days = 5 pens  - please order insulin lispro U-100 insulin pen \"inject 25 units three times daily with meals\"  - please order insulin pen needles 32G 4mm for 5x/day injections, 90 days = 400 pen needles  - please order glucose testing supplies for QID glucose measurement, 90 days = 1 glucose meter, 400 lancets and 400 strips  - please order alcohol swabs  - please ensure new glucometer is ordered, patient reports he does not have one currently  **write in comment section on all supplies ordered: \"substitutions may be made by pharmacist depending on insurance coverage and what the pharmacy has available\"      In the future, may consider U-500 however patient is very resistant to changes in insulin regimen and it was felt that this would be too drastic of a change for him at this time.    Will enroll in  pharmacy platinum plan for follow-up insulin titration.       He should also follow-up with PCP.      Endocrine will continue to follow.  Plan communicated with primary team via secure chat.  Please message on secure chat (8AM-5PM) or page [phone] with any questions or concerns.  Patient discussed with Dr. Nelson Schumacher who agrees with the management plan.           "

## 2024-02-01 NOTE — DISCHARGE INSTRUCTIONS
It was a pleasure caring for you! You were admitted to [facility] ([facility]) on January 25, 2024 for right leg and knee infection.  Orthopedic surgery saw you they recommended above-the-knee amputation. Infectious diseases saw you they recommended to start IV antibiotics however based on your preference you were started on doxycycline 100 mg twice daily indefinitely. Endocrinology saw you and adjusted your insulin to 70 units twice daily. Ultrasound lower limbs did not show clots. Please come to the emergency department if you develop fever chills or rigors. Worsening leg pain, loss of breath, worsening cough. Any new neurologic symptoms, including new numbness, tingling, weakness, abnormal sensations, visual loss, or change in bowel or bladder control.    For you to do:  [ ] Take all of your medications as prescribed.  [ ] Follow up with your primary care provider.  [ ] Continue doxycycline 100 mg twice daily indefinitely.  [ ] Start taking insulin glargine 70 units subcutaneous injection twice daily and lispro 25 units three times daily with meals.    Thank you for letting us take part in your care.  [facility] Inpatient internal medicine service

## 2024-02-01 NOTE — PROGRESS NOTES
Transitional Care Coordinator Note: Per medical team patient is medically ready. Plan for patent to discharge home with no home care needs on oral antibiotics. TCC met with patient to discuss discharge plan. Patient agreeable to discharge plan, states he has a HHA set up with [facility] by his PCP office. Patient stated he will need assistance with transportation home, TCC placed request in round trip for transportation home via wheelchair for 4pm pending confirmation. Patient has wheelchair in room. Bedside nurse updated.       Catherine Osuna RN BSN   Transitional Care Coordinator

## 2024-02-01 NOTE — PROGRESS NOTES
"Kody Choi is a 53 y.o. adult on day 7 of admission presenting with Pyogenic arthritis of right knee joint, due to unspecified organism (CMS/HCC).    Subjective   No acute event overnight.  Objective     Physical Exam  Constitutional:       General: He is not in acute distress.     Appearance: He is obese.   HENT:      Head: Normocephalic.      Nose: Nose normal.      Mouth/Throat:      Mouth: Mucous membranes are moist.      Pharynx: Oropharynx is clear.   Eyes:      General: No scleral icterus.     Pupils: Pupils are equal, round, and reactive to light.   Cardiovascular:      Rate and Rhythm: Normal rate and regular rhythm.      Pulses: Normal pulses.      Heart sounds: No murmur heard.  Pulmonary:      Effort: Pulmonary effort is normal.      Breath sounds: No wheezing.   Abdominal:      Palpations: Abdomen is soft.      Tenderness: There is no abdominal tenderness.   Musculoskeletal:         General: Tenderness and signs of injury present.      Cervical back: Normal range of motion.      Right knee: Swelling and erythema present.      Right lower leg: Edema present.      Left lower leg: Edema present.      Comments: Significant swelling of R knee with open wound on anterior knee that is draining pus.    Skin:     Capillary Refill: Capillary refill takes less than 2 seconds.      Comments: Significant erythema of R knee now extending in to R lower leg    Neurological:      General: No focal deficit present.      Mental Status: He is alert and oriented to person, place, and time.       Last Recorded Vitals  Blood pressure 105/70, pulse 92, temperature 36.6 °C (97.9 °F), temperature source Temporal, resp. rate 18, height 1.676 m (5' 6\"), weight 150 kg (330 lb), SpO2 95 %.  Intake/Output last 3 Shifts:  I/O last 3 completed shifts:  In: 240 (1.6 mL/kg) [P.O.:240]  Out: 750 (5 mL/kg) [Urine:750 (0.1 mL/kg/hr)]  Weight: 149.7 kg     Relevant Results    Scheduled medications  aspirin, 81 mg, oral, Daily  atorvastatin, " 40 mg, oral, Daily  daptomycin, 8 mg/kg (Adjusted), intravenous, q24h SUDHIR  enoxaparin, 60 mg, subcutaneous, BID  insulin glargine, 80 Units, subcutaneous, BID  insulin lispro, 0-10 Units, subcutaneous, TID with meals  insulin lispro, 25 Units, subcutaneous, TID with meals  losartan, 25 mg, oral, Daily  metoprolol succinate XL, 25 mg, oral, Daily  pantoprazole, 20 mg, oral, Daily before breakfast  traZODone, 50 mg, oral, Nightly    Continuous medications     PRN medications  PRN medications: acetaminophen, dextrose 10 % in water (D10W), dextrose, fluticasone, glucagon, traMADol    Results for orders placed or performed during the hospital encounter of 01/25/24 (from the past 24 hour(s))   POCT GLUCOSE   Result Value Ref Range    POCT Glucose 211 (H) 74 - 99 mg/dL   POCT GLUCOSE   Result Value Ref Range    POCT Glucose 198 (H) 74 - 99 mg/dL   CBC and Auto Differential   Result Value Ref Range    WBC 6.9 4.4 - 11.3 x10*3/uL    nRBC 0.0 0.0 - 0.0 /100 WBCs    RBC 3.75 (L) 4.00 - 5.90 x10*6/uL    Hemoglobin 8.9 (L) 12.0 - 17.5 g/dL    Hematocrit 28.9 (L) 36.0 - 52.0 %    MCV 77 (L) 80 - 100 fL    MCH 23.7 (L) 26.0 - 34.0 pg    MCHC 30.8 (L) 32.0 - 36.0 g/dL    RDW 15.9 (H) 11.5 - 14.5 %    Platelets 453 (H) 150 - 450 x10*3/uL    Neutrophils % 59.8 40.0 - 80.0 %    Immature Granulocytes %, Automated 0.4 0.0 - 0.9 %    Lymphocytes % 22.2 13.0 - 44.0 %    Monocytes % 11.7 2.0 - 10.0 %    Eosinophils % 4.7 0.0 - 6.0 %    Basophils % 1.2 0.0 - 2.0 %    Neutrophils Absolute 4.11 1.20 - 7.70 x10*3/uL    Immature Granulocytes Absolute, Automated 0.03 0.00 - 0.70 x10*3/uL    Lymphocytes Absolute 1.52 1.20 - 4.80 x10*3/uL    Monocytes Absolute 0.80 0.10 - 1.00 x10*3/uL    Eosinophils Absolute 0.32 0.00 - 0.70 x10*3/uL    Basophils Absolute 0.08 0.00 - 0.10 x10*3/uL   Magnesium   Result Value Ref Range    Magnesium 2.15 1.60 - 2.40 mg/dL   Comprehensive Metabolic Panel   Result Value Ref Range    Glucose 71 (L) 74 - 99 mg/dL     Sodium 138 136 - 145 mmol/L    Potassium 4.0 3.5 - 5.3 mmol/L    Chloride 106 98 - 107 mmol/L    Bicarbonate 23 21 - 32 mmol/L    Anion Gap 13 10 - 20 mmol/L    Urea Nitrogen 58 (H) 6 - 23 mg/dL    Creatinine 1.42 (H) 0.50 - 1.30 mg/dL    eGFR 44 (L) >60 mL/min/1.73m*2    Calcium 9.0 8.6 - 10.6 mg/dL    Albumin 2.7 (L) 3.4 - 5.0 g/dL    Alkaline Phosphatase 199 (H) 33 - 120 U/L    Total Protein 6.3 (L) 6.4 - 8.2 g/dL    AST 35 9 - 39 U/L    Bilirubin, Total 0.1 0.0 - 1.2 mg/dL    ALT 35 7 - 52 U/L   Phosphorus   Result Value Ref Range    Phosphorus 4.7 2.5 - 4.9 mg/dL     Assessment/Plan   Principal Problem:    Pyogenic arthritis of right knee joint, due to unspecified organism (CMS/[facility])    Kody Choi is a 54 y/o M with PMH significant for uncontrolled T2DM, CAD s/p multiple stents and CABG, HFrEF (EF 35-40% 01/21), A fib, RUPESH, TKA in 2015, hx of multiple septic arthritis infections in R knee, presenting with 1 week of worsening R knee pain and drainage from sinus s/p fall two days prior to symptom onset. Likely septic arthritis in setting of leukocytosis and knee pain and drainage given history of repeated infections and poorly controlled diabetes. Ortho consulted, aspiration and cultures sent.     Updates 02/01/2024:  -Patient adamantly declined surgery for above-the-knee amputation patient has understanding of the risk and benefits including overwhelming infection and death.  -Declined taking metoprolol for A-fib and HFrEF.  -Follow-up endocrinology recs.  -For discharge home today patient refused SNF.    updates 01/31/2024  -Follow up vascular duplex BLE results  -Cancelled Knee CT as it will not be beneficial or add further information regarding treatment. Only option for treatment offered at this point from orthopedics standpoint is above-knee-amputation  -Patient is refusing IV antibiotics and rehab  -Will follow up with ID regarding recommendation for antibiotics given patient is refusing IV antibiotics  outpatient  -TTE 01/30 shows reduced EF 30%, Predominant LAD wall motion abnormality with akinesis of the apex and H of anteroseptum as well as hypokinesis in the lateral wall   -Patient will require cardiology follow up outpatient  - Will start patient on metoprolol tartrate 25 mg daily for GDMT  -Blood sugars better controlled today, POCTs in the 110s in AM. Will reach out to endocrinology regarding dosing recommendations for insulin  -PT recommends SNF however patient is refusing    Updates 1/30/2024:   -Patient still refusing AKA, has capacity   -ID recommends continuing IV vancomycin (dose per pharmacy) and ceftriaxone 2g q24h  -ID recommends CT or MRI of R knee. CT knee arthrogram ordered   -BG continues to be elevated to 300s on POCT.   -Endo recommends continuing Lantus 80 units BID, and increased Prandial lispro to 25 units TID before meals  -Changed lantus to 80 BID (increased based on prior 24 hour requirements) + will continue increased SSI + will continue prandial lispro coverage 25u   -spoke with pharmacy, changed antibiotics to normal saline (instead of mixed with dextrose) on 1/27   -knee aspiration 01/25- NGTD   -blood cx 01/25- NGTD   -TTE ordered (last TTE showed HFrEF w EF 35-40% in 1/2021), patient refused 01/29 however amenable to doing it today 01/30  -stopped amlodipine and switched to losartan (GDMT)      #Superimposed R knee infection  #Hx of septic arthritis of R knee, multiple episodes  #R TKA 2015 c/b recurrent prosthetic joint infection s/p explant and antibiotic spacer in 2019 on chronic keflex  #Leukocytosis  -Broken Abx spacer seen on Xrays  -WBC 12.8, , CRP 9.28  -On outpatient keflex for suppression  -Bactrim listed in home meds but patient unsure if taking  -Orthopedics consulted, recommend AKA however patient adamantly refusing at this time after multiple attempts at discussion.  -IV vanc and ceftriaxone in the ED empirically, continued per ID recs   -knee aspirate  (-)  Plan  -Appreciate further ortho recs  -Continue IV abx with vancomycin and ceftriaxone   -Blood cultures x2: NGTD x1 day,   -PT recommends SNF     #HFrEF (EF 35-40% 01/21)  #CAD s/p stents and CABG  -on home lisinopril and atorvastatin   -per patient he does not endorse cardiac symptoms at this time, does not report any new LE swelling  Plan  - repeat TTE  -continue home atorvastatin  -Start amlodipine 5 mg    -hold lisinopril  -consider GDMT optimization     #A fib, unsure if on eliquis  -Documented history of a fib.  -Eliquis not listed as a home med, unsure if patient has been taking or not  -Chart review states he is on eliquis   Plan  -Telemetry monitoring      #Type 2 diabetes mellitus, uncontrolled  -A1c 14.7 10/2023  -POCT 486 while in ED, reportedly drawn after eating however  -hold home jardiance and semaglutide  -patient reports taking 100 units Lantus daily at home  -s/p 500 ml bolus  -endocrinology consulted  Plan  -Lantus 80 units BID   -SSI low intensity, titrate as needed - increased on 1/29  -prandial coverage with lispro 25u tid per endo recs   -Repeat A1c 16.6     F: PRN  E: PRN  N: Regular diet (pt refusing diabetic diet at this time)   A: PIV     DVT prophylaxis: lovenox      Code Status: Full code  Emergency contact: mAira Espino (?) [phone]       Chanel Bowen MD  IM PGY-2

## 2024-02-03 NOTE — DISCHARGE SUMMARY
"Discharge Diagnosis  Pyogenic arthritis of right knee joint, due to unspecified organism (CMS/[facility])  Uncontrolled DM, Insulin dependant  Acute Systolic Heart failure  CAD s/p CABG  HTN  Non compliance     Issues Requiring Follow-Up  -Please follow up patient's knee symptoms including erythema, swelling and warmth  -Please reassess patient's diabetic management and insulin regimen  -please follow weekly labwork to assess kidney status and infectious process improvement    Test Results Pending At Discharge  Pending Labs       No current pending labs.            Hospital Course  Kody Choi is a 53 y.o. adult with PMH of uncontrolled T2DM, CAD s/p multiple stents and CABG, HFrEF (EF 35-40% 01/21), A fib, RUPESH, TKA in 2015, hx of multiple septic arthritis infections in R knee, presenting with 1 week of worsening R knee pain and drainage. Patient states he began feeling knee pain in his R knee starting around one week ago and swelling that he rates a 10/10 in severity. He is wheelchair bound and did not put weight on that leg but even had difficulty moving his leg on and off the wheelchair support. He states that one day after the pain started he was in his wheelchair when the chair \"gave out\" from underneath him and he fell to the ground hitting that right knee. He also endorses drainage from the front of the right knee that started a few weeks prior. He explained that his knee has been swollen for weeks and at one point he bent down and his knee spontaneously opened up and drained purulent fluid. He states that the pain has been constant but improves with tramadol. Of note patient has had multiple infections of the R knee in the past including chronic MSSA/MRSA infections, on outpatient keflex which he states he takes consistently.      In the ED, patient was hemodynamically stable, afebrile. Xrays of R knee show no acute osseous injury, fragmentation of cement of the femoral and tibial components of the knee " "arthroplasty, soft tissue swelling greatest anteriorly. Orthopedics was consulted who took an aspiration sample from the knee and sent for cultures. Patient was then admitted for IV antibiotics. While admitted, patient continued to refuse the idea of above-knee-amputation stating \"you will not take my leg away from me, its staying with me.\" He was only interested in receiving IV antibiotics to treat his infection. Infectious disease was consulted who stated that the likelihood of the patient's infection improving solely with antibiotics was not high and that they recommended amputation as well. Patient acknowledged the risks of not getting his leg amputated including worsening of the infection leading to sepsis, septic shock and possible death. He was recommended by physical therapy to go to a SNF however patient declined stating \"I'm not in the right place right now for that, I have too much going on.\" Patient also was found have significantly uncontrolled blood sugar management with sugars in the 400s-500s despite taking 100 units of lantus at home. Patient was frustrated after being placed on a diabetic diet and changing of his insulin regimen to 80 units lantus BID with prandial and sliding scale lispro, however sugars did improve to mid 100s. He had an Echo that showed worsening LVEF to 30% down from 35% in 2021 and multiple areas of hypokinesis/akinesis. Patient encouraged to start GDMT however not agreeable at this time to beta blockers.     Infectious disease recommended patient would need continued IV antibiotics for treatment which would require a PICC line to be placed. However, patient was not agreeable to having line placed, saying he did not want to deal with outpatient IV infusions. Decision was made given his refusal for operative and IV antibiotic management to discharge patient on daptomycin 800 mg daily for total 6 weeks to end March 7th, 2024 and doxycycline 100 mg BID likely indefinitely. " "Patient was again reminded of the risks of leaving without IV antibiotics and surgical management of R knee infection and patient acknowledges and understands the risks of leaving.        Home Medications     Medication List      START taking these medications     acetaminophen 325 mg tablet; Commonly known as: Tylenol; Take 2 tablets   (650 mg) by mouth every 6 hours if needed for moderate pain (4 - 6) or   headaches for up to 14 days.   BD Ultra-Fine Short Pen Needle 31 gauge x 5/16\" needle; Generic drug:   pen needle, diabetic; Use to inject 1-4 times daily as directed.   doxycycline 100 mg capsule; Commonly known as: Vibramycin; Take 1   capsule (100 mg) by mouth 2 times a day. Take with at least 8 ounces   (large glass) of water, do not lie down for 30 minutes after   insulin lispro 100 unit/mL injection; Commonly known as: HumaLOG; Inject   25 Units under the skin 3 times a day with meals.   losartan 25 mg tablet; Commonly known as: Cozaar; Take 1 tablet (25 mg)   by mouth once daily.   metoprolol succinate XL 25 mg 24 hr tablet; Commonly known as:   Toprol-XL; Take 1 tablet (25 mg) by mouth once daily for 6 doses. Do not   crush or chew.     CHANGE how you take these medications     insulin glargine 100 unit/mL (3 mL) pen; Commonly known as: Lantus;   Inject 70 Units under the skin 2 times a day.; What changed: how much to   take, when to take this, additional instructions     CONTINUE taking these medications     aspirin 81 mg EC tablet   atorvastatin 40 mg tablet; Commonly known as: Lipitor   fluticasone 50 mcg/actuation nasal spray; Commonly known as: Flonase   omeprazole 20 mg DR capsule; Commonly known as: PriLOSEC   Rybelsus 7 mg tablet; Generic drug: semaglutide   traZODone 50 mg tablet; Commonly known as: Desyrel     STOP taking these medications     amLODIPine 5 mg tablet; Commonly known as: Norvasc   cephalexin 500 mg capsule; Commonly known as: Keflex   empagliflozin 25 mg; Commonly known as: " Jardiance   sulfamethoxazole-trimethoprim 800-160 mg tablet; Commonly known as:   Bactrim DS       Outpatient Follow-Up  Future Appointments   Date Time Provider Department Center   2/29/2024  8:40 AM Noah Goodwin MD M[id number]       Ana Baumann MD

## 2024-02-04 LAB
GLUCOSE BLD MANUAL STRIP-MCNC: 175 MG/DL (ref 74–99)
GLUCOSE BLD MANUAL STRIP-MCNC: 77 MG/DL (ref 74–99)

## 2024-02-16 ENCOUNTER — HOSPITAL ENCOUNTER (INPATIENT)
Facility: HOSPITAL | Age: 54
LOS: 7 days | Discharge: NURSING, REHAB OR LONG-TERM CARE | DRG: 560 | End: 2024-02-23
Attending: STUDENT IN AN ORGANIZED HEALTH CARE EDUCATION/TRAINING PROGRAM | Admitting: INTERNAL MEDICINE
Payer: COMMERCIAL

## 2024-02-16 ENCOUNTER — APPOINTMENT (OUTPATIENT)
Dept: RADIOLOGY | Facility: HOSPITAL | Age: 54
DRG: 560 | End: 2024-02-16
Payer: COMMERCIAL

## 2024-02-16 ENCOUNTER — CLINICAL SUPPORT (OUTPATIENT)
Dept: EMERGENCY MEDICINE | Facility: HOSPITAL | Age: 54
DRG: 560 | End: 2024-02-16
Payer: COMMERCIAL

## 2024-02-16 DIAGNOSIS — E11.65 TYPE 2 DIABETES MELLITUS WITH HYPERGLYCEMIA, WITH LONG-TERM CURRENT USE OF INSULIN (MULTI): ICD-10-CM

## 2024-02-16 DIAGNOSIS — M00.9 PYOGENIC ARTHRITIS OF RIGHT KNEE JOINT, DUE TO UNSPECIFIED ORGANISM (MULTI): Primary | ICD-10-CM

## 2024-02-16 DIAGNOSIS — E11.65 HYPERGLYCEMIA DUE TO DIABETES MELLITUS (MULTI): ICD-10-CM

## 2024-02-16 DIAGNOSIS — Z79.4 TYPE 2 DIABETES MELLITUS WITH HYPERGLYCEMIA, WITH LONG-TERM CURRENT USE OF INSULIN (MULTI): ICD-10-CM

## 2024-02-16 DIAGNOSIS — L03.90 CELLULITIS, UNSPECIFIED CELLULITIS SITE: ICD-10-CM

## 2024-02-16 DIAGNOSIS — M00.9: ICD-10-CM

## 2024-02-16 DIAGNOSIS — I50.9 HEART FAILURE, UNSPECIFIED HF CHRONICITY, UNSPECIFIED HEART FAILURE TYPE (MULTI): ICD-10-CM

## 2024-02-16 LAB
ALBUMIN SERPL BCP-MCNC: 3.1 G/DL (ref 3.4–5)
ALP SERPL-CCNC: 394 U/L (ref 33–120)
ALT SERPL W P-5'-P-CCNC: 60 U/L (ref 7–52)
ANION GAP BLDV CALCULATED.4IONS-SCNC: 12 MMOL/L (ref 10–25)
ANION GAP SERPL CALC-SCNC: 14 MMOL/L
AST SERPL W P-5'-P-CCNC: 18 U/L (ref 9–39)
BASE EXCESS BLDV CALC-SCNC: -4 MMOL/L (ref -2–3)
BASOPHILS # BLD AUTO: 0.1 X10*3/UL (ref 0–0.1)
BASOPHILS NFR BLD AUTO: 0.9 %
BILIRUB SERPL-MCNC: 0.3 MG/DL (ref 0–1.2)
BODY TEMPERATURE: 37 DEGREES CELSIUS
BUN SERPL-MCNC: 33 MG/DL (ref 6–23)
CA-I BLDV-SCNC: 1.23 MMOL/L (ref 1.1–1.33)
CALCIUM SERPL-MCNC: 8.3 MG/DL (ref 8.6–10.6)
CHLORIDE BLDV-SCNC: 103 MMOL/L (ref 98–107)
CHLORIDE SERPL-SCNC: 102 MMOL/L (ref 98–107)
CO2 SERPL-SCNC: 21 MMOL/L (ref 21–32)
CREAT SERPL-MCNC: 1.63 MG/DL (ref 0.5–1.3)
EGFRCR SERPLBLD CKD-EPI 2021: 37 ML/MIN/1.73M*2
EOSINOPHIL # BLD AUTO: 0.19 X10*3/UL (ref 0–0.7)
EOSINOPHIL NFR BLD AUTO: 1.7 %
ERYTHROCYTE [DISTWIDTH] IN BLOOD BY AUTOMATED COUNT: 16.4 % (ref 11.5–14.5)
GLUCOSE BLD MANUAL STRIP-MCNC: 410 MG/DL (ref 74–99)
GLUCOSE BLDV-MCNC: 381 MG/DL (ref 74–99)
GLUCOSE SERPL-MCNC: 470 MG/DL (ref 74–99)
HCO3 BLDV-SCNC: 22.6 MMOL/L (ref 22–26)
HCT VFR BLD AUTO: 32.4 % (ref 36–52)
HCT VFR BLD EST: 30 % (ref 36–52)
HGB BLD-MCNC: 10 G/DL (ref 12–17.5)
HGB BLDV-MCNC: 10.1 G/DL (ref 12–17.5)
IMM GRANULOCYTES # BLD AUTO: 0.1 X10*3/UL (ref 0–0.7)
IMM GRANULOCYTES NFR BLD AUTO: 0.9 % (ref 0–0.9)
INHALED O2 CONCENTRATION: 100 %
LACTATE BLDV-SCNC: 1.4 MMOL/L (ref 0.4–2)
LYMPHOCYTES # BLD AUTO: 1.31 X10*3/UL (ref 1.2–4.8)
LYMPHOCYTES NFR BLD AUTO: 11.4 %
MAGNESIUM SERPL-MCNC: 2.06 MG/DL (ref 1.6–2.4)
MCH RBC QN AUTO: 23.1 PG (ref 26–34)
MCHC RBC AUTO-ENTMCNC: 30.9 G/DL (ref 32–36)
MCV RBC AUTO: 75 FL (ref 80–100)
MONOCYTES # BLD AUTO: 0.91 X10*3/UL (ref 0.1–1)
MONOCYTES NFR BLD AUTO: 7.9 %
NEUTROPHILS # BLD AUTO: 8.85 X10*3/UL (ref 1.2–7.7)
NEUTROPHILS NFR BLD AUTO: 77.2 %
NRBC BLD-RTO: 0.2 /100 WBCS (ref 0–0)
OXYHGB MFR BLDV: 42 % (ref 45–75)
PCO2 BLDV: 47 MM HG (ref 41–51)
PH BLDV: 7.29 PH (ref 7.33–7.43)
PHOSPHATE SERPL-MCNC: 3.3 MG/DL (ref 2.5–4.9)
PLATELET # BLD AUTO: 491 X10*3/UL (ref 150–450)
PO2 BLDV: 34 MM HG (ref 35–45)
POTASSIUM BLDV-SCNC: 5.7 MMOL/L (ref 3.5–5.3)
POTASSIUM SERPL-SCNC: 5.1 MMOL/L (ref 3.5–5.3)
POTASSIUM SERPL-SCNC: 6.3 MMOL/L (ref 3.5–5.3)
PROT SERPL-MCNC: 6.4 G/DL (ref 6.4–8.2)
RBC # BLD AUTO: 4.33 X10*6/UL (ref 4–5.9)
SAO2 % BLDV: 42 % (ref 45–75)
SODIUM BLDV-SCNC: 132 MMOL/L (ref 136–145)
SODIUM SERPL-SCNC: 131 MMOL/L (ref 136–145)
WBC # BLD AUTO: 11.5 X10*3/UL (ref 4.4–11.3)

## 2024-02-16 PROCEDURE — 2500000004 HC RX 250 GENERAL PHARMACY W/ HCPCS (ALT 636 FOR OP/ED)

## 2024-02-16 PROCEDURE — 96365 THER/PROPH/DIAG IV INF INIT: CPT

## 2024-02-16 PROCEDURE — 82947 ASSAY GLUCOSE BLOOD QUANT: CPT

## 2024-02-16 PROCEDURE — 99285 EMERGENCY DEPT VISIT HI MDM: CPT | Performed by: STUDENT IN AN ORGANIZED HEALTH CARE EDUCATION/TRAINING PROGRAM

## 2024-02-16 PROCEDURE — 87040 BLOOD CULTURE FOR BACTERIA: CPT

## 2024-02-16 PROCEDURE — 1210000001 HC SEMI-PRIVATE ROOM DAILY

## 2024-02-16 PROCEDURE — 84100 ASSAY OF PHOSPHORUS: CPT

## 2024-02-16 PROCEDURE — 2500000002 HC RX 250 W HCPCS SELF ADMINISTERED DRUGS (ALT 637 FOR MEDICARE OP, ALT 636 FOR OP/ED)

## 2024-02-16 PROCEDURE — 2500000001 HC RX 250 WO HCPCS SELF ADMINISTERED DRUGS (ALT 637 FOR MEDICARE OP)

## 2024-02-16 PROCEDURE — 96367 TX/PROPH/DG ADDL SEQ IV INF: CPT

## 2024-02-16 PROCEDURE — 99285 EMERGENCY DEPT VISIT HI MDM: CPT | Mod: 25 | Performed by: STUDENT IN AN ORGANIZED HEALTH CARE EDUCATION/TRAINING PROGRAM

## 2024-02-16 PROCEDURE — 36415 COLL VENOUS BLD VENIPUNCTURE: CPT

## 2024-02-16 PROCEDURE — 73560 X-RAY EXAM OF KNEE 1 OR 2: CPT | Mod: RT

## 2024-02-16 PROCEDURE — 86140 C-REACTIVE PROTEIN: CPT

## 2024-02-16 PROCEDURE — 2500000005 HC RX 250 GENERAL PHARMACY W/O HCPCS

## 2024-02-16 PROCEDURE — 36000 PLACE NEEDLE IN VEIN: CPT | Performed by: STUDENT IN AN ORGANIZED HEALTH CARE EDUCATION/TRAINING PROGRAM

## 2024-02-16 PROCEDURE — 83735 ASSAY OF MAGNESIUM: CPT

## 2024-02-16 PROCEDURE — 73560 X-RAY EXAM OF KNEE 1 OR 2: CPT | Mod: RIGHT SIDE | Performed by: STUDENT IN AN ORGANIZED HEALTH CARE EDUCATION/TRAINING PROGRAM

## 2024-02-16 PROCEDURE — 80053 COMPREHEN METABOLIC PANEL: CPT

## 2024-02-16 PROCEDURE — 85025 COMPLETE CBC W/AUTO DIFF WBC: CPT

## 2024-02-16 PROCEDURE — 96375 TX/PRO/DX INJ NEW DRUG ADDON: CPT

## 2024-02-16 PROCEDURE — 84132 ASSAY OF SERUM POTASSIUM: CPT

## 2024-02-16 PROCEDURE — 93010 ELECTROCARDIOGRAM REPORT: CPT | Performed by: STUDENT IN AN ORGANIZED HEALTH CARE EDUCATION/TRAINING PROGRAM

## 2024-02-16 PROCEDURE — 93005 ELECTROCARDIOGRAM TRACING: CPT

## 2024-02-16 RX ORDER — TRAMADOL HYDROCHLORIDE 50 MG/1
50 TABLET ORAL ONCE
Status: COMPLETED | OUTPATIENT
Start: 2024-02-16 | End: 2024-02-16

## 2024-02-16 RX ORDER — VANCOMYCIN HYDROCHLORIDE 1 G/200ML
2000 INJECTION, SOLUTION INTRAVENOUS ONCE
Status: COMPLETED | OUTPATIENT
Start: 2024-02-16 | End: 2024-02-17

## 2024-02-16 RX ORDER — INSULIN LISPRO 100 [IU]/ML
5 INJECTION, SOLUTION INTRAVENOUS; SUBCUTANEOUS ONCE
Status: DISCONTINUED | OUTPATIENT
Start: 2024-02-16 | End: 2024-02-17

## 2024-02-16 RX ORDER — CEFTRIAXONE 2 G/50ML
2 INJECTION, SOLUTION INTRAVENOUS EVERY 24 HOURS
Status: DISCONTINUED | OUTPATIENT
Start: 2024-02-16 | End: 2024-02-18

## 2024-02-16 RX ORDER — ATORVASTATIN CALCIUM 40 MG/1
40 TABLET, FILM COATED ORAL DAILY
Status: DISCONTINUED | OUTPATIENT
Start: 2024-02-17 | End: 2024-02-23 | Stop reason: HOSPADM

## 2024-02-16 RX ORDER — DEXTROSE 50 % IN WATER (D50W) INTRAVENOUS SYRINGE
25 ONCE
Status: COMPLETED | OUTPATIENT
Start: 2024-02-16 | End: 2024-02-16

## 2024-02-16 RX ORDER — INSULIN GLARGINE 100 [IU]/ML
70 INJECTION, SOLUTION SUBCUTANEOUS EVERY 12 HOURS
Status: DISCONTINUED | OUTPATIENT
Start: 2024-02-16 | End: 2024-02-17

## 2024-02-16 RX ORDER — CALCIUM GLUCONATE 20 MG/ML
2 INJECTION, SOLUTION INTRAVENOUS ONCE
Status: DISCONTINUED | OUTPATIENT
Start: 2024-02-16 | End: 2024-02-23 | Stop reason: HOSPADM

## 2024-02-16 RX ORDER — INSULIN LISPRO 100 [IU]/ML
10 INJECTION, SOLUTION INTRAVENOUS; SUBCUTANEOUS
Status: DISCONTINUED | OUTPATIENT
Start: 2024-02-17 | End: 2024-02-17

## 2024-02-16 RX ORDER — DEXTROSE MONOHYDRATE 100 MG/ML
0.3 INJECTION, SOLUTION INTRAVENOUS ONCE AS NEEDED
Status: COMPLETED | OUTPATIENT
Start: 2024-02-16 | End: 2024-02-21

## 2024-02-16 RX ORDER — CEFTRIAXONE 1 G/50ML
1 INJECTION, SOLUTION INTRAVENOUS ONCE
Status: COMPLETED | OUTPATIENT
Start: 2024-02-16 | End: 2024-02-16

## 2024-02-16 RX ORDER — ACETAMINOPHEN 325 MG/1
650 TABLET ORAL ONCE
Status: COMPLETED | OUTPATIENT
Start: 2024-02-16 | End: 2024-02-16

## 2024-02-16 RX ORDER — ALBUTEROL SULFATE 5 MG/ML
10 SOLUTION RESPIRATORY (INHALATION) ONCE
Status: DISCONTINUED | OUTPATIENT
Start: 2024-02-16 | End: 2024-02-23 | Stop reason: HOSPADM

## 2024-02-16 RX ORDER — DEXTROSE 50 % IN WATER (D50W) INTRAVENOUS SYRINGE
25
Status: DISCONTINUED | OUTPATIENT
Start: 2024-02-16 | End: 2024-02-23 | Stop reason: HOSPADM

## 2024-02-16 RX ORDER — OXYCODONE AND ACETAMINOPHEN 5; 325 MG/1; MG/1
1 TABLET ORAL ONCE
Status: DISCONTINUED | OUTPATIENT
Start: 2024-02-16 | End: 2024-02-16

## 2024-02-16 RX ADMIN — VANCOMYCIN HYDROCHLORIDE 2000 MG: 1 INJECTION, SOLUTION INTRAVENOUS at 23:27

## 2024-02-16 RX ADMIN — DEXTROSE MONOHYDRATE 25 G: 25 INJECTION, SOLUTION INTRAVENOUS at 18:51

## 2024-02-16 RX ADMIN — ACETAMINOPHEN 650 MG: 325 TABLET ORAL at 21:02

## 2024-02-16 RX ADMIN — TRAMADOL HYDROCHLORIDE 50 MG: 50 TABLET, COATED ORAL at 18:27

## 2024-02-16 RX ADMIN — CEFTRIAXONE SODIUM 1 G: 1 INJECTION, SOLUTION INTRAVENOUS at 18:13

## 2024-02-16 RX ADMIN — INSULIN HUMAN 5 UNITS: 100 INJECTION, SOLUTION PARENTERAL at 18:52

## 2024-02-16 ASSESSMENT — PAIN SCALES - GENERAL
PAINLEVEL_OUTOF10: 4
PAINLEVEL_OUTOF10: 9
PAINLEVEL_OUTOF10: 10 - WORST POSSIBLE PAIN

## 2024-02-16 ASSESSMENT — PAIN - FUNCTIONAL ASSESSMENT
PAIN_FUNCTIONAL_ASSESSMENT: 0-10
PAIN_FUNCTIONAL_ASSESSMENT: 0-10

## 2024-02-16 ASSESSMENT — PAIN DESCRIPTION - LOCATION: LOCATION: KNEE

## 2024-02-16 ASSESSMENT — PAIN DESCRIPTION - ORIENTATION: ORIENTATION: RIGHT

## 2024-02-16 NOTE — ED PROVIDER NOTES
CC: Wound Check     HPI: Kody Choi is an 53 y.o. adult with history including uncontrolled T2DM, CAD s/p multiple stents and CABG, HFrEF (EF 35-40% 01/21), A fib, RUPESH, TKA in 2015, hx of multiple septic arthritis infections in R knee  presenting to the emergency department for worsening right knee pain.  Patient was discharged on the first.  Patient reports that his symptoms were improving at the time of discharge.  They recommended a PICC line at that time which he declined because he was unable to pay for insurance.  Is reporting that he just got home yesterday and has been having worsening pain.  Notes that he is unable to move it or ambulate on it.  He states he was able to with the last time he was admitted.  Is noting that it had a large amount of drainage yesterday.  He notes that he was only taking Keflex at home.  EMS also reports that his sugars were elevated.  Patient is currently not complaining about abdominal pain.    Triage note was reviewed and  agree with it  Limitations to History:  none  Additional History Obtained from:  chart review    PMHx/PSHx:  Per HPI.   - has a past medical history of Personal history of other endocrine, nutritional and metabolic disease and Unspecified astigmatism, bilateral (01/04/2016).  - has a past surgical history that includes Knee surgery (10/29/2014).    Social History:  - Tobacco:  reports that he has never smoked. He has never been exposed to tobacco smoke. He has never used smokeless tobacco.   - Alcohol:  has no history on file for alcohol use.   - Drugs:  has no history on file for drug use.     Medications: Reviewed in EMR.     Allergies:  Loratadine, Pollen extracts, and Lisinopril    ???????????????????????????????????????????????????????????????  Triage Vitals:  T 37.1 °C (98.8 °F)  HR 96  /84  RR 18  O2 98 % None (Room air)    Physical Exam  Vitals and nursing note reviewed.   Constitutional:       General: He is not in acute distress.  HENT:       Head: Normocephalic and atraumatic.   Eyes:      Conjunctiva/sclera: Conjunctivae normal.   Cardiovascular:      Rate and Rhythm: Normal rate and regular rhythm.   Pulmonary:      Effort: Pulmonary effort is normal. No respiratory distress.      Breath sounds: Normal breath sounds.   Abdominal:      General: There is no distension.      Palpations: Abdomen is soft.   Musculoskeletal:         General: No deformity.      Cervical back: Normal range of motion.      Comments: Erythema, hot, areas of pustular drainage overlying the right knee. Unable to bend it secondary to pain.    Skin:     General: Skin is warm and dry.   Neurological:      Mental Status: He is alert and oriented to person, place, and time.   Psychiatric:         Mood and Affect: Mood normal.         Behavior: Behavior normal.       ???????????????????????????????????????????????????????????????      ED Course/Medical Decision Making:    ED Course as of 02/16/24 2257 Fri Feb 16, 2024   1841 Potassium was elevated no signs of hemolysis on the lab work.  Will treat with calcium gluconate, insulin and D50.  EKG will be obtained. [BERNARDINO]   2047 EKG was independently interpreted and sinus rhythm at a rate of 94.  Patient has an incomplete bundle branch block.  No signs of peaked T waves.  No signs of ST elevation [BERNARDINO]      ED Course User Index  [BERNARDINO] Franchesca Lake MD         Diagnoses as of 02/16/24 2257   Pyogenic arthritis of right knee joint, due to unspecified organism (CMS/[facility])        Patient is a 53-year-old male with past medical history of a septic joint who is presenting for recurrent septic joint.  Patient symptoms are very consistent with septic arthritis.  Low concern for sepsis at this time.  Low concern for alternative etiology including fracture.  X-rays of the knee were obtained in addition lab work including blood cultures.  Patient was discussed with Ortho surgery who stated that they would not offer this patient a washout and they  would only offer this patient an amputation.  This was discussed with the patient who does not want an amputation at this time.  Given that the patient will not be going for washout he was started on broad-spectrum antibiotics with vancomycin and Rocephin.  Lab work was reviewed and was notable for hyperglycemia.  Patient was not in DKA or HHS.  Otherwise lab work was notable for an anemia at the patient's baseline as well as a mild leukocytosis unsurprising given that the patient is presenting with septic arthritis.  Patient CMP was notable for hyperkalemia and worsening kidney function.  No EKG changes secondary to the hyperkalemia. patient was given calcium gluconate, insulin, D5.  Patient was offered Lokelma but refused.  Patient was given pain meds including Tylenol and tramadol.  X-ray of the knee shows the knee replacement with cement fragmentation of the femoral and tibial components.  Patient's potassium was improved on repeat and hyperglycemia was improved.  As a result of their workup, the patient will require admission to the hospital.  The patient was informed of their diagnosis.  Patient was given the opportunity to ask questions and answered them.  Patient agreed to be admitted to the hospital.      External records reviewed: recent inpatient, clinic, and prior ED notes  Diagnostic imaging independently reviewed/interpreted by me (as reflected in MDM) includes: labs, EKG,   Social Determinants Affecting Care:   Discussion of management with other providers: ED attending,  admission coordinator  Prescription Drug Consideration: vanc, rocephin, insulin, D50   Escalation of Care: considered admission    Pt was seen and discussed with ED attending     Impression:   Septic arthritis  Hyperglycemia  Hyperkalemia      Disposition: admit        General    Performed by: Franchesca Lake MD  Authorized by: Monica Epstein MD    Consent:     Consent obtained:  Verbal    Consent given by:  Patient    Risks discussed:   Bleeding    Alternatives discussed:  No treatment  Universal protocol:     Patient identity confirmed:  Verbally with patient  Pre-procedure details:     Skin preparation:  Chlorhexidine  Procedure specific details:      There was difficulty obtaining IV access on this patient, and multiple attempts by nursing staff and/or medics have failed. Patient required IV access by ED provider under the assistance of ultrasound.      Site was prepped and sterilized before IV insertion.     Ultrasound images were not saved in the patient's chart demonstrating cannulization.      IV Size: 20  Anatomic Side: Right  IV Site: Forearm      Franchesca Lake MD    Post-procedure details:     Procedure completion:  Tolerated   ? [employer] last updated 2/16/2024 4:30 PM        Franchesca Lake MD  Resident  02/16/24 0802

## 2024-02-16 NOTE — ED TRIAGE NOTES
Pt brought in by [facility] c/o MRSA. Pt is having r knee pain that is red with severe swelling. Pt states this is n ongoing problem for 4 years now. Vitals stable denies fever chills cough

## 2024-02-17 LAB
ABO GROUP (TYPE) IN BLOOD: NORMAL
ALBUMIN SERPL BCP-MCNC: 2.8 G/DL (ref 3.4–5)
ALP SERPL-CCNC: 343 U/L (ref 33–120)
ALT SERPL W P-5'-P-CCNC: 54 U/L (ref 7–52)
ANION GAP BLDV CALCULATED.4IONS-SCNC: 10 MMOL/L (ref 10–25)
ANION GAP SERPL CALC-SCNC: 15 MMOL/L (ref 10–20)
ANTIBODY SCREEN: NORMAL
APTT PPP: 34 SECONDS (ref 27–38)
AST SERPL W P-5'-P-CCNC: 23 U/L (ref 9–39)
ATRIAL RATE: 94 BPM
BASE EXCESS BLDV CALC-SCNC: -2.9 MMOL/L (ref -2–3)
BASOPHILS # BLD AUTO: 0.1 X10*3/UL (ref 0–0.1)
BASOPHILS NFR BLD AUTO: 0.9 %
BILIRUB SERPL-MCNC: 0.3 MG/DL (ref 0–1.2)
BODY TEMPERATURE: 37 DEGREES CELSIUS
BUN SERPL-MCNC: 34 MG/DL (ref 6–23)
CA-I BLDV-SCNC: 1.21 MMOL/L (ref 1.1–1.33)
CALCIUM SERPL-MCNC: 8.5 MG/DL (ref 8.6–10.6)
CHLORIDE BLDV-SCNC: 106 MMOL/L (ref 98–107)
CHLORIDE SERPL-SCNC: 104 MMOL/L (ref 98–107)
CO2 SERPL-SCNC: 23 MMOL/L (ref 21–32)
CREAT SERPL-MCNC: 1.64 MG/DL (ref 0.5–1.3)
CRP SERPL-MCNC: 5.01 MG/DL
EGFRCR SERPLBLD CKD-EPI 2021: 37 ML/MIN/1.73M*2
EOSINOPHIL # BLD AUTO: 0.47 X10*3/UL (ref 0–0.7)
EOSINOPHIL NFR BLD AUTO: 4.2 %
ERYTHROCYTE [DISTWIDTH] IN BLOOD BY AUTOMATED COUNT: 16.9 % (ref 11.5–14.5)
ERYTHROCYTE [SEDIMENTATION RATE] IN BLOOD BY WESTERGREN METHOD: 122 MM/H (ref 0–30)
GGT SERPL-CCNC: 90 U/L (ref 5–64)
GLUCOSE BLD MANUAL STRIP-MCNC: 101 MG/DL (ref 74–99)
GLUCOSE BLD MANUAL STRIP-MCNC: 260 MG/DL (ref 74–99)
GLUCOSE BLD MANUAL STRIP-MCNC: 290 MG/DL (ref 74–99)
GLUCOSE BLD MANUAL STRIP-MCNC: 297 MG/DL (ref 74–99)
GLUCOSE BLD MANUAL STRIP-MCNC: 93 MG/DL (ref 74–99)
GLUCOSE BLD MANUAL STRIP-MCNC: 97 MG/DL (ref 74–99)
GLUCOSE BLDV-MCNC: 182 MG/DL (ref 74–99)
GLUCOSE SERPL-MCNC: 152 MG/DL (ref 74–99)
HCO3 BLDV-SCNC: 24.3 MMOL/L (ref 22–26)
HCT VFR BLD AUTO: 34 % (ref 36–52)
HCT VFR BLD EST: 31 % (ref 36–52)
HGB BLD-MCNC: 10 G/DL (ref 12–17.5)
HGB BLDV-MCNC: 10.3 G/DL (ref 12–17.5)
IMM GRANULOCYTES # BLD AUTO: 0.07 X10*3/UL (ref 0–0.7)
IMM GRANULOCYTES NFR BLD AUTO: 0.6 % (ref 0–0.9)
INHALED O2 CONCENTRATION: 0 %
INR PPP: 1.1 (ref 0.9–1.1)
LACTATE BLDV-SCNC: 1.8 MMOL/L (ref 0.4–2)
LYMPHOCYTES # BLD AUTO: 1.46 X10*3/UL (ref 1.2–4.8)
LYMPHOCYTES NFR BLD AUTO: 13.1 %
MAGNESIUM SERPL-MCNC: 2.11 MG/DL (ref 1.6–2.4)
MCH RBC QN AUTO: 23.9 PG (ref 26–34)
MCHC RBC AUTO-ENTMCNC: 29.4 G/DL (ref 32–36)
MCV RBC AUTO: 81 FL (ref 80–100)
MONOCYTES # BLD AUTO: 0.92 X10*3/UL (ref 0.1–1)
MONOCYTES NFR BLD AUTO: 8.2 %
NEUTROPHILS # BLD AUTO: 8.16 X10*3/UL (ref 1.2–7.7)
NEUTROPHILS NFR BLD AUTO: 73 %
NRBC BLD-RTO: 0.2 /100 WBCS (ref 0–0)
OXYHGB MFR BLDV: 72.3 % (ref 45–75)
P AXIS: 60 DEGREES
P OFFSET: 170 MS
P ONSET: 114 MS
PCO2 BLDV: 53 MM HG (ref 41–51)
PH BLDV: 7.27 PH (ref 7.33–7.43)
PLATELET # BLD AUTO: 480 X10*3/UL (ref 150–450)
PO2 BLDV: 49 MM HG (ref 35–45)
POTASSIUM BLDV-SCNC: 5.7 MMOL/L (ref 3.5–5.3)
POTASSIUM SERPL-SCNC: 5.5 MMOL/L (ref 3.5–5.3)
PR INTERVAL: 170 MS
PROT SERPL-MCNC: 6.1 G/DL (ref 6.4–8.2)
PROTHROMBIN TIME: 12.5 SECONDS (ref 9.8–12.8)
Q ONSET: 199 MS
QRS COUNT: 15 BEATS
QRS DURATION: 108 MS
QT INTERVAL: 350 MS
QTC CALCULATION(BAZETT): 437 MS
QTC FREDERICIA: 406 MS
R AXIS: 4 DEGREES
RBC # BLD AUTO: 4.19 X10*6/UL (ref 4–5.9)
RH FACTOR (ANTIGEN D): NORMAL
SAO2 % BLDV: 74 % (ref 45–75)
SODIUM BLDV-SCNC: 135 MMOL/L (ref 136–145)
SODIUM SERPL-SCNC: 136 MMOL/L (ref 136–145)
T AXIS: 5 DEGREES
T OFFSET: 374 MS
VENTRICULAR RATE: 94 BPM
WBC # BLD AUTO: 11.2 X10*3/UL (ref 4.4–11.3)

## 2024-02-17 PROCEDURE — 86901 BLOOD TYPING SEROLOGIC RH(D): CPT

## 2024-02-17 PROCEDURE — 82947 ASSAY GLUCOSE BLOOD QUANT: CPT

## 2024-02-17 PROCEDURE — 84075 ASSAY ALKALINE PHOSPHATASE: CPT

## 2024-02-17 PROCEDURE — 2500000002 HC RX 250 W HCPCS SELF ADMINISTERED DRUGS (ALT 637 FOR MEDICARE OP, ALT 636 FOR OP/ED)

## 2024-02-17 PROCEDURE — 85610 PROTHROMBIN TIME: CPT

## 2024-02-17 PROCEDURE — 2500000004 HC RX 250 GENERAL PHARMACY W/ HCPCS (ALT 636 FOR OP/ED)

## 2024-02-17 PROCEDURE — 82977 ASSAY OF GGT: CPT

## 2024-02-17 PROCEDURE — 83735 ASSAY OF MAGNESIUM: CPT

## 2024-02-17 PROCEDURE — 99222 1ST HOSP IP/OBS MODERATE 55: CPT

## 2024-02-17 PROCEDURE — 36415 COLL VENOUS BLD VENIPUNCTURE: CPT

## 2024-02-17 PROCEDURE — 1200000002 HC GENERAL ROOM WITH TELEMETRY DAILY

## 2024-02-17 PROCEDURE — 85652 RBC SED RATE AUTOMATED: CPT

## 2024-02-17 PROCEDURE — 85025 COMPLETE CBC W/AUTO DIFF WBC: CPT

## 2024-02-17 PROCEDURE — 99222 1ST HOSP IP/OBS MODERATE 55: CPT | Performed by: INTERNAL MEDICINE

## 2024-02-17 PROCEDURE — 2500000001 HC RX 250 WO HCPCS SELF ADMINISTERED DRUGS (ALT 637 FOR MEDICARE OP)

## 2024-02-17 PROCEDURE — 84132 ASSAY OF SERUM POTASSIUM: CPT

## 2024-02-17 RX ORDER — TALC
3 POWDER (GRAM) TOPICAL NIGHTLY PRN
Status: DISCONTINUED | OUTPATIENT
Start: 2024-02-17 | End: 2024-02-23 | Stop reason: HOSPADM

## 2024-02-17 RX ORDER — METOPROLOL SUCCINATE 25 MG/1
25 TABLET, EXTENDED RELEASE ORAL DAILY
Status: DISCONTINUED | OUTPATIENT
Start: 2024-02-17 | End: 2024-02-23 | Stop reason: HOSPADM

## 2024-02-17 RX ORDER — INSULIN LISPRO 100 [IU]/ML
25 INJECTION, SOLUTION INTRAVENOUS; SUBCUTANEOUS
Status: DISCONTINUED | OUTPATIENT
Start: 2024-02-17 | End: 2024-02-21

## 2024-02-17 RX ORDER — INSULIN LISPRO 100 [IU]/ML
0-10 INJECTION, SOLUTION INTRAVENOUS; SUBCUTANEOUS
Status: DISCONTINUED | OUTPATIENT
Start: 2024-02-17 | End: 2024-02-23 | Stop reason: HOSPADM

## 2024-02-17 RX ORDER — NAPROXEN SODIUM 220 MG/1
81 TABLET, FILM COATED ORAL DAILY
Status: DISCONTINUED | OUTPATIENT
Start: 2024-02-17 | End: 2024-02-23 | Stop reason: HOSPADM

## 2024-02-17 RX ORDER — TRAMADOL HYDROCHLORIDE 50 MG/1
50 TABLET ORAL EVERY 6 HOURS PRN
Status: DISCONTINUED | OUTPATIENT
Start: 2024-02-17 | End: 2024-02-23 | Stop reason: HOSPADM

## 2024-02-17 RX ORDER — CEPHALEXIN 500 MG/1
500 CAPSULE ORAL 2 TIMES DAILY
COMMUNITY
End: 2024-02-23 | Stop reason: HOSPADM

## 2024-02-17 RX ORDER — TRAMADOL HYDROCHLORIDE 50 MG/1
50 TABLET ORAL 3 TIMES DAILY
Status: ON HOLD | COMMUNITY
Start: 2024-02-06 | End: 2024-02-20 | Stop reason: SDUPTHER

## 2024-02-17 RX ORDER — LISINOPRIL 5 MG/1
5 TABLET ORAL DAILY
Status: DISCONTINUED | OUTPATIENT
Start: 2024-02-17 | End: 2024-02-17

## 2024-02-17 RX ORDER — LISINOPRIL 5 MG/1
5 TABLET ORAL DAILY
COMMUNITY
Start: 2015-04-02 | End: 2024-02-23 | Stop reason: HOSPADM

## 2024-02-17 RX ORDER — ACETAMINOPHEN 325 MG/1
650 TABLET ORAL EVERY 6 HOURS PRN
Status: DISCONTINUED | OUTPATIENT
Start: 2024-02-17 | End: 2024-02-23 | Stop reason: HOSPADM

## 2024-02-17 RX ORDER — INSULIN GLARGINE 100 [IU]/ML
80 INJECTION, SOLUTION SUBCUTANEOUS EVERY 12 HOURS
Status: DISCONTINUED | OUTPATIENT
Start: 2024-02-17 | End: 2024-02-21

## 2024-02-17 RX ORDER — LOSARTAN POTASSIUM 25 MG/1
25 TABLET ORAL DAILY
Status: DISCONTINUED | OUTPATIENT
Start: 2024-02-17 | End: 2024-02-23 | Stop reason: HOSPADM

## 2024-02-17 RX ORDER — VANCOMYCIN HYDROCHLORIDE 1 G/20ML
INJECTION, POWDER, LYOPHILIZED, FOR SOLUTION INTRAVENOUS DAILY PRN
Status: DISCONTINUED | OUTPATIENT
Start: 2024-02-17 | End: 2024-02-20

## 2024-02-17 RX ORDER — HEPARIN SODIUM 5000 [USP'U]/ML
7500 INJECTION, SOLUTION INTRAVENOUS; SUBCUTANEOUS EVERY 8 HOURS SCHEDULED
Status: DISCONTINUED | OUTPATIENT
Start: 2024-02-17 | End: 2024-02-23 | Stop reason: HOSPADM

## 2024-02-17 RX ADMIN — ATORVASTATIN CALCIUM 40 MG: 40 TABLET, FILM COATED ORAL at 09:24

## 2024-02-17 RX ADMIN — HEPARIN SODIUM 7500 UNITS: 5000 INJECTION INTRAVENOUS; SUBCUTANEOUS at 17:00

## 2024-02-17 RX ADMIN — INSULIN LISPRO 10 UNITS: 100 INJECTION, SOLUTION INTRAVENOUS; SUBCUTANEOUS at 09:37

## 2024-02-17 RX ADMIN — HEPARIN SODIUM 7500 UNITS: 5000 INJECTION INTRAVENOUS; SUBCUTANEOUS at 09:24

## 2024-02-17 RX ADMIN — INSULIN LISPRO 6 UNITS: 100 INJECTION, SOLUTION INTRAVENOUS; SUBCUTANEOUS at 12:53

## 2024-02-17 RX ADMIN — CEFTRIAXONE SODIUM 2 G: 2 INJECTION, SOLUTION INTRAVENOUS at 22:16

## 2024-02-17 RX ADMIN — INSULIN LISPRO 25 UNITS: 100 INJECTION, SOLUTION INTRAVENOUS; SUBCUTANEOUS at 12:55

## 2024-02-17 RX ADMIN — LOSARTAN POTASSIUM 25 MG: 25 TABLET, FILM COATED ORAL at 17:15

## 2024-02-17 RX ADMIN — SODIUM ZIRCONIUM CYCLOSILICATE 5 G: 5 POWDER, FOR SUSPENSION ORAL at 19:56

## 2024-02-17 RX ADMIN — ACETAMINOPHEN 650 MG: 325 TABLET ORAL at 19:56

## 2024-02-17 RX ADMIN — INSULIN GLARGINE 80 UNITS: 100 INJECTION, SOLUTION SUBCUTANEOUS at 09:26

## 2024-02-17 RX ADMIN — TRAMADOL HYDROCHLORIDE 50 MG: 50 TABLET, COATED ORAL at 15:41

## 2024-02-17 RX ADMIN — INSULIN LISPRO 6 UNITS: 100 INJECTION, SOLUTION INTRAVENOUS; SUBCUTANEOUS at 09:29

## 2024-02-17 SDOH — SOCIAL STABILITY: SOCIAL INSECURITY: DO YOU FEEL ANYONE HAS EXPLOITED OR TAKEN ADVANTAGE OF YOU FINANCIALLY OR OF YOUR PERSONAL PROPERTY?: NO

## 2024-02-17 SDOH — SOCIAL STABILITY: SOCIAL INSECURITY: ARE YOU OR HAVE YOU BEEN THREATENED OR ABUSED PHYSICALLY, EMOTIONALLY, OR SEXUALLY BY ANYONE?: NO

## 2024-02-17 SDOH — SOCIAL STABILITY: SOCIAL INSECURITY: DOES ANYONE TRY TO KEEP YOU FROM HAVING/CONTACTING OTHER FRIENDS OR DOING THINGS OUTSIDE YOUR HOME?: NO

## 2024-02-17 SDOH — SOCIAL STABILITY: SOCIAL INSECURITY: HAVE YOU HAD THOUGHTS OF HARMING ANYONE ELSE?: NO

## 2024-02-17 SDOH — SOCIAL STABILITY: SOCIAL INSECURITY: HAS ANYONE EVER THREATENED TO HURT YOUR FAMILY OR YOUR PETS?: NO

## 2024-02-17 SDOH — SOCIAL STABILITY: SOCIAL INSECURITY: DO YOU FEEL UNSAFE GOING BACK TO THE PLACE WHERE YOU ARE LIVING?: NO

## 2024-02-17 SDOH — SOCIAL STABILITY: SOCIAL INSECURITY: ABUSE: ADULT

## 2024-02-17 SDOH — SOCIAL STABILITY: SOCIAL INSECURITY: ARE THERE ANY APPARENT SIGNS OF INJURIES/BEHAVIORS THAT COULD BE RELATED TO ABUSE/NEGLECT?: NO

## 2024-02-17 ASSESSMENT — COGNITIVE AND FUNCTIONAL STATUS - GENERAL
DAILY ACTIVITIY SCORE: 16
HELP NEEDED FOR BATHING: A LOT
WALKING IN HOSPITAL ROOM: TOTAL
TOILETING: A LOT
CLIMB 3 TO 5 STEPS WITH RAILING: TOTAL
WALKING IN HOSPITAL ROOM: A LOT
MOVING FROM LYING ON BACK TO SITTING ON SIDE OF FLAT BED WITH BEDRAILS: A LOT
TURNING FROM BACK TO SIDE WHILE IN FLAT BAD: A LOT
MOVING TO AND FROM BED TO CHAIR: A LOT
DRESSING REGULAR UPPER BODY CLOTHING: A LITTLE
PERSONAL GROOMING: A LITTLE
WALKING IN HOSPITAL ROOM: A LOT
STANDING UP FROM CHAIR USING ARMS: A LITTLE
DRESSING REGULAR LOWER BODY CLOTHING: A LITTLE
MOVING FROM LYING ON BACK TO SITTING ON SIDE OF FLAT BED WITH BEDRAILS: A LOT
MOBILITY SCORE: 11
HELP NEEDED FOR BATHING: A LOT
DRESSING REGULAR UPPER BODY CLOTHING: A LITTLE
CLIMB 3 TO 5 STEPS WITH RAILING: TOTAL
MOVING TO AND FROM BED TO CHAIR: A LITTLE
DRESSING REGULAR LOWER BODY CLOTHING: A LOT
MOBILITY SCORE: 15
CLIMB 3 TO 5 STEPS WITH RAILING: TOTAL
PATIENT BASELINE BEDBOUND: NO
TOILETING: A LOT
DAILY ACTIVITIY SCORE: 16
TURNING FROM BACK TO SIDE WHILE IN FLAT BAD: A LITTLE
MOVING TO AND FROM BED TO CHAIR: A LOT
DRESSING REGULAR LOWER BODY CLOTHING: A LOT
PERSONAL GROOMING: A LITTLE
STANDING UP FROM CHAIR USING ARMS: A LOT
TURNING FROM BACK TO SIDE WHILE IN FLAT BAD: A LOT
DAILY ACTIVITIY SCORE: 23

## 2024-02-17 ASSESSMENT — PATIENT HEALTH QUESTIONNAIRE - PHQ9
2. FEELING DOWN, DEPRESSED OR HOPELESS: SEVERAL DAYS
1. LITTLE INTEREST OR PLEASURE IN DOING THINGS: MORE THAN HALF THE DAYS
SUM OF ALL RESPONSES TO PHQ9 QUESTIONS 1 & 2: 3

## 2024-02-17 ASSESSMENT — ACTIVITIES OF DAILY LIVING (ADL)
DRESSING YOURSELF: NEEDS ASSISTANCE
GROOMING: NEEDS ASSISTANCE
PATIENT'S MEMORY ADEQUATE TO SAFELY COMPLETE DAILY ACTIVITIES?: NO
BATHING: NEEDS ASSISTANCE
TOILETING: NEEDS ASSISTANCE
JUDGMENT_ADEQUATE_SAFELY_COMPLETE_DAILY_ACTIVITIES: YES
WALKS IN HOME: UNABLE TO ASSESS
ASSISTIVE_DEVICE: OTHER (COMMENT)
ADEQUATE_TO_COMPLETE_ADL: YES
HEARING - LEFT EAR: FUNCTIONAL
FEEDING YOURSELF: INDEPENDENT
HEARING - RIGHT EAR: FUNCTIONAL

## 2024-02-17 ASSESSMENT — LIFESTYLE VARIABLES
HOW MANY STANDARD DRINKS CONTAINING ALCOHOL DO YOU HAVE ON A TYPICAL DAY: PATIENT DECLINED
AUDIT-C TOTAL SCORE: -1
SKIP TO QUESTIONS 9-10: 0
AUDIT-C TOTAL SCORE: -1
SUBSTANCE_ABUSE_PAST_12_MONTHS: NO
HOW OFTEN DO YOU HAVE 6 OR MORE DRINKS ON ONE OCCASION: PATIENT DECLINED
HOW OFTEN DO YOU HAVE A DRINK CONTAINING ALCOHOL: PATIENT DECLINED
PRESCIPTION_ABUSE_PAST_12_MONTHS: NO

## 2024-02-17 ASSESSMENT — COLUMBIA-SUICIDE SEVERITY RATING SCALE - C-SSRS
1. IN THE PAST MONTH, HAVE YOU WISHED YOU WERE DEAD OR WISHED YOU COULD GO TO SLEEP AND NOT WAKE UP?: YES
6. HAVE YOU EVER DONE ANYTHING, STARTED TO DO ANYTHING, OR PREPARED TO DO ANYTHING TO END YOUR LIFE?: NO
2. HAVE YOU ACTUALLY HAD ANY THOUGHTS OF KILLING YOURSELF?: YES
5. HAVE YOU STARTED TO WORK OUT OR WORKED OUT THE DETAILS OF HOW TO KILL YOURSELF? DO YOU INTEND TO CARRY OUT THIS PLAN?: NO
4. HAVE YOU HAD THESE THOUGHTS AND HAD SOME INTENTION OF ACTING ON THEM?: NO

## 2024-02-17 ASSESSMENT — PAIN SCALES - GENERAL: PAINLEVEL_OUTOF10: 4

## 2024-02-17 NOTE — HOSPITAL COURSE
Kody Choi is a 53-year-old man with a past medical history of T2DM, CAD s/p multiple stents and CABG, HFrEF (EF 30% 1/30/24), A Fib, RUPESH, TKA in 2015, and multiple septic arthritis infections in the R knee who presents recurrent right knee pain and swelling. He continues to refuse recommended therapy (amputation), orthopedic surgery not recommending a washout.  Treated empirically with Ceftriaxone and Vancomycin (2/16-2/18).    The patient was admitted and was treated with IV vanc/ceftriaxone. Bcx (2/16) NGTD, wound culture with PMNs and multiple GP and GN. ID consulted, rec long term antibiotics with daptomycin (2/19-3/18). PICC placed 2/19. Rec'd SNF.     Course c/b BARBIE on CKD, baseline Cr of 1.6, bumped up to 1.92 with hyperkalemia of 5.6. Refused IVF, eventually able to be amenable to 500cc bolus. Will follow kidney function at SNF with repeat BMPs.    While inpatient, glucose was ultimately controlled with Glargine 40 units at bedtime with SSI.    Follow-up Visits:    -Infectious Disease  :: Manuel w/ Dr. Goodwin 2/29, will discuss abx suppresion long term at this visit    Please obtain a basic metabolic panel (BMP) in 1 week to check  your kidney function, this will be on Monday 2/26. Of note, Pt's baseline Cr is 1.6

## 2024-02-17 NOTE — PROGRESS NOTES
"Vancomycin Dosing by Pharmacy- INITIAL    Kody Choi is a 53 y.o. year old adult who Pharmacy has been consulted for vancomycin dosing for osteomyelitis/septic arthritis. Based on the patient's indication and renal status this patient will be dosed based on a goal trough/random level of 15-20.     Per H&P:   #BARBIE vs. CKD  ::Admission Cr 1.63.  - Unclear if BARBIE or new baseline renal function, on day prior to recent discharge patient's Cr was stable ~1.4. Additionally, during October 2023 admission patient was documented to have CKD IIIb with a baseline of 1.6-1.8.      Visit Vitals  BP (!) 153/94   Pulse 95   Temp 36.7 °C (98.1 °F)   Resp 24        Lab Results   Component Value Date    CREATININE 1.63 (H) 02/16/2024    CREATININE 1.42 (H) 02/01/2024    CREATININE 1.46 (H) 01/31/2024    CREATININE 1.34 (H) 01/29/2024        Patient weight is No results found for: \"PTWEIGHT\"    No results found for: \"CULTURE\"     I/O last 3 completed shifts:  In: 220 (1.4 mL/kg) [P.O.:120; IV Piggyback:100]  Out: - (0 mL/kg)   Weight: 157.7 kg   [unfilled]    Lab Results   Component Value Date    PATIENTTEMP 37.0 02/16/2024    PATIENTTEMP 37.0 01/25/2024    PATIENTTEMP 37.0 10/16/2023          Assessment/Plan     Patient has already been given a loading dose of 2000 mg. 2/16 @11pm  Will initiate vancomycin maintenance, based on next level due to possible BARBIE.  Follow-up level will be ordered on 2/17 at 2200 unless clinically indicated sooner.  Will continue to monitor renal function daily while on vancomycin and order serum creatinine at least every 48 hours if not already ordered.  Follow for continued vancomycin needs, clinical response, and signs/symptoms of toxicity.       Melissa Baird, PharmD       "

## 2024-02-17 NOTE — H&P
"History Of Present Illness  Kody Choi is a 53-year-old man with a past medical history of T2DM, CAD s/p multiple stents and CABG, HFrEF (EF 30% 1/30/24), A Fib, RUPESH, TKA in 2015, and multiple septic (MSSA/MRSA) arthritis infections in the R knee who presents recurrent right knee pain and swelling. Per chart review, the patient was recently admitted to [facility] from 1/25 through 2/1 for the same issue. Orthopedics was consulted and aspirated a sample, and the patient was given IV antibiotics. Orthopedics recommended an above-knee-amputation but the patient adamantly refused. Infectious disease was also consulted and recommended amputation. Hospital course was also complicated by hyperglycemia (400s-500s), with improvement once switched to Lantus BID. An echocardiogram was done showing further reduced EF (from 35% in 2021), however the patient was not agreeable to GDMT. Infectious disease recommended continued IV antibiotics via a PICC line on discharge, however the patient was not agreeable as he “did not want to deal with outpatient IV infusions.\" The patient also refused SNF placement, and ultimately was discharged on doxycycline 100 mg BID indefinitely.     On exam, the patient confirms the above history. He states his swelling now is about the same as it was when he was discharged. He notes that a large amount of purulent fluid drained from it yesterday. He denies any fevers or chills. He remains adamant that he will not have an amputation. He does admit that he did not have total compliance with his antibiotics or his insulin. He denies any shortness of breath, chest pain. He states he has not been able to ambulate for some time, and he cannot flex his leg due to pain. He does state he his amenable to rehab this time in addition to a PICC line.    In the ED:    - Vitals:   T 37.1, HR 96, /84, RR 18, SpO2 98% on RA  - Labs:   CBC: WBC 11.5, Hgb 10.0, plt 491   BMP: Na 131, K 6.3, Cl 102, HCO3 21, BUN 33, Cr " 1.63, glu 470   LFT: Ca 8.3, tprot 6.4, alb 3.1, alkphos 394, AST 18, ALT 60, tbili 0.3   Electrolytes: Mg 2.06   VBG: pH 7.29, pCO2 47, pO2 34, lactate 1.4   BCx x2 drawn    - ECG:     Sinus rhythm with PVCs. Normal ME interval, possible flattened P waves noted, no ST depressions, low voltage QRS.    - Imaging:     Right knee x-ray:    IMPRESSION:  1.  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  2. Re-demonstration of cement fragmentation of the femoral and tibial components, with multiple cement fragments throughout the knee.  3. Previously seen posterior translation of the tibia relative to the  femur is less prominent and alignment appears near anatomic.  4. Previously described ulcer in the anterior soft tissues on the  lateral view is not visualized.    ED COURSE:     The above labs/imaging were obtained. Started on vancomycin/ceftriaxone. Given tramadol 50 mg. Given calcium gluconate 2 g, 5 units regular insulin with D50, Lokelma (however patient refused), albuterol (patient refused). ED provider discussed case with orthopedic surgery who stated they would not offer the patient a washout, only an amputation.      Past Medical History  Past Medical History:   Diagnosis Date    Personal history of other endocrine, nutritional and metabolic disease     History of type 2 diabetes mellitus    Unspecified astigmatism, bilateral 01/04/2016    Astigmatism, bilateral     Surgical History  Past Surgical History:   Procedure Laterality Date    KNEE SURGERY  10/29/2014    Knee Surgery     Social History  He endorses a remote history of tobacco use many years ago (less than one half pack per day over one year). Denies alcohol, recreational drug use. Lives alone.    Family History  No family history on file.     Allergies  Loratadine, Pollen extracts, and Lisinopril    Review of Systems:    12-point review of systems completed and negative except as stated above.     Home Medications:  Acetaminophen 650 mg  "PRN  ASA 81 mg  Atorvastatin 40 mg  Doxycycline 100 mg BID  Fluticasone nasal spray  Glargine 70 units BID --> patient reports 80 BID  Lispro 25 units with meals  Metoprolol succinate 25 mg -> patient denies  Omeprazole 20 mg -> patient denies  Trazodone 50 mg at bedtime -> patient unclear     Physical exam:   General: Appears stated age, morbidly obese, laying on a bed in the middle of the emergency department hallway only wearing underwear, uncovered.  Head: Normocephalic, atraumatic.  Eyes: No icterus, EOMI.  ENT: No nasal discharge, moist mucus membranes.  Cardiovascular: Regular rate/rhythm, no murmurs, 2+ leg edema.  Respiratory: Clear to auscultation bilaterally, no wheezes/rales/rhonchi, normal respiratory effort.  Abdominal: Soft, obese, non-tender to palpation, normal bowel sounds.  Neurologic: Alert & oriented x 3, no focal deficits.  MSK: Right knee extremely swollen, erythematous, tense but compressible, tender to touch. Several small open areas with purulent drainage. Patient refuses to flex the right knee, able to passively flex right knee and ankle without difficulty although patient reports pain. Capillary refill < 2 seconds in right foot, DP pulse palpable.   Psychiatric: Cooperative, appropriate mood and affect.      Last Recorded Vitals  Blood pressure (!) 133/95, pulse 90, temperature 37.1 °C (98.8 °F), temperature source Oral, resp. rate 18, height 1.676 m (5' 6\"), weight 150 kg (330 lb), SpO2 (!) 93 %, unknown if currently breastfeeding.    Assessment/Plan   Principal Problem:    Cellulitis  Active Problems:    Pyogenic arthritis of right knee joint, due to unspecified organism (CMS/[facility])    Kody Choi is a 53-year-old man with a past medical history of T2DM, CAD s/p multiple stents and CABG, HFrEF (EF 30% 1/30/24), A Fib, RUPESH, TKA in 2015, and multiple septic arthritis infections in the R knee who presents recurrent right knee pain and swelling. He continues to refuse recommended therapy " (amputation), orthopedic surgery not recommending a washout. Will continue IV antibiotics. Currently I believe the patient understands the risks of not pursuing surgery and has capacity to make this decision. In addition to sepsis, I am concerned that if the swelling from the infection and untreated heart failure gets much worse he could develop compartment syndrome. Will need to continue address with the patient.     #Septic arthritis  ::Discussed with orthopedic surgery in the ED, still recommending amputation, not wash out.  - Continue vancomycin.  - Continue ceftriaxone.  - Consider ID consult, previously planned to discharge with daptomycin for 6 weeks, then doxycycline indefinitely. Patient now amenable to PICC line.   - Add on ESR, CRP. Elevated Alk phos likely related, has been chronically elevated although not to this degree (394). Not reporting any abdominal symptoms, could consider abdominal imaging. Will order GGT.  - Follow-up blood cultures.  - Wound culture ordered.  - Wound care consult.    #T2DM  #Hyperkalemia  #Hyperglycemia  #Metabolic acidosis  - Likely related to insulin non-compliance.  - Home regimen reportedly Lantus 80 units BID, 25 units lispro with meals.  - Will continue Lantus 80 units BID, start lispro 10 units with meals, SSI.  - Potassium improved following insulin administration, continue to monitor.   - Repeat VBG ordered.  - Refusing diabetic diet.    #BARBIE vs. CKD  ::Admission Cr 1.63.  - Unclear if BARBIE or new baseline renal function, on day prior to recent discharge patient's Cr was stable ~1.4. Additionally, during October 2023 admission patient was documented to have CKD IIIb with a baseline of 1.6-1.8. Will continue to monitor.     #CAD  #HFrEF  ::EF 30 % 1/30/24  - Patient continues to refuse GDMT (beta blockade, diuretics).   - Continue atorvastatin 40 mg, ASA 81 mg.     #Atrial fibrillation  - Sinus rhythm on admission, not on anticoagulation.  - Telemetry ordered, patient  refusing.    F: Cautious given HFrEF  E: PRN  N: Regular (refusing diabetic diet)  A: PIV    DVT: SQH  Full code (confirmed on admission)  NOK: Vannesa (friend): [phone]    Pietro Taylor MD

## 2024-02-17 NOTE — PROGRESS NOTES
Pharmacy Medication History Review    Kody Choi is a 53 y.o. adult admitted for Cellulitis. Pharmacy reviewed the patient's [id number] medications and allergies for accuracy.    The list below reflects the updated PTA list. Comments regarding how patient may be taking medications differently can be found in the Admit Orders Activity  Prior to Admission Medications   Prescriptions Last Dose Informant Patient Reported? Taking?   acetaminophen (Tylenol) 325 mg tablet   No No   Sig: Take 2 tablets (650 mg) by mouth every 6 hours if needed for moderate pain (4 - 6) or headaches for up to 14 days.   aspirin 81 mg EC tablet  Other, Self Yes No   Sig: Take 1 tablet (81 mg) by mouth once daily.   atorvastatin (Lipitor) 40 mg tablet Past Week Other, Self Yes Yes   Sig: Take 1 tablet (40 mg) by mouth once daily. Take in evening.   cephalexin (Keflex) 500 mg capsule Past Month Self Yes Yes   Sig: Take 1 capsule (500 mg) by mouth 2 times a day.   doxycycline (Vibramycin) 100 mg capsule Not Taking Self No No   Sig: Take 1 capsule (100 mg) by mouth 2 times a day. Take with at least 8 ounces (large glass) of water, do not lie down for 30 minutes after   Patient not taking: Reported on 2/17/2024   fluticasone (Flonase) 50 mcg/actuation nasal spray  Other, Self Yes No   Sig: Administer 2 sprays into each nostril once daily as needed for rhinitis. Shake gently. Before first use, prime pump. After use, clean tip and replace cap.   insulin glargine (Lantus) 100 unit/mL (3 mL) pen  Self No Yes   Sig: Inject 70 Units under the skin 2 times a day.   Patient taking differently: Inject 80 Units under the skin 2 times a day.   insulin lispro (HumaLOG) 100 unit/mL injection Not Taking Self No No   Sig: Inject 25 Units under the skin 3 times a day with meals.   Patient not taking: Reported on 2/17/2024   lisinopril 5 mg tablet Past Month Self Yes Yes   Sig: Take 1 tablet (5 mg) by mouth once daily.   losartan (Cozaar) 25 mg tablet Not  "Taking Self No No   Sig: Take 1 tablet (25 mg) by mouth once daily.   Patient not taking: Reported on 2/17/2024   metoprolol succinate XL (Toprol-XL) 25 mg 24 hr tablet   No No   Sig: Take 1 tablet (25 mg) by mouth once daily for 6 doses. Do not crush or chew.   omeprazole (PriLOSEC) 20 mg DR capsule Unknown Self, Other Yes No   Sig: Take 1 capsule (20 mg) by mouth once daily in the morning. Take before meals.   pen needle, diabetic 31 gauge x 5/16\" needle Unknown Self No No   Sig: Use to inject 1-4 times daily as directed.   semaglutide (Rybelsus) 7 mg tablet Unknown Self, Other Yes No   Sig: Take 1 tablet (7 mg) by mouth early in the morning.. Take on empty stomach.   traMADol (Ultram) 50 mg tablet   Yes No   Sig: Take 1 tablet (50 mg) by mouth 3 times a day.      Facility-Administered Medications: None        The list below reflects the updated allergy list. Please review each documented allergy for additional clarification and justification.  Allergies  Reviewed by Franchesca Lake MD on 2/16/2024        Severity Reactions Comments    Loratadine High Cough, Palpitations A fib Palpitations    Pollen Extracts Not Specified Other, Runny nose Sneezing, nasal congestion    Lisinopril Low Cough \"Really bad cough\"            Patient was unable to be assessed for M2B at discharge. Patient declined to answer with pharmacy choice. Pharmacy has been updated to [facility].    Sources used to complete the med history include   [facility], [facility] fill history, care everywhere  Patient interview, patient was generally unsure of what medications he takes at home. Recent fill history is available for Atorvastatin 40mg, losartan 25mg, lisinopril 5mg, Lantus, Humalog, Doxycycline, cephalexin, and tramadol)  Patient states he uses Lantus 80 units twice daily, and does not use any other insulins    Below are additional concerns with the patient's PTA list.  Patient was not sure if he is taking lisinopril or losartan at home. " "Per dispense report, losartan was filled most recently, however, patient may have both medications at home (lisinopril 5mg last filled 11/10/2023 for 90 days, losartan 25mg filled 2/1/2024 for 90 days)  Patient stopped taking doxycycline due to side effects of feeling sick      Wendy Bose, PharmD, [facility] PGY1 Pharmacy Resident   Meds [facility]  Please reach out via [employer] Secure Chat for questions, or if no response call [phone] or [employer] \"MedRec\"    "

## 2024-02-17 NOTE — PROGRESS NOTES
"Kody Choi is a 53 y.o. adult on day 1 of admission presenting with Cellulitis.    Subjective   Overnight: The pt got admitted  AM: Pt endorses leg pain and drainage from R knee. Endorses leg swelling. Denies fever, chills.  Per nurse: pt was peeing on the nurse and would not put off dirty underwear. His personality changed rapidly from calm to being aggressive. The pt has passing mention of suicidal ideation. Pt refuses lab.  The writer went to assess the patient and the patient did not endorse suicidal ideation.       Objective     Physical Exam    Last Recorded Vitals  Blood pressure (!) 153/94, pulse 95, temperature 36.7 °C (98.1 °F), resp. rate 24, height 1.676 m (5' 6\"), weight (!) 158 kg (347 lb 10.7 oz), SpO2 97 %, unknown if currently breastfeeding.  Intake/Output last 3 Shifts:  I/O last 3 completed shifts:  In: 220 (1.4 mL/kg) [P.O.:120; IV Piggyback:100]  Out: - (0 mL/kg)   Weight: 157.7 kg     General: Appears stated age, morbidly obese, laying on a bed in the middle of the emergency department hallway only wearing underwear, uncovered.  Head: Normocephalic, atraumatic.  Eyes: No icterus, EOMI.  ENT: No nasal discharge, moist mucus membranes.  Cardiovascular: Regular rate/rhythm, no murmurs, 2+ leg edema.  Respiratory: Clear to auscultation bilaterally, no wheezes/rales/rhonchi, normal respiratory effort.  Abdominal: Soft, obese, non-tender to palpation, normal bowel sounds.  Neurologic: Alert & oriented x 3, no focal deficits.  MSK: Right knee extremely swollen, erythematous, tense but compressible, tender to touch. Several small open areas with purulent drainage. Patient refuses to flex the right knee, able to passively flex right knee and ankle without difficulty although patient reports pain. Capillary refill < 2 seconds in right foot, DP pulse palpable.   Psychiatric: rude to medical staff     Relevant Results    Results for orders placed or performed during the hospital encounter of 02/16/24 (from " the past 24 hour(s))   POCT GLUCOSE   Result Value Ref Range    POCT Glucose 410 (H) 74 - 99 mg/dL   CBC and Auto Differential   Result Value Ref Range    WBC 11.5 (H) 4.4 - 11.3 x10*3/uL    nRBC 0.2 (H) 0.0 - 0.0 /100 WBCs    RBC 4.33 4.00 - 5.90 x10*6/uL    Hemoglobin 10.0 (L) 12.0 - 17.5 g/dL    Hematocrit 32.4 (L) 36.0 - 52.0 %    MCV 75 (L) 80 - 100 fL    MCH 23.1 (L) 26.0 - 34.0 pg    MCHC 30.9 (L) 32.0 - 36.0 g/dL    RDW 16.4 (H) 11.5 - 14.5 %    Platelets 491 (H) 150 - 450 x10*3/uL    Neutrophils % 77.2 40.0 - 80.0 %    Immature Granulocytes %, Automated 0.9 0.0 - 0.9 %    Lymphocytes % 11.4 13.0 - 44.0 %    Monocytes % 7.9 2.0 - 10.0 %    Eosinophils % 1.7 0.0 - 6.0 %    Basophils % 0.9 0.0 - 2.0 %    Neutrophils Absolute 8.85 (H) 1.20 - 7.70 x10*3/uL    Immature Granulocytes Absolute, Automated 0.10 0.00 - 0.70 x10*3/uL    Lymphocytes Absolute 1.31 1.20 - 4.80 x10*3/uL    Monocytes Absolute 0.91 0.10 - 1.00 x10*3/uL    Eosinophils Absolute 0.19 0.00 - 0.70 x10*3/uL    Basophils Absolute 0.10 0.00 - 0.10 x10*3/uL   Comprehensive metabolic panel   Result Value Ref Range    Glucose 470 (HH) 74 - 99 mg/dL    Sodium 131 (L) 136 - 145 mmol/L    Potassium 6.3 (HH) 3.5 - 5.3 mmol/L    Chloride 102 98 - 107 mmol/L    Bicarbonate 21 21 - 32 mmol/L    Anion Gap 14 mmol/L    Urea Nitrogen 33 (H) 6 - 23 mg/dL    Creatinine 1.63 (H) 0.50 - 1.30 mg/dL    eGFR 37 (L) >60 mL/min/1.73m*2    Calcium 8.3 (L) 8.6 - 10.6 mg/dL    Albumin 3.1 (L) 3.4 - 5.0 g/dL    Alkaline Phosphatase 394 (H) 33 - 120 U/L    Total Protein 6.4 6.4 - 8.2 g/dL    AST 18 9 - 39 U/L    Bilirubin, Total 0.3 0.0 - 1.2 mg/dL    ALT 60 (H) 7 - 52 U/L   Magnesium   Result Value Ref Range    Magnesium 2.06 1.60 - 2.40 mg/dL   Phosphorus   Result Value Ref Range    Phosphorus 3.3 2.5 - 4.9 mg/dL   Blood Culture    Specimen: Peripheral Venipuncture; Blood culture   Result Value Ref Range    Blood Culture Loaded on Instrument - Culture in progress    Blood  Culture    Specimen: Peripheral Venipuncture; Blood culture   Result Value Ref Range    Blood Culture Loaded on Instrument - Culture in progress    ECG 12 lead   Result Value Ref Range    Ventricular Rate 94 BPM    Atrial Rate 94 BPM    IL Interval 170 ms    QRS Duration 108 ms    QT Interval 350 ms    QTC Calculation(Bazett) 437 ms    P Axis 60 degrees    R Axis 4 degrees    T Axis 5 degrees    QRS Count 15 beats    Q Onset 199 ms    P Onset 114 ms    P Offset 170 ms    T Offset 374 ms    QTC Fredericia 406 ms   Potassium   Result Value Ref Range    Potassium 5.1 3.5 - 5.3 mmol/L   BLOOD GAS VENOUS FULL PANEL   Result Value Ref Range    POCT pH, Venous 7.29 (L) 7.33 - 7.43 pH    POCT pCO2, Venous 47 41 - 51 mm Hg    POCT pO2, Venous 34 (L) 35 - 45 mm Hg    POCT SO2, Venous 42 (L) 45 - 75 %    POCT Oxy Hemoglobin, Venous 42.0 (L) 45.0 - 75.0 %    POCT Hematocrit Calculated, Venous 30.0 (L) 36.0 - 52.0 %    POCT Sodium, Venous 132 (L) 136 - 145 mmol/L    POCT Potassium, Venous 5.7 (H) 3.5 - 5.3 mmol/L    POCT Chloride, Venous 103 98 - 107 mmol/L    POCT Ionized Calicum, Venous 1.23 1.10 - 1.33 mmol/L    POCT Glucose, Venous 381 (H) 74 - 99 mg/dL    POCT Lactate, Venous 1.4 0.4 - 2.0 mmol/L    POCT Base Excess, Venous -4.0 (L) -2.0 - 3.0 mmol/L    POCT HCO3 Calculated, Venous 22.6 22.0 - 26.0 mmol/L    POCT Hemoglobin, Venous 10.1 (L) 12.0 - 17.5 g/dL    POCT Anion Gap, Venous 12.0 10.0 - 25.0 mmol/L    Patient Temperature 37.0 degrees Celsius    FiO2 100 %   C-reactive protein   Result Value Ref Range    C-Reactive Protein 5.01 (H) <1.00 mg/dL   POCT GLUCOSE   Result Value Ref Range    POCT Glucose 297 (H) 74 - 99 mg/dL   POCT GLUCOSE   Result Value Ref Range    POCT Glucose 290 (H) 74 - 99 mg/dL     ECG 12 lead    Result Date: 2/17/2024  Sinus rhythm with occasional ventricular-paced complexes Low voltage QRS Possible Anterolateral infarct , age undetermined Abnormal ECG When compared with ECG of 27-JUN-2023 04:13,  Previous ECG has undetermined rhythm, needs review See ED provider note for full interpretation and clinical correlation Confirmed by Arden Becker ([id number]) on 2/17/2024 1:44:13 AM    XR knee right 1-2 views    Result Date: 2/16/2024  Interpreted By:  Brad Hunt, STUDY: XR KNEE RIGHT 1-2 VIEWS; ;  2/16/2024 5:48 pm   INDICATION: Signs/Symptoms:known septic joint.   COMPARISON: Right knee radiographs 01/25/2014.   ACCESSION NUMBER(S): [id number]   ORDERING CLINICIAN: MELVIN RAMSAY   FINDINGS: Abnormal findings in the knee are overall similar compared to prior radiographs 01/25/2024.   Status post total knee arthroplasty. Redemonstration of fragmentation of the femoral cement at the junction of the stem-condylar component as well as cement fragmentation of the tibial component at the stem plateau interface. Mellitus cement fragments again seen throughout the knee slight posterior translation of the tibia relative to femur is less evident than prior. Diffuse soft tissue swelling about the knee. Previously described soft tissue ulcer anteriorly is not well seen.       1.  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 2. Re-demonstration of cement fragmentation of the femoral and tibial components, with multiple cement fragments throughout the knee. 3. Previously seen posterior translation of the tibia relative to the femur is less prominent and alignment appears near anatomic. 4. Previously described ulcer in the anterior soft tissues on the lateral view is not visualized.   MACRO: None   Signed by: Brad Hunt 2/16/2024 5:56 PM Dictation workstation:   [id number]     Scheduled medications  albuterol, 10 mg, nebulization, Once  aspirin, 81 mg, oral, Daily  atorvastatin, 40 mg, oral, Daily  calcium gluconate, 2 g, intravenous, Once  cefTRIAXone, 2 g, intravenous, q24h  heparin (porcine), 7,500 Units, subcutaneous, q8h SUDHIR  insulin glargine, 80 Units, subcutaneous, q12h  insulin lispro, 0-10 Units,  subcutaneous, TID with meals  insulin lispro, 25 Units, subcutaneous, TID with meals  sodium zirconium cyclosilicate, 5 g, oral, Once      Continuous medications     PRN medications  PRN medications: dextrose 10 % in water (D10W), dextrose, glucagon, traMADol, vancomycin       Assessment/Plan   Principal Problem:    Cellulitis  Active Problems:    Pyogenic arthritis of right knee joint, due to unspecified organism (CMS/HCC)    Kody Choi is a 53-year-old man with a past medical history of T2DM, CAD s/p multiple stents and CABG, HFrEF (EF 30% 1/30/24), A Fib, RUPESH, TKA in 2015, and multiple septic arthritis infections in the R knee who presents recurrent right knee pain and swelling. He continues to refuse recommended therapy (amputation), orthopedic surgery not recommending a washout. Will continue IV antibiotics. Currently I believe the patient understands the risks of not pursuing surgery and has capacity to make this decision. In addition to sepsis, I am concerned that if the swelling from the infection and untreated heart failure gets much worse he could develop compartment syndrome. Will need to continue address with the patient.   -----  Update 2/17/24  - c/w vanc/ceftriaxone  - Increase lantus to 25 unit  - Consult ID, appreciate recs    To follow up  - Bcx  - ID recs  -----     #Septic arthritis  ::Discussed with orthopedic surgery in the ED, still recommending amputation, not wash out.  - Continue vancomycin.  - Continue ceftriaxone.  - Consider ID consult, previously planned to discharge with daptomycin for 6 weeks, then doxycycline indefinitely. Patient now amenable to PICC line.   - Add on ESR, CRP. Elevated Alk phos likely related, has been chronically elevated although not to this degree (394). Not reporting any abdominal symptoms, could consider abdominal imaging. Will order GGT.  - Follow-up blood cultures.  - Wound culture ordered.  - Wound care consult.      #T2DM  #Hyperkalemia  #Hyperglycemia  #Metabolic acidosis  - Likely related to insulin non-compliance.  - Home regimen reportedly Lantus 80 units BID, 25 units lispro with meals.  - Will continue Lantus 80 units BID, start lispro 10 units with meals, SSI.  - Potassium improved following insulin administration, continue to monitor.   - Repeat VBG ordered.  - Refusing diabetic diet.     #BARBIE vs. CKD  ::Admission Cr 1.63.  - Unclear if BARBIE or new baseline renal function, on day prior to recent discharge patient's Cr was stable ~1.4. Additionally, during October 2023 admission patient was documented to have CKD IIIb with a baseline of 1.6-1.8. Will continue to monitor.      #CAD  #HFrEF  ::EF 30 % 1/30/24  - Patient continues to refuse GDMT (beta blockade, diuretics).   - Continue atorvastatin 40 mg, ASA 81 mg.      #Atrial fibrillation  - Sinus rhythm on admission, not on anticoagulation.  - Telemetry ordered, patient refusing.    F: Cautious given HFrEF  E: PRN  N: Regular (refusing diabetic diet)  A: PIV     DVT: SQH  Full code (confirmed on admission)  ALTAFK: Vannesa (friend): [phone]           Talia He MD

## 2024-02-18 LAB
ALBUMIN SERPL BCP-MCNC: 2.9 G/DL (ref 3.4–5)
ANION GAP SERPL CALC-SCNC: 13 MMOL/L (ref 10–20)
APPEARANCE UR: CLEAR
BACTERIA #/AREA URNS AUTO: ABNORMAL /HPF
BASOPHILS # BLD AUTO: 0.09 X10*3/UL (ref 0–0.1)
BASOPHILS NFR BLD AUTO: 0.8 %
BILIRUB UR STRIP.AUTO-MCNC: NEGATIVE MG/DL
BUN SERPL-MCNC: 37 MG/DL (ref 6–23)
CALCIUM SERPL-MCNC: 8.8 MG/DL (ref 8.6–10.6)
CHLORIDE SERPL-SCNC: 105 MMOL/L (ref 98–107)
CHLORIDE UR-SCNC: 51 MMOL/L
CHLORIDE/CREATININE (MMOL/G) IN URINE: 48 MMOL/G CREAT (ref 23–318)
CO2 SERPL-SCNC: 24 MMOL/L (ref 21–32)
COLOR UR: ABNORMAL
CREAT SERPL-MCNC: 1.67 MG/DL (ref 0.5–1.3)
CREAT UR-MCNC: 106.4 MG/DL (ref 20–370)
EGFRCR SERPLBLD CKD-EPI 2021: 36 ML/MIN/1.73M*2
EOSINOPHIL # BLD AUTO: 0.58 X10*3/UL (ref 0–0.7)
EOSINOPHIL NFR BLD AUTO: 5.1 %
ERYTHROCYTE [DISTWIDTH] IN BLOOD BY AUTOMATED COUNT: 16.7 % (ref 11.5–14.5)
GLUCOSE BLD MANUAL STRIP-MCNC: 115 MG/DL (ref 74–99)
GLUCOSE BLD MANUAL STRIP-MCNC: 133 MG/DL (ref 74–99)
GLUCOSE BLD MANUAL STRIP-MCNC: 184 MG/DL (ref 74–99)
GLUCOSE BLD MANUAL STRIP-MCNC: 78 MG/DL (ref 74–99)
GLUCOSE SERPL-MCNC: 70 MG/DL (ref 74–99)
GLUCOSE UR STRIP.AUTO-MCNC: ABNORMAL MG/DL
HCT VFR BLD AUTO: 33.7 % (ref 36–52)
HGB BLD-MCNC: 10.1 G/DL (ref 12–17.5)
IMM GRANULOCYTES # BLD AUTO: 0.05 X10*3/UL (ref 0–0.7)
IMM GRANULOCYTES NFR BLD AUTO: 0.4 % (ref 0–0.9)
KETONES UR STRIP.AUTO-MCNC: NEGATIVE MG/DL
LEUKOCYTE ESTERASE UR QL STRIP.AUTO: NEGATIVE
LYMPHOCYTES # BLD AUTO: 1.59 X10*3/UL (ref 1.2–4.8)
LYMPHOCYTES NFR BLD AUTO: 14.1 %
MAGNESIUM SERPL-MCNC: 2.04 MG/DL (ref 1.6–2.4)
MCH RBC QN AUTO: 23.2 PG (ref 26–34)
MCHC RBC AUTO-ENTMCNC: 30 G/DL (ref 32–36)
MCV RBC AUTO: 77 FL (ref 80–100)
MONOCYTES # BLD AUTO: 0.94 X10*3/UL (ref 0.1–1)
MONOCYTES NFR BLD AUTO: 8.3 %
MUCOUS THREADS #/AREA URNS AUTO: ABNORMAL /LPF
NEUTROPHILS # BLD AUTO: 8.05 X10*3/UL (ref 1.2–7.7)
NEUTROPHILS NFR BLD AUTO: 71.3 %
NITRITE UR QL STRIP.AUTO: NEGATIVE
NRBC BLD-RTO: 0.2 /100 WBCS (ref 0–0)
PH UR STRIP.AUTO: 5.5 [PH]
PHOSPHATE SERPL-MCNC: 3.7 MG/DL (ref 2.5–4.9)
PLATELET # BLD AUTO: 515 X10*3/UL (ref 150–450)
POTASSIUM SERPL-SCNC: 4.8 MMOL/L (ref 3.5–5.3)
POTASSIUM UR-SCNC: 40 MMOL/L
POTASSIUM/CREAT UR-RTO: 38 MMOL/G CREAT
PROT UR STRIP.AUTO-MCNC: ABNORMAL MG/DL
RBC # BLD AUTO: 4.36 X10*6/UL (ref 4–5.9)
RBC # UR STRIP.AUTO: ABNORMAL /UL
RBC #/AREA URNS AUTO: ABNORMAL /HPF
SODIUM SERPL-SCNC: 137 MMOL/L (ref 136–145)
SODIUM UR-SCNC: 28 MMOL/L
SODIUM/CREAT UR-RTO: 26 MMOL/G CREAT
SP GR UR STRIP.AUTO: 1.02
UROBILINOGEN UR STRIP.AUTO-MCNC: NORMAL MG/DL
VANCOMYCIN SERPL-MCNC: 5.4 UG/ML (ref 5–20)
WBC # BLD AUTO: 11.3 X10*3/UL (ref 4.4–11.3)
WBC #/AREA URNS AUTO: ABNORMAL /HPF

## 2024-02-18 PROCEDURE — 80202 ASSAY OF VANCOMYCIN: CPT

## 2024-02-18 PROCEDURE — 82947 ASSAY GLUCOSE BLOOD QUANT: CPT

## 2024-02-18 PROCEDURE — 2500000004 HC RX 250 GENERAL PHARMACY W/ HCPCS (ALT 636 FOR OP/ED): Performed by: STUDENT IN AN ORGANIZED HEALTH CARE EDUCATION/TRAINING PROGRAM

## 2024-02-18 PROCEDURE — 2500000004 HC RX 250 GENERAL PHARMACY W/ HCPCS (ALT 636 FOR OP/ED)

## 2024-02-18 PROCEDURE — 85025 COMPLETE CBC W/AUTO DIFF WBC: CPT

## 2024-02-18 PROCEDURE — 1200000002 HC GENERAL ROOM WITH TELEMETRY DAILY

## 2024-02-18 PROCEDURE — 80069 RENAL FUNCTION PANEL: CPT

## 2024-02-18 PROCEDURE — 84133 ASSAY OF URINE POTASSIUM: CPT | Performed by: STUDENT IN AN ORGANIZED HEALTH CARE EDUCATION/TRAINING PROGRAM

## 2024-02-18 PROCEDURE — 99232 SBSQ HOSP IP/OBS MODERATE 35: CPT | Performed by: STUDENT IN AN ORGANIZED HEALTH CARE EDUCATION/TRAINING PROGRAM

## 2024-02-18 PROCEDURE — 36415 COLL VENOUS BLD VENIPUNCTURE: CPT

## 2024-02-18 PROCEDURE — A4217 STERILE WATER/SALINE, 500 ML: HCPCS

## 2024-02-18 PROCEDURE — 81001 URINALYSIS AUTO W/SCOPE: CPT | Performed by: STUDENT IN AN ORGANIZED HEALTH CARE EDUCATION/TRAINING PROGRAM

## 2024-02-18 PROCEDURE — 83735 ASSAY OF MAGNESIUM: CPT

## 2024-02-18 PROCEDURE — 2500000001 HC RX 250 WO HCPCS SELF ADMINISTERED DRUGS (ALT 637 FOR MEDICARE OP)

## 2024-02-18 PROCEDURE — 99232 SBSQ HOSP IP/OBS MODERATE 35: CPT | Performed by: INTERNAL MEDICINE

## 2024-02-18 RX ORDER — CEFEPIME 1 G/50ML
2 INJECTION, SOLUTION INTRAVENOUS EVERY 12 HOURS
Status: DISCONTINUED | OUTPATIENT
Start: 2024-02-18 | End: 2024-02-20

## 2024-02-18 RX ADMIN — SODIUM CHLORIDE, POTASSIUM CHLORIDE, SODIUM LACTATE AND CALCIUM CHLORIDE 1000 ML: 600; 310; 30; 20 INJECTION, SOLUTION INTRAVENOUS at 11:07

## 2024-02-18 RX ADMIN — TRAMADOL HYDROCHLORIDE 50 MG: 50 TABLET, COATED ORAL at 11:51

## 2024-02-18 RX ADMIN — HEPARIN SODIUM 7500 UNITS: 5000 INJECTION INTRAVENOUS; SUBCUTANEOUS at 18:29

## 2024-02-18 RX ADMIN — INSULIN GLARGINE 80 UNITS: 100 INJECTION, SOLUTION SUBCUTANEOUS at 20:57

## 2024-02-18 RX ADMIN — VANCOMYCIN HYDROCHLORIDE 2 G: 5 INJECTION, POWDER, LYOPHILIZED, FOR SOLUTION INTRAVENOUS at 09:20

## 2024-02-18 RX ADMIN — ACETAMINOPHEN 650 MG: 325 TABLET ORAL at 11:47

## 2024-02-18 RX ADMIN — INSULIN GLARGINE 80 UNITS: 100 INJECTION, SOLUTION SUBCUTANEOUS at 09:12

## 2024-02-18 RX ADMIN — HEPARIN SODIUM 7500 UNITS: 5000 INJECTION INTRAVENOUS; SUBCUTANEOUS at 09:12

## 2024-02-18 RX ADMIN — CEFEPIME 2 G: 1 INJECTION, SOLUTION INTRAVENOUS at 22:08

## 2024-02-18 RX ADMIN — LOSARTAN POTASSIUM 25 MG: 25 TABLET, FILM COATED ORAL at 09:20

## 2024-02-18 ASSESSMENT — ENCOUNTER SYMPTOMS
NEUROLOGICAL NEGATIVE: 1
CARDIOVASCULAR NEGATIVE: 1
CONSTITUTIONAL NEGATIVE: 1
PSYCHIATRIC NEGATIVE: 1
ALLERGIC/IMMUNOLOGIC NEGATIVE: 1
RESPIRATORY NEGATIVE: 1
ENDOCRINE NEGATIVE: 1
EYES NEGATIVE: 1
MUSCULOSKELETAL NEGATIVE: 1
GASTROINTESTINAL NEGATIVE: 1
HEMATOLOGIC/LYMPHATIC NEGATIVE: 1

## 2024-02-18 ASSESSMENT — PAIN SCALES - GENERAL
PAINLEVEL_OUTOF10: 6
PAINLEVEL_OUTOF10: 6

## 2024-02-18 ASSESSMENT — COGNITIVE AND FUNCTIONAL STATUS - GENERAL
MOVING FROM LYING ON BACK TO SITTING ON SIDE OF FLAT BED WITH BEDRAILS: A LOT
MOVING TO AND FROM BED TO CHAIR: A LOT
WALKING IN HOSPITAL ROOM: A LOT
MOVING FROM LYING ON BACK TO SITTING ON SIDE OF FLAT BED WITH BEDRAILS: A LOT
STANDING UP FROM CHAIR USING ARMS: A LOT
PERSONAL GROOMING: A LITTLE
HELP NEEDED FOR BATHING: A LOT
HELP NEEDED FOR BATHING: A LOT
TURNING FROM BACK TO SIDE WHILE IN FLAT BAD: A LOT
DRESSING REGULAR UPPER BODY CLOTHING: A LITTLE
STANDING UP FROM CHAIR USING ARMS: A LOT
PERSONAL GROOMING: A LITTLE
DRESSING REGULAR UPPER BODY CLOTHING: A LITTLE
TURNING FROM BACK TO SIDE WHILE IN FLAT BAD: A LOT
DAILY ACTIVITIY SCORE: 16
MOBILITY SCORE: 11
MOBILITY SCORE: 11
DRESSING REGULAR LOWER BODY CLOTHING: A LOT
MOVING TO AND FROM BED TO CHAIR: A LOT
TOILETING: A LOT
DRESSING REGULAR LOWER BODY CLOTHING: A LOT
CLIMB 3 TO 5 STEPS WITH RAILING: TOTAL
TOILETING: A LOT
CLIMB 3 TO 5 STEPS WITH RAILING: TOTAL
DAILY ACTIVITIY SCORE: 16
WALKING IN HOSPITAL ROOM: A LOT

## 2024-02-18 NOTE — CONSULTS
"Inpatient consult to Infectious Diseases  Consult performed by: Tamie Bradley MD  Consult ordered by: Ramesh Gunter MD        Reason For Consult  \"Recurrent R knee infection with R knee TKA S/P explant and ATB spacers\"    History Of Present Illness  Kody Choi is a 53 y.o. male known to ID service since 2018 for chronic R knee PJI infection. Last seen by ID on 01/31/2024.  From last consult:  52 y/o male presenting with pain swelling discharge from right knee.     \"Hx of CAD afib, uncontrolled DM , s/p right knee arthroplasty in 2015.  He presented on 4/12/2019 with sign/sx of PJI with culture growing MSSA as well as MSSA bacteremia .   Had surgery ( Excisional debridement arthrotomy of right total knee with revision of poly component tibia. Placement of biodegradable antibiotic beads) on 4/12/19 . Was treated with 6 weeks cefazolin followed by oral cephalexin ( which he didn't take), then returned on 6/5/19 with the same sx with MSSA. Had hardware explant with  antibiotic infused cement spacer  on 6/17/19 and put on cefazolin, ( unclear whether he was taking oral suppressive medicine after that ).  Had CABG surgery on 11/25/2020.   He presented in January 2021 with knee pain swelling and joint fluid grew MRSA and was recommended  with cefepime and vancomycin for 6 weeks.  But he refused PICC line and discharged with oral ciprofloxacin and doxycycline which he didn't take . He was also suggested AKA which was refused.  Then returned to hospital on 2/21/2023 with the same sx and was treated with vancomycin  . Then returned to hospital on 4/16/21 ,  refused to see ortho and ID made the same recommendation but this time 8 weeks of vancomycin .  He was discharged to SNF.   He was seen by ID for follow up on 9/16 and antibiotic was switched to oral doxycycline for chronic suppression,  he presented to [facility] on 2/20/23 with MRSA bacteremia  . Was treated with vancomycin in the hospital and discharged with oral " "TMP-SMX . Readmitted to [facility] in June 2023 for substernal abscess and sternal wire was removed ( it was thought substernal abscess was secondary to MRSA bacteremia).   He returned to  on 7/2/23 with right knee infection, AKA was suggested and declined,  joint culture grew group B streptococcus and was treated with vancomycin and piperacillin-tazobactam and discharged with cephalexin and TMP-SMX .    TMP-SMX was discontinued by his PCP and been on cephalexin ( started as QID then BID) .   He came in this time with right knee pain swelling discharge on 1/25/24.  Blood sugar was over 500.  White blood cell count 12.8, ESR> 130.  C-reactive protein 9.28.   afebrile .   Arthrocentesis done at ER was sent.  Culture is pending ,  put on vancomycin and piperacillin-tazobactam,     Pt was seen by ortho at ER and the only option from ortho would be AKA which he refused . \"     During that visit, patient continued to refuse suggested interventions of AKA, as well as home IV therapy or SNF.    Patient again seen by ID on 12/17 for pain, discharge, swelling of right knee.  Patient has highly variable affect ranging from flat and calm to agitated/belligerent with no apparent inciting factor.  He reports severe pain in right knee as well as \"squishy\" drainage after his knee \"exploded after I pressed on it\", when asked if there had been a fluid collection that he attempted to drain by applying pressure, patient stated he was not being listened to and he wanted to go to [facility] for rehab, which she repeated several times.  Patient denied fevers or chills, shortness of breath, chest pain, NVD.  Discussed with RN who stated patient has had similar outbursts with other medical personnel.     Past Medical History  He has a past medical history of Personal history of other endocrine, nutritional and metabolic disease and Unspecified astigmatism, bilateral (01/04/2016).    Surgical History  He has a past surgical history that includes " Knee surgery (10/29/2014).     Social History     Occupational History    Not on file   Tobacco Use    Smoking status: Never     Passive exposure: Never    Smokeless tobacco: Never   Substance and Sexual Activity    Alcohol use: Not on file    Drug use: Not on file    Sexual activity: Not on file     Travel History   Travel since 22    No documented travel since 22          Family History  No family history on file.  Allergies  Loratadine, Pollen extracts, and Lisinopril     Immunization History   Administered Date(s) Administered    Moderna SARS-CoV-2 Vaccination 2021    Pfizer Purple Cap SARS-CoV-2 2020, 2021     Medications  Home medications:  Medications Prior to Admission   Medication Sig Dispense Refill Last Dose    lisinopril 5 mg tablet Take 1 tablet (5 mg) by mouth once daily.   Past Month    traMADol (Ultram) 50 mg tablet Take 1 tablet (50 mg) by mouth 3 times a day.       [] acetaminophen (Tylenol) 325 mg tablet Take 2 tablets (650 mg) by mouth every 6 hours if needed for moderate pain (4 - 6) or headaches for up to 14 days. 30 tablet 0     aspirin 81 mg EC tablet Take 1 tablet (81 mg) by mouth once daily.       atorvastatin (Lipitor) 40 mg tablet Take 1 tablet (40 mg) by mouth once daily. Take in evening.   Past Week    cephalexin (Keflex) 500 mg capsule Take 1 capsule (500 mg) by mouth 2 times a day.   Past Month    doxycycline (Vibramycin) 100 mg capsule Take 1 capsule (100 mg) by mouth 2 times a day. Take with at least 8 ounces (large glass) of water, do not lie down for 30 minutes after (Patient not taking: Reported on 2024) 60 capsule 3 Not Taking    fluticasone (Flonase) 50 mcg/actuation nasal spray Administer 2 sprays into each nostril once daily as needed for rhinitis. Shake gently. Before first use, prime pump. After use, clean tip and replace cap.       insulin glargine (Lantus) 100 unit/mL (3 mL) pen Inject 70 Units under the skin 2 times a day.  "(Patient taking differently: Inject 80 Units under the skin 2 times a day.) 30 mL 2     insulin lispro (HumaLOG) 100 unit/mL injection Inject 25 Units under the skin 3 times a day with meals. (Patient not taking: Reported on 2/17/2024) 15 mL 2 Not Taking    losartan (Cozaar) 25 mg tablet Take 1 tablet (25 mg) by mouth once daily. (Patient not taking: Reported on 2/17/2024) 30 tablet 3 Not Taking    metoprolol succinate XL (Toprol-XL) 25 mg 24 hr tablet Take 1 tablet (25 mg) by mouth once daily for 6 doses. Do not crush or chew. 6 tablet 0     omeprazole (PriLOSEC) 20 mg DR capsule Take 1 capsule (20 mg) by mouth once daily in the morning. Take before meals.   Unknown    pen needle, diabetic 31 gauge x 5/16\" needle Use to inject 1-4 times daily as directed. 100 each 1 Unknown    semaglutide (Rybelsus) 7 mg tablet Take 1 tablet (7 mg) by mouth early in the morning.. Take on empty stomach.   Unknown     Current medications:  Scheduled medications  albuterol, 10 mg, nebulization, Once  aspirin, 81 mg, oral, Daily  atorvastatin, 40 mg, oral, Daily  calcium gluconate, 2 g, intravenous, Once  cefTRIAXone, 2 g, intravenous, q24h  heparin (porcine), 7,500 Units, subcutaneous, q8h SUDHIR  insulin glargine, 80 Units, subcutaneous, q12h  insulin lispro, 0-10 Units, subcutaneous, TID with meals  insulin lispro, 25 Units, subcutaneous, TID with meals  losartan, 25 mg, oral, Daily  metoprolol succinate XL, 25 mg, oral, Daily      Continuous medications     PRN medications  PRN medications: acetaminophen, dextrose 10 % in water (D10W), dextrose, glucagon, melatonin, traMADol, vancomycin       Objective  Range of Vitals (last 24 hours)  Heart Rate:  [90-99]   Temp:  [36.7 °C (98.1 °F)-37 °C (98.6 °F)]   Resp:  [18-24]   BP: (125-157)/(89-95)   Height:  [167.6 cm (5' 6\")]   Weight:  [158 kg (347 lb 10.7 oz)-163 kg (360 lb)]   SpO2:  [92 %-98 %]   Daily Weight  02/17/24 : (!) 158 kg (347 lb 10.7 oz)    Body mass index is 56.11 kg/m².   "   Physical Exam     GENERAL APPEARANCE:  52y/o white male, chronically ill-appearing, unkempt, alert and intermittently cooperative, and appears to be in no acute distress.  Seated on side of hospital bed.  EYES: Pupils small, equal, round and reactive, right eye strabismus with right upward deviation, conjunctivae clear  NOSE: No nasal discharge.  THROAT: Oral mucosa moist.  Dorsum of tongue with 3 discrete shallow ulcerations with pale papular base.  CARDIAC: Regular rate and rhythm.  Lymphedema  LUNGS: Clear to auscultation bilaterally. No wheezing or diminished breath sounds.   ABDOMEN: Positive bowel sounds. Soft, nontender.  MUSCULOSKELETAL: Right lower extremity knee diffusely edematous, wrapped in Ace bandage, with purulent drainage on bandage, several draining tracts on anterior knee with purulent drainage and surrounding erythema, tender to palpation.  NEUROLOGICAL: No focal deficits. Moving all 4 ext.  SKIN: Skin pale.  Scattered furuncles.  PSYCHIATRIC: Highly variable affect from flat to belligerent.        Relevant Results    Labs  Results from last 72 hours   Lab Units 02/17/24 1717 02/16/24  1725   WBC AUTO x10*3/uL 11.2 11.5*   HEMOGLOBIN g/dL 10.0* 10.0*   HEMATOCRIT % 34.0* 32.4*   PLATELETS AUTO x10*3/uL 480* 491*   NEUTROS PCT AUTO % 73.0 77.2   LYMPHS PCT AUTO % 13.1 11.4   MONOS PCT AUTO % 8.2 7.9   EOS PCT AUTO % 4.2 1.7     Results from last 72 hours   Lab Units 02/17/24 1717 02/16/24 2124 02/16/24  1725   SODIUM mmol/L 136  --  131*   POTASSIUM mmol/L 5.5* 5.1 6.3*   CHLORIDE mmol/L 104  --  102   CO2 mmol/L 23  --  21   BUN mg/dL 34*  --  33*   CREATININE mg/dL 1.64*  --  1.63*   GLUCOSE mg/dL 152*  --  470*   CALCIUM mg/dL 8.5*  --  8.3*   ANION GAP mmol/L 15  --  14   EGFR mL/min/1.73m*2 37*  --  37*   PHOSPHORUS mg/dL  --   --  3.3     Results from last 72 hours   Lab Units 02/17/24 1717 02/16/24  1725   ALK PHOS U/L 343* 394*   BILIRUBIN TOTAL mg/dL 0.3 0.3   PROTEIN TOTAL g/dL  6.1* 6.4   ALT U/L 54* 60*   AST U/L 23 18   ALBUMIN g/dL 2.8* 3.1*     Estimated Creatinine Clearance (by C-G formula based on SCr of 1.64 mg/dL (H))  Female: 60.5 mL/min (A)  Male: 72.7 mL/min (A)  The patient&#39;s sex/gender information is inconclusive; review their information before proceeding.  C-Reactive Protein   Date Value Ref Range Status   02/16/2024 5.01 (H) <1.00 mg/dL Final   01/25/2024 9.28 (H) <1.00 mg/dL Final   10/15/2023 8.52 (H) <1.00 mg/dL Final     Sedimentation Rate   Date Value Ref Range Status   02/17/2024 122 (H) 0 - 30 mm/h Final   01/25/2024 >130 (H) 0 - 30 mm/h Final   10/15/2023 >130 (H) 0 - 30 mm/h Final       Microbiology  4/11/19 knee fluid  MSSA  4/12/19  blood culture MSSA   6/5/19   knee fluid  MSSA   9/19/19 MRSA screen positive  1/11/21  knee fluid MRSA   4/12/21  knee fluid  MRSA   7/15/21  knee fluid MRSA   6/27/23  knee fluid group B streptococcus  resistant to clindamycin and erythromycin and susceptible to penicillin  01/25/24 No growth aerobically and anaerobically       Imaging  XR KNEE RIGHT 1-2 VIEWS; ; 2/16/2024 5:48 pm   IMPRESSION:  1.  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  2. Re-demonstration of cement fragmentation of the femoral and tibial  components, with multiple cement fragments throughout the knee.  3. Previously seen posterior translation of the tibia relative to the  femur is less prominent and alignment appears near anatomic.  4. Previously described ulcer in the anterior soft tissues on the  lateral view is not visualized.        Assessment and plan  54yo male with uncontrolled DM (A1c 16.6 ) and  hx of knee PJI with pieces of antibiotic spacer .   Multiple admission with PJI with MSSA, MRSA and group B streptococcus.  History of non-adherence to oral suppressive antibiotics and unwillingness for OPAT, refusal of definitive source control with AKA previously recommended by Ortho.     Presenting with persistent septic knee arthritis.    Patient states he wishes to go to [facility] for rehab.    Impression:  Persistent prosthetic joint infection.      Recommendations:  Continue vancomycin and ceftriaxone.  Follow-up on wound culture.        Patient seen and discussed with Dr. Goodwin.    Tamie Bradley MD  ID Fellow, PGY IV.  Pager Team B: [phone].

## 2024-02-18 NOTE — PROGRESS NOTES
Vancomycin Dosing by Pharmacy  FOLLOW UP    Kody Choi is a 53 y.o. adult who Pharmacy is consulted to dose vancomycin for osteomyelitis/ septic arthiritis.     Based on the patient's indication and renal status, this patient is being dosed based on a goal vancomycin level of 15-20.     Current vancomycin dose: Dosing by levels    Most recent vancomycin level: 5.4 mcg/mL (28 hour level). Patient was refusing lab draws at due 2/17 22:00 set lab draw    Renal function is currently stable.    Estimated Creatinine Clearance (by C-G formula based on SCr of 1.67 mg/dL (H))  Female: 59.4 mL/min (A)  Male: 71.4 mL/min (A)  The patient&#39;s sex/gender information is inconclusive; review their information before proceeding.    Vancomycin   Date Value Ref Range Status   02/18/2024 5.4 5.0 - 20.0 ug/mL Final   01/30/2024 21.7 (H) 5.0 - 20.0 ug/mL Final   01/28/2024 17.0 5.0 - 20.0 ug/mL Final   01/26/2024 15.9 5.0 - 20.0 ug/mL Final     Vancomycin, Trough   Date Value Ref Range Status   10/17/2023 18.0 5.0 - 20.0 ug/mL Final     Comment:     Therapeutic Ranges:    Peak (all ages):        30.0-40.0 ug/mL                       Trough (all ages):        10.0-20.0 ug/mL                                             Vancomycin trough concentrations drawn immediately prior to the next dose at steady-state are preferred for concentration-guided monitoring of patients treated with vancomycin.    Reference: Am J Health-Syst Pharm. 2020; 77(11):835-864.          Vancomycin Tr   Date Value Ref Range Status   08/13/2021 14.6 5.0 - 20.0 ug/mL Final     Comment:      Vancomycin levels should be interpreted in conjunction   with the dose, disease being treated, vancomycin JOSE,   time of draw (trough concentrations should be   obtained just before the next dose at steady-state),   and other clinical information. Trough concentrations   of 15-20 ug/mL are desired for severe infections.   Ref.: Am J Health-Syst Pharm 66: 83-98, 2009.      07/21/2021 14.4 5.0 - 20.0 ug/mL Final     Comment:      Vancomycin levels should be interpreted in conjunction   with the dose, disease being treated, vancomycin JOSE,   time of draw (trough concentrations should be   obtained just before the next dose at steady-state),   and other clinical information. Trough concentrations   of 15-20 ug/mL are desired for severe infections.   Ref.: Am J Health-Syst Pharm 66: 83-98, 2009.     07/18/2021 20.9 (HH) 5.0 - 20.0 ug/mL Final     Comment:      Vancomycin levels should be interpreted in conjunction   with the dose, disease being treated, vancomycin JOSE,   time of draw (trough concentrations should be   obtained just before the next dose at steady-state),   and other clinical information. Trough concentrations   of 15-20 ug/mL are desired for severe infections.   Ref.: Am J Health-Syst Pharm 66: 83-98, 2009.   20:17 07/18/2021.  JUAN ESPINOZA TO SHONDA ACOSTA RB 2ID, 07/18/2021 20:17     04/25/2021 6.5 5.0 - 20.0 ug/mL Final     Comment:      Vancomycin levels should be interpreted in conjunction   with the dose, disease being treated, vancomycin JOSE,   time of draw (trough concentrations should be   obtained just before the next dose at steady-state),   and other clinical information. Trough concentrations   of 15-20 ug/mL are desired for severe infections.   Ref.: Am J [employer]-Syst Pharm 66: 83-98, 2009.         Results from last 7 days   Lab Units 02/18/24  0520 02/17/24  1717 02/16/24  1725   CREATININE mg/dL 1.67* 1.64* 1.63*   BUN mg/dL 37* 34* 33*   WBC AUTO x10*3/uL 11.3 11.2 11.5*        Visit Vitals  /89   Pulse 93   Temp 36.7 °C (98.1 °F)   Resp 18       Gram Stain   Date/Time Value Ref Range Status   01/25/2024 10:56 AM No polymorphonuclear leukocytes seen  Final   01/25/2024 10:56 AM No organisms seen  Final     Blood Culture   Date/Time Value Ref Range Status   02/16/2024 05:25 PM No growth at 1 day  Preliminary   02/16/2024 05:25 PM No growth at 1 day   Preliminary        Assessment/Plan    Vancomycin level is below goal range of 15-20. Will re-dose vancomycin with one time order of 2000 mg.    The next vancomycin level will be ordered for 2/19 at AM draw, unless clinically indicated sooner.  Will continue to monitor renal function daily while on vancomycin and order serum creatinine at least every 48 hours if not already ordered.  Will follow for continued vancomycin needs, clinical response, and signs/symptoms of toxicity.       Brook Rosales (Bella), PharmD   PGY1 [facility] Resident   Ext. [phone]

## 2024-02-18 NOTE — PROGRESS NOTES
"Kody Choi is a 53 y.o. adult on day 2 of admission presenting with Cellulitis.    Subjective   NAEON         Objective       Last Recorded Vitals  Blood pressure 142/67, pulse 95, temperature 36.7 °C (98.1 °F), resp. rate 18, height 1.676 m (5' 6\"), weight (!) 158 kg (347 lb 10.7 oz), SpO2 94 %, unknown if currently breastfeeding.  Intake/Output last 3 Shifts:  I/O last 3 completed shifts:  In: 340 (2.2 mL/kg) [P.O.:240; IV Piggyback:100]  Out: 1200 (7.6 mL/kg) [Urine:1200 (0.2 mL/kg/hr)]  Weight: 157.7 kg     General: Appears stated age, morbidly obese, laying on a bed in the middle of the emergency department hallway only wearing underwear, uncovered.  Head: Normocephalic, atraumatic.  Eyes: No icterus, EOMI.  ENT: No nasal discharge, moist mucus membranes.  Respiratory: normal respiratory effort.  Abdominal: Soft, obese, non-tender to palpation, normal bowel sounds.  Neurologic: Alert & oriented x 3, no focal deficits.  MSK: Right knee extremely swollen, erythematous, tense but compressible, tender to touch. Several small open areas with purulent drainage. Patient refuses to flex the right knee, able to passively flex right knee and ankle without difficulty although patient reports pain. Capillary refill < 2 seconds in right foot, DP pulse palpable.   Psychiatric: rude to medical staff   Scheduled medications  albuterol, 10 mg, nebulization, Once  aspirin, 81 mg, oral, Daily  atorvastatin, 40 mg, oral, Daily  calcium gluconate, 2 g, intravenous, Once  cefTRIAXone, 2 g, intravenous, q24h  heparin (porcine), 7,500 Units, subcutaneous, q8h SUDHIR  insulin glargine, 80 Units, subcutaneous, q12h  insulin lispro, 0-10 Units, subcutaneous, TID with meals  insulin lispro, 25 Units, subcutaneous, TID with meals  lactated Ringer's, 1,000 mL, intravenous, Once  losartan, 25 mg, oral, Daily  metoprolol succinate XL, 25 mg, oral, Daily      Continuous medications     PRN medications  PRN medications: acetaminophen, dextrose 10 " % in water (D10W), dextrose, glucagon, melatonin, traMADol, vancomycin       Assessment/Plan   Principal Problem:    Cellulitis  Active Problems:    Pyogenic arthritis of right knee joint, due to unspecified organism (CMS/[facility])    Kody Choi is a 53-year-old man with a past medical history of T2DM, CAD s/p multiple stents and CABG, HFrEF (EF 30% 1/30/24), A Fib, RUPESH, TKA in 2015, and multiple septic arthritis infections in the R knee who presents recurrent right knee pain and swelling. He continues to refuse recommended therapy (amputation), orthopedic surgery not recommending a washout. Will continue IV antibiotics. Currently I believe the patient understands the risks of not pursuing surgery and has capacity to make this decision. In addition to sepsis, I am concerned that if the swelling from the infection and untreated heart failure gets much worse he could develop compartment syndrome. Will need to continue address with the patient.     Update 2/18  - c/w vanc/ceftriaxone  - BARBIE: workup sent, 1L LR bolus  - Consult ID, appreciate recs     #Septic arthritis  ::Discussed with orthopedic surgery in the ED, still recommending amputation, not wash out.  - Continue vancomycin.  - Continue ceftriaxone.  - Consider ID consult, previously planned to discharge with daptomycin for 6 weeks, then doxycycline indefinitely. Patient now amenable to PICC line.   - Add on ESR, CRP. Elevated Alk phos likely related, has been chronically elevated although not to this degree (394). Not reporting any abdominal symptoms, could consider abdominal imaging. Will order GGT.  - Follow-up blood cultures.  - Wound culture ordered.  - Wound care consult.     #T2DM  #Hyperkalemia  #Hyperglycemia  #Metabolic acidosis  - Likely related to insulin non-compliance.  - Home regimen reportedly Lantus 80 units BID, 25 units lispro with meals.  - Will continue Lantus 80 units BID, start lispro 10 units with meals, SSI.  - Potassium improved following  insulin administration, continue to monitor.   - Repeat VBG ordered.  - Refusing diabetic diet.     #BARBIE vs. CKD  ::Admission Cr 1.63.  - Unclear if BARBIE or new baseline renal function, on day prior to recent discharge patient's Cr was stable ~1.4. Additionally, during October 2023 admission patient was documented to have CKD IIIb with a baseline of 1.6-1.8. Will continue to monitor.   -urine lytes and UA  -1L bolus 02/18/24      #CAD  #HFrEF  ::EF 30 % 1/30/24  - Patient continues to refuse GDMT (beta blockade, diuretics).   - Continue atorvastatin 40 mg, ASA 81 mg.      #Atrial fibrillation  - Sinus rhythm on admission, not on anticoagulation.  - Telemetry ordered, patient refusing.    F: Cautious given HFrEF  E: PRN  N: Regular (refusing diabetic diet)  A: PIV     DVT: SQH  Full code (confirmed on admission)  NOK: Vannesa (friend): [phone]     Seen with MD Antony Bui MD  Resident, PGY-3

## 2024-02-18 NOTE — PROGRESS NOTES
Kody Choi is a 53 y.o. male on day 2 of admission presenting with Cellulitis.    Subjective   Interval History:   Afebrile          Review of Systems   Constitutional: Negative.    HENT: Negative.     Eyes: Negative.    Respiratory: Negative.     Cardiovascular: Negative.    Gastrointestinal: Negative.    Endocrine: Negative.    Genitourinary: Negative.    Musculoskeletal: Negative.    Skin: Negative.    Allergic/Immunologic: Negative.    Neurological: Negative.    Hematological: Negative.    Psychiatric/Behavioral: Negative.         Objective   Range of Vitals (last 24 hours)  Heart Rate:  []   Temp:  [36.7 °C (98.1 °F)-37 °C (98.6 °F)]   Resp:  [18]   BP: (125-162)/()   SpO2:  [92 %-94 %]   Daily Weight  02/17/24 : (!) 158 kg (347 lb 10.7 oz)    Body mass index is 56.11 kg/m².    Physical Exam  Vitals reviewed.   Constitutional:       Appearance: Normal appearance.   HENT:      Head: Normocephalic and atraumatic.      Mouth/Throat:      Mouth: Mucous membranes are moist.      Pharynx: Oropharynx is clear.   Cardiovascular:      Rate and Rhythm: Normal rate and regular rhythm.   Pulmonary:      Effort: Pulmonary effort is normal.   Abdominal:      General: Abdomen is flat. Bowel sounds are normal.      Palpations: Abdomen is soft.   Musculoskeletal:         General: Normal range of motion.      Cervical back: Normal range of motion.   Skin:     General: Skin is warm.   Neurological:      General: No focal deficit present.      Mental Status: He is alert and oriented to person, place, and time.   Psychiatric:         Mood and Affect: Mood normal.         Behavior: Behavior normal.         Thought Content: Thought content normal.         Judgment: Judgment normal.         Antibiotics  oxyCODONE-acetaminophen (Percocet) 5-325 mg per tablet 1 tablet  vancomycin (Vancocin) 2,000 mg in dextrose 5% water 400 mL  cefTRIAXone (Rocephin) IVPB 1 g  traMADol (Ultram) tablet 50 mg  calcium gluconate in NS IV 2  g  dextrose 50 % injection 25 g  insulin regular (HumuLIN R,NovoLIN R) injection 5 Units  sodium zirconium cyclosilicate (Lokelma) packet 5 g  albuterol nebulizer solution 10 mg  acetaminophen (Tylenol) tablet 650 mg  insulin lispro (HumaLOG) injection 5 Units  atorvastatin (Lipitor) tablet 40 mg  dextrose 50 % injection 25 g  glucagon (Glucagen) injection 1 mg  dextrose 10 % in water (D10W) infusion  insulin glargine (Lantus) injection 70 Units  insulin lispro (HumaLOG) injection 10 Units  cefTRIAXone (Rocephin) 2 g IV in dextrose 5% 50 mL  insulin lispro (HumaLOG) injection 0-10 Units  aspirin chewable tablet 81 mg  heparin (porcine) injection 7,500 Units  traMADol (Ultram) tablet 50 mg  insulin glargine (Lantus) injection 80 Units  insulin lispro (HumaLOG) injection 25 Units  vancomycin (Vancocin) placeholder  melatonin tablet 3 mg  lisinopril (Prinivil, Zestril) tablet  cephalexin (Keflex) capsule  traMADol (Ultram) tablet  lisinopril tablet 5 mg  metoprolol succinate XL (Toprol-XL) 24 hr tablet 25 mg  losartan (Cozaar) tablet 25 mg  sodium zirconium cyclosilicate (Lokelma) packet 5 g  acetaminophen (Tylenol) tablet 650 mg  vancomycin (Vancocin) 2000 mg/500 ml in D5W 500 mL IV piggyback 2 g      Relevant Results  Labs  Results from last 72 hours   Lab Units 02/18/24  0520 02/17/24 1717 02/16/24  1725   WBC AUTO x10*3/uL 11.3 11.2 11.5*   HEMOGLOBIN g/dL 10.1* 10.0* 10.0*   HEMATOCRIT % 33.7* 34.0* 32.4*   PLATELETS AUTO x10*3/uL 515* 480* 491*   NEUTROS PCT AUTO % 71.3 73.0 77.2   LYMPHS PCT AUTO % 14.1 13.1 11.4   MONOS PCT AUTO % 8.3 8.2 7.9   EOS PCT AUTO % 5.1 4.2 1.7     Results from last 72 hours   Lab Units 02/18/24  0520 02/17/24  1717 02/16/24 2124 02/16/24  1725   SODIUM mmol/L 137 136  --  131*   POTASSIUM mmol/L 4.8 5.5* 5.1 6.3*   CHLORIDE mmol/L 105 104  --  102   CO2 mmol/L 24 23  --  21   BUN mg/dL 37* 34*  --  33*   CREATININE mg/dL 1.67* 1.64*  --  1.63*   GLUCOSE mg/dL 70* 152*  --  470*    CALCIUM mg/dL 8.8 8.5*  --  8.3*   ANION GAP mmol/L 13 15  --  14   EGFR mL/min/1.73m*2 36* 37*  --  37*   PHOSPHORUS mg/dL 3.7  --   --  3.3     Results from last 72 hours   Lab Units 02/18/24  0520 02/17/24  1717 02/16/24  1725   ALK PHOS U/L  --  343* 394*   BILIRUBIN TOTAL mg/dL  --  0.3 0.3   PROTEIN TOTAL g/dL  --  6.1* 6.4   ALT U/L  --  54* 60*   AST U/L  --  23 18   ALBUMIN g/dL 2.9* 2.8* 3.1*     Estimated Creatinine Clearance (by C-G formula based on SCr of 1.67 mg/dL (H))  Female: 59.4 mL/min (A)  Male: 71.4 mL/min (A)  The patient&#39;s sex/gender information is inconclusive; review their information before proceeding.  C-Reactive Protein   Date Value Ref Range Status   02/16/2024 5.01 (H) <1.00 mg/dL Final   01/25/2024 9.28 (H) <1.00 mg/dL Final   10/15/2023 8.52 (H) <1.00 mg/dL Final     Microbiology  No results found for the last 14 days.       Assessment/Plan     52yo male with uncontrolled DM (A1c 16.6 ) and  hx of knee PJI with pieces of antibiotic spacer .   Multiple admission with PJI with MSSA, MRSA and group B streptococcus.  History of non-adherence to oral suppressive antibiotics and unwillingness for OPAT, refusal of definitive source control with AKA previously recommended by Ortho.     Presenting with persistent septic knee arthritis.   Patient states he wishes to go to [facility] for rehab.     Impression:  Persistent prosthetic joint infection.   .crcl     Recommendations:  Continue vancomycin  STOP ceftriaxone  START cefepime 2gm IV q12  Follow-up on wound cultures     Following    Noah HORNER attending    Pager Team B: [phone].     I spent 30 minutes in the professional and overall care of this patient.

## 2024-02-18 NOTE — CARE PLAN
The clinical goals for the shift include pt will be compliant with meds, orders  and POC

## 2024-02-18 NOTE — NURSING NOTE
"Pt refuses to wear tele. Provider aware. Pt refused labs several times throughout the day shift. Pt was agreeable to get labs drawn late afternoon. Pt is calm and cooperative at moments and then angry, anxious, non-compliant at other moments. Pt potassium came back at 5.5 this afternoon. Education was provided  regarding the concerns for heart rhythm issues related to higher potassium levels, pt stated \"he is fine.\" Team is aware of non-compliance.   "

## 2024-02-18 NOTE — NURSING NOTE
"Pt refused some meds, refuses tele.  Pt refuses vanco because he he states ,\"I am upset that the hospital does not have larger underwear for me to wear.\" Pt threw med cup across room. Pt was redirectable and calmed down. Pt requested for me to replace the hospital underwear on with the same pair after I previously applied two pairs. Pt became upset again after I explained they are the same size. Pt attempted to place dirty soiled underwear on from his personal bag. I provided him with education regarding infection prevention and washed his hands. Team aware as I spoke with team outside of pt's room during rounding.   "

## 2024-02-18 NOTE — NURSING NOTE
Pt refused to take lantus 80 units after poct glucose 93, pt also refused to have ordered abs drawn.  On call resident informed via secure chat.

## 2024-02-18 NOTE — CARE PLAN
The patient's goals for the shift include infection and pain treatment    The clinical goals for the shift include pt will be compliant with meds, orders  and POC    Over the shift, the patient did not make progress toward the following goals.

## 2024-02-19 ENCOUNTER — APPOINTMENT (OUTPATIENT)
Dept: RADIOLOGY | Facility: HOSPITAL | Age: 54
DRG: 560 | End: 2024-02-19
Payer: COMMERCIAL

## 2024-02-19 LAB
ALBUMIN SERPL BCP-MCNC: 2.8 G/DL (ref 3.4–5)
ANION GAP SERPL CALC-SCNC: 12 MMOL/L (ref 10–20)
BASOPHILS # BLD AUTO: 0.09 X10*3/UL (ref 0–0.1)
BASOPHILS NFR BLD AUTO: 0.9 %
BUN SERPL-MCNC: 41 MG/DL (ref 6–23)
CALCIUM SERPL-MCNC: 8.7 MG/DL (ref 8.6–10.6)
CHLORIDE SERPL-SCNC: 108 MMOL/L (ref 98–107)
CO2 SERPL-SCNC: 22 MMOL/L (ref 21–32)
CREAT SERPL-MCNC: 1.4 MG/DL (ref 0.5–1.3)
EGFRCR SERPLBLD CKD-EPI 2021: 45 ML/MIN/1.73M*2
EOSINOPHIL # BLD AUTO: 0.53 X10*3/UL (ref 0–0.7)
EOSINOPHIL NFR BLD AUTO: 5.3 %
ERYTHROCYTE [DISTWIDTH] IN BLOOD BY AUTOMATED COUNT: 17 % (ref 11.5–14.5)
GLUCOSE BLD MANUAL STRIP-MCNC: 150 MG/DL (ref 74–99)
GLUCOSE BLD MANUAL STRIP-MCNC: 165 MG/DL (ref 74–99)
GLUCOSE BLD MANUAL STRIP-MCNC: 97 MG/DL (ref 74–99)
GLUCOSE SERPL-MCNC: 86 MG/DL (ref 74–99)
HCT VFR BLD AUTO: 33.6 % (ref 36–52)
HGB BLD-MCNC: 9.7 G/DL (ref 12–17.5)
IMM GRANULOCYTES # BLD AUTO: 0.05 X10*3/UL (ref 0–0.7)
IMM GRANULOCYTES NFR BLD AUTO: 0.5 % (ref 0–0.9)
LYMPHOCYTES # BLD AUTO: 1.49 X10*3/UL (ref 1.2–4.8)
LYMPHOCYTES NFR BLD AUTO: 14.9 %
MAGNESIUM SERPL-MCNC: 2.03 MG/DL (ref 1.6–2.4)
MCH RBC QN AUTO: 23.2 PG (ref 26–34)
MCHC RBC AUTO-ENTMCNC: 28.9 G/DL (ref 32–36)
MCV RBC AUTO: 80 FL (ref 80–100)
MONOCYTES # BLD AUTO: 0.97 X10*3/UL (ref 0.1–1)
MONOCYTES NFR BLD AUTO: 9.7 %
NEUTROPHILS # BLD AUTO: 6.85 X10*3/UL (ref 1.2–7.7)
NEUTROPHILS NFR BLD AUTO: 68.7 %
NRBC BLD-RTO: 0 /100 WBCS (ref 0–0)
PHOSPHATE SERPL-MCNC: 3.9 MG/DL (ref 2.5–4.9)
PLATELET # BLD AUTO: 456 X10*3/UL (ref 150–450)
POTASSIUM SERPL-SCNC: 4.8 MMOL/L (ref 3.5–5.3)
RBC # BLD AUTO: 4.19 X10*6/UL (ref 4–5.9)
SODIUM SERPL-SCNC: 137 MMOL/L (ref 136–145)
VANCOMYCIN SERPL-MCNC: 11.7 UG/ML (ref 5–20)
WBC # BLD AUTO: 10 X10*3/UL (ref 4.4–11.3)

## 2024-02-19 PROCEDURE — 85025 COMPLETE CBC W/AUTO DIFF WBC: CPT

## 2024-02-19 PROCEDURE — 87070 CULTURE OTHR SPECIMN AEROBIC: CPT

## 2024-02-19 PROCEDURE — 1200000002 HC GENERAL ROOM WITH TELEMETRY DAILY

## 2024-02-19 PROCEDURE — 99232 SBSQ HOSP IP/OBS MODERATE 35: CPT

## 2024-02-19 PROCEDURE — 99232 SBSQ HOSP IP/OBS MODERATE 35: CPT | Performed by: INTERNAL MEDICINE

## 2024-02-19 PROCEDURE — 36569 INSJ PICC 5 YR+ W/O IMAGING: CPT

## 2024-02-19 PROCEDURE — 2500000004 HC RX 250 GENERAL PHARMACY W/ HCPCS (ALT 636 FOR OP/ED)

## 2024-02-19 PROCEDURE — 71045 X-RAY EXAM CHEST 1 VIEW: CPT

## 2024-02-19 PROCEDURE — 82947 ASSAY GLUCOSE BLOOD QUANT: CPT

## 2024-02-19 PROCEDURE — 97161 PT EVAL LOW COMPLEX 20 MIN: CPT | Mod: GP

## 2024-02-19 PROCEDURE — 71045 X-RAY EXAM CHEST 1 VIEW: CPT | Performed by: RADIOLOGY

## 2024-02-19 PROCEDURE — C1751 CATH, INF, PER/CENT/MIDLINE: HCPCS

## 2024-02-19 PROCEDURE — 2500000001 HC RX 250 WO HCPCS SELF ADMINISTERED DRUGS (ALT 637 FOR MEDICARE OP)

## 2024-02-19 PROCEDURE — 80069 RENAL FUNCTION PANEL: CPT

## 2024-02-19 PROCEDURE — 97165 OT EVAL LOW COMPLEX 30 MIN: CPT | Mod: GO

## 2024-02-19 PROCEDURE — 02H633Z INSERTION OF INFUSION DEVICE INTO RIGHT ATRIUM, PERCUTANEOUS APPROACH: ICD-10-PCS | Performed by: STUDENT IN AN ORGANIZED HEALTH CARE EDUCATION/TRAINING PROGRAM

## 2024-02-19 PROCEDURE — 83735 ASSAY OF MAGNESIUM: CPT

## 2024-02-19 PROCEDURE — 36415 COLL VENOUS BLD VENIPUNCTURE: CPT

## 2024-02-19 PROCEDURE — 80202 ASSAY OF VANCOMYCIN: CPT

## 2024-02-19 PROCEDURE — 87081 CULTURE SCREEN ONLY: CPT

## 2024-02-19 PROCEDURE — 2780000003 HC OR 278 NO HCPCS

## 2024-02-19 RX ORDER — LIDOCAINE HYDROCHLORIDE 10 MG/ML
5 INJECTION, SOLUTION EPIDURAL; INFILTRATION; INTRACAUDAL; PERINEURAL ONCE
Status: DISCONTINUED | OUTPATIENT
Start: 2024-02-19 | End: 2024-02-23 | Stop reason: HOSPADM

## 2024-02-19 RX ORDER — VANCOMYCIN HYDROCHLORIDE 750 MG/150ML
750 INJECTION, SOLUTION INTRAVENOUS EVERY 12 HOURS
Status: DISCONTINUED | OUTPATIENT
Start: 2024-02-19 | End: 2024-02-20

## 2024-02-19 RX ADMIN — HEPARIN SODIUM 7500 UNITS: 5000 INJECTION INTRAVENOUS; SUBCUTANEOUS at 02:14

## 2024-02-19 RX ADMIN — INSULIN GLARGINE 80 UNITS: 100 INJECTION, SOLUTION SUBCUTANEOUS at 21:17

## 2024-02-19 RX ADMIN — HEPARIN SODIUM 7500 UNITS: 5000 INJECTION INTRAVENOUS; SUBCUTANEOUS at 09:09

## 2024-02-19 RX ADMIN — CEFEPIME 2 G: 1 INJECTION, SOLUTION INTRAVENOUS at 21:17

## 2024-02-19 RX ADMIN — HEPARIN SODIUM 7500 UNITS: 5000 INJECTION INTRAVENOUS; SUBCUTANEOUS at 23:57

## 2024-02-19 RX ADMIN — VANCOMYCIN HYDROCHLORIDE 750 MG: 750 INJECTION, SOLUTION INTRAVENOUS at 23:57

## 2024-02-19 RX ADMIN — CEFEPIME 2 G: 1 INJECTION, SOLUTION INTRAVENOUS at 09:08

## 2024-02-19 RX ADMIN — HEPARIN SODIUM 7500 UNITS: 5000 INJECTION INTRAVENOUS; SUBCUTANEOUS at 17:16

## 2024-02-19 RX ADMIN — LOSARTAN POTASSIUM 25 MG: 25 TABLET, FILM COATED ORAL at 09:08

## 2024-02-19 ASSESSMENT — COGNITIVE AND FUNCTIONAL STATUS - GENERAL
TURNING FROM BACK TO SIDE WHILE IN FLAT BAD: A LITTLE
DAILY ACTIVITIY SCORE: 14
MOVING TO AND FROM BED TO CHAIR: TOTAL
TOILETING: A LOT
WALKING IN HOSPITAL ROOM: TOTAL
CLIMB 3 TO 5 STEPS WITH RAILING: TOTAL
MOVING FROM LYING ON BACK TO SITTING ON SIDE OF FLAT BED WITH BEDRAILS: A LITTLE
TURNING FROM BACK TO SIDE WHILE IN FLAT BAD: A LITTLE
HELP NEEDED FOR BATHING: A LOT
CLIMB 3 TO 5 STEPS WITH RAILING: TOTAL
DRESSING REGULAR LOWER BODY CLOTHING: A LOT
DRESSING REGULAR UPPER BODY CLOTHING: A LOT
PERSONAL GROOMING: A LOT
MOVING FROM LYING ON BACK TO SITTING ON SIDE OF FLAT BED WITH BEDRAILS: A LITTLE
TOILETING: A LOT
DRESSING REGULAR LOWER BODY CLOTHING: A LOT
STANDING UP FROM CHAIR USING ARMS: A LOT
MOVING FROM LYING ON BACK TO SITTING ON SIDE OF FLAT BED WITH BEDRAILS: A LITTLE
WALKING IN HOSPITAL ROOM: TOTAL
STANDING UP FROM CHAIR USING ARMS: TOTAL
MOBILITY SCORE: 10
PERSONAL GROOMING: A LOT
STANDING UP FROM CHAIR USING ARMS: TOTAL
TURNING FROM BACK TO SIDE WHILE IN FLAT BAD: A LITTLE
CLIMB 3 TO 5 STEPS WITH RAILING: TOTAL
MOBILITY SCORE: 10
MOVING TO AND FROM BED TO CHAIR: A LOT
DRESSING REGULAR UPPER BODY CLOTHING: A LOT
MOBILITY SCORE: 13
DAILY ACTIVITIY SCORE: 14
HELP NEEDED FOR BATHING: A LOT
WALKING IN HOSPITAL ROOM: A LOT
MOVING TO AND FROM BED TO CHAIR: TOTAL

## 2024-02-19 ASSESSMENT — PAIN SCALES - GENERAL
PAINLEVEL_OUTOF10: 10 - WORST POSSIBLE PAIN
PAINLEVEL_OUTOF10: 5 - MODERATE PAIN
PAINLEVEL_OUTOF10: 4
PAINLEVEL_OUTOF10: 10 - WORST POSSIBLE PAIN

## 2024-02-19 ASSESSMENT — ACTIVITIES OF DAILY LIVING (ADL)
ADL_ASSISTANCE: INDEPENDENT
ADL_ASSISTANCE: INDEPENDENT
BATHING_ASSISTANCE: MODERATE

## 2024-02-19 ASSESSMENT — PAIN - FUNCTIONAL ASSESSMENT
PAIN_FUNCTIONAL_ASSESSMENT: 0-10

## 2024-02-19 NOTE — PROGRESS NOTES
2/19/2024 Care coordination  Pyogenic arthritis of right knee joint, due to unspecified organism (CMS/[facility])   Kody Choi is a 53-year-old man with a past medical history of T2DM, CAD s/p multiple stents and CABG, HFrEF (EF 30% 1/30/24), A Fib, RUPESH, TKA in 2015, and multiple septic arthritis infections in the R knee who presents recurrent right knee pain and swelling. He continues to refuse recommended therapy (amputation), orthopedic surgery not recommending a washout. Will continue IV antibiotics.  PT/OT recs Mod.  Pt states he wants [facility].

## 2024-02-19 NOTE — PROGRESS NOTES
"Vancomycin Dosing by Pharmacy- FOLLOW UP    Kody Choi is a 53 y.o. year old adult who Pharmacy has been consulted for vancomycin dosing for osteomyelitis/septic arthritis. Based on the patient's indication and renal status this patient is being dosed based on a goal AUC of 400-600.     Renal function is currently stable. Please monitor Scr     Current vancomycin dose: 2000 mg given every 24 hours    Estimated vancomycin AUC on current dose: <400 mg/L.hr     Visit Vitals  BP (!) 132/92   Pulse 61   Temp 36.9 °C (98.4 °F)   Resp 22        Lab Results   Component Value Date    CREATININE 1.40 (H) 02/19/2024    CREATININE 1.67 (H) 02/18/2024    CREATININE 1.64 (H) 02/17/2024    CREATININE 1.63 (H) 02/16/2024        Patient weight is No results found for: \"PTWEIGHT\"    No results found for: \"CULTURE\"     I/O last 3 completed shifts:  In: - (0 mL/kg)   Out: 825 (5.2 mL/kg) [Urine:825 (0.1 mL/kg/hr)]  Weight: 157.7 kg   [unfilled]    Lab Results   Component Value Date    PATIENTTEMP 37.0 02/17/2024    PATIENTTEMP 37.0 02/16/2024    PATIENTTEMP 37.0 01/25/2024        Assessment/Plan    Below goal AUC. Orders placed for new vancomcyin regimen of 750 every 12 hours to begin at 1200.     This dosing regimen is predicted by [employer] to result in the following pharmacokinetic parameters:    AUC24,ss: 531 mg/L.hr  Probability of AUC24 > 400: 86 %  Ctrough,ss: 12.5 mg/L  Probability of Ctrough,ss > 20: 0 %    The next level will be obtained on 2/20 at AM labs. May be obtained sooner if clinically indicated.   Will continue to monitor renal function daily while on vancomycin and order serum creatinine at least every 48 hours if not already ordered.  Follow for continued vancomycin needs, clinical response, and signs/symptoms of toxicity.       Rodo Larose, PharmD           "

## 2024-02-19 NOTE — PROGRESS NOTES
Kody Choi is a 53 y.o. male on day 3 of admission presenting with Cellulitis.    Subjective   Interval History: afebrile, no leukocytosis.        Objective   Range of Vitals (last 24 hours)  Heart Rate:  []   Temp:  [36.7 °C (98.1 °F)-37 °C (98.6 °F)]   Resp:  [18-22]   BP: (127-151)/(67-92)   SpO2:  [92 %-95 %]   Daily Weight  02/17/24 : (!) 158 kg (347 lb 10.7 oz)    Body mass index is 56.11 kg/m².    Physical exam:  GENERAL APPEARANCE:  54y/o white male, chronically ill-appearing, unkempt, alert and cooperative, and appears to be in no acute distress.  Seated on side of hospital bed.   Chest rise symmetric, no increased work of breathing.  MUSCULOSKELETAL: Right lower extremity knee diffusely edematous, wrapped in Ace bandage, with purulent drainage on bandage, several draining tracts on anterior knee with purulent drainage and surrounding erythema, tender to palpation.   SKIN: Skin pale.  Scattered furuncles.     Relevant Results  Labs  Results from last 72 hours   Lab Units 02/18/24  0520 02/17/24 1717 02/16/24  1725   WBC AUTO x10*3/uL 11.3 11.2 11.5*   HEMOGLOBIN g/dL 10.1* 10.0* 10.0*   HEMATOCRIT % 33.7* 34.0* 32.4*   PLATELETS AUTO x10*3/uL 515* 480* 491*   NEUTROS PCT AUTO % 71.3 73.0 77.2   LYMPHS PCT AUTO % 14.1 13.1 11.4   MONOS PCT AUTO % 8.3 8.2 7.9   EOS PCT AUTO % 5.1 4.2 1.7     Results from last 72 hours   Lab Units 02/18/24  0520 02/17/24 1717 02/16/24 2124 02/16/24  1725   SODIUM mmol/L 137 136  --  131*   POTASSIUM mmol/L 4.8 5.5* 5.1 6.3*   CHLORIDE mmol/L 105 104  --  102   CO2 mmol/L 24 23  --  21   BUN mg/dL 37* 34*  --  33*   CREATININE mg/dL 1.67* 1.64*  --  1.63*   GLUCOSE mg/dL 70* 152*  --  470*   CALCIUM mg/dL 8.8 8.5*  --  8.3*   ANION GAP mmol/L 13 15  --  14   EGFR mL/min/1.73m*2 36* 37*  --  37*   PHOSPHORUS mg/dL 3.7  --   --  3.3     Results from last 72 hours   Lab Units 02/18/24  0520 02/17/24  1717 02/16/24  1725   ALK PHOS U/L  --  343* 394*   BILIRUBIN TOTAL  mg/dL  --  0.3 0.3   PROTEIN TOTAL g/dL  --  6.1* 6.4   ALT U/L  --  54* 60*   AST U/L  --  23 18   ALBUMIN g/dL 2.9* 2.8* 3.1*     Estimated Creatinine Clearance (by C-G formula based on SCr of 1.67 mg/dL (H))  Female: 59.4 mL/min (A)  Male: 71.4 mL/min (A)  The patient&#39;s sex/gender information is inconclusive; review their information before proceeding.  C-Reactive Protein   Date Value Ref Range Status   02/16/2024 5.01 (H) <1.00 mg/dL Final   01/25/2024 9.28 (H) <1.00 mg/dL Final   10/15/2023 8.52 (H) <1.00 mg/dL Final     Microbiology  blCx 2/16 ngtd    Imaging  XR CHEST 1 VIEW;  2/19/2024 3:08 pm   Impression:     1. Mildly increased pulmonary vascular cephalization and at least  small bilateral pleural effusions. At most, mild interstitial  pulmonary edema.  2. Right upper extremity PICC as above.        Assessment/Plan   54yo male with uncontrolled DM (A1c 16.6 ) and  hx of knee PJI with pieces of antibiotic spacer .   Multiple admission with PJI with MSSA, MRSA and group B streptococcus.  History of non-adherence to oral suppressive antibiotics and unwillingness for OPAT, refusal of definitive source control with AKA previously recommended by Ortho.     Presenting with persistent septic knee arthritis.  No recent culture data available, will treat based on prior culture data.  Patient states he wishes to go to [facility] for rehab.     Impression:  Persistent prosthetic joint infection.       Recommendations:  Discontinue vancomycin and cefepime  Start daptomycin, continue for planned duration of 4 weeks start date 2/19 until tentative end date of 3/18  Please check CK levels weekly while on daptomycin.  Patient to follow-up with Dr. Goodwin in clinic in 4 weeks.       Patient seen and discussed with Dr. Goodwin.  ID to sign off.    Tamie Bradley MD  ID Fellow, PGY IV.  Pager Team B: [phone].

## 2024-02-19 NOTE — PROCEDURES
Vascular Access Team Procedure Note     Visit Date: 2/19/2024      Patient Name: Kody Choi         MRN: [id number]             Procedure:PICC      PICC - Adult 02/19/24 Single lumen Right Basilic vein (Active)   02/19/24 1420 Basilic vein   Earliest Known Present: 02/19/24   Placed by External Staff?:    Hand Hygiene Completed: Yes   Catheter Time Out Checklist Completed: Yes   Size (Fr): 4   Lumen Type: Single lumen   Description (optional):    Catheter to Vein Ratio Less Than 50%: Yes   Total Length (cm): 49 cm   External Length (cm): 0 cm   Orientation: Right   Site Prep: Chlorhexidine ;Usual sterile procedure followed   Local Anesthetic: Injectable   Indication: Parenteral medications (antibiotics)   Insertion Team Members In The Room: Nurse (Sunita Clements LPN)   Initial Extremity Circumference (cm): 38 cm   Placed by: Sydney Jaffe RN-[facility]   Insertion attempts: 1   Patient Tolerance: Tolerated well   Comfort Measures: Subcutaneous anesthetic   Procedure Location: Bedside (timeout with Jett TRUJILLO)   Safety Measures: Patient specific safety measures addressed with RN   Estimated Blood Loss (mL): 0 mL   Vessel Fully Compressible Proximally and Distally to Insertion Site: Yes   Brisk Blood Return Obtained and Line Draws Easily: Yes   Tip Location:    Line Confirmation: X-ray;Blood return;Non-pulsatile blood flow   Lot #: [id number]   : BARD   PICC Line Exp Date: 03/31/25   Number of Sutures Placed:    Post Procedure Checklist: Handoff with RN;Bed at lowest level and wheels locked;Obtain all new IV tubing prior to use;Line discharge information at bedside   Earliest Known Removed:    Removal Reason :    Removal Catheter Length (cm):    Catheter Tip Cultured:    Site Assessment Clean;Dry 02/19/24 1426                      Sydney Jaffe RN  2/19/2024  2:27 PM

## 2024-02-19 NOTE — CONSULTS
Wound Care Consult     Visit Date: 2/19/2024      Patient Name: Kody Choi         MRN: [id number]           YOB: 1970     Reason for Consult:  2 abscess wounds on the Right knee concerns for MRSA     Wound History:  Multiple admission with PJI with MSSA, MRSA and group B streptococcus.  History of non-adherence to oral suppressive antibiotics and unwillingness for OPAT, refusal of definitive source control with AKA previously recommended by Ortho.     Presenting with persistent septic knee arthritis.     Wound Assessment:  Wound 10/16/23 Skin Tear Sacrum Right (Active)       Wound 02/16/24 Other (comment) Knee Right (Active)   Wound Image   02/19/24 0927   Site Assessment Sloughing;Yellow;Maceration;Induration 02/19/24 0927   [place]bey-Wound Assessment Painful;Swelling;Maceration 02/19/24 0927   Non-staged Wound Description Full thickness 02/19/24 0927   Shape round 02/19/24 0927   State of Healing Slough;Non-healing 02/19/24 0927   Margins Poorly defined 02/19/24 0927   Drainage Description Yellow;Tan 02/19/24 0927   Drainage Amount Small 02/19/24 0927   Dressing Foam;Gauze;Other (Comment) 02/17/24 1300   Dressing Changed Changed 02/19/24 0927   Dressing Status Clean;Dry 02/19/24 0927       Wound Team Summary Assessment: The wound care team came to bedside to assess the patient's chronic right knee wound.  The wound was cleansed with vashe wound cleanser and pat dry with a 4x4 cover sponge.  The patient has 2 wound present on the right knee; the medial wound is 1 cm x 1 cm x 1 cm and the lateral wound is 2 cm x 2 cm.  A strip of hydrofera blue ready foam ( shown to effective against MRSA) was applied to the wound bed and covered with a mepilex border dressing.  The patient is currently on antibiotics at this time, please reconsult if the worsens or new further assistance.       Wound Team Plan:   Recommendations: 1-2 days or PRN   Cleanse with normal saline and pat dry with a 4x4 cover sponge   Strips of  hydrofera blue have been cut and left at bedside for dressing changes. Apply a strip over both wounds.  Cover with a mepilex border dressing or ABD pad and Kerlix wrap.       Rajendra Saleh RN-BC, CWON  2/19/2024  3:38 PM

## 2024-02-19 NOTE — PROGRESS NOTES
"Kody Choi is a 53 y.o. adult on day 3 of admission presenting with Cellulitis.    Subjective   NAEON. Pt refusing his insulin this morning. Seen and examined, pt states his knee continues to drain. Swelling has improved significantly since admission. Denies f/c/n/v/abd pain. Daily bms, eating/drinking well, no problems with urination. No other concerns this AM.    Objective     Last Recorded Vitals  Blood pressure (!) 132/92, pulse 105, temperature 36.9 °C (98.4 °F), resp. rate 22, height 1.676 m (5' 6\"), weight (!) 158 kg (347 lb 10.7 oz), SpO2 92 %, unknown if currently breastfeeding.  Intake/Output last 3 Shifts:  I/O last 3 completed shifts:  In: - (0 mL/kg)   Out: 825 (5.2 mL/kg) [Urine:825 (0.1 mL/kg/hr)]  Weight: 157.7 kg     General: Appears stated age, morbidly obese, laying on a bed in the middle of the emergency department hallway only wearing underwear, uncovered.  Head: Normocephalic, atraumatic.  Eyes: No icterus, EOMI.  ENT: No nasal discharge, moist mucus membranes.  Respiratory: normal respiratory effort.  Abdominal: Soft, obese, non-tender to palpation, normal bowel sounds.  Neurologic: Alert & oriented x 3, no focal deficits.  MSK: Right knee extremely swollen, erythematous, tense but compressible, tender to touch. Several small open areas with purulent drainage. Patient refuses to flex the right knee, able to passively flex right knee and ankle without difficulty although patient reports pain. Capillary refill < 2 seconds in right foot, DP pulse palpable.   Psychiatric: rude to medical staff   Scheduled medications  albuterol, 10 mg, nebulization, Once  aspirin, 81 mg, oral, Daily  atorvastatin, 40 mg, oral, Daily  calcium gluconate, 2 g, intravenous, Once  cefepime, 2 g, intravenous, q12h  heparin (porcine), 7,500 Units, subcutaneous, q8h SUDHIR  insulin glargine, 80 Units, subcutaneous, q12h  insulin lispro, 0-10 Units, subcutaneous, TID with meals  insulin lispro, 25 Units, subcutaneous, TID " with meals  lidocaine, 5 mL, infiltration, Once  losartan, 25 mg, oral, Daily  metoprolol succinate XL, 25 mg, oral, Daily      Continuous medications     PRN medications  PRN medications: acetaminophen, alteplase, dextrose 10 % in water (D10W), dextrose, glucagon, melatonin, traMADol, vancomycin       Assessment/Plan   Principal Problem:    Cellulitis  Active Problems:    Pyogenic arthritis of right knee joint, due to unspecified organism (CMS/[facility])    Kody Choi is a 53-year-old man with a past medical history of T2DM, CAD s/p multiple stents and CABG, HFrEF (EF 30% 1/30/24), A Fib, RUPESH, TKA in 2015, and multiple septic arthritis infections in the R knee who presents recurrent right knee pain and swelling. He continues to refuse recommended therapy (amputation), orthopedic surgery not recommending a washout. Will continue IV antibiotics. Currently I believe the patient understands the risks of not pursuing surgery and has capacity to make this decision. In addition to sepsis, I am concerned that if the swelling from the infection and untreated heart failure gets much worse he could develop compartment syndrome. Will need to continue address with the patient.     Update 2/19:  -CTX to cefepime per ID, c/w vanc  -PICC line ordered  -pending pt/ot, patient requesting [facility]    Update 2/18  - c/w vanc/ceftriaxone  - BARBIE: workup sent, 1L LR bolus  - Consult ID, appreciate recs     #Septic arthritis  ::Discussed with orthopedic surgery in the ED, still recommending amputation, not wash out.  ::Blood cx NGTD  - Continue vancomycin.  - CTX to cefepime per ID, c/w vanc  - ID consult, previously planned to discharge with daptomycin for 6 weeks, then doxycycline indefinitely. Patient now amenable to PICC line.   - Wound culture ordered.  - Wound care consult.     #T2DM  #Hyperkalemia  #Hyperglycemia  #Metabolic acidosis  - Likely related to insulin non-compliance.  - Home regimen reportedly Lantus 80 units BID,  25 units lispro with meals.  - Will continue Lantus 80 units BID, start lispro 10 units with meals, SSI.  - Potassium improved following insulin administration, continue to monitor.   - Refusing diabetic diet.     #BARBIE vs. CKD  ::Admission Cr 1.63, now improved to 1.40  ::Urine lytes c/w prerenal  ::s/p 1L fluid bolus 2/19  - Unclear if BARBIE or new baseline renal function, on day prior to recent discharge patient's Cr was stable ~1.4. Additionally, during October 2023 admission patient was documented to have CKD IIIb with a baseline of 1.6-1.8. Will continue to monitor.      #CAD  #HFrEF  ::EF 30 % 1/30/24  - Patient continues to refuse GDMT (beta blockade, diuretics).   - Continue atorvastatin 40 mg, ASA 81 mg.      #Atrial fibrillation  - Sinus rhythm on admission, not on anticoagulation.  - Telemetry ordered, patient refusing.    F: Cautious given HFrEF  E: PRN  N: Regular (refusing diabetic diet)  A: PIV     DVT: SQH  Full code (confirmed on admission)  ALTAFK: Vannesa (friend): [phone]     Seen with MD Patrick Bui, DO  PGY-1 Neurology

## 2024-02-19 NOTE — PROGRESS NOTES
Physical Therapy    Physical Therapy Evaluation    Patient Name: Kody Choi  MRN: [id number]  Today's Date: 2/19/2024   Time Calculation  Start Time: 1001  Stop Time: 1018  Time Calculation (min): 17 min    Assessment/Plan   PT Assessment  PT Assessment Results: Decreased strength, Decreased range of motion, Decreased endurance, Impaired balance, Decreased mobility, Pain  Rehab Prognosis: Good  Barriers to Discharge: none  Evaluation/Treatment Tolerance: Patient limited by fatigue, Patient limited by pain  Medical Staff Made Aware: Yes  Strengths: Attitude of self  Barriers to Participation: Coping skills, Premorbid level of function  Assessment Comment: The pt lives alone and wheelchair bound, but unable to perform bed to W/C to bed transfers making him unable to care for self.  IP OR SWING BED PT PLAN  Inpatient or Swing Bed: Inpatient  PT Plan  Treatment/Interventions: Bed mobility, Transfer training, Balance training, Strengthening, Endurance training, Range of motion, Therapeutic exercise, Therapeutic activity, Home exercise program  PT Plan: Skilled PT  PT Frequency: 3 times per week  PT Discharge Recommendations: Moderate intensity level of continued care  Equipment Recommended upon Discharge:  (none)  PT Recommended Transfer Status: Assist x1  PT - OK to Discharge: Yes      Subjective   General Visit Information:  General  Reason for Referral: Right knee cellulitis, pyogenic arthritis   Past Medical History Relevant to Rehab: DM II, CAD s/p multiple stents s/p CABG, HFrEF(30%, 1/24), A-fib, RUPESH, right knee OA s/p TKA with multiple septic (MSSA/MRSA) arthritis infections   Co-Treatment: OT   Co-Treatment Reason: PT/OT co-eval for increased pt safety and participation.   Prior to Session Communication: Bedside nurse  Patient Position Received: Bed, 3 rail up, Alarm off, not on at start of session   Preferred Learning Style: verbal, visual, written   General Comment: The pt was pleasant, cooperative and willing  to participate in therapy.   Home Living:  Home Living  Type of Home: Apartment   Lives With: Alone   Home Adaptive Equipment: Wheelchair-manual   Home Layout: One level   Home Access: Elevator  Bathroom Shower/Tub: Tub/shower unit   Bathroom Toilet: Handicapped height   Bathroom Equipment: Grab bars in shower, Shower chair without back   Prior Level of Function:  Prior Function Per Pt/Caregiver Report  Level of [place]: Independent with ADLs and functional transfers, Independent with homemaking with ambulation   Receives Help From: Family, Friends  ADL Assistance: Independent   Homemaking Assistance: Independent   Ambulatory Assistance: Independent   Vocational: Retired   Leisure: any entertainment  Hand Dominance: Left   Prior Function Comments: The pt was independent using the wheelchair in the household and in the community.   Precautions:  Precautions  Hearing/Visual Limitations: Hearing and vision WFL  LE Weight Bearing Status: Weight Bearing as Tolerated R LE    Medical Precautions: Fall precautions   Precautions Comment: Pt in compliance with precaution throughout therapy session.  Vital Signs:  Vital Signs  Heart Rate: 61   SpO2: 95 % on RA    Patient Position: Sitting     Objective   Pain:  Pain Assessment  Pain Assessment: 0-10   Pain Score: 10 - Worst possible pain   Pain Type: Acute pain  Pain Location: Knee   Pain Orientation: Right   Cognition:  Cognition  Overall Cognitive Status: Within Functional Limits  Orientation Level: Oriented X4     General Assessments:  General Observation  General Observation: The pt was unable to perform bed to W/C to bed transfers due to decreased strength and high level right knee pain.          Coordination  Movements are Fluid and Coordinated: Yes    Postural Control  Postural Control: Impaired  Posture Comment: Pt presented with good sitting posture, but unable to perform a sit to stand transfer due to decreased strength and high level right knee pain.    Static  Sitting Balance  Static Sitting-Balance Support: Bilateral upper extremity supported, Feet supported  Static Sitting-Level of Assistance: Distant supervision  Dynamic Sitting Balance  Dynamic Sitting-Balance Support: Bilateral upper extremity supported, Feet supported  Dynamic Sitting-Comments: supervision assistance     Extremity/Trunk Assessments:  Cervical Spine   Cervical Spine: Within Functional Limits  Lumbar Spine   Lumbar Spine : Within Functional Limits    RUE AROM (degrees)  RUE AROM Comment: WFL  RUE Strength  R Shoulder Flexion: 4/5  R Elbow Flexion: 4+/5  R Elbow Extension: 4+/5  R Wrist Flexion: 5/5  R Wrist Extension: 5/5  LUE Strength  L Shoulder Flexion: 4/5  L Elbow Flexion: 4+/5  L Elbow Extension: 4+/5  L Wrist Flexion: 5/5  L Wrist Extension: 5/5  RLE   RLE : Exceptions to WFL  AROM RLE (degrees)  RLE AROM Comment: decreased right knee flexion  Strength RLE  R Hip Flexion: 3-/5  R Knee Flexion: 3-/5  R Knee Extension: 3-/5  R Ankle Dorsiflexion: 4/5  R Ankle Plantar Flexion: 4/5  LLE   LLE : Exceptions to WFL  AROM LLE (degrees)  LLE AROM Comment: WFL  Strength LLE  L Hip Flexion: 3/5  L Knee Flexion: 3/5  L Knee Extension: 3/5  L Ankle Dorsiflexion: 4/5  L Ankle Plantar Flexion: 4/5  Outcome Measures:  [facility] Basic Mobility  Turning from your back to your side while in a flat bed without using bedrails: A little  Moving from lying on your back to sitting on the side of a flat bed without using bedrails: A little  Moving to and from bed to chair (including a wheelchair): Total  Standing up from a chair using your arms (e.g. wheelchair or bedside chair): Total  To walk in hospital room: Total  Climbing 3-5 steps with railing: Total  Basic Mobility - Total Score: 10    Encounter Problems       Encounter Problems (Active)       Mobility       STG - Patient will propel the wheelchair 125ft, independently. (Progressing)       Start:  02/19/24    Expected End:  03/04/24               Transfers        STG - Transfer from bed to wheelchair with SBA. (Progressing)       Start:  02/19/24    Expected End:  03/04/24            STG - Patient to transfer to and from sit to supine, independently. (Progressing)       Start:  02/19/24    Expected End:  03/04/24            STG - Patient will perform toilet transfer with SBA. (Progressing)       Start:  02/19/24    Expected End:  03/04/24                   Education Documentation  Body Mechanics, taught by Arnaldo Perrin, PT at 2/19/2024 11:52 AM.  Learner: Patient  Readiness: Acceptance  Method: Explanation, Demonstration  Response: Verbalizes Understanding, Demonstrated Understanding    Home Exercise Program, taught by Arnaldo Perrin, PT at 2/19/2024 11:52 AM.  Learner: Patient  Readiness: Acceptance  Method: Explanation, Demonstration  Response: Verbalizes Understanding, Demonstrated Understanding    Mobility Training, taught by Arnaldo Perrin, PT at 2/19/2024 11:52 AM.  Learner: Patient  Readiness: Acceptance  Method: Explanation, Demonstration  Response: Verbalizes Understanding, Demonstrated Understanding    Education Comments  No comments found.

## 2024-02-19 NOTE — CARE PLAN
The patient's goals for the shift include infection and pain treatment    The clinical goals for the shift include pt will remain HDS throughout the shift    Over the shift, the patient did not make progress toward the following goals.

## 2024-02-19 NOTE — PROGRESS NOTES
Occupational Therapy    Evaluation    Patient Name: Kody Choi  MRN: [id number]  Today's Date: 2/19/2024  Time Calculation  Start Time: 1002  Stop Time: 1016  Time Calculation (min): 14 min    Assessment  IP OT Assessment  OT Assessment: Pt demonstrates decreased ability to complete ADLs and functional transfers due to pain and generalized weakness.  Evaluation/Treatment Tolerance: Patient limited by pain, Patient limited by fatigue  Medical Staff Made Aware: Yes  End of Session Communication: Bedside nurse  End of Session Patient Position: Bed, 2 rail up, Alarm off, not on at start of session  Plan:  Treatment Interventions: ADL retraining, Functional transfer training, Endurance training, Compensatory technique education, Equipment evaluation/education  OT Frequency: 3 times per week  OT Discharge Recommendations: Moderate intensity level of continued care  OT Recommended Transfer Status: Maximum assist  OT - OK to Discharge: Yes    Subjective     General:  General  Reason for Referral: Right knee cellulitis, pyogenic arthritis  Past Medical History Relevant to Rehab: DM II, CAD s/p multiple stents s/p CABG, HFrEF(30%, 1/24), A-fib, RUPESH, right knee OA s/p TKA with multiple septic (MSSA/MRSA) arthritis infections  Co-Treatment: PT  Co-Treatment Reason: PT/OT co-eval for increased pt safety and participation.  Prior to Session Communication: Bedside nurse  Patient Position Received: Bed, 3 rail up, Alarm off, not on at start of session  Preferred Learning Style: verbal, visual, written  General Comment: pt agreed to participate in OT eval, but became aggitated with questioning about prior level of function.  Precautions:  LE Weight Bearing Status: Weight Bearing as Tolerated  Medical Precautions: Fall precautions  Vital Signs:  Heart Rate: 61  SpO2: 95 %  Pain:  Pain Assessment  Pain Assessment: 0-10  Pain Score: 10 - Worst possible pain  Pain Location: Knee  Pain Orientation: Right    Objective  "  Cognition:  Orientation Level: Oriented X4           Home Living:  Type of Home: Apartment  Lives With: Alone  Home Adaptive Equipment: Wheelchair-manual  Home Layout: One level  Home Access: Elevator  Bathroom Shower/Tub: Tub/shower unit  Bathroom Toilet: Handicapped height  Bathroom Equipment: Grab bars in shower, Shower chair without back   Prior Function:  Level of [place]: Independent with ADLs and functional transfers, Independent with homemaking with ambulation  ADL Assistance: Independent  Homemaking Assistance: Independent  Ambulatory Assistance: Independent  Vocational: Retired  Hand Dominance: Left  IADL History:  Homemaking Responsibilities: Yes  Meal Prep Responsibility: Primary  Laundry Responsibility: Primary  Cleaning Responsibility: Primary  Shopping Responsibility: Primary  Mode of Transportation: Bus  ADL:  Eating Assistance: Independent  Grooming Deficit: Setup, Increased time to complete (anticipated)  Bathing Assistance: Moderate  Bathing Deficit: Right lower leg including foot, Left lower leg including foot, Supervision/safety, Increased time to complete , Setup (anticipated)  UE Dressing Assistance: Stand by  UE Dressing Deficit: Setup (anticipated, UEs ROM WFL)  LE Dressing Assistance: Maximal  LE Dressing Deficit: Don/doff L shoe, Don/doff R shoe, Pull up over hips, Thread LLE into underwear, Thread RLE into underwear, Thread LLE into pants, Thread RLE into pants, Don/doff L sock, Don/doff R sock, Tie shoes, Increased time to complete, Supervision/safety, Steadying, Setup  Toileting Assistance with Device: Moderate  Toileting Deficit: Use of bedpan/urinal setup  Functional Assistance: Maximal  Functional Deficit: Setup, Supervision/safety, Increased time to complete  ADL Comments: Pt refused UE dressing donning gown, and demonstrating transfer to Wheelchair. Pt reports \"its hard but I do it\"  Activity Tolerance:  Endurance: Decreased tolerance for upright activites  Bed " Mobility/Transfers: Bed Mobility  Bed Mobility: Yes (Simultaneous filing. User may not have seen previous data.)  Bed Mobility 1  Bed Mobility 1: Supine to sitting  Level of Assistance 1: Close supervision  Bed Mobility Comments 1: HOB elevated    Transfers  Transfer: No  Pt initially agreed to participate in transfer from EOB to wheelchair, later refused due to pain and decreased strength.     Sitting Balance:  Static Sitting Balance  Static Sitting-Level of Assistance: Distant supervision  Static Sitting-Comment/Number of Minutes: Pt tolerated sitting EOB during OT evaluation and reports wanting to stay seated after session.  Modalities:  Modalities Used:  (Pt initially complied to demonstrate transfer from EOB to wheelchair, and later refused. Unable to assess.)  IADL's:   Homemaking Responsibilities: Yes  Meal Prep Responsibility: Primary  Laundry Responsibility: Primary  Cleaning Responsibility: Primary  Shopping Responsibility: Primary  Mode of Transportation: Bus    Strength:  Strength Comments: decreased strenth (Simultaneous filing. User may not have seen previous data.)       Extremities: RUE   RUE : Within Functional Limits  RUE AROM (degrees)  RUE AROM Comment: WFL  RUE Strength  RUE Overall Strength: Within Functional Limits - strength 5/5 (pt demonstrated decreased  strength 4-/5) and LUE   LUE: Within Functional Limits  LUE AROM (degrees)  LUE AROM Comment: WFL  LUE Strength  LUE Overall Strength: Within Functional Limits - strength 5/5 (decreased  strength 4-/5)      Outcome Measures: [facility] Daily Activity  Putting on and taking off regular lower body clothing: A lot  Bathing (including washing, rinsing, drying): A lot  Putting on and taking off regular upper body clothing: A lot  Toileting, which includes using toilet, bedpan or urinal: A lot  Taking care of personal grooming such as brushing teeth: A lot  Eating Meals: None  Daily Activity - Total Score: 14      Education Documentation  Body  Mechanics, taught by RUBEN Osei at 2/19/2024 12:16 PM.  Learner: Patient  Readiness: Acceptance  Method: Explanation  Response: Needs Reinforcement    Precautions, taught by RUBEN Osei at 2/19/2024 12:16 PM.  Learner: Patient  Readiness: Acceptance  Method: Explanation  Response: Needs Reinforcement    ADL Training, taught by RUBEN Osei at 2/19/2024 12:16 PM.  Learner: Patient  Readiness: Acceptance  Method: Explanation  Response: Needs Reinforcement    Education Comments  No comments found.      Goals:   Encounter Problems       Encounter Problems (Active)       ADLs       Patient with complete lower body dressing with minimal assist  level of assistance donning and doffing all LE clothes  with PRN adaptive equipment while edge of bed  (Progressing)       Start:  02/19/24    Expected End:  03/11/24            Patient will complete toileting including hygiene clothing management/hygiene with stand by assist level of assistance and PRN adaptive equipment.  (Progressing)       Start:  02/19/24    Expected End:  03/11/24                 TRANSFERS       Patient will complete functional transfer to manual wheelchair with least restrictive device with minimal assist level of assistance. (Progressing)       Start:  02/19/24    Expected End:  03/11/24            Patient will perform sit to stand transfer minimal assist  level of assistance using least restrictive device in order to improve safety and independence with functional mobility (Progressing)       Start:  02/19/24    Expected End:  03/11/24

## 2024-02-20 LAB
ALBUMIN SERPL BCP-MCNC: 2.9 G/DL (ref 3.4–5)
ANION GAP SERPL CALC-SCNC: 13 MMOL/L (ref 10–20)
BACTERIA BLD CULT: NORMAL
BACTERIA BLD CULT: NORMAL
BASOPHILS # BLD AUTO: 0.09 X10*3/UL (ref 0–0.1)
BASOPHILS NFR BLD AUTO: 1 %
BUN SERPL-MCNC: 44 MG/DL (ref 6–23)
CALCIUM SERPL-MCNC: 8.4 MG/DL (ref 8.6–10.6)
CHLORIDE SERPL-SCNC: 108 MMOL/L (ref 98–107)
CK SERPL-CCNC: 84 U/L (ref 0–325)
CO2 SERPL-SCNC: 23 MMOL/L (ref 21–32)
CREAT SERPL-MCNC: 1.79 MG/DL (ref 0.5–1.3)
EGFRCR SERPLBLD CKD-EPI 2021: 33 ML/MIN/1.73M*2
EOSINOPHIL # BLD AUTO: 0.33 X10*3/UL (ref 0–0.7)
EOSINOPHIL NFR BLD AUTO: 3.5 %
ERYTHROCYTE [DISTWIDTH] IN BLOOD BY AUTOMATED COUNT: 17 % (ref 11.5–14.5)
GLUCOSE BLD MANUAL STRIP-MCNC: 112 MG/DL (ref 74–99)
GLUCOSE BLD MANUAL STRIP-MCNC: 242 MG/DL (ref 74–99)
GLUCOSE BLD MANUAL STRIP-MCNC: 40 MG/DL (ref 74–99)
GLUCOSE BLD MANUAL STRIP-MCNC: 44 MG/DL (ref 74–99)
GLUCOSE BLD MANUAL STRIP-MCNC: 47 MG/DL (ref 74–99)
GLUCOSE BLD MANUAL STRIP-MCNC: 51 MG/DL (ref 74–99)
GLUCOSE BLD MANUAL STRIP-MCNC: 54 MG/DL (ref 74–99)
GLUCOSE BLD MANUAL STRIP-MCNC: 84 MG/DL (ref 74–99)
GLUCOSE SERPL-MCNC: 72 MG/DL (ref 74–99)
HCT VFR BLD AUTO: 31.1 % (ref 36–52)
HGB BLD-MCNC: 9.2 G/DL (ref 12–17.5)
IMM GRANULOCYTES # BLD AUTO: 0.04 X10*3/UL (ref 0–0.7)
IMM GRANULOCYTES NFR BLD AUTO: 0.4 % (ref 0–0.9)
LYMPHOCYTES # BLD AUTO: 1.31 X10*3/UL (ref 1.2–4.8)
LYMPHOCYTES NFR BLD AUTO: 13.9 %
MAGNESIUM SERPL-MCNC: 2.01 MG/DL (ref 1.6–2.4)
MCH RBC QN AUTO: 23.2 PG (ref 26–34)
MCHC RBC AUTO-ENTMCNC: 29.6 G/DL (ref 32–36)
MCV RBC AUTO: 79 FL (ref 80–100)
MONOCYTES # BLD AUTO: 0.99 X10*3/UL (ref 0.1–1)
MONOCYTES NFR BLD AUTO: 10.5 %
NEUTROPHILS # BLD AUTO: 6.66 X10*3/UL (ref 1.2–7.7)
NEUTROPHILS NFR BLD AUTO: 70.7 %
NRBC BLD-RTO: 0 /100 WBCS (ref 0–0)
PHOSPHATE SERPL-MCNC: 4.5 MG/DL (ref 2.5–4.9)
PLATELET # BLD AUTO: 448 X10*3/UL (ref 150–450)
POTASSIUM SERPL-SCNC: 4.8 MMOL/L (ref 3.5–5.3)
RBC # BLD AUTO: 3.96 X10*6/UL (ref 4–5.9)
SODIUM SERPL-SCNC: 139 MMOL/L (ref 136–145)
WBC # BLD AUTO: 9.4 X10*3/UL (ref 4.4–11.3)

## 2024-02-20 PROCEDURE — 82947 ASSAY GLUCOSE BLOOD QUANT: CPT

## 2024-02-20 PROCEDURE — 36415 COLL VENOUS BLD VENIPUNCTURE: CPT

## 2024-02-20 PROCEDURE — 2500000004 HC RX 250 GENERAL PHARMACY W/ HCPCS (ALT 636 FOR OP/ED)

## 2024-02-20 PROCEDURE — 1200000002 HC GENERAL ROOM WITH TELEMETRY DAILY

## 2024-02-20 PROCEDURE — 2500000001 HC RX 250 WO HCPCS SELF ADMINISTERED DRUGS (ALT 637 FOR MEDICARE OP)

## 2024-02-20 PROCEDURE — 99232 SBSQ HOSP IP/OBS MODERATE 35: CPT

## 2024-02-20 PROCEDURE — 83735 ASSAY OF MAGNESIUM: CPT

## 2024-02-20 PROCEDURE — 2500000005 HC RX 250 GENERAL PHARMACY W/O HCPCS

## 2024-02-20 PROCEDURE — 80069 RENAL FUNCTION PANEL: CPT

## 2024-02-20 PROCEDURE — 85025 COMPLETE CBC W/AUTO DIFF WBC: CPT

## 2024-02-20 PROCEDURE — 82550 ASSAY OF CK (CPK): CPT

## 2024-02-20 RX ORDER — LOSARTAN POTASSIUM 25 MG/1
25 TABLET ORAL DAILY
Status: CANCELLED
Start: 2024-02-20

## 2024-02-20 RX ORDER — LISINOPRIL 5 MG/1
5 TABLET ORAL DAILY
Status: CANCELLED
Start: 2024-02-20

## 2024-02-20 RX ORDER — LIDOCAINE HYDROCHLORIDE 10 MG/ML
5 INJECTION, SOLUTION EPIDURAL; INFILTRATION; INTRACAUDAL; PERINEURAL ONCE
Qty: 5 ML | Refills: 0 | Status: CANCELLED
Start: 2024-02-20 | End: 2024-02-20

## 2024-02-20 RX ORDER — ACETAMINOPHEN 325 MG/1
650 TABLET ORAL EVERY 6 HOURS PRN
Qty: 30 TABLET | Refills: 0 | Status: CANCELLED
Start: 2024-02-20

## 2024-02-20 RX ORDER — OMEPRAZOLE 20 MG/1
20 CAPSULE, DELAYED RELEASE ORAL
Status: CANCELLED
Start: 2024-02-20

## 2024-02-20 RX ORDER — INSULIN GLARGINE 100 [IU]/ML
80 INJECTION, SOLUTION SUBCUTANEOUS 2 TIMES DAILY
Qty: 30 EACH | Refills: 3 | Status: CANCELLED | OUTPATIENT
Start: 2024-02-20

## 2024-02-20 RX ORDER — INSULIN LISPRO 100 [IU]/ML
25 INJECTION, SOLUTION INTRAVENOUS; SUBCUTANEOUS
Status: CANCELLED
Start: 2024-02-20

## 2024-02-20 RX ORDER — ASPIRIN 81 MG/1
81 TABLET ORAL DAILY
Status: CANCELLED
Start: 2024-02-20

## 2024-02-20 RX ORDER — FLUTICASONE PROPIONATE 50 MCG
2 SPRAY, SUSPENSION (ML) NASAL DAILY PRN
Qty: 16 G | Refills: 12 | Status: CANCELLED
Start: 2024-02-20

## 2024-02-20 RX ADMIN — DEXTROSE MONOHYDRATE 25 G: 25 INJECTION, SOLUTION INTRAVENOUS at 23:17

## 2024-02-20 RX ADMIN — INSULIN LISPRO 4 UNITS: 100 INJECTION, SOLUTION INTRAVENOUS; SUBCUTANEOUS at 12:18

## 2024-02-20 RX ADMIN — DEXTROSE MONOHYDRATE 0.3 G/KG/HR: 100 INJECTION, SOLUTION INTRAVENOUS at 22:40

## 2024-02-20 RX ADMIN — INSULIN GLARGINE 80 UNITS: 100 INJECTION, SOLUTION SUBCUTANEOUS at 08:41

## 2024-02-20 RX ADMIN — INSULIN LISPRO 25 UNITS: 100 INJECTION, SOLUTION INTRAVENOUS; SUBCUTANEOUS at 09:02

## 2024-02-20 RX ADMIN — LOSARTAN POTASSIUM 25 MG: 25 TABLET, FILM COATED ORAL at 08:39

## 2024-02-20 RX ADMIN — DAPTOMYCIN 800 MG: 500 INJECTION, POWDER, LYOPHILIZED, FOR SOLUTION INTRAVENOUS at 09:09

## 2024-02-20 RX ADMIN — DEXTROSE MONOHYDRATE 25 G: 25 INJECTION, SOLUTION INTRAVENOUS at 20:22

## 2024-02-20 RX ADMIN — HEPARIN SODIUM 7500 UNITS: 5000 INJECTION INTRAVENOUS; SUBCUTANEOUS at 08:38

## 2024-02-20 RX ADMIN — ATORVASTATIN CALCIUM 40 MG: 40 TABLET, FILM COATED ORAL at 20:22

## 2024-02-20 RX ADMIN — INSULIN LISPRO 25 UNITS: 100 INJECTION, SOLUTION INTRAVENOUS; SUBCUTANEOUS at 12:19

## 2024-02-20 ASSESSMENT — COGNITIVE AND FUNCTIONAL STATUS - GENERAL
CLIMB 3 TO 5 STEPS WITH RAILING: TOTAL
HELP NEEDED FOR BATHING: A LOT
STANDING UP FROM CHAIR USING ARMS: TOTAL
DRESSING REGULAR LOWER BODY CLOTHING: A LOT
STANDING UP FROM CHAIR USING ARMS: TOTAL
TURNING FROM BACK TO SIDE WHILE IN FLAT BAD: A LITTLE
MOVING FROM LYING ON BACK TO SITTING ON SIDE OF FLAT BED WITH BEDRAILS: A LITTLE
WALKING IN HOSPITAL ROOM: TOTAL
DAILY ACTIVITIY SCORE: 14
DRESSING REGULAR UPPER BODY CLOTHING: A LOT
CLIMB 3 TO 5 STEPS WITH RAILING: TOTAL
TOILETING: A LOT
MOBILITY SCORE: 10
MOVING FROM LYING ON BACK TO SITTING ON SIDE OF FLAT BED WITH BEDRAILS: A LITTLE
MOVING TO AND FROM BED TO CHAIR: TOTAL
WALKING IN HOSPITAL ROOM: TOTAL
HELP NEEDED FOR BATHING: A LOT
TURNING FROM BACK TO SIDE WHILE IN FLAT BAD: A LITTLE
PERSONAL GROOMING: A LOT
DRESSING REGULAR UPPER BODY CLOTHING: A LOT
DAILY ACTIVITIY SCORE: 14
DRESSING REGULAR LOWER BODY CLOTHING: A LOT
PERSONAL GROOMING: A LOT
TOILETING: A LOT

## 2024-02-20 ASSESSMENT — PAIN SCALES - GENERAL
PAINLEVEL_OUTOF10: 0 - NO PAIN
PAINLEVEL_OUTOF10: 0 - NO PAIN

## 2024-02-20 ASSESSMENT — PAIN SCALES - WONG BAKER
WONGBAKER_NUMERICALRESPONSE: NO HURT
WONGBAKER_NUMERICALRESPONSE: NO HURT

## 2024-02-20 NOTE — CONSULTS
Vancomycin Dosing by Pharmacy- Cessation of Therapy    Consult to pharmacy for vancomycin dosing has been discontinued by the prescriber, pharmacy will sign off at this time.    Please call pharmacy if there are further questions or re-enter a consult if vancomycin is resumed.     JUSTINA LINARES, PharmD

## 2024-02-20 NOTE — CARE PLAN
The patient's goals for the shift include infection and pain treatment    The clinical goals for the shift include pt will remain HDS throughout the shift    Over the shift, the patient did  make progress toward the following goals.

## 2024-02-20 NOTE — PROGRESS NOTES
"Kody Choi is a 53 y.o. adult on day 4 of admission presenting with Cellulitis.    Subjective   NAEON. Knee continuing to drain, states his new bandage is not keeping all the fluid from leaking. Denies any new symptoms, denies f/c/n/v/abd pain, no increased swelling or pain. Endorsing difficulty sleeping. Otherwise, no concerns this morning.    Objective     Last Recorded Vitals  Blood pressure 133/87, pulse 74, temperature 36.6 °C (97.9 °F), temperature source Temporal, resp. rate 19, height 1.676 m (5' 6\"), weight (!) 158 kg (347 lb 10.7 oz), SpO2 97 %, unknown if currently breastfeeding.  Intake/Output last 3 Shifts:  I/O last 3 completed shifts:  In: 720 (4.6 mL/kg) [P.O.:720]  Out: 1125 (7.1 mL/kg) [Urine:1125 (0.2 mL/kg/hr)]  Weight: 157.7 kg     General: Appears stated age, morbidly obese, laying on a bed in the middle of the emergency department hallway only wearing underwear, uncovered.  Head: Normocephalic, atraumatic.  Eyes: No icterus, EOMI.  ENT: No nasal discharge, moist mucus membranes.  Respiratory: normal respiratory effort.  Abdominal: Soft, obese, non-tender to palpation, normal bowel sounds.  Neurologic: Alert & oriented x 3, no focal deficits.  MSK: Right knee extremely swollen, erythematous, tense but compressible, tender to touch. Several small open areas with purulent drainage. Patient refuses to flex the right knee, able to passively flex right knee and ankle without difficulty although patient reports pain. Capillary refill < 2 seconds in right foot, DP pulse palpable.   Psychiatric: rude to medical staff   Scheduled medications  albuterol, 10 mg, nebulization, Once  aspirin, 81 mg, oral, Daily  atorvastatin, 40 mg, oral, Daily  calcium gluconate, 2 g, intravenous, Once  daptomycin, 8 mg/kg (Adjusted), intravenous, q24h [facility]  heparin (porcine), 7,500 Units, subcutaneous, q8h SUDHIR  insulin glargine, 80 Units, subcutaneous, q12h  insulin lispro, 0-10 Units, subcutaneous, TID with meals  insulin " lispro, 25 Units, subcutaneous, TID with meals  lidocaine, 5 mL, infiltration, Once  losartan, 25 mg, oral, Daily  metoprolol succinate XL, 25 mg, oral, Daily      Continuous medications     PRN medications  PRN medications: acetaminophen, alteplase, dextrose 10 % in water (D10W), dextrose, glucagon, melatonin, traMADol       Assessment/Plan   Principal Problem:    Cellulitis  Active Problems:    Pyogenic arthritis of right knee joint, due to unspecified organism (CMS/[facility])    Kody Choi is a 53-year-old man with a past medical history of T2DM, CAD s/p multiple stents and CABG, HFrEF (EF 30% 1/30/24), A Fib, RUPESH, TKA in 2015, and multiple septic arthritis infections in the R knee who presents recurrent right knee pain and swelling. He continues to refuse recommended therapy (amputation), orthopedic surgery not recommending a washout. Will continue IV antibiotics. Currently I believe the patient understands the risks of not pursuing surgery and has capacity to make this decision. In addition to sepsis, I am concerned that if the swelling from the infection and untreated heart failure gets much worse he could develop compartment syndrome. Will need to continue address with the patient.     Updates 2/20;  -Started Daptomycin for 4 week course, PICC placed 2/19  -Pending SNF placement, ADOD 2/21    Update 2/19:  -CTX to cefepime per ID, c/w vanc  -PICC line ordered  -pending pt/ot, patient requesting [facility]    #Septic arthritis  ::Discussed with orthopedic surgery in the ED, still recommending amputation, not wash out.  ::Blood cx NGTD  - ID c/s   -Recs: PICC placed, Daptomycin 4 weeks (2/19-3/18), started 2/20 AM  - Wound culture pending  - Wound care consult, appreciate recs     #T2DM  #Hyperkalemia  #Hyperglycemia  #Metabolic acidosis  - Likely related to insulin non-compliance.  - Home regimen reportedly Lantus 80 units BID, 25 units lispro with meals.  - Will continue Lantus 80 units BID, start lispro  10 units with meals, SSI.  - Potassium improved following insulin administration, continue to monitor.   - Refusing diabetic diet.     #BARBIE vs. CKD  ::Admission Cr 1.63, now improved to 1.40  ::Urine lytes c/w prerenal  ::s/p 1L fluid bolus 2/19  - Unclear if BARBIE or new baseline renal function, on day prior to recent discharge patient's Cr was stable ~1.4. Additionally, during October 2023 admission patient was documented to have CKD IIIb with a baseline of 1.6-1.8. Will continue to monitor.      #CAD  #HFrEF  ::EF 30 % 1/30/24  - Patient continues to refuse GDMT (beta blockade, diuretics).   - Continue atorvastatin 40 mg, ASA 81 mg.      #Atrial fibrillation  - Sinus rhythm on admission, not on anticoagulation.  - Telemetry ordered, patient refusing.    F: Cautious given HFrEF  E: PRN  N: Regular (refusing diabetic diet)  A: PIV     DVT: SQH  Full code (confirmed on admission)  NOK: Vannesa (friend): [phone]    Patrick Solitario DO  PGY-1 Neurology

## 2024-02-21 PROBLEM — M00.9: Status: RESOLVED | Noted: 2023-10-15 | Resolved: 2024-02-21

## 2024-02-21 LAB
ALBUMIN SERPL BCP-MCNC: 2.6 G/DL (ref 3.4–5)
ANION GAP SERPL CALC-SCNC: 13 MMOL/L (ref 10–20)
BASOPHILS # BLD AUTO: 0.09 X10*3/UL (ref 0–0.1)
BASOPHILS NFR BLD AUTO: 1 %
BUN SERPL-MCNC: 45 MG/DL (ref 6–23)
CALCIUM SERPL-MCNC: 8.3 MG/DL (ref 8.6–10.6)
CHLORIDE SERPL-SCNC: 108 MMOL/L (ref 98–107)
CO2 SERPL-SCNC: 22 MMOL/L (ref 21–32)
CREAT SERPL-MCNC: 1.69 MG/DL (ref 0.5–1.3)
EGFRCR SERPLBLD CKD-EPI 2021: 36 ML/MIN/1.73M*2
EOSINOPHIL # BLD AUTO: 0.25 X10*3/UL (ref 0–0.7)
EOSINOPHIL NFR BLD AUTO: 2.6 %
ERYTHROCYTE [DISTWIDTH] IN BLOOD BY AUTOMATED COUNT: 17.1 % (ref 11.5–14.5)
GLUCOSE BLD MANUAL STRIP-MCNC: 171 MG/DL (ref 74–99)
GLUCOSE BLD MANUAL STRIP-MCNC: 179 MG/DL (ref 74–99)
GLUCOSE BLD MANUAL STRIP-MCNC: 183 MG/DL (ref 74–99)
GLUCOSE BLD MANUAL STRIP-MCNC: 238 MG/DL (ref 74–99)
GLUCOSE BLD MANUAL STRIP-MCNC: 70 MG/DL (ref 74–99)
GLUCOSE BLD MANUAL STRIP-MCNC: 80 MG/DL (ref 74–99)
GLUCOSE BLD MANUAL STRIP-MCNC: 82 MG/DL (ref 74–99)
GLUCOSE BLD MANUAL STRIP-MCNC: 84 MG/DL (ref 74–99)
GLUCOSE BLD MANUAL STRIP-MCNC: 92 MG/DL (ref 74–99)
GLUCOSE BLD MANUAL STRIP-MCNC: 92 MG/DL (ref 74–99)
GLUCOSE SERPL-MCNC: 105 MG/DL (ref 74–99)
HCT VFR BLD AUTO: 30.9 % (ref 36–52)
HGB BLD-MCNC: 9 G/DL (ref 12–17.5)
IMM GRANULOCYTES # BLD AUTO: 0.05 X10*3/UL (ref 0–0.7)
IMM GRANULOCYTES NFR BLD AUTO: 0.5 % (ref 0–0.9)
LYMPHOCYTES # BLD AUTO: 1.56 X10*3/UL (ref 1.2–4.8)
LYMPHOCYTES NFR BLD AUTO: 16.5 %
MAGNESIUM SERPL-MCNC: 2.08 MG/DL (ref 1.6–2.4)
MCH RBC QN AUTO: 23.4 PG (ref 26–34)
MCHC RBC AUTO-ENTMCNC: 29.1 G/DL (ref 32–36)
MCV RBC AUTO: 80 FL (ref 80–100)
MONOCYTES # BLD AUTO: 0.83 X10*3/UL (ref 0.1–1)
MONOCYTES NFR BLD AUTO: 8.8 %
NEUTROPHILS # BLD AUTO: 6.68 X10*3/UL (ref 1.2–7.7)
NEUTROPHILS NFR BLD AUTO: 70.6 %
NRBC BLD-RTO: 0.2 /100 WBCS (ref 0–0)
PHOSPHATE SERPL-MCNC: 4.7 MG/DL (ref 2.5–4.9)
PLATELET # BLD AUTO: 406 X10*3/UL (ref 150–450)
POTASSIUM SERPL-SCNC: 5.3 MMOL/L (ref 3.5–5.3)
RBC # BLD AUTO: 3.85 X10*6/UL (ref 4–5.9)
SODIUM SERPL-SCNC: 138 MMOL/L (ref 136–145)
WBC # BLD AUTO: 9.5 X10*3/UL (ref 4.4–11.3)

## 2024-02-21 PROCEDURE — 2500000004 HC RX 250 GENERAL PHARMACY W/ HCPCS (ALT 636 FOR OP/ED)

## 2024-02-21 PROCEDURE — 82947 ASSAY GLUCOSE BLOOD QUANT: CPT

## 2024-02-21 PROCEDURE — 2500000001 HC RX 250 WO HCPCS SELF ADMINISTERED DRUGS (ALT 637 FOR MEDICARE OP)

## 2024-02-21 PROCEDURE — 80069 RENAL FUNCTION PANEL: CPT

## 2024-02-21 PROCEDURE — 99232 SBSQ HOSP IP/OBS MODERATE 35: CPT

## 2024-02-21 PROCEDURE — 83735 ASSAY OF MAGNESIUM: CPT

## 2024-02-21 PROCEDURE — 1200000002 HC GENERAL ROOM WITH TELEMETRY DAILY

## 2024-02-21 PROCEDURE — 85025 COMPLETE CBC W/AUTO DIFF WBC: CPT

## 2024-02-21 RX ORDER — ACETAMINOPHEN 325 MG/1
975 TABLET ORAL EVERY 8 HOURS PRN
Qty: 30 TABLET | Refills: 0
Start: 2024-02-21

## 2024-02-21 RX ORDER — INSULIN GLARGINE 100 [IU]/ML
80 INJECTION, SOLUTION SUBCUTANEOUS NIGHTLY
Status: DISCONTINUED | OUTPATIENT
Start: 2024-02-21 | End: 2024-02-22

## 2024-02-21 RX ORDER — INSULIN LISPRO 100 [IU]/ML
15 INJECTION, SOLUTION INTRAVENOUS; SUBCUTANEOUS
Status: DISCONTINUED | OUTPATIENT
Start: 2024-02-21 | End: 2024-02-21

## 2024-02-21 RX ORDER — INSULIN GLARGINE 100 [IU]/ML
80 INJECTION, SOLUTION SUBCUTANEOUS 2 TIMES DAILY
Start: 2024-02-21 | End: 2024-02-21 | Stop reason: SDUPTHER

## 2024-02-21 RX ORDER — INSULIN LISPRO 100 [IU]/ML
0-10 INJECTION, SOLUTION INTRAVENOUS; SUBCUTANEOUS
Start: 2024-02-21

## 2024-02-21 RX ORDER — INSULIN LISPRO 100 [IU]/ML
15 INJECTION, SOLUTION INTRAVENOUS; SUBCUTANEOUS
Start: 2024-02-21 | End: 2024-02-21 | Stop reason: HOSPADM

## 2024-02-21 RX ORDER — INSULIN GLARGINE 100 [IU]/ML
80 INJECTION, SOLUTION SUBCUTANEOUS NIGHTLY
Start: 2024-02-21 | End: 2024-02-21 | Stop reason: HOSPADM

## 2024-02-21 RX ORDER — DEXTROSE MONOHYDRATE 100 MG/ML
0.3 INJECTION, SOLUTION INTRAVENOUS ONCE AS NEEDED
Status: DISCONTINUED | OUTPATIENT
Start: 2024-02-21 | End: 2024-02-21

## 2024-02-21 RX ORDER — DEXTROSE MONOHYDRATE 100 MG/ML
0.3 INJECTION, SOLUTION INTRAVENOUS ONCE AS NEEDED
Status: COMPLETED | OUTPATIENT
Start: 2024-02-21 | End: 2024-02-21

## 2024-02-21 RX ORDER — TRAMADOL HYDROCHLORIDE 50 MG/1
50 TABLET ORAL EVERY 8 HOURS PRN
Start: 2024-02-21

## 2024-02-21 RX ORDER — HEPARIN SODIUM 5000 [USP'U]/ML
7500 INJECTION, SOLUTION INTRAVENOUS; SUBCUTANEOUS EVERY 8 HOURS SCHEDULED
Start: 2024-02-21 | End: 2024-04-18 | Stop reason: WASHOUT

## 2024-02-21 RX ORDER — ATORVASTATIN CALCIUM 40 MG/1
40 TABLET, FILM COATED ORAL DAILY
Start: 2024-02-21

## 2024-02-21 RX ORDER — INSULIN GLARGINE 100 [IU]/ML
80 INJECTION, SOLUTION SUBCUTANEOUS NIGHTLY
Start: 2024-02-21 | End: 2024-02-22 | Stop reason: HOSPADM

## 2024-02-21 RX ORDER — INSULIN GLARGINE 100 [IU]/ML
80 INJECTION, SOLUTION SUBCUTANEOUS NIGHTLY
Start: 2024-02-21 | End: 2024-02-21 | Stop reason: SDUPTHER

## 2024-02-21 RX ORDER — METOPROLOL SUCCINATE 25 MG/1
25 TABLET, EXTENDED RELEASE ORAL DAILY
Qty: 6 TABLET | Refills: 0
Start: 2024-02-21 | End: 2024-03-04 | Stop reason: HOSPADM

## 2024-02-21 RX ADMIN — HEPARIN SODIUM 7500 UNITS: 5000 INJECTION INTRAVENOUS; SUBCUTANEOUS at 17:27

## 2024-02-21 RX ADMIN — HEPARIN SODIUM 7500 UNITS: 5000 INJECTION INTRAVENOUS; SUBCUTANEOUS at 07:56

## 2024-02-21 RX ADMIN — TRAMADOL HYDROCHLORIDE 50 MG: 50 TABLET, COATED ORAL at 10:12

## 2024-02-21 RX ADMIN — HEPARIN SODIUM 7500 UNITS: 5000 INJECTION INTRAVENOUS; SUBCUTANEOUS at 00:25

## 2024-02-21 RX ADMIN — DEXTROSE MONOHYDRATE 0.3 G/KG/HR: 100 INJECTION, SOLUTION INTRAVENOUS at 04:16

## 2024-02-21 RX ADMIN — DAPTOMYCIN 800 MG: 500 INJECTION, POWDER, LYOPHILIZED, FOR SOLUTION INTRAVENOUS at 09:55

## 2024-02-21 RX ADMIN — INSULIN GLARGINE 80 UNITS: 100 INJECTION, SOLUTION SUBCUTANEOUS at 08:03

## 2024-02-21 RX ADMIN — INSULIN LISPRO 2 UNITS: 100 INJECTION, SOLUTION INTRAVENOUS; SUBCUTANEOUS at 11:40

## 2024-02-21 RX ADMIN — ATORVASTATIN CALCIUM 40 MG: 40 TABLET, FILM COATED ORAL at 20:34

## 2024-02-21 RX ADMIN — LOSARTAN POTASSIUM 25 MG: 25 TABLET, FILM COATED ORAL at 08:00

## 2024-02-21 RX ADMIN — INSULIN GLARGINE 80 UNITS: 100 INJECTION, SOLUTION SUBCUTANEOUS at 20:34

## 2024-02-21 RX ADMIN — INSULIN LISPRO 25 UNITS: 100 INJECTION, SOLUTION INTRAVENOUS; SUBCUTANEOUS at 11:38

## 2024-02-21 ASSESSMENT — COGNITIVE AND FUNCTIONAL STATUS - GENERAL
TURNING FROM BACK TO SIDE WHILE IN FLAT BAD: A LITTLE
STANDING UP FROM CHAIR USING ARMS: A LOT
CLIMB 3 TO 5 STEPS WITH RAILING: A LOT
DAILY ACTIVITIY SCORE: 17
DRESSING REGULAR LOWER BODY CLOTHING: A LITTLE
DRESSING REGULAR UPPER BODY CLOTHING: A LOT
WALKING IN HOSPITAL ROOM: A LOT
HELP NEEDED FOR BATHING: A LOT
TOILETING: A LOT
MOVING FROM LYING ON BACK TO SITTING ON SIDE OF FLAT BED WITH BEDRAILS: A LITTLE
MOVING TO AND FROM BED TO CHAIR: A LOT
MOBILITY SCORE: 14

## 2024-02-21 ASSESSMENT — ACTIVITIES OF DAILY LIVING (ADL): LACK_OF_TRANSPORTATION: PATIENT DECLINED

## 2024-02-21 ASSESSMENT — PAIN - FUNCTIONAL ASSESSMENT
PAIN_FUNCTIONAL_ASSESSMENT: 0-10

## 2024-02-21 ASSESSMENT — PAIN SCALES - GENERAL
PAINLEVEL_OUTOF10: 0 - NO PAIN
PAINLEVEL_OUTOF10: 7
PAINLEVEL_OUTOF10: 8

## 2024-02-21 NOTE — NURSING NOTE
"Nursing Note: Patient Refusal of Medications/Bed Alarm  02/21/2024 1344     0048:  Provider Patrick Solitario MD notified of patient refusal of medications this morning. Patient medications that were refused were the following: aspirin 81 mg and metoprolol 25 mg via secure text. Patient re-educated regarding medications, still, patient refused. Provider made aware of patient's refusal. No new orders at this time.      1344:  Patient placed on bed alarm since this morning for increased risk of falls. Due to patient mobility in bed (moving from laying to sitting frequently to adjust for comfort), patient became agitated that the bed alarm kept going off. Patient verbally aggressive with RN stating to \"turn that shit off\" in reference to the bed alarm Patient educated about the importance of the bed alarm as an additional safety measure in prevention of falls. Patient refused bed alarm at this time. Provider made aware.      Melissa WEST, RN, CMSRN  "

## 2024-02-21 NOTE — PROGRESS NOTES
Kody Choi is a 53 y.o. adult on day 5 of admission presenting with Cellulitis.    Subjective   Per chart review, patient is currently pending precert for [facility] [place], operated by [facility]. Per medical team, ADOD is 2/22.     Era reviewed and noted that authorization has been received, good until 2/24. 7000 completed in [facility].    SW will follow up tomorrow.    -Cayla LÁVAREZ MA, LSW  [phone] or [facility] Secure Chat  Care Transitions

## 2024-02-21 NOTE — NURSING NOTE
Patient refused Metoprolol, and Aspirin during morning meds, patient also refused last set of vitals and glucose check, upon transfer to [place] [id number], patient was not in respiratory distress, patient was awake, oriented, and stable.

## 2024-02-21 NOTE — PROGRESS NOTES
"Kody Choi is a 53 y.o. adult on day 5 of admission presenting with Cellulitis.    Subjective   Overnight, pt found to be hypoglycemic in the 40s, given d10 and d50 with improvement to 90s. Asymptomatic throughout. Seen this morning. Denies any new symptoms, continues to refuse certain medications and rude to nursing staff.     Objective     Last Recorded Vitals  Blood pressure (!) 131/105, pulse 102, temperature 35.9 °C (96.6 °F), temperature source Tympanic, resp. rate 24, height 1.676 m (5' 6\"), weight (!) 158 kg (347 lb 10.7 oz), SpO2 94 %, unknown if currently breastfeeding.  Intake/Output last 3 Shifts:  I/O last 3 completed shifts:  In: 980 (6.2 mL/kg) [P.O.:480; I.V.:500 (3.2 mL/kg)]  Out: 800 (5.1 mL/kg) [Urine:800 (0.1 mL/kg/hr)]  Weight: 157.7 kg     General: Appears stated age, morbidly obese, laying on a bed in the middle of the emergency department hallway only wearing underwear, uncovered.  Head: Normocephalic, atraumatic.  Eyes: No icterus, EOMI.  ENT: No nasal discharge, moist mucus membranes.  Respiratory: normal respiratory effort.  Abdominal: Soft, obese, non-tender to palpation, normal bowel sounds.  Neurologic: Alert & oriented x 3, no focal deficits.  MSK: Right knee extremely swollen, erythematous, tense but compressible, tender to touch. Several small open areas with purulent drainage. Patient refuses to flex the right knee, able to passively flex right knee and ankle without difficulty although patient reports pain. Capillary refill < 2 seconds in right foot, DP pulse palpable.   Psychiatric: rude to medical staff   Scheduled medications  albuterol, 10 mg, nebulization, Once  aspirin, 81 mg, oral, Daily  atorvastatin, 40 mg, oral, Daily  calcium gluconate, 2 g, intravenous, Once  daptomycin, 8 mg/kg (Adjusted), intravenous, q24h SUDHIR  heparin (porcine), 7,500 Units, subcutaneous, q8h SUDHIR  insulin glargine, 80 Units, subcutaneous, Nightly  insulin lispro, 0-10 Units, subcutaneous, TID with " meals  lidocaine, 5 mL, infiltration, Once  losartan, 25 mg, oral, Daily  metoprolol succinate XL, 25 mg, oral, Daily      Continuous medications     PRN medications  PRN medications: acetaminophen, alteplase, dextrose, glucagon, melatonin, traMADol       Assessment/Plan   Principal Problem:    Cellulitis  Active Problems:    Pyogenic arthritis of right knee joint, due to unspecified organism (CMS/[facility])    Kody Choi is a 53-year-old man with a past medical history of T2DM, CAD s/p multiple stents and CABG, HFrEF (EF 30% 1/30/24), A Fib, RUPESH, TKA in 2015, and multiple septic arthritis infections in the R knee who presents recurrent right knee pain and swelling. He continues to refuse recommended therapy (amputation), orthopedic surgery not recommending a washout. Will continue IV antibiotics. Currently I believe the patient understands the risks of not pursuing surgery and has capacity to make this decision. In addition to sepsis, I am concerned that if the swelling from the infection and untreated heart failure gets much worse he could develop compartment syndrome. Will need to continue address with the patient.     Updates 2/21:  -Adjusted insulin from Lantus 80 BID to once daily, and DC meal time Lispro, continuing SSI  -500cc LR bolus  -Prior auth approved for [facility], possible dc 2/22    Updates 2/20;  -Started Daptomycin for 4 week course, PICC placed 2/19  -Pending SNF placement, ADOD 2/21    #Septic arthritis  ::Discussed with orthopedic surgery in the ED, still recommending amputation, not wash out.  ::Blood cx NGTD  - ID c/s   -Recs: PICC placed, Daptomycin 4 weeks (2/19-3/18), started 2/20 AM  - Wound culture pending  - Wound care consult, appreciate recs     #T2DM  #Hyperkalemia  #Hyperglycemia  #Metabolic acidosis  ::Likely related to insulin non-compliance.  ::Home regimen reportedly Lantus 80 units BID, 25 units lispro with meals.  ::Hypoglycemic periodically 2/19-2/20  -Adjusted insulin from Lantus  80 BID to once daily, and DC meal time Lispro, continuing SSI, will CTM and adjust accordingly  - Refusing diabetic diet.     #BARBIE vs. CKD  ::Admission Cr 1.63, now improved to 1.40, 1.69 2/21  ::Urine lytes c/w prerenal  ::s/p 1L fluid bolus 2/19  - Unclear if BARBIE or new baseline renal function, on day prior to recent discharge patient's Cr was stable ~1.4. Additionally, during October 2023 admission patient was documented to have CKD IIIb with a baseline of 1.6-1.8. Will continue to monitor.   -Giving 500cc LR bolus     #CAD  #HFrEF  ::EF 30 % 1/30/24  - Patient continues to refuse GDMT (beta blockade, diuretics).   - Continue atorvastatin 40 mg, ASA 81 mg.      #Atrial fibrillation  - Sinus rhythm on admission, not on anticoagulation.  - Telemetry ordered, patient refusing.    F: Cautious given HFrEF  E: PRN  N: Regular (refusing diabetic diet)  A: PIV     DVT: SQH  Full code (confirmed on admission)  ALTAFK: Vannesa (friend): [phone]    Patrick Solitario DO  PGY-1 Neurology

## 2024-02-21 NOTE — NURSING NOTE
Patient refused IV LR bolus and Insulin coverage at this time. Patient stated he felt like they were not necessary at this time. Will continue with Plan of Care.

## 2024-02-21 NOTE — CARE PLAN
Patient remained safe and free from falls and/or injury. Patient refused some medications this morning and provider was made aware; see EMR. Patient refused bed alarm this afternoon; provider aware. Patient participated in wound changes this shift twice. Patient taking off the dressings intermittently; provider aware. Dressing changes done per orders. Patient complained of pain to the RLE; medicated per EMR. Pending discharge to SNF for continued antibiotic therapy, PT/OT.     The clinical goals for the shift include patient will participate in dressing change this shift. Goal met. Patient participated in dressing changes this shift.    Plan of care ongoing.    Melissa BARRYN, RN, CMSRN    Problem: Skin  Goal: Promote skin healing  Outcome: Progressing  Flowsheets (Taken 2/21/2024 1332)  Promote skin healing:   Ensure correct size (line/device) and apply per  instructions   Assess skin/pad under line(s)/device(s)   Rotate device position/do not position patient on device   Turn/reposition every 2 hours/use positioning/transfer devices     Problem: Pain  Goal: My pain/discomfort is manageable  Outcome: Progressing     Problem: Safety  Goal: Patient will be injury free during hospitalization  Outcome: Progressing  Goal: I will remain free of falls  Outcome: Progressing     Problem: Daily Care  Goal: Daily care needs are met  Outcome: Progressing     Problem: Psychosocial Needs  Goal: Demonstrates ability to cope with hospitalization/illness  Outcome: Progressing  Goal: Collaborate with me, my family, and caregiver to identify my specific goals  Outcome: Progressing

## 2024-02-21 NOTE — CARE PLAN
Problem: Skin  Goal: Promote skin healing  Outcome: Progressing   The patient's goals for the shift include infection and pain treatment    The clinical goals for the shift include Pt will be monitor for S/S of hypoglycemia during this shift

## 2024-02-21 NOTE — DISCHARGE INSTRUCTIONS
Mr. Combs,    You were admitted for an infection of your right knee. An amputation is the best way to obtain source control, but will treat with long-term antibiotics. A PICC line was placed in your arm, and you will receive a total of 4 weeks of IV antibiotics (Daptomycin 800mg daily). Following that, please follow-up with infectious disease 2/29/2024 to discuss if and which antibiotic you will need to be on daily as suppressive therapy. We also adjusted your insulin regimen, for which the rehab facility will continue to monitor and adjust while you are there.    Please obtain a basic metabolic panel (BMP) in 1 week to check  your kidney function, this will be on Friday 3/1. Baseline Cr on discharge is 1.98. Prerenal per workup. Improved w/ IVF, encouraged PO intake. IVF PRN at SNF.    Requesting weekly CK while on daptomycin to be faxed to Dr. Goodwin [phone].     It was a pleasure taking care of you,    [facility]

## 2024-02-22 LAB
ALBUMIN SERPL BCP-MCNC: 2.9 G/DL (ref 3.4–5)
ANION GAP SERPL CALC-SCNC: 15 MMOL/L (ref 10–20)
ANION GAP SERPL CALC-SCNC: 15 MMOL/L (ref 10–20)
BACTERIA SPEC CULT: ABNORMAL
BASOPHILS # BLD AUTO: 0.11 X10*3/UL (ref 0–0.1)
BASOPHILS NFR BLD AUTO: 1.1 %
BUN SERPL-MCNC: 59 MG/DL (ref 6–23)
BUN SERPL-MCNC: 59 MG/DL (ref 6–23)
CALCIUM SERPL-MCNC: 8.3 MG/DL (ref 8.6–10.6)
CALCIUM SERPL-MCNC: 8.3 MG/DL (ref 8.6–10.6)
CHLORIDE SERPL-SCNC: 107 MMOL/L (ref 98–107)
CHLORIDE SERPL-SCNC: 107 MMOL/L (ref 98–107)
CO2 SERPL-SCNC: 21 MMOL/L (ref 21–32)
CO2 SERPL-SCNC: 22 MMOL/L (ref 21–32)
CREAT SERPL-MCNC: 1.82 MG/DL (ref 0.5–1.3)
CREAT SERPL-MCNC: 2.02 MG/DL (ref 0.5–1.3)
EGFRCR SERPLBLD CKD-EPI 2021: 29 ML/MIN/1.73M*2
EGFRCR SERPLBLD CKD-EPI 2021: 32 ML/MIN/1.73M*2
EOSINOPHIL # BLD AUTO: 0.48 X10*3/UL (ref 0–0.7)
EOSINOPHIL NFR BLD AUTO: 4.6 %
ERYTHROCYTE [DISTWIDTH] IN BLOOD BY AUTOMATED COUNT: 17.2 % (ref 11.5–14.5)
GLUCOSE BLD MANUAL STRIP-MCNC: 108 MG/DL (ref 74–99)
GLUCOSE BLD MANUAL STRIP-MCNC: 109 MG/DL (ref 74–99)
GLUCOSE BLD MANUAL STRIP-MCNC: 112 MG/DL (ref 74–99)
GLUCOSE BLD MANUAL STRIP-MCNC: 113 MG/DL (ref 74–99)
GLUCOSE BLD MANUAL STRIP-MCNC: 56 MG/DL (ref 74–99)
GLUCOSE BLD MANUAL STRIP-MCNC: 58 MG/DL (ref 74–99)
GLUCOSE BLD MANUAL STRIP-MCNC: 65 MG/DL (ref 74–99)
GLUCOSE SERPL-MCNC: 67 MG/DL (ref 74–99)
GLUCOSE SERPL-MCNC: 96 MG/DL (ref 74–99)
GRAM STN SPEC: ABNORMAL
GRAM STN SPEC: ABNORMAL
HCT VFR BLD AUTO: 32.9 % (ref 36–52)
HGB BLD-MCNC: 9.4 G/DL (ref 12–17.5)
IMM GRANULOCYTES # BLD AUTO: 0.09 X10*3/UL (ref 0–0.7)
IMM GRANULOCYTES NFR BLD AUTO: 0.9 % (ref 0–0.9)
LYMPHOCYTES # BLD AUTO: 1.93 X10*3/UL (ref 1.2–4.8)
LYMPHOCYTES NFR BLD AUTO: 18.6 %
MAGNESIUM SERPL-MCNC: 2.09 MG/DL (ref 1.6–2.4)
MCH RBC QN AUTO: 23.2 PG (ref 26–34)
MCHC RBC AUTO-ENTMCNC: 28.6 G/DL (ref 32–36)
MCV RBC AUTO: 81 FL (ref 80–100)
MONOCYTES # BLD AUTO: 1.13 X10*3/UL (ref 0.1–1)
MONOCYTES NFR BLD AUTO: 10.9 %
NEUTROPHILS # BLD AUTO: 6.63 X10*3/UL (ref 1.2–7.7)
NEUTROPHILS NFR BLD AUTO: 63.9 %
NRBC BLD-RTO: 0.2 /100 WBCS (ref 0–0)
PHOSPHATE SERPL-MCNC: 4.8 MG/DL (ref 2.5–4.9)
PLATELET # BLD AUTO: 470 X10*3/UL (ref 150–450)
POTASSIUM SERPL-SCNC: 5.6 MMOL/L (ref 3.5–5.3)
POTASSIUM SERPL-SCNC: 5.7 MMOL/L (ref 3.5–5.3)
RBC # BLD AUTO: 4.06 X10*6/UL (ref 4–5.9)
SODIUM SERPL-SCNC: 137 MMOL/L (ref 136–145)
SODIUM SERPL-SCNC: 138 MMOL/L (ref 136–145)
STAPHYLOCOCCUS SPEC CULT: NORMAL
WBC # BLD AUTO: 10.4 X10*3/UL (ref 4.4–11.3)

## 2024-02-22 PROCEDURE — 2500000001 HC RX 250 WO HCPCS SELF ADMINISTERED DRUGS (ALT 637 FOR MEDICARE OP)

## 2024-02-22 PROCEDURE — 2500000004 HC RX 250 GENERAL PHARMACY W/ HCPCS (ALT 636 FOR OP/ED)

## 2024-02-22 PROCEDURE — 82947 ASSAY GLUCOSE BLOOD QUANT: CPT

## 2024-02-22 PROCEDURE — 83735 ASSAY OF MAGNESIUM: CPT

## 2024-02-22 PROCEDURE — 1200000002 HC GENERAL ROOM WITH TELEMETRY DAILY

## 2024-02-22 PROCEDURE — 85025 COMPLETE CBC W/AUTO DIFF WBC: CPT

## 2024-02-22 PROCEDURE — 80048 BASIC METABOLIC PNL TOTAL CA: CPT | Mod: CCI

## 2024-02-22 PROCEDURE — 2500000002 HC RX 250 W HCPCS SELF ADMINISTERED DRUGS (ALT 637 FOR MEDICARE OP, ALT 636 FOR OP/ED)

## 2024-02-22 PROCEDURE — 99232 SBSQ HOSP IP/OBS MODERATE 35: CPT

## 2024-02-22 PROCEDURE — 80069 RENAL FUNCTION PANEL: CPT

## 2024-02-22 RX ORDER — INSULIN GLARGINE 100 [IU]/ML
50 INJECTION, SOLUTION SUBCUTANEOUS NIGHTLY
Status: DISCONTINUED | OUTPATIENT
Start: 2024-02-22 | End: 2024-02-22

## 2024-02-22 RX ORDER — INSULIN GLARGINE 100 [IU]/ML
50 INJECTION, SOLUTION SUBCUTANEOUS NIGHTLY
Start: 2024-02-22 | End: 2024-02-22 | Stop reason: HOSPADM

## 2024-02-22 RX ORDER — INSULIN GLARGINE 100 [IU]/ML
40 INJECTION, SOLUTION SUBCUTANEOUS NIGHTLY
Start: 2024-02-22 | End: 2024-02-23 | Stop reason: SDUPTHER

## 2024-02-22 RX ORDER — INSULIN GLARGINE 100 [IU]/ML
40 INJECTION, SOLUTION SUBCUTANEOUS NIGHTLY
Status: DISCONTINUED | OUTPATIENT
Start: 2024-02-22 | End: 2024-02-23 | Stop reason: HOSPADM

## 2024-02-22 RX ADMIN — TRAMADOL HYDROCHLORIDE 50 MG: 50 TABLET, COATED ORAL at 13:54

## 2024-02-22 RX ADMIN — SODIUM CHLORIDE 500 ML: 9 INJECTION, SOLUTION INTRAVENOUS at 11:52

## 2024-02-22 RX ADMIN — ATORVASTATIN CALCIUM 40 MG: 40 TABLET, FILM COATED ORAL at 08:26

## 2024-02-22 RX ADMIN — HEPARIN SODIUM 7500 UNITS: 5000 INJECTION INTRAVENOUS; SUBCUTANEOUS at 08:25

## 2024-02-22 RX ADMIN — LOSARTAN POTASSIUM 25 MG: 25 TABLET, FILM COATED ORAL at 08:21

## 2024-02-22 RX ADMIN — HEPARIN SODIUM 7500 UNITS: 5000 INJECTION INTRAVENOUS; SUBCUTANEOUS at 17:15

## 2024-02-22 RX ADMIN — HEPARIN SODIUM 7500 UNITS: 5000 INJECTION INTRAVENOUS; SUBCUTANEOUS at 01:06

## 2024-02-22 RX ADMIN — INSULIN GLARGINE 40 UNITS: 100 INJECTION, SOLUTION SUBCUTANEOUS at 21:38

## 2024-02-22 RX ADMIN — DAPTOMYCIN 800 MG: 500 INJECTION, POWDER, LYOPHILIZED, FOR SOLUTION INTRAVENOUS at 08:21

## 2024-02-22 ASSESSMENT — COGNITIVE AND FUNCTIONAL STATUS - GENERAL
DRESSING REGULAR UPPER BODY CLOTHING: A LOT
PERSONAL GROOMING: A LITTLE
CLIMB 3 TO 5 STEPS WITH RAILING: TOTAL
STANDING UP FROM CHAIR USING ARMS: A LOT
TOILETING: A LOT
DRESSING REGULAR LOWER BODY CLOTHING: A LITTLE
MOVING FROM LYING ON BACK TO SITTING ON SIDE OF FLAT BED WITH BEDRAILS: A LITTLE
HELP NEEDED FOR BATHING: A LOT
WALKING IN HOSPITAL ROOM: A LOT
DAILY ACTIVITIY SCORE: 16
MOVING TO AND FROM BED TO CHAIR: A LOT
TURNING FROM BACK TO SIDE WHILE IN FLAT BAD: A LITTLE
MOBILITY SCORE: 13

## 2024-02-22 ASSESSMENT — PAIN SCALES - WONG BAKER: WONGBAKER_NUMERICALRESPONSE: NO HURT

## 2024-02-22 ASSESSMENT — PAIN SCALES - GENERAL: PAINLEVEL_OUTOF10: 0 - NO PAIN

## 2024-02-22 NOTE — PROGRESS NOTES
Kody Choi is a 53 y.o. adult on day 6 of admission presenting with Cellulitis.    Subjective   Received update from primary medical team that patient is medically cleared for discharge.     Transportation requested in [employer] and received confirmed  time of 2pm with [facility] includes the [facility]. Call placed to Count includes the [facility] and notified them of patients updated weight (347 pounds) and informed that this would not be an issue.    Facility updated of transportation time and completed [place] uploaded into [employer]. 7000 previously completed in "[employer]. Blue form completed and provided to unit secretary.    Senior resident and TCC updated.    UPDATE 1605 Received update from primary medical team that patient is no longer medically ready for discharge due to his lab results.     Call placed to [facility] includes the [facility] and transportation cancelled; SNF updated in [employer].    Current auth is good through 2/23; will follow up with team in morning regarding medical clearance.    -Cayla ÁLVAREZ MA, LSW  [phone] or [facility]  Care Transitions

## 2024-02-22 NOTE — CARE PLAN
Problem: Skin  Goal: Promote skin healing  Outcome: Progressing   The patient's goals for the shift include infection and pain treatment    The clinical goals for the shift include Pt will be free from fall/injuries during this shift     Charts have been merged into correct name. Closing encounter.

## 2024-02-22 NOTE — CARE PLAN
The patient's goals for the shift include infection and pain treatment    The clinical goals for the shift include Pt will be free from fall/injuries during this shift

## 2024-02-22 NOTE — PROGRESS NOTES
"Kody Choi is a 53 y.o. adult on day 6 of admission presenting with Cellulitis.    Subjective   NAEO. Pt comfortable this morning eating breakfast. No new concerns, denies f/c/sob/cp/n/v/abd pain.    Objective     Last Recorded Vitals  Blood pressure 131/89, pulse 97, temperature 36.7 °C (98.1 °F), resp. rate 14, height 1.676 m (5' 6\"), weight (!) 158 kg (347 lb 10.7 oz), SpO2 94 %, unknown if currently breastfeeding.  Intake/Output last 3 Shifts:  I/O last 3 completed shifts:  In: 1030 (6.5 mL/kg) [P.O.:480; I.V.:500 (3.2 mL/kg); IV Piggyback:50]  Out: - (0 mL/kg)   Weight: 157.7 kg     General: Appears stated age, morbidly obese, laying on a bed in the middle of the emergency department hallway only wearing underwear, uncovered.  Head: Normocephalic, atraumatic.  Eyes: No icterus, EOMI.  ENT: No nasal discharge, moist mucus membranes.  Respiratory: normal respiratory effort.  Abdominal: Soft, obese, non-tender to palpation, normal bowel sounds.  Neurologic: Alert & oriented x 3, no focal deficits.  MSK: Right knee extremely swollen, erythematous, tense but compressible, tender to touch. Several small open areas with purulent drainage. Patient refuses to flex the right knee, able to passively flex right knee and ankle without difficulty although patient reports pain. Capillary refill < 2 seconds in right foot, DP pulse palpable.   Psychiatric: rude to medical staff   Scheduled medications  albuterol, 10 mg, nebulization, Once  aspirin, 81 mg, oral, Daily  atorvastatin, 40 mg, oral, Daily  calcium gluconate, 2 g, intravenous, Once  daptomycin, 8 mg/kg (Adjusted), intravenous, q24h SUDHIR  heparin (porcine), 7,500 Units, subcutaneous, q8h SUDHIR  insulin glargine, 80 Units, subcutaneous, Nightly  insulin lispro, 0-10 Units, subcutaneous, TID with meals  lactated Ringer's, 500 mL, intravenous, Once  lidocaine, 5 mL, infiltration, Once  losartan, 25 mg, oral, Daily  metoprolol succinate XL, 25 mg, oral, Daily      Continuous " medications     PRN medications  PRN medications: acetaminophen, alteplase, dextrose, glucagon, melatonin, traMADol     Results for orders placed or performed during the hospital encounter of 02/16/24 (from the past 24 hour(s))   POCT GLUCOSE   Result Value Ref Range    POCT Glucose 171 (H) 74 - 99 mg/dL   POCT GLUCOSE   Result Value Ref Range    POCT Glucose 238 (H) 74 - 99 mg/dL   POCT GLUCOSE   Result Value Ref Range    POCT Glucose 183 (H) 74 - 99 mg/dL   Magnesium   Result Value Ref Range    Magnesium 2.09 1.60 - 2.40 mg/dL   Renal Function Panel   Result Value Ref Range    Glucose 67 (L) 74 - 99 mg/dL    Sodium 137 136 - 145 mmol/L    Potassium 5.6 (H) 3.5 - 5.3 mmol/L    Chloride 107 98 - 107 mmol/L    Bicarbonate 21 21 - 32 mmol/L    Anion Gap 15 10 - 20 mmol/L    Urea Nitrogen 59 (H) 6 - 23 mg/dL    Creatinine 1.82 (H) 0.50 - 1.30 mg/dL    eGFR 32 (L) >60 mL/min/1.73m*2    Calcium 8.3 (L) 8.6 - 10.6 mg/dL    Phosphorus 4.8 2.5 - 4.9 mg/dL    Albumin 2.9 (L) 3.4 - 5.0 g/dL   CBC and Auto Differential   Result Value Ref Range    WBC 10.4 4.4 - 11.3 x10*3/uL    nRBC 0.2 (H) 0.0 - 0.0 /100 WBCs    RBC 4.06 4.00 - 5.90 x10*6/uL    Hemoglobin 9.4 (L) 12.0 - 17.5 g/dL    Hematocrit 32.9 (L) 36.0 - 52.0 %    MCV 81 80 - 100 fL    MCH 23.2 (L) 26.0 - 34.0 pg    MCHC 28.6 (L) 32.0 - 36.0 g/dL    RDW 17.2 (H) 11.5 - 14.5 %    Platelets 470 (H) 150 - 450 x10*3/uL    Neutrophils % 63.9 40.0 - 80.0 %    Immature Granulocytes %, Automated 0.9 0.0 - 0.9 %    Lymphocytes % 18.6 13.0 - 44.0 %    Monocytes % 10.9 2.0 - 10.0 %    Eosinophils % 4.6 0.0 - 6.0 %    Basophils % 1.1 0.0 - 2.0 %    Neutrophils Absolute 6.63 1.20 - 7.70 x10*3/uL    Immature Granulocytes Absolute, Automated 0.09 0.00 - 0.70 x10*3/uL    Lymphocytes Absolute 1.93 1.20 - 4.80 x10*3/uL    Monocytes Absolute 1.13 (H) 0.10 - 1.00 x10*3/uL    Eosinophils Absolute 0.48 0.00 - 0.70 x10*3/uL    Basophils Absolute 0.11 (H) 0.00 - 0.10 x10*3/uL   POCT GLUCOSE    Result Value Ref Range    POCT Glucose 56 (L) 74 - 99 mg/dL   POCT GLUCOSE   Result Value Ref Range    POCT Glucose 112 (H) 74 - 99 mg/dL   POCT GLUCOSE   Result Value Ref Range    POCT Glucose 65 (L) 74 - 99 mg/dL   POCT GLUCOSE   Result Value Ref Range    POCT Glucose 58 (L) 74 - 99 mg/dL   POCT GLUCOSE   Result Value Ref Range    POCT Glucose 109 (H) 74 - 99 mg/dL       Assessment/Plan   Principal Problem:    Cellulitis  Active Problems:    Pyogenic arthritis of right knee joint, due to unspecified organism (CMS/[facility])    Kody Choi is a 53-year-old man with a past medical history of T2DM, CAD s/p multiple stents and CABG, HFrEF (EF 30% 1/30/24), A Fib, RUPESH, TKA in 2015, and multiple septic arthritis infections in the R knee who presents recurrent right knee pain and swelling. He continues to refuse recommended therapy (amputation), orthopedic surgery not recommending a washout. Will continue IV antibiotics. Pt understands risks of further sepsis and potential fatality without amputation. Amenable to PICC and has since been placed for 4 weeks IV abx and ID follow-up to discuss further abx suppressive therapy.    Updates 2/22:  -Continues to be mildly hypoglycemic, decreased glargine to 40mg daily with SSI  -Cr increased from 1.69->1.82, hyperkalemic at 5.6 from 5.3, will need fluid bolus prior to dc to SNF, repeating BMP in afternoon and can consider dc if improved  -[facility] approved, plan to dc to SNF 2/22 if Cr stabilizes    Updates 2/21:  -Adjusted insulin from Lantus 80 BID to once daily, and DC meal time Lispro, continuing SSI  -500cc LR bolus  -Prior auth approved for [facility], possible dc 2/22    #Septic arthritis  ::Discussed with orthopedic surgery in the ED, still recommending amputation, not wash out.  ::Blood cx NGTD  - ID c/s   -Recs: PICC placed, Daptomycin 4 weeks (2/19-3/18), started 2/20 AM  - Wound culture pending  - Wound care consult, appreciate recs      #T2DM  #Hyperkalemia  #Hyperglycemia  #Metabolic acidosis  ::Likely related to insulin non-compliance.  ::Home regimen reportedly Lantus 80 units BID, 25 units lispro with meals.  ::Hypoglycemic periodically 2/19-2/20  -Continues to be mildly hypoglycemic, decreased glargine to 40mg daily with SSI  - Refusing diabetic diet.     #BARBIE vs. CKD  ::Admission Cr 1.63, now improved to 1.40, 1.69 2/21  ::Urine lytes c/w prerenal  ::s/p 1L fluid bolus 2/19  - Unclear if BARBIE or new baseline renal function, on day prior to recent discharge patient's Cr was stable ~1.4. Additionally, during October 2023 admission patient was documented to have CKD IIIb with a baseline of 1.6-1.8. Will continue to monitor.   -Cr increased from 1.69->1.82, hyperkalemic at 5.6 from 5.3, will need fluid bolus prior to dc to SNF, repeating BMP in afternoon and can consider dc if improved     #CAD  #HFrEF  ::EF 30 % 1/30/24  - Patient continues to refuse GDMT (beta blockade, diuretics).   - Continue atorvastatin 40 mg, ASA 81 mg.      #Atrial fibrillation  - Sinus rhythm on admission, not on anticoagulation.  - Telemetry ordered, patient refusing.    F: Cautious given HFrEF  E: PRN  N: Regular (refusing diabetic diet)  A: PIV     DVT: SQH  Full code (confirmed on admission)  NOK: Vannesa (friend): [phone]    Patrick Solitario DO  PGY-1 Neurology

## 2024-02-23 VITALS
RESPIRATION RATE: 16 BRPM | DIASTOLIC BLOOD PRESSURE: 98 MMHG | HEIGHT: 66 IN | WEIGHT: 315 LBS | HEART RATE: 95 BPM | TEMPERATURE: 98.1 F | SYSTOLIC BLOOD PRESSURE: 139 MMHG | BODY MASS INDEX: 50.62 KG/M2 | OXYGEN SATURATION: 95 %

## 2024-02-23 LAB
ALBUMIN SERPL BCP-MCNC: 3 G/DL (ref 3.4–5)
ANION GAP SERPL CALC-SCNC: 15 MMOL/L (ref 10–20)
APPEARANCE UR: ABNORMAL
BACTERIA FOR EOSINOPHIL SMEAR: ABNORMAL
BASOPHILS # BLD AUTO: 0.13 X10*3/UL (ref 0–0.1)
BASOPHILS NFR BLD AUTO: 1.2 %
BILIRUB UR STRIP.AUTO-MCNC: NEGATIVE MG/DL
BUN SERPL-MCNC: 63 MG/DL (ref 6–23)
CALCIUM SERPL-MCNC: 8.3 MG/DL (ref 8.6–10.6)
CHLORIDE SERPL-SCNC: 108 MMOL/L (ref 98–107)
CHLORIDE UR-SCNC: 19 MMOL/L
CHLORIDE/CREATININE (MMOL/G) IN URINE: 16 MMOL/G CREAT (ref 23–318)
CK SERPL-CCNC: 137 U/L (ref 0–325)
CO2 SERPL-SCNC: 21 MMOL/L (ref 21–32)
COLOR UR: ABNORMAL
CREAT SERPL-MCNC: 1.98 MG/DL (ref 0.5–1.3)
CREAT UR-MCNC: 118.1 MG/DL (ref 20–370)
EGFRCR SERPLBLD CKD-EPI 2021: 29 ML/MIN/1.73M*2
EOSINOPHIL # BLD AUTO: 0.38 X10*3/UL (ref 0–0.7)
EOSINOPHIL NFR BLD AUTO: 3.6 %
EOSINOPHIL SMEAR: ABNORMAL
ERYTHROCYTE [DISTWIDTH] IN BLOOD BY AUTOMATED COUNT: 17.1 % (ref 11.5–14.5)
GLUCOSE BLD MANUAL STRIP-MCNC: 223 MG/DL (ref 74–99)
GLUCOSE BLD MANUAL STRIP-MCNC: 79 MG/DL (ref 74–99)
GLUCOSE BLD MANUAL STRIP-MCNC: 81 MG/DL (ref 74–99)
GLUCOSE BLD MANUAL STRIP-MCNC: 87 MG/DL (ref 74–99)
GLUCOSE SERPL-MCNC: 94 MG/DL (ref 74–99)
GLUCOSE UR STRIP.AUTO-MCNC: NORMAL MG/DL
HCT VFR BLD AUTO: 31.5 % (ref 36–52)
HGB BLD-MCNC: 9.3 G/DL (ref 12–17.5)
IMM GRANULOCYTES # BLD AUTO: 0.08 X10*3/UL (ref 0–0.7)
IMM GRANULOCYTES NFR BLD AUTO: 0.8 % (ref 0–0.9)
KETONES UR STRIP.AUTO-MCNC: NEGATIVE MG/DL
LEUKOCYTE ESTERASE UR QL STRIP.AUTO: NEGATIVE
LYMPHOCYTES # BLD AUTO: 1.98 X10*3/UL (ref 1.2–4.8)
LYMPHOCYTES NFR BLD AUTO: 18.9 %
MAGNESIUM SERPL-MCNC: 2.16 MG/DL (ref 1.6–2.4)
MCH RBC QN AUTO: 23.3 PG (ref 26–34)
MCHC RBC AUTO-ENTMCNC: 29.5 G/DL (ref 32–36)
MCV RBC AUTO: 79 FL (ref 80–100)
MONOCYTES # BLD AUTO: 0.96 X10*3/UL (ref 0.1–1)
MONOCYTES NFR BLD AUTO: 9.1 %
MUCOUS THREADS #/AREA URNS AUTO: NORMAL /LPF
NEUTROPHILS # BLD AUTO: 6.97 X10*3/UL (ref 1.2–7.7)
NEUTROPHILS FOR EOSINOPHIL SMEAR: ABNORMAL
NEUTROPHILS NFR BLD AUTO: 66.4 %
NITRITE UR QL STRIP.AUTO: NEGATIVE
NRBC BLD-RTO: 0 /100 WBCS (ref 0–0)
PH UR STRIP.AUTO: 5.5 [PH]
PHOSPHATE SERPL-MCNC: 4.5 MG/DL (ref 2.5–4.9)
PLATELET # BLD AUTO: 461 X10*3/UL (ref 150–450)
POTASSIUM SERPL-SCNC: 5.7 MMOL/L (ref 3.5–5.3)
POTASSIUM UR-SCNC: 59 MMOL/L
POTASSIUM/CREAT UR-RTO: 50 MMOL/G CREAT
PROT UR STRIP.AUTO-MCNC: ABNORMAL MG/DL
RBC # BLD AUTO: 3.99 X10*6/UL (ref 4–5.9)
RBC # UR STRIP.AUTO: ABNORMAL /UL
RBC #/AREA URNS AUTO: NORMAL /HPF
SODIUM SERPL-SCNC: 138 MMOL/L (ref 136–145)
SODIUM UR-SCNC: 21 MMOL/L
SODIUM/CREAT UR-RTO: 18 MMOL/G CREAT
SP GR UR STRIP.AUTO: 1.02
SQUAMOUS #/AREA URNS AUTO: NORMAL /HPF
UROBILINOGEN UR STRIP.AUTO-MCNC: NORMAL MG/DL
WBC # BLD AUTO: 10.5 X10*3/UL (ref 4.4–11.3)
WBC #/AREA URNS AUTO: NORMAL /HPF

## 2024-02-23 PROCEDURE — 81001 URINALYSIS AUTO W/SCOPE: CPT

## 2024-02-23 PROCEDURE — 2500000004 HC RX 250 GENERAL PHARMACY W/ HCPCS (ALT 636 FOR OP/ED)

## 2024-02-23 PROCEDURE — 82947 ASSAY GLUCOSE BLOOD QUANT: CPT

## 2024-02-23 PROCEDURE — 82550 ASSAY OF CK (CPK): CPT

## 2024-02-23 PROCEDURE — 82436 ASSAY OF URINE CHLORIDE: CPT

## 2024-02-23 PROCEDURE — 89190 NASAL SMEAR FOR EOSINOPHILS: CPT

## 2024-02-23 PROCEDURE — 85025 COMPLETE CBC W/AUTO DIFF WBC: CPT

## 2024-02-23 PROCEDURE — 2500000001 HC RX 250 WO HCPCS SELF ADMINISTERED DRUGS (ALT 637 FOR MEDICARE OP)

## 2024-02-23 PROCEDURE — 99239 HOSP IP/OBS DSCHRG MGMT >30: CPT

## 2024-02-23 PROCEDURE — 80069 RENAL FUNCTION PANEL: CPT

## 2024-02-23 PROCEDURE — 97530 THERAPEUTIC ACTIVITIES: CPT | Mod: GP

## 2024-02-23 PROCEDURE — 83735 ASSAY OF MAGNESIUM: CPT

## 2024-02-23 RX ORDER — INSULIN GLARGINE 100 [IU]/ML
35 INJECTION, SOLUTION SUBCUTANEOUS NIGHTLY
Start: 2024-02-23

## 2024-02-23 RX ADMIN — DAPTOMYCIN 800 MG: 500 INJECTION, POWDER, LYOPHILIZED, FOR SOLUTION INTRAVENOUS at 08:22

## 2024-02-23 RX ADMIN — HEPARIN SODIUM 7500 UNITS: 5000 INJECTION INTRAVENOUS; SUBCUTANEOUS at 00:36

## 2024-02-23 RX ADMIN — HEPARIN SODIUM 7500 UNITS: 5000 INJECTION INTRAVENOUS; SUBCUTANEOUS at 08:22

## 2024-02-23 RX ADMIN — SODIUM CHLORIDE 500 ML: 9 INJECTION, SOLUTION INTRAVENOUS at 08:22

## 2024-02-23 RX ADMIN — ATORVASTATIN CALCIUM 40 MG: 40 TABLET, FILM COATED ORAL at 08:22

## 2024-02-23 ASSESSMENT — COGNITIVE AND FUNCTIONAL STATUS - GENERAL
WALKING IN HOSPITAL ROOM: A LOT
CLIMB 3 TO 5 STEPS WITH RAILING: TOTAL
TURNING FROM BACK TO SIDE WHILE IN FLAT BAD: A LITTLE
CLIMB 3 TO 5 STEPS WITH RAILING: TOTAL
MOBILITY SCORE: 13
WALKING IN HOSPITAL ROOM: TOTAL
DAILY ACTIVITIY SCORE: 15
DRESSING REGULAR UPPER BODY CLOTHING: A LOT
DAILY ACTIVITIY SCORE: 15
MOVING FROM LYING ON BACK TO SITTING ON SIDE OF FLAT BED WITH BEDRAILS: A LITTLE
DRESSING REGULAR LOWER BODY CLOTHING: A LITTLE
TOILETING: A LOT
MOVING TO AND FROM BED TO CHAIR: A LOT
WALKING IN HOSPITAL ROOM: A LOT
HELP NEEDED FOR BATHING: A LOT
TURNING FROM BACK TO SIDE WHILE IN FLAT BAD: A LOT
HELP NEEDED FOR BATHING: A LOT
MOBILITY SCORE: 13
MOVING FROM LYING ON BACK TO SITTING ON SIDE OF FLAT BED WITH BEDRAILS: A LITTLE
MOVING FROM LYING ON BACK TO SITTING ON SIDE OF FLAT BED WITH BEDRAILS: A LOT
CLIMB 3 TO 5 STEPS WITH RAILING: TOTAL
DRESSING REGULAR UPPER BODY CLOTHING: A LOT
STANDING UP FROM CHAIR USING ARMS: A LOT
MOBILITY SCORE: 10
STANDING UP FROM CHAIR USING ARMS: A LOT
MOVING TO AND FROM BED TO CHAIR: A LOT
MOVING TO AND FROM BED TO CHAIR: A LOT
STANDING UP FROM CHAIR USING ARMS: A LOT
PERSONAL GROOMING: A LOT
TOILETING: A LOT
TURNING FROM BACK TO SIDE WHILE IN FLAT BAD: A LITTLE
DRESSING REGULAR LOWER BODY CLOTHING: A LITTLE
PERSONAL GROOMING: A LOT

## 2024-02-23 ASSESSMENT — PAIN - FUNCTIONAL ASSESSMENT: PAIN_FUNCTIONAL_ASSESSMENT: 0-10

## 2024-02-23 ASSESSMENT — PAIN SCALES - WONG BAKER
WONGBAKER_NUMERICALRESPONSE: NO HURT
WONGBAKER_NUMERICALRESPONSE: NO HURT

## 2024-02-23 ASSESSMENT — PAIN SCALES - GENERAL
PAINLEVEL_OUTOF10: 0 - NO PAIN

## 2024-02-23 NOTE — PROGRESS NOTES
Kody Choi is a 53 y.o. adult on day 7 of admission presenting with Cellulitis.    Subjective   Received update from primary medical team that patient is cleared for discharge. Patient will discharge to [facility] and they are able to admit today.    Transportation requested in transportation and received confirmation time of 1:00pm via [facility]. Facility updated in [facility].    Bedside RN and senior resident updated.    -Cayla ÁLVAREZ MA, LSW  [phone] or [facility]  Care Transitions

## 2024-02-23 NOTE — PROGRESS NOTES
Physical Therapy    Physical Therapy Treatment    Patient Name: Kody Choi  MRN: [id number]  Today's Date: 2/23/2024  Time Calculation  Start Time: 1155  Stop Time: 1210  Time Calculation (min): 15 min       Assessment/Plan   PT Assessment  PT Assessment Results: Decreased strength, Decreased range of motion, Decreased endurance, Impaired balance, Decreased mobility, Pain  Rehab Prognosis: Good  End of Session Communication: Bedside nurse  Assessment Comment: Pt remains not safe for return home alone and to benefit from continued PT to maximize function.  End of Session Patient Position: Bed, 3 rail up, Alarm off, not on at start of session  PT Plan  Inpatient/Swing Bed or Outpatient: Inpatient  PT Plan  Treatment/Interventions: Bed mobility, Transfer training, Balance training, Strengthening, Endurance training, Range of motion, Therapeutic exercise, Therapeutic activity, Home exercise program  PT Plan: Skilled PT  PT Frequency: 3 times per week  PT Discharge Recommendations: Moderate intensity level of continued care  Equipment Recommended upon Discharge:  (none)  PT Recommended Transfer Status: Assist x1  PT - OK to Discharge: Yes      General Visit Information:   PT  Visit  PT Received On: 02/23/24  General  Prior to Session Communication: Bedside nurse  Patient Position Received: Up in chair  General Comment: Pt seated in wheelchair upon PT's entry. Pt now with increased fatigue and focus of visit on functional mobility/transfers/sidestepping. Pt with increased fatigue and became irritable towards end of visit.    Subjective   Precautions:  Precautions  Medical Precautions: Fall precautions    Objective   Pain:  Pain Assessment  Pain Assessment: 0-10  Pain Score: 0 - No pain  Cognition:  Cognition  Orientation Level: Oriented X4  Postural Control:  Postural Control  Postural Control: Impaired  Static Standing Balance  Static Standing-Balance Support: Bilateral upper extremity supported  Static Standing-Level of  Assistance: Maximum assistance  Static Standing-Comment/Number of Minutes: Two trials of stance. Each requiring max assist of 2 for stability.    Activity Tolerance:  Activity Tolerance  Endurance: Decreased tolerance for upright activites  Treatments:    Therapeutic Activity  Therapeutic Activity Performed: Yes  Therapeutic Activity 1: bed mobility, transfers, sidestepping    Bed Mobility  Bed Mobility: Yes  Bed Mobility 1  Bed Mobility 1: Sitting to supine  Level of Assistance 1: Maximum assistance, Maximum verbal cues    Ambulation/Gait Training  Ambulation/Gait Training Performed: Yes  Ambulation/Gait Training 1  Surface 1: Level tile  Assistance 1: Arm in arm assistance, Maximum assistance (x2)  Quality of Gait 1: Shuffling gait  Comments/Distance (ft) 1: 2-3ft sidestepping with several short shuffling steps  Transfers  Transfer: Yes  Transfer 1  Transfer From 1: Sit to, Stand to  Transfer to 1: Stand, Sit  Transfer Level of Assistance 1: Maximum assistance (x2)    Other Activity  Other Activity Performed: No    Outcome Measures:  [facility] Basic Mobility  Turning from your back to your side while in a flat bed without using bedrails: A lot  Moving from lying on your back to sitting on the side of a flat bed without using bedrails: A lot  Moving to and from bed to chair (including a wheelchair): A lot  Standing up from a chair using your arms (e.g. wheelchair or bedside chair): A lot  To walk in hospital room: Total  Climbing 3-5 steps with railing: Total  Basic Mobility - Total Score: 10    Education Documentation  Mobility Training, taught by Pietro Beltran PT at 2/23/2024 12:24 PM.  Learner: Patient  Readiness: Acceptance  Method: Explanation  Response: Verbalizes Understanding  Comment: transfer training, up with assist    Education Comments  No comments found.      Encounter Problems       Encounter Problems (Active)       Mobility       STG - Patient will propel the wheelchair 125ft, independently.  (Progressing)       Start:  02/19/24    Expected End:  03/04/24               Transfers       STG - Transfer from bed to wheelchair with SBA. (Progressing)       Start:  02/19/24    Expected End:  03/04/24            STG - Patient to transfer to and from sit to supine, independently. (Progressing)       Start:  02/19/24    Expected End:  03/04/24            STG - Patient will perform toilet transfer with SBA. (Progressing)       Start:  02/19/24    Expected End:  03/04/24

## 2024-02-23 NOTE — CARE PLAN
Problem: Skin  Goal: Promote skin healing  Outcome: Progressing  Flowsheets (Taken 2/23/2024 0604)  Promote skin healing: Turn/reposition every 2 hours/use positioning/transfer devices   The patient's goals for the shift include infection and pain treatment    The clinical goals for the shift include Pt will be free from fall during this shift

## 2024-02-23 NOTE — NURSING NOTE
Report called to [facility] [phone] Spencer TRUJILLO. Belongings gathered and returned. IV heplock removed. Patient to be picked up at 1pm.

## 2024-02-23 NOTE — DISCHARGE SUMMARY
Discharge Diagnosis  Cellulitis    Issues Requiring Follow-Up  -Fu w/ ID 3/21 for septic arthritis, need to discuss long-term suppressive abx  -Follow-up with daily RFPs to monitor kidney function and hyperk, pt given multiple 500cc fluid boluses inpatient, labs c/w pre-renal BARBIE 2/2 dehydration    Test Results Pending At Discharge  Pending Labs       No current pending labs.            Hospital Course  Kody Choi is a 53-year-old man with a past medical history of T2DM, CAD s/p multiple stents and CABG, HFrEF (EF 30% 1/30/24), A Fib, RUPESH, TKA in 2015, and multiple septic arthritis infections in the R knee who presents recurrent right knee pain and swelling. He continues to refuse recommended therapy (amputation), orthopedic surgery not recommending a washout.  Treated empirically with Ceftriaxone and Vancomycin (2/16-2/18).    The patient was admitted and was treated with IV vanc/ceftriaxone. Bcx (2/16) NGTD, wound culture with PMNs and multiple GP and GN. ID consulted, rec long term antibiotics with daptomycin (2/19-3/18). PICC placed 2/19. Rec'd SNF.     Course c/b BARBIE on CKD, baseline Cr of 1.6, bumped up to 1.92 with hyperkalemia of 5.6. Refused IVF, eventually able to be amenable to 500cc bolus. Will follow kidney function at SNF with repeat BMPs.    While inpatient, glucose was ultimately controlled with Glargine 40 units at bedtime with SSI.    Follow-up Visits:    -Infectious Disease  :: Fu w/ Dr. Goodwin 2/29, will discuss abx suppresion long term at this visit    Please obtain a basic metabolic panel (BMP) in 1 week to check  your kidney function, this will be on Monday 2/26. Of note, Pt's baseline Cr is 1.6    Pertinent Physical Exam At Time of Discharge  General: Appears stated age, morbidly obese, laying on a bed in the middle of the emergency department hallway only wearing underwear, uncovered.  Head: Normocephalic, atraumatic.  Eyes: No icterus, EOMI.  ENT: No nasal discharge, moist mucus  membranes.  Respiratory: normal respiratory effort.  Abdominal: Soft, obese, non-tender to palpation, normal bowel sounds.  Neurologic: Alert & oriented x 3, no focal deficits.  MSK: Right knee extremely swollen, erythematous, tense but compressible, tender to touch. Several small open areas with purulent drainage. Patient refuses to flex the right knee, able to passively flex right knee and ankle without difficulty although patient reports pain. Capillary refill < 2 seconds in right foot, DP pulse palpable.   Psychiatric: rude to medical staff     Home Medications     Medication List      START taking these medications     alteplase 2 mg injection; Commonly known as: Cathflo Activase; 2 mL (2   mg) by intra-catheter route if needed (Line care).   heparin (porcine) 5,000 unit/mL injection; Inject 1.5 mL (7,500 Units)   under the skin every 8 hours.   sodium chloride 0.9% parenteral solution 50 mL with DAPTOmycin 50 mg/mL   recon soln 950 mg; Infuse 950 mg at 138 mL/hr over 30 minutes into a   venous catheter once every 24 hours for 25 days. Do not start before   February 22, 2024.     CHANGE how you take these medications     acetaminophen 325 mg tablet; Commonly known as: Tylenol; Take 3 tablets   (975 mg) by mouth every 8 hours if needed for headaches, mild pain (1 -   3), moderate pain (4 - 6) or fever (temp greater than 38.0 C).; What   changed: how much to take, when to take this, reasons to take this   insulin glargine 100 unit/mL injection; Commonly known as: Lantus;   Inject 35 Units under the skin once daily at bedtime. Take as directed per   insulin instructions.; What changed: medication strength, how much to   take, when to take this, additional instructions   insulin lispro 100 unit/mL injection; Commonly known as: HumaLOG; Inject   0-0.1 mL (0-10 Units) under the skin 3 times a day with meals. Take as   directed per insulin instructions.; What changed: how much to take,   additional instructions    "traMADol 50 mg tablet; Commonly known as: Ultram; Take 1 tablet (50 mg)   by mouth every 8 hours if needed for severe pain (7 - 10).; What changed:   when to take this, reasons to take this     CONTINUE taking these medications     aspirin 81 mg EC tablet   atorvastatin 40 mg tablet; Commonly known as: Lipitor; Take 1 tablet (40   mg) by mouth once daily. Take in evening.   BD Ultra-Fine Short Pen Needle 31 gauge x 5/16\" needle; Generic drug:   pen needle, diabetic; Use to inject 1-4 times daily as directed.   fluticasone 50 mcg/actuation nasal spray; Commonly known as: Flonase   metoprolol succinate XL 25 mg 24 hr tablet; Commonly known as:   Toprol-XL; Take 1 tablet (25 mg) by mouth once daily for 6 doses. Do not   crush or chew.   omeprazole 20 mg DR capsule; Commonly known as: PriLOSEC     STOP taking these medications     cephalexin 500 mg capsule; Commonly known as: Keflex   doxycycline 100 mg capsule; Commonly known as: Vibramycin   lisinopril 5 mg tablet   losartan 25 mg tablet; Commonly known as: Cozaar   Rybelsus 7 mg tablet; Generic drug: semaglutide       Outpatient Follow-Up  Future Appointments   Date Time Provider Department Center   3/21/2024  1:00 PM Noah Goodwin MD [id number] Academic       Patrick Solitario DO  PGY-1 Neurology  "

## 2024-02-23 NOTE — CARE PLAN
The patient's goals for the shift include Maintain patient safety throughout the shift    The clinical goals for the shift include Monitor renal function and hemodynamic status    Over the shift, the patient did not make progress toward the following goals. Barriers to progression include   . Recommendations to address these barriers include   .

## 2024-02-27 ENCOUNTER — HOSPITAL ENCOUNTER (INPATIENT)
Facility: HOSPITAL | Age: 54
LOS: 6 days | Discharge: NURSING, REHAB OR LONG-TERM CARE | DRG: 727 | End: 2024-03-04
Attending: STUDENT IN AN ORGANIZED HEALTH CARE EDUCATION/TRAINING PROGRAM | Admitting: INTERNAL MEDICINE
Payer: COMMERCIAL

## 2024-02-27 ENCOUNTER — APPOINTMENT (OUTPATIENT)
Dept: RADIOLOGY | Facility: HOSPITAL | Age: 54
DRG: 727 | End: 2024-02-27
Payer: COMMERCIAL

## 2024-02-27 ENCOUNTER — APPOINTMENT (OUTPATIENT)
Dept: CARDIOLOGY | Facility: HOSPITAL | Age: 54
DRG: 727 | End: 2024-02-27
Payer: COMMERCIAL

## 2024-02-27 DIAGNOSIS — N49.2 CELLULITIS OF SCROTUM: Primary | ICD-10-CM

## 2024-02-27 DIAGNOSIS — M00.9 PYOGENIC ARTHRITIS OF RIGHT KNEE JOINT, DUE TO UNSPECIFIED ORGANISM (MULTI): ICD-10-CM

## 2024-02-27 DIAGNOSIS — I50.23 ACUTE ON CHRONIC SYSTOLIC CONGESTIVE HEART FAILURE (MULTI): ICD-10-CM

## 2024-02-27 LAB
ALBUMIN SERPL BCP-MCNC: 2.7 G/DL (ref 3.4–5)
ALP SERPL-CCNC: 450 U/L (ref 33–120)
ALT SERPL W P-5'-P-CCNC: 225 U/L (ref 7–52)
ANION GAP SERPL CALC-SCNC: 12 MMOL/L (ref 10–20)
APPEARANCE UR: ABNORMAL
AST SERPL W P-5'-P-CCNC: 69 U/L (ref 9–39)
BASOPHILS # BLD AUTO: 0.11 X10*3/UL (ref 0–0.1)
BASOPHILS NFR BLD AUTO: 1.4 %
BILIRUB SERPL-MCNC: 0.4 MG/DL (ref 0–1.2)
BILIRUB UR STRIP.AUTO-MCNC: NEGATIVE MG/DL
BUN SERPL-MCNC: 59 MG/DL (ref 6–23)
CALCIUM SERPL-MCNC: 7.6 MG/DL (ref 8.6–10.3)
CARDIAC TROPONIN I PNL SERPL HS: 43 NG/L (ref 0–20)
CHLORIDE SERPL-SCNC: 108 MMOL/L (ref 98–107)
CO2 SERPL-SCNC: 21 MMOL/L (ref 21–32)
COLOR UR: YELLOW
CREAT SERPL-MCNC: 1.75 MG/DL (ref 0.5–1.3)
CRP SERPL-MCNC: 3.07 MG/DL
EGFRCR SERPLBLD CKD-EPI 2021: 34 ML/MIN/1.73M*2
EOSINOPHIL # BLD AUTO: 0.17 X10*3/UL (ref 0–0.7)
EOSINOPHIL NFR BLD AUTO: 2.1 %
ERYTHROCYTE [DISTWIDTH] IN BLOOD BY AUTOMATED COUNT: 17.2 % (ref 11.5–14.5)
ERYTHROCYTE [SEDIMENTATION RATE] IN BLOOD BY WESTERGREN METHOD: 64 MM/H (ref 0–30)
FLUAV RNA RESP QL NAA+PROBE: NOT DETECTED
FLUBV RNA RESP QL NAA+PROBE: NOT DETECTED
GLUCOSE SERPL-MCNC: 239 MG/DL (ref 74–99)
GLUCOSE UR STRIP.AUTO-MCNC: ABNORMAL MG/DL
HCT VFR BLD AUTO: 32.6 % (ref 36–52)
HGB BLD-MCNC: 9.6 G/DL (ref 12–17.5)
IMM GRANULOCYTES # BLD AUTO: 0.04 X10*3/UL (ref 0–0.7)
IMM GRANULOCYTES NFR BLD AUTO: 0.5 % (ref 0–0.9)
KETONES UR STRIP.AUTO-MCNC: NEGATIVE MG/DL
LACTATE SERPL-SCNC: 0.8 MMOL/L (ref 0.4–2)
LEUKOCYTE ESTERASE UR QL STRIP.AUTO: NEGATIVE
LYMPHOCYTES # BLD AUTO: 1.41 X10*3/UL (ref 1.2–4.8)
LYMPHOCYTES NFR BLD AUTO: 17.4 %
MCH RBC QN AUTO: 23.7 PG (ref 26–34)
MCHC RBC AUTO-ENTMCNC: 29.4 G/DL (ref 32–36)
MCV RBC AUTO: 81 FL (ref 80–100)
MONOCYTES # BLD AUTO: 0.82 X10*3/UL (ref 0.1–1)
MONOCYTES NFR BLD AUTO: 10.1 %
MUCOUS THREADS #/AREA URNS AUTO: NORMAL /LPF
NEUTROPHILS # BLD AUTO: 5.57 X10*3/UL (ref 1.2–7.7)
NEUTROPHILS NFR BLD AUTO: 68.5 %
NITRITE UR QL STRIP.AUTO: NEGATIVE
NRBC BLD-RTO: 1.5 /100 WBCS (ref 0–0)
PH UR STRIP.AUTO: 5 [PH]
PLATELET # BLD AUTO: 446 X10*3/UL (ref 150–450)
POTASSIUM SERPL-SCNC: 4.7 MMOL/L (ref 3.5–5.3)
PROT SERPL-MCNC: 6.1 G/DL (ref 6.4–8.2)
PROT UR STRIP.AUTO-MCNC: ABNORMAL MG/DL
RBC # BLD AUTO: 4.05 X10*6/UL (ref 4–5.9)
RBC # UR STRIP.AUTO: ABNORMAL /UL
RBC #/AREA URNS AUTO: NORMAL /HPF
SARS-COV-2 RNA RESP QL NAA+PROBE: NOT DETECTED
SODIUM SERPL-SCNC: 136 MMOL/L (ref 136–145)
SP GR UR STRIP.AUTO: 1.01
UROBILINOGEN UR STRIP.AUTO-MCNC: <2 MG/DL
WBC # BLD AUTO: 8.1 X10*3/UL (ref 4.4–11.3)
WBC #/AREA URNS AUTO: NORMAL /HPF

## 2024-02-27 PROCEDURE — 71045 X-RAY EXAM CHEST 1 VIEW: CPT

## 2024-02-27 PROCEDURE — 70450 CT HEAD/BRAIN W/O DYE: CPT

## 2024-02-27 PROCEDURE — 93005 ELECTROCARDIOGRAM TRACING: CPT

## 2024-02-27 PROCEDURE — 85652 RBC SED RATE AUTOMATED: CPT | Performed by: EMERGENCY MEDICINE

## 2024-02-27 PROCEDURE — 36415 COLL VENOUS BLD VENIPUNCTURE: CPT | Performed by: STUDENT IN AN ORGANIZED HEALTH CARE EDUCATION/TRAINING PROGRAM

## 2024-02-27 PROCEDURE — 99285 EMERGENCY DEPT VISIT HI MDM: CPT | Mod: 25

## 2024-02-27 PROCEDURE — 80053 COMPREHEN METABOLIC PANEL: CPT | Performed by: STUDENT IN AN ORGANIZED HEALTH CARE EDUCATION/TRAINING PROGRAM

## 2024-02-27 PROCEDURE — 93010 ELECTROCARDIOGRAM REPORT: CPT | Performed by: STUDENT IN AN ORGANIZED HEALTH CARE EDUCATION/TRAINING PROGRAM

## 2024-02-27 PROCEDURE — 85025 COMPLETE CBC W/AUTO DIFF WBC: CPT | Performed by: STUDENT IN AN ORGANIZED HEALTH CARE EDUCATION/TRAINING PROGRAM

## 2024-02-27 PROCEDURE — 96365 THER/PROPH/DIAG IV INF INIT: CPT

## 2024-02-27 PROCEDURE — 83605 ASSAY OF LACTIC ACID: CPT | Performed by: STUDENT IN AN ORGANIZED HEALTH CARE EDUCATION/TRAINING PROGRAM

## 2024-02-27 PROCEDURE — 2550000001 HC RX 255 CONTRASTS: Performed by: STUDENT IN AN ORGANIZED HEALTH CARE EDUCATION/TRAINING PROGRAM

## 2024-02-27 PROCEDURE — 2500000004 HC RX 250 GENERAL PHARMACY W/ HCPCS (ALT 636 FOR OP/ED): Performed by: STUDENT IN AN ORGANIZED HEALTH CARE EDUCATION/TRAINING PROGRAM

## 2024-02-27 PROCEDURE — 71045 X-RAY EXAM CHEST 1 VIEW: CPT | Mod: FOREIGN READ | Performed by: RADIOLOGY

## 2024-02-27 PROCEDURE — 72193 CT PELVIS W/DYE: CPT

## 2024-02-27 PROCEDURE — 70450 CT HEAD/BRAIN W/O DYE: CPT | Performed by: SURGERY

## 2024-02-27 PROCEDURE — 76870 US EXAM SCROTUM: CPT

## 2024-02-27 PROCEDURE — 87636 SARSCOV2 & INF A&B AMP PRB: CPT | Performed by: STUDENT IN AN ORGANIZED HEALTH CARE EDUCATION/TRAINING PROGRAM

## 2024-02-27 PROCEDURE — 86140 C-REACTIVE PROTEIN: CPT | Performed by: EMERGENCY MEDICINE

## 2024-02-27 PROCEDURE — 96375 TX/PRO/DX INJ NEW DRUG ADDON: CPT

## 2024-02-27 PROCEDURE — 1210000001 HC SEMI-PRIVATE ROOM DAILY

## 2024-02-27 PROCEDURE — 87040 BLOOD CULTURE FOR BACTERIA: CPT | Mod: PARLAB | Performed by: STUDENT IN AN ORGANIZED HEALTH CARE EDUCATION/TRAINING PROGRAM

## 2024-02-27 PROCEDURE — 93976 VASCULAR STUDY: CPT | Mod: FOREIGN READ | Performed by: RADIOLOGY

## 2024-02-27 PROCEDURE — 76870 US EXAM SCROTUM: CPT | Mod: FOREIGN READ | Performed by: RADIOLOGY

## 2024-02-27 PROCEDURE — 81001 URINALYSIS AUTO W/SCOPE: CPT | Performed by: STUDENT IN AN ORGANIZED HEALTH CARE EDUCATION/TRAINING PROGRAM

## 2024-02-27 PROCEDURE — 84484 ASSAY OF TROPONIN QUANT: CPT | Performed by: STUDENT IN AN ORGANIZED HEALTH CARE EDUCATION/TRAINING PROGRAM

## 2024-02-27 PROCEDURE — 72193 CT PELVIS W/DYE: CPT | Performed by: STUDENT IN AN ORGANIZED HEALTH CARE EDUCATION/TRAINING PROGRAM

## 2024-02-27 RX ADMIN — PIPERACILLIN SODIUM AND TAZOBACTAM SODIUM 4.5 G: 4; .5 INJECTION, SOLUTION INTRAVENOUS at 19:24

## 2024-02-27 RX ADMIN — IOHEXOL 85 ML: 350 INJECTION, SOLUTION INTRAVENOUS at 21:21

## 2024-02-27 RX ADMIN — VANCOMYCIN HYDROCHLORIDE 2 G: 10 INJECTION, POWDER, LYOPHILIZED, FOR SOLUTION INTRAVENOUS at 20:05

## 2024-02-27 RX ADMIN — SODIUM CHLORIDE 1000 ML: 9 INJECTION, SOLUTION INTRAVENOUS at 19:24

## 2024-02-27 ASSESSMENT — PAIN SCALES - WONG BAKER: WONGBAKER_NUMERICALRESPONSE: HURTS LITTLE BIT

## 2024-02-27 ASSESSMENT — PAIN SCALES - GENERAL: PAINLEVEL_OUTOF10: 3

## 2024-02-27 ASSESSMENT — LIFESTYLE VARIABLES
EVER HAD A DRINK FIRST THING IN THE MORNING TO STEADY YOUR NERVES TO GET RID OF A HANGOVER: NO
HAVE PEOPLE ANNOYED YOU BY CRITICIZING YOUR DRINKING: NO
EVER FELT BAD OR GUILTY ABOUT YOUR DRINKING: NO
HAVE YOU EVER FELT YOU SHOULD CUT DOWN ON YOUR DRINKING: NO

## 2024-02-27 ASSESSMENT — PAIN - FUNCTIONAL ASSESSMENT: PAIN_FUNCTIONAL_ASSESSMENT: 0-10

## 2024-02-27 ASSESSMENT — PAIN DESCRIPTION - PROGRESSION: CLINICAL_PROGRESSION: NOT CHANGED

## 2024-02-28 LAB
ANION GAP SERPL CALC-SCNC: 12 MMOL/L (ref 10–20)
ATRIAL RATE: 83 BPM
BNP SERPL-MCNC: 1268 PG/ML (ref 0–99)
BUN SERPL-MCNC: 53 MG/DL (ref 6–23)
CALCIUM SERPL-MCNC: 7.6 MG/DL (ref 8.6–10.3)
CARDIAC TROPONIN I PNL SERPL HS: 34 NG/L (ref 0–20)
CARDIAC TROPONIN I PNL SERPL HS: 40 NG/L (ref 0–20)
CHLORIDE SERPL-SCNC: 108 MMOL/L (ref 98–107)
CO2 SERPL-SCNC: 21 MMOL/L (ref 21–32)
CREAT SERPL-MCNC: 1.74 MG/DL (ref 0.5–1.3)
EGFRCR SERPLBLD CKD-EPI 2021: 34 ML/MIN/1.73M*2
ERYTHROCYTE [DISTWIDTH] IN BLOOD BY AUTOMATED COUNT: 17.5 % (ref 11.5–14.5)
GLUCOSE SERPL-MCNC: 230 MG/DL (ref 74–99)
HCT VFR BLD AUTO: 31.8 % (ref 36–52)
HGB BLD-MCNC: 8.9 G/DL (ref 12–17.5)
HOLD SPECIMEN: NORMAL
INR PPP: 1.1 (ref 0.9–1.1)
MCH RBC QN AUTO: 22.5 PG (ref 26–34)
MCHC RBC AUTO-ENTMCNC: 28 G/DL (ref 32–36)
MCV RBC AUTO: 80 FL (ref 80–100)
NRBC BLD-RTO: 1.1 /100 WBCS (ref 0–0)
P AXIS: 60 DEGREES
P OFFSET: 192 MS
P ONSET: 134 MS
PLATELET # BLD AUTO: 461 X10*3/UL (ref 150–450)
POTASSIUM SERPL-SCNC: 4.7 MMOL/L (ref 3.5–5.3)
PR INTERVAL: 160 MS
PROTHROMBIN TIME: 12.3 SECONDS (ref 9.8–12.8)
Q ONSET: 214 MS
QRS COUNT: 14 BEATS
QRS DURATION: 124 MS
QT INTERVAL: 418 MS
QTC CALCULATION(BAZETT): 491 MS
QTC FREDERICIA: 466 MS
R AXIS: -19 DEGREES
RBC # BLD AUTO: 3.96 X10*6/UL (ref 4–5.9)
SODIUM SERPL-SCNC: 136 MMOL/L (ref 136–145)
T AXIS: -56 DEGREES
T OFFSET: 423 MS
VENTRICULAR RATE: 83 BPM
WBC # BLD AUTO: 8.8 X10*3/UL (ref 4.4–11.3)

## 2024-02-28 PROCEDURE — 85027 COMPLETE CBC AUTOMATED: CPT | Performed by: NURSE PRACTITIONER

## 2024-02-28 PROCEDURE — 83880 ASSAY OF NATRIURETIC PEPTIDE: CPT | Performed by: NURSE PRACTITIONER

## 2024-02-28 PROCEDURE — 1100000001 HC PRIVATE ROOM DAILY

## 2024-02-28 PROCEDURE — 2500000002 HC RX 250 W HCPCS SELF ADMINISTERED DRUGS (ALT 637 FOR MEDICARE OP, ALT 636 FOR OP/ED): Performed by: NURSE PRACTITIONER

## 2024-02-28 PROCEDURE — 2500000004 HC RX 250 GENERAL PHARMACY W/ HCPCS (ALT 636 FOR OP/ED): Performed by: NURSE PRACTITIONER

## 2024-02-28 PROCEDURE — 99223 1ST HOSP IP/OBS HIGH 75: CPT | Performed by: NURSE PRACTITIONER

## 2024-02-28 PROCEDURE — 99223 1ST HOSP IP/OBS HIGH 75: CPT | Performed by: STUDENT IN AN ORGANIZED HEALTH CARE EDUCATION/TRAINING PROGRAM

## 2024-02-28 PROCEDURE — 36415 COLL VENOUS BLD VENIPUNCTURE: CPT | Performed by: NURSE PRACTITIONER

## 2024-02-28 PROCEDURE — 99222 1ST HOSP IP/OBS MODERATE 55: CPT | Performed by: INTERNAL MEDICINE

## 2024-02-28 PROCEDURE — 2500000004 HC RX 250 GENERAL PHARMACY W/ HCPCS (ALT 636 FOR OP/ED): Performed by: STUDENT IN AN ORGANIZED HEALTH CARE EDUCATION/TRAINING PROGRAM

## 2024-02-28 PROCEDURE — 84484 ASSAY OF TROPONIN QUANT: CPT | Performed by: NURSE PRACTITIONER

## 2024-02-28 PROCEDURE — C9113 INJ PANTOPRAZOLE SODIUM, VIA: HCPCS | Performed by: NURSE PRACTITIONER

## 2024-02-28 PROCEDURE — 85610 PROTHROMBIN TIME: CPT | Performed by: NURSE PRACTITIONER

## 2024-02-28 PROCEDURE — 80048 BASIC METABOLIC PNL TOTAL CA: CPT | Performed by: NURSE PRACTITIONER

## 2024-02-28 PROCEDURE — 2500000005 HC RX 250 GENERAL PHARMACY W/O HCPCS: Performed by: NURSE PRACTITIONER

## 2024-02-28 PROCEDURE — 2500000002 HC RX 250 W HCPCS SELF ADMINISTERED DRUGS (ALT 637 FOR MEDICARE OP, ALT 636 FOR OP/ED): Performed by: PHYSICIAN ASSISTANT

## 2024-02-28 RX ORDER — INSULIN GLARGINE 100 [IU]/ML
35 INJECTION, SOLUTION SUBCUTANEOUS NIGHTLY
Status: DISCONTINUED | OUTPATIENT
Start: 2024-02-28 | End: 2024-03-04 | Stop reason: HOSPADM

## 2024-02-28 RX ORDER — NAPROXEN SODIUM 220 MG/1
81 TABLET, FILM COATED ORAL DAILY
Status: DISCONTINUED | OUTPATIENT
Start: 2024-02-28 | End: 2024-03-04 | Stop reason: HOSPADM

## 2024-02-28 RX ORDER — DEXTROSE MONOHYDRATE 100 MG/ML
0.3 INJECTION, SOLUTION INTRAVENOUS ONCE AS NEEDED
Status: DISCONTINUED | OUTPATIENT
Start: 2024-02-28 | End: 2024-03-04 | Stop reason: HOSPADM

## 2024-02-28 RX ORDER — ACETAMINOPHEN 325 MG/1
650 TABLET ORAL EVERY 4 HOURS PRN
Status: DISCONTINUED | OUTPATIENT
Start: 2024-02-28 | End: 2024-03-04 | Stop reason: HOSPADM

## 2024-02-28 RX ORDER — DEXTROSE 50 % IN WATER (D50W) INTRAVENOUS SYRINGE
25
Status: DISCONTINUED | OUTPATIENT
Start: 2024-02-28 | End: 2024-03-04 | Stop reason: HOSPADM

## 2024-02-28 RX ORDER — INSULIN LISPRO 100 [IU]/ML
0-10 INJECTION, SOLUTION INTRAVENOUS; SUBCUTANEOUS
Status: DISCONTINUED | OUTPATIENT
Start: 2024-02-28 | End: 2024-03-04 | Stop reason: HOSPADM

## 2024-02-28 RX ORDER — DAPTOMYCIN IN SODIUM CHLORIDE 500 MG/50ML
50 INJECTION, SOLUTION INTRAVENOUS EVERY 24 HOURS
COMMUNITY
Start: 2024-02-24 | End: 2024-03-04 | Stop reason: HOSPADM

## 2024-02-28 RX ORDER — FLUTICASONE PROPIONATE 50 MCG
2 SPRAY, SUSPENSION (ML) NASAL DAILY PRN
Status: DISCONTINUED | OUTPATIENT
Start: 2024-02-28 | End: 2024-03-04 | Stop reason: HOSPADM

## 2024-02-28 RX ORDER — METOPROLOL TARTRATE 1 MG/ML
5 INJECTION, SOLUTION INTRAVENOUS EVERY 6 HOURS PRN
Status: DISCONTINUED | OUTPATIENT
Start: 2024-02-28 | End: 2024-03-04 | Stop reason: HOSPADM

## 2024-02-28 RX ORDER — TRAMADOL HYDROCHLORIDE 50 MG/1
50 TABLET ORAL EVERY 8 HOURS PRN
Status: DISCONTINUED | OUTPATIENT
Start: 2024-02-28 | End: 2024-02-28

## 2024-02-28 RX ORDER — FUROSEMIDE 10 MG/ML
40 INJECTION INTRAMUSCULAR; INTRAVENOUS 2 TIMES DAILY
Status: DISCONTINUED | OUTPATIENT
Start: 2024-02-28 | End: 2024-03-04

## 2024-02-28 RX ORDER — HEPARIN SODIUM 5000 [USP'U]/ML
7500 INJECTION, SOLUTION INTRAVENOUS; SUBCUTANEOUS EVERY 8 HOURS SCHEDULED
Status: DISCONTINUED | OUTPATIENT
Start: 2024-02-28 | End: 2024-03-04 | Stop reason: HOSPADM

## 2024-02-28 RX ORDER — VANCOMYCIN HYDROCHLORIDE 1 G/20ML
INJECTION, POWDER, LYOPHILIZED, FOR SOLUTION INTRAVENOUS DAILY PRN
Status: DISCONTINUED | OUTPATIENT
Start: 2024-02-28 | End: 2024-02-29

## 2024-02-28 RX ORDER — METOPROLOL TARTRATE 1 MG/ML
5 INJECTION, SOLUTION INTRAVENOUS EVERY 6 HOURS PRN
Status: DISCONTINUED | OUTPATIENT
Start: 2024-02-28 | End: 2024-02-28

## 2024-02-28 RX ORDER — ATORVASTATIN CALCIUM 40 MG/1
40 TABLET, FILM COATED ORAL DAILY
Status: DISCONTINUED | OUTPATIENT
Start: 2024-02-28 | End: 2024-03-04 | Stop reason: HOSPADM

## 2024-02-28 RX ORDER — PANTOPRAZOLE SODIUM 40 MG/10ML
40 INJECTION, POWDER, LYOPHILIZED, FOR SOLUTION INTRAVENOUS DAILY
Status: DISCONTINUED | OUTPATIENT
Start: 2024-02-28 | End: 2024-03-04 | Stop reason: HOSPADM

## 2024-02-28 RX ORDER — TRAMADOL HYDROCHLORIDE 50 MG/1
50 TABLET ORAL EVERY 6 HOURS PRN
Status: DISCONTINUED | OUTPATIENT
Start: 2024-02-28 | End: 2024-03-04 | Stop reason: HOSPADM

## 2024-02-28 RX ADMIN — METOPROLOL TARTRATE 5 MG: 5 INJECTION INTRAVENOUS at 22:06

## 2024-02-28 RX ADMIN — INSULIN GLARGINE 35 UNITS: 100 INJECTION, SOLUTION SUBCUTANEOUS at 21:54

## 2024-02-28 RX ADMIN — INSULIN LISPRO 6 UNITS: 100 INJECTION, SOLUTION INTRAVENOUS; SUBCUTANEOUS at 17:59

## 2024-02-28 RX ADMIN — HEPARIN SODIUM 7500 UNITS: 5000 INJECTION INTRAVENOUS; SUBCUTANEOUS at 01:29

## 2024-02-28 RX ADMIN — INSULIN LISPRO 4 UNITS: 100 INJECTION, SOLUTION INTRAVENOUS; SUBCUTANEOUS at 21:54

## 2024-02-28 RX ADMIN — HEPARIN SODIUM 7500 UNITS: 5000 INJECTION INTRAVENOUS; SUBCUTANEOUS at 18:00

## 2024-02-28 RX ADMIN — VANCOMYCIN HYDROCHLORIDE 1.25 G: 1.25 INJECTION, POWDER, LYOPHILIZED, FOR SOLUTION INTRAVENOUS at 21:50

## 2024-02-28 RX ADMIN — HEPARIN SODIUM 7500 UNITS: 5000 INJECTION INTRAVENOUS; SUBCUTANEOUS at 08:31

## 2024-02-28 RX ADMIN — PIPERACILLIN SODIUM AND TAZOBACTAM SODIUM 3.38 G: 3; .375 INJECTION, SOLUTION INTRAVENOUS at 17:59

## 2024-02-28 RX ADMIN — PIPERACILLIN SODIUM AND TAZOBACTAM SODIUM 3.38 G: 3; .375 INJECTION, SOLUTION INTRAVENOUS at 02:30

## 2024-02-28 RX ADMIN — PIPERACILLIN SODIUM AND TAZOBACTAM SODIUM 3.38 G: 3; .375 INJECTION, SOLUTION INTRAVENOUS at 10:44

## 2024-02-28 RX ADMIN — FUROSEMIDE 40 MG: 10 INJECTION, SOLUTION INTRAMUSCULAR; INTRAVENOUS at 21:53

## 2024-02-28 RX ADMIN — PIPERACILLIN SODIUM AND TAZOBACTAM SODIUM 3.38 G: 3; .375 INJECTION, SOLUTION INTRAVENOUS at 23:49

## 2024-02-28 RX ADMIN — PANTOPRAZOLE SODIUM 40 MG: 40 INJECTION, POWDER, FOR SOLUTION INTRAVENOUS at 08:31

## 2024-02-28 SDOH — SOCIAL STABILITY: SOCIAL INSECURITY: ARE THERE ANY APPARENT SIGNS OF INJURIES/BEHAVIORS THAT COULD BE RELATED TO ABUSE/NEGLECT?: NO

## 2024-02-28 SDOH — SOCIAL STABILITY: SOCIAL INSECURITY: DOES ANYONE TRY TO KEEP YOU FROM HAVING/CONTACTING OTHER FRIENDS OR DOING THINGS OUTSIDE YOUR HOME?: NO

## 2024-02-28 SDOH — SOCIAL STABILITY: SOCIAL INSECURITY: WERE YOU ABLE TO COMPLETE ALL THE BEHAVIORAL HEALTH SCREENINGS?: YES

## 2024-02-28 SDOH — SOCIAL STABILITY: SOCIAL INSECURITY: HAVE YOU HAD THOUGHTS OF HARMING ANYONE ELSE?: NO

## 2024-02-28 SDOH — SOCIAL STABILITY: SOCIAL INSECURITY: DO YOU FEEL UNSAFE GOING BACK TO THE PLACE WHERE YOU ARE LIVING?: NO

## 2024-02-28 SDOH — SOCIAL STABILITY: SOCIAL INSECURITY: ABUSE: ADULT

## 2024-02-28 SDOH — SOCIAL STABILITY: SOCIAL INSECURITY: HAS ANYONE EVER THREATENED TO HURT YOUR FAMILY OR YOUR PETS?: NO

## 2024-02-28 SDOH — SOCIAL STABILITY: SOCIAL INSECURITY: ARE YOU OR HAVE YOU BEEN THREATENED OR ABUSED PHYSICALLY, EMOTIONALLY, OR SEXUALLY BY ANYONE?: NO

## 2024-02-28 SDOH — SOCIAL STABILITY: SOCIAL INSECURITY: DO YOU FEEL ANYONE HAS EXPLOITED OR TAKEN ADVANTAGE OF YOU FINANCIALLY OR OF YOUR PERSONAL PROPERTY?: NO

## 2024-02-28 ASSESSMENT — COGNITIVE AND FUNCTIONAL STATUS - GENERAL
WALKING IN HOSPITAL ROOM: A LOT
STANDING UP FROM CHAIR USING ARMS: A LOT
DRESSING REGULAR UPPER BODY CLOTHING: A LOT
PERSONAL GROOMING: A LOT
EATING MEALS: A LOT
PATIENT BASELINE BEDBOUND: NO
DRESSING REGULAR LOWER BODY CLOTHING: A LOT
TURNING FROM BACK TO SIDE WHILE IN FLAT BAD: A LOT
DAILY ACTIVITIY SCORE: 12
TOILETING: A LOT
MOBILITY SCORE: 12
MOVING FROM LYING ON BACK TO SITTING ON SIDE OF FLAT BED WITH BEDRAILS: A LOT
MOVING TO AND FROM BED TO CHAIR: A LOT
HELP NEEDED FOR BATHING: A LOT
CLIMB 3 TO 5 STEPS WITH RAILING: A LOT

## 2024-02-28 ASSESSMENT — ACTIVITIES OF DAILY LIVING (ADL)
TOILETING: NEEDS ASSISTANCE
BATHING: NEEDS ASSISTANCE
PATIENT'S MEMORY ADEQUATE TO SAFELY COMPLETE DAILY ACTIVITIES?: NO
LACK_OF_TRANSPORTATION: NO
JUDGMENT_ADEQUATE_SAFELY_COMPLETE_DAILY_ACTIVITIES: YES
GROOMING: NEEDS ASSISTANCE
HEARING - RIGHT EAR: FUNCTIONAL
HEARING - LEFT EAR: FUNCTIONAL
ADEQUATE_TO_COMPLETE_ADL: YES
FEEDING YOURSELF: NEEDS ASSISTANCE
WALKS IN HOME: NEEDS ASSISTANCE
DRESSING YOURSELF: NEEDS ASSISTANCE

## 2024-02-28 ASSESSMENT — PAIN - FUNCTIONAL ASSESSMENT: PAIN_FUNCTIONAL_ASSESSMENT: 0-10

## 2024-02-28 ASSESSMENT — PAIN SCALES - GENERAL
PAINLEVEL_OUTOF10: 8
PAINLEVEL_OUTOF10: 0 - NO PAIN
PAINLEVEL_OUTOF10: 0 - NO PAIN

## 2024-02-28 ASSESSMENT — LIFESTYLE VARIABLES
AUDIT-C TOTAL SCORE: 0
HOW OFTEN DO YOU HAVE 6 OR MORE DRINKS ON ONE OCCASION: NEVER
AUDIT-C TOTAL SCORE: 0
HOW MANY STANDARD DRINKS CONTAINING ALCOHOL DO YOU HAVE ON A TYPICAL DAY: PATIENT DOES NOT DRINK
SKIP TO QUESTIONS 9-10: 1
HOW OFTEN DO YOU HAVE A DRINK CONTAINING ALCOHOL: NEVER

## 2024-02-28 ASSESSMENT — ENCOUNTER SYMPTOMS
ENDOCRINE NEGATIVE: 1
ARTHRALGIAS: 1
RESPIRATORY NEGATIVE: 1
GASTROINTESTINAL NEGATIVE: 1
PSYCHIATRIC NEGATIVE: 1
NEUROLOGICAL NEGATIVE: 1
HEMATOLOGIC/LYMPHATIC NEGATIVE: 1
ALLERGIC/IMMUNOLOGIC NEGATIVE: 1
EYES NEGATIVE: 1
CONSTITUTIONAL NEGATIVE: 1

## 2024-02-28 ASSESSMENT — PATIENT HEALTH QUESTIONNAIRE - PHQ9
2. FEELING DOWN, DEPRESSED OR HOPELESS: NOT AT ALL
SUM OF ALL RESPONSES TO PHQ9 QUESTIONS 1 & 2: 0
1. LITTLE INTEREST OR PLEASURE IN DOING THINGS: NOT AT ALL

## 2024-02-28 NOTE — NURSING NOTE
"Pt stating at this time that he wants to go back to the nursing home. This nurse explained that he is unable to go back to the nursing home at this time of day because SW and TCC are gone for the day. This nurse explained process of pt having to be accepted back to the facility and transportation would need to arranged once confirmed that he can return to nursing home. Pt stated \"well, I'm going to leave. I can't just sit here all night.\" This nurse stated that his request would be given to the proper parties and handled in the morning. Pt stated \"well move me to a different room then.\" This nurse explained that there weren't any other empty beds on the unit at this time and pt rolled his eyes and turn to face away from this nurse.   "

## 2024-02-28 NOTE — CONSULTS
Vancomycin Dosing by Pharmacy- INITIAL    Kody Choi is a 53 y.o. year old adult who Pharmacy has been consulted for vancomycin dosing for cellulitis, skin and soft tissue. Based on the patient's indication and renal status this patient will be dosed based on a goal AUC of 400-600.     Renal function is currently stable.    Visit Vitals  BP (!) 182/95 (BP Location: Left arm, Patient Position: Lying)   Pulse 90   Temp 37.2 °C (99 °F)   Resp (!) 24        Lab Results   Component Value Date    CREATININE 1.75 (H) 02/27/2024    CREATININE 1.98 (H) 02/23/2024    CREATININE 2.02 (H) 02/22/2024    CREATININE 1.82 (H) 02/22/2024      Lab Results   Component Value Date    PATIENTTEMP 37.0 02/17/2024    PATIENTTEMP 37.0 02/16/2024    PATIENTTEMP 37.0 01/25/2024          Assessment/Plan     Patient has already been given a loading dose of 2000 mg.  Will initiate vancomycin maintenance,  1250 mg every 24 hours.  This dosing regimen is predicted by Pix4DR[employer] to result in the following pharmacokinetic parameters:  Regimen: 1250 mg IV every 24 hours.  Start time: 20:05 on 02/28/2024  Exposure target: AUC24 (range)400-600 mg/L.hr   AUC24,ss: 499 mg/L.hr  Probability of AUC24 > 400: 67 %  Ctrough,ss: 12.9 mg/L  Probability of Ctrough,ss > 20: 30 %  Probability of nephrotoxicity (Lodise EDIE 2009): 8 %    Follow-up level will be ordered on 2/29 at 0500 unless clinically indicated sooner.  Will continue to monitor renal function daily while on vancomycin and order serum creatinine at least every 48 hours if not already ordered.  Follow for continued vancomycin needs, clinical response, and signs/symptoms of toxicity.       Leslie Fitzpatrick [facility]

## 2024-02-28 NOTE — H&P
History Of Present Illness  Kody Choi is a 53 y.o. adult presenting to the emergency department for evaluation of scrotal pain and edema.  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  Patient did finally admit to a PICC line and admission to skilled nursing with IV antibiotics.  Patient states that he noticed scrotal edema worsening over the last several days.  Patient denies chest pain or dizziness.  Patient denies nausea, vomiting, diarrhea.    In ED, urine negative for infection, CRP 3.07, sed rate 64, troponin 43 with a repeat pending.  Glucose 239, chloride 108, BUN 59, creatinine 1.75, GFR 34, calcium 7.6, alk phos 450, hemoglobin 9.6, hematocrit 32.6.  BNP 1268.  Chest x-ray showing mild cardiomegaly.  Ultrasound of the scrotum showing bilateral hydrocele and scrotal edema.  CT of the head completed and negative for acute process.  CT of the abdomen pelvis completed showing diffuse scrotal wall thickening extending into the bilateral lower extremities, anasarca.  Blood cultures pending.  Consult placed to cardiology.  Last echocardiogram on file 1/30/2024 with an EF of 30%.  Consult to infectious disease due to patient having a PICC line for recurrent right knee septic arthritis and on IV antibiotics at skilled nursing.  Patient started on IV vancomycin and Zosyn in ED.  Patient admitted to telemetry under the care of Dr. Lyndon Metzger will continue to follow.  I was asked to do H&P and place initial admission orders.     Past Medical History  T2DM, CAD s/p multiple stents and CABG, heart failure (EF 30% 1/30/2024) A-fib, RUPESH, TKA in 2015, recurrent septic arthritis of the right knee  Surgical History  TKA 2015, CABG, PICC line  Social History  Former smoker, no drug use, no alcohol use  Family History  Reviewed and  "noncontributory     Allergies  Loratadine, Pollen extracts, and Lisinopril    Review of Systems  A 10 point review of systems was completed and negative except what is listed in HPI  Physical Exam  Constitutional:       Appearance: He is obese.   HENT:      Mouth/Throat:      Mouth: Mucous membranes are dry.      Pharynx: Oropharynx is clear.   Eyes:      Pupils: Pupils are equal, round, and reactive to light.   Cardiovascular:      Rate and Rhythm: Rhythm irregularly irregular.   Pulmonary:      Breath sounds: Normal breath sounds.   Abdominal:      General: Bowel sounds are normal.   Musculoskeletal:      Right knee: Swelling present.      Comments: Scrotal edema, erythema   Skin:     General: Skin is warm and dry.      Capillary Refill: Capillary refill takes less than 2 seconds.   Neurological:      General: No focal deficit present.      Mental Status: He is alert and oriented to person, place, and time.   Psychiatric:         Mood and Affect: Mood normal. Affect is angry.          Last Recorded Vitals  Blood pressure 127/79, pulse 84, temperature 35.7 °C (96.3 °F), resp. rate (!) 24, height 1.676 m (5' 6\"), weight 150 kg (330 lb), SpO2 95 %, unknown if currently breastfeeding.    Relevant Results  CT pelvis w IV contrast    Result Date: 2/27/2024  Interpreted By:  Susanna Lewis, STUDY: CT PELVIS W IV CONTRAST;  2/27/2024 9:21 pm   INDICATION: Signs/Symptoms:scrotal swelling.   COMPARISON: Scrotal ultrasound performed on the same day.   ACCESSION NUMBER(S): [id number]   ORDERING CLINICIAN: YANELY RODRIGUEZ   TECHNIQUE: CT of the pelvis was performed.  Standard contiguous axial images were obtained at 3 mm slice thickness through pelvis. Coronal and sagittal reconstructions at 3 mm slice thickness were performed.  85 ml of contrast  Optiray 350 were administered intravenously without immediate complication.   FINDINGS: PELVIS:   BLADDER: Urinary bladder is decompressed secondary to Campos catheterization.   " REPRODUCTIVE ORGANS: Prostate gland is unremarkable.   BOWEL: Visualized small bowel and large bowel loops appear grossly unremarkable.   VESSELS: Visualized vasculature appears grossly unremarkable.   PERITONEUM/RETROPERITONEUM/LYMPH NODES: There is small amount of free fluid along the right lower quadrant measuring approximately 20 Hounsfield units. This is incompletely included in the field of view. There are multiple enlarged lymph nodes along the right external iliac chain with largest measuring up to 2 cm in short axis. There are also multiple enlarged right inguinal lymph nodes with the largest measuring up to 1.8 cm in short axis. However no evidence of enlarged lymphadenopathy in the left inguinal region or in the left pelvis.   BONES AND ABDOMINAL WALL: No suspicious osseous lesions in the visualized field of view. There is small left fat containing inguinal hernia. Mild discogenic degenerative changes are noted in the lower lumbar spine. There is diffuse reticulated subcutaneous soft tissue edema throughout the visualized abdominal wall extending into the included bilateral thighs. There is diffuse subcutaneous soft tissue edema throughout the scrotum. No evidence of peripheral rim enhancing fluid collection within the scrotal wall. No evidence of soft tissue gas.       1.  Diffuse body wall edema extending into visualized bilateral lower extremities. There is also diffuse edematous thickening of the scrotum, which could again be related to similar systemic process/anasarca. No definite CT evidence of peripheral rim enhancing fluid collection to suggest an abscess. No evidence of soft tissue gas within the scrotum. 2. Multiple enlarged right inguinal and right external iliac lymph nodes as described above. This could be related to infectious/inflammatory etiology in the right lower extremity. Recommend clinical correlation with patient's symptomatology in the right lower extremity and further evaluation  as clinically warranted. 3. Small volume intraperitoneal free fluid in the right hemiabdomen. This is only partially included in the field of view and is also most likely favored to represent similar findings secondary to 3rd spacing of fluid/anasarca. Recommend correlation with patient's abdominal symptomatology and a dedicated CT abdomen pelvis with contrast can be obtained for further evaluation if clinically warranted.   MACRO: None   Signed by: Susanna Lewis 2/27/2024 10:56 PM Dictation workstation:   [id number]    CT head wo IV contrast    Result Date: 2/27/2024  Interpreted By:  Vargas Mendoza, STUDY: CT HEAD WO IV CONTRAST;  2/27/2024 9:21 pm   INDICATION: Signs/Symptoms:ams.   COMPARISON: Noncontrast head CT of 11/10/2015.   ACCESSION NUMBER(S): [id number]   ORDERING CLINICIAN: YANELY RODRIGUEZ   TECHNIQUE: Noncontrast axial CT scan of head was performed. Angled reformats in brain and bone windows were generated. The images were reviewed in bone, brain, blood and soft tissue windows.   FINDINGS: CSF Spaces: The ventricles, sulci and basal cisterns are within normal limits. There is no extraaxial fluid collection.   Parenchyma: Moderate to advanced volume loss.  There is periventricular and subcortical white matter hypoattenuation, most in keeping with chronic microvascular ischemic change. The grey-white differentiation is intact. There is no mass effect or midline shift. There is no intracranial hemorrhage.   Calvarium: The calvarium is unremarkable.   Paranasal sinuses and mastoids: Visualized paranasal sinuses and mastoids are clear.       No evidence of acute cortical infarct or intracranial hemorrhage.     MACRO: None   Signed by: Vargas Mendoza 2/27/2024 9:59 PM Dictation workstation:   [id number]    US scrotum w doppler    Result Date: 2/27/2024  STUDY: Scrotal Ultrasound; 2/27/2024 8:12 PM. INDICATION: Scrotal pain and swelling. COMPARISON: None available. ACCESSION NUMBER(S): [id number]  ORDERING CLINICIAN: YANELY RDORIGUEZ TECHNIQUE:  Ultrasound of the scrotum and testicular spectral Doppler evaluation performed. FINDINGS:    The right testicle measures 4.1 x 3.1 x 2.9 cm.  It demonstrates a normal homogeneous echotexture.  Normal color and spectral Doppler flow is demonstrated.  The right epididymis measures 1.8 x 1.0 x 1.0 cm and demonstrates normal echogenicity. The left testicle measures 4.5 x 3.0 x 3.2 cm.  It demonstrates a normal homogeneous echotexture.  Normal color and spectral Doppler flow is demonstrated.  The left epididymis measures 1.7 x 1.1 x 1.4 cm and demonstrates a simple cyst measuring 0.5 cm. There are bilateral hydroceles present. There is severe scrotal edema. The scrotal wall measures 5.2 cm on the right and 4.6 cm on the left.    Bilateral hydroceles. Severe scrotal edema. The testicles are otherwise unremarkable. Normal testicular spectral Doppler evaluation. Signed by Aubrey Zapien MD    XR chest 1 view    Result Date: 2/27/2024  STUDY: Chest Radiograph;  2/27/2024 at 19:16 hours. INDICATION: Shortness of breath. COMPARISON: XR chest 6/27/2023, 1/20/2021 and 1/11/2021. CT chest 2/14/2021 ACCESSION NUMBER(S): [id number] ORDERING CLINICIAN: YANELY RODRIGUEZ TECHNIQUE:  Frontal chest was obtained at 19:16 hours. FINDINGS: CARDIOMEDIASTINAL SILHOUETTE: Mild cardiomegaly present.  Sternotomy wires seen.  LUNGS: Lungs are clear.  ABDOMEN: No remarkable upper abdominal findings.  BONES: No acute osseous changes.    Results for orders placed or performed during the hospital encounter of 02/27/24 (from the past 24 hour(s))   CBC and Auto Differential   Result Value Ref Range    WBC 8.1 4.4 - 11.3 x10*3/uL    nRBC 1.5 (H) 0.0 - 0.0 /100 WBCs    RBC 4.05 4.00 - 5.90 x10*6/uL    Hemoglobin 9.6 (L) 12.0 - 17.5 g/dL    Hematocrit 32.6 (L) 36.0 - 52.0 %    MCV 81 80 - 100 fL    MCH 23.7 (L) 26.0 - 34.0 pg    MCHC 29.4 (L) 32.0 - 36.0 g/dL    RDW 17.2 (H) 11.5 - 14.5 %    Platelets  446 150 - 450 x10*3/uL    Neutrophils % 68.5 40.0 - 80.0 %    Immature Granulocytes %, Automated 0.5 0.0 - 0.9 %    Lymphocytes % 17.4 13.0 - 44.0 %    Monocytes % 10.1 2.0 - 10.0 %    Eosinophils % 2.1 0.0 - 6.0 %    Basophils % 1.4 0.0 - 2.0 %    Neutrophils Absolute 5.57 1.20 - 7.70 x10*3/uL    Immature Granulocytes Absolute, Automated 0.04 0.00 - 0.70 x10*3/uL    Lymphocytes Absolute 1.41 1.20 - 4.80 x10*3/uL    Monocytes Absolute 0.82 0.10 - 1.00 x10*3/uL    Eosinophils Absolute 0.17 0.00 - 0.70 x10*3/uL    Basophils Absolute 0.11 (H) 0.00 - 0.10 x10*3/uL   Comprehensive Metabolic Panel   Result Value Ref Range    Glucose 239 (H) 74 - 99 mg/dL    Sodium 136 136 - 145 mmol/L    Potassium 4.7 3.5 - 5.3 mmol/L    Chloride 108 (H) 98 - 107 mmol/L    Bicarbonate 21 21 - 32 mmol/L    Anion Gap 12 10 - 20 mmol/L    Urea Nitrogen 59 (H) 6 - 23 mg/dL    Creatinine 1.75 (H) 0.50 - 1.30 mg/dL    eGFR 34 (L) >60 mL/min/1.73m*2    Calcium 7.6 (L) 8.6 - 10.3 mg/dL    Albumin 2.7 (L) 3.4 - 5.0 g/dL    Alkaline Phosphatase 450 (H) 33 - 120 U/L    Total Protein 6.1 (L) 6.4 - 8.2 g/dL    AST 69 (H) 9 - 39 U/L    Bilirubin, Total 0.4 0.0 - 1.2 mg/dL     (H) 7 - 52 U/L   Lactate   Result Value Ref Range    Lactate 0.8 0.4 - 2.0 mmol/L   Blood Culture    Specimen: Peripheral Venipuncture; Blood culture   Result Value Ref Range    Blood Culture Loaded on Instrument - Culture in progress    Blood Culture    Specimen: Peripheral Venipuncture; Blood culture   Result Value Ref Range    Blood Culture Loaded on Instrument - Culture in progress    Troponin I, High Sensitivity   Result Value Ref Range    Troponin I, High Sensitivity 43 (H) 0 - 20 ng/L   Sedimentation rate, automated   Result Value Ref Range    Sedimentation Rate 64 (H) 0 - 30 mm/h   C-reactive protein   Result Value Ref Range    C-Reactive Protein 3.07 (H) <1.00 mg/dL   Urinalysis with Reflex Culture and Microscopic   Result Value Ref Range    Color, Urine Yellow  Straw, Yellow    Appearance, Urine Hazy (N) Clear    Specific Gravity, Urine 1.014 1.005 - 1.035    pH, Urine 5.0 5.0, 5.5, 6.0, 6.5, 7.0, 7.5, 8.0    Protein, Urine >=500 (3+) (N) NEGATIVE mg/dL    Glucose, Urine 50 (1+) (A) NEGATIVE mg/dL    Blood, Urine SMALL (1+) (A) NEGATIVE    Ketones, Urine NEGATIVE NEGATIVE mg/dL    Bilirubin, Urine NEGATIVE NEGATIVE    Urobilinogen, Urine <2.0 <2.0 mg/dL    Nitrite, Urine NEGATIVE NEGATIVE    Leukocyte Esterase, Urine NEGATIVE NEGATIVE   Extra Urine Gray Tube   Result Value Ref Range    Extra Tube Hold for add-ons.    Sars-CoV-2 and Influenza A/B PCR   Result Value Ref Range    Flu A Result Not Detected Not Detected    Flu B Result Not Detected Not Detected    Coronavirus 2019, PCR Not Detected Not Detected   Urinalysis Microscopic   Result Value Ref Range    WBC, Urine 1-5 1-5, NONE /HPF    RBC, Urine 1-2 NONE, 1-2, 3-5 /HPF    Mucus, Urine 1+ Reference range not established. /LPF   Troponin I, High Sensitivity   Result Value Ref Range    Troponin I, High Sensitivity 40 (H) 0 - 20 ng/L   B-type natriuretic peptide   Result Value Ref Range    BNP 1,268 (H) 0 - 99 pg/mL   CBC   Result Value Ref Range    WBC 8.8 4.4 - 11.3 x10*3/uL    nRBC 1.1 (H) 0.0 - 0.0 /100 WBCs    RBC 3.96 (L) 4.00 - 5.90 x10*6/uL    Hemoglobin 8.9 (L) 12.0 - 17.5 g/dL    Hematocrit 31.8 (L) 36.0 - 52.0 %    MCV 80 80 - 100 fL    MCH 22.5 (L) 26.0 - 34.0 pg    MCHC 28.0 (L) 32.0 - 36.0 g/dL    RDW 17.5 (H) 11.5 - 14.5 %    Platelets 461 (H) 150 - 450 x10*3/uL       Assessment/Plan   Kody is a 53-year-old male patient is alert and oriented x 3 who is presenting to ED from skilled nursing facility with complaint of scrotal edema.  Troponin and BNP elevated.  Consult to cardiology.  Imaging completed showing anasarca, scrotal edema, concern for infection.  Patient started on IV vancomycin and Zosyn.  Consult to ID due to PICC line and history of IV antibiotics at skilled nursing facility for recurrent  septic arthritis of the right knee.  Patient admitted for further medical management.    Scrotal cellulitis  Admit to telemetry per Dr. Lyndon Metzger  See imaging results above  Blood cultures pending  Tylenol as needed  Zofran as needed  Continue IV vancomycin/Zosyn  Repeat labs in a.m.    Elevated troponin/elevated BNP  History heart failure (EF 30% 1/30/2024)  Consult cardiology and appreciate input  Low-sodium diet  Fluid restriction  Trend troponin    BARBIE  No IV fluids given due to elevated troponin  Continue to monitor and defer to attending for consult    Diabetes/CAD/A-fib/RUPESH/recurrent septic arthritis  PICC line  Consult infectious disease for IV antibiotics to continue from skilled nursing facility  Diabetic diet  ISS  Hold home oral antidiabetic medications when med rec is complete  Continue home tramadol  Metoprolol IV every 6 as needed    DVT Ppx/GI PPx  SCDs  Heparin subcutaneous  Up to chair as tolerated  Protonix IV daily  PT/OT    I spent 35 minutes in the professional and overall care of this patient.      Jennifer Cervantes, APRN-CNP

## 2024-02-28 NOTE — CONSULTS
ANTICOAGULATION  MANAGEMENT    Assessment     Today's INR result of 2.8 is Therapeutic (goal INR of 2.0-3.0)        Warfarin taken as previously instructed    No new diet changes affecting INR    No new medication/supplements affecting INR    Continues to tolerate warfarin with no reported s/s of bleeding or thromboembolism     Previous INR was Subtherapeutic     Colonoscopy procedure on 4/11 - per PCP patient will hold warfarin 4 days prior to procedure ( encounter dated 3/12/19)        Plan:     Spoke with Parish regarding INR result and instructed:     Warfarin Dosing Instructions:  Continue current warfarin dose    5 mg every Mon, Thu; 7.5 mg all other days       (0 % change)    Instructed patient to follow up no later than: 2 weeks- appointment made.    Education provided: importance of therapeutic range and target INR goal and significance of current INR result    Parish verbalizes understanding and agrees to warfarin dosing plan.    Instructed to call the [facility] for any changes, questions or concerns. (#[phone])   ?   Lorena Barnhart RN    Subjective/Objective:      Parish SANCHEZ Sanju, a 76 y.o. male is on warfarin.     Parish reports:     Home warfarin dose: as updated on anticoagulation calendar per template     Missed doses: No     Medication changes:  No     S/S of bleeding or thromboembolism:  No     New Injury or illness:  Yes: fell and injured right hip.     Changes in diet or alcohol consumption:  No     Upcoming surgery, procedure or cardioversion:  Yes: Colonoscopy procedure on 4/11 - per PCP patient will hold warfarin 4 days prior to procedure ( encounter dated 3/12/19)    Anticoagulation Episode Summary     Current INR goal:   2.0-3.0   TTR:   82.9 % (4.3 y)   Next INR check:   4/19/2019   INR from last check:   2.80 (3/22/2019)   Weekly max warfarin dose:      Target end date:      INR check location:      Preferred lab:      Send INR reminders to:   ANTICOAGULATION POOL C (DTN,VAD,CGR,GAV)     Consults    Reason For Consult  Scrotal cellulitis and chronic right knee infection    History Of Present Illness  Kody Choi is a 53 y.o. 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  He was at a SNF for IV antibiotics.  He developed scrotal swelling and was subsequently admitted to [place].  His PICC was placed 2/19 and he was on daptomycin.  His labs show white blood cell count of 8.8, and A1c of 16.6 he is currently on vancomycin and Zosyn.  On admission he had a Campos catheter placed.     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    HENT: Negative.     Eyes: Negative.    Respiratory: Negative.     Cardiovascular:  Positive for leg swelling.   Gastrointestinal: Negative.    Endocrine: Negative.    Genitourinary:  Positive for scrotal swelling.   Musculoskeletal:  Positive for arthralgias.   Skin: Negative.    Allergic/Immunologic: Negative.    Neurological: Negative.    Hematological: Negative.    Psychiatric/Behavioral: Negative.          Physical Exam  Constitutional:       Appearance: He is obese.   HENT:      Head: Normocephalic and atraumatic.      Right Ear: External ear normal.      Left Ear: External ear normal.      Nose: Nose normal.      Mouth/Throat:      Mouth: Mucous membranes are dry.   Eyes:      Extraocular Movements: Extraocular movements intact.      Pupils: Pupils are equal, round, and reactive to light.    Cardiovascular:      Rate and Rhythm: Normal rate and regular rhythm.   Pulmonary:      Effort: Pulmonary effort is normal. No respiratory distress.   Abdominal:      General: Bowel sounds are normal. There is no distension.   Genitourinary:     Comments: Scrotum markedly swollen with minimal erythema, Campos is in place  Musculoskeletal:         General: Deformity present.      Cervical back: Normal range of motion and neck supple.      Right lower leg: Edema present.      Left lower leg: Edema present.      Comments: Right knee is erythematous and swollen, patient cannot move his right knee secondary to pain   Skin:     General: Skin is warm and dry.      Capillary Refill: Capillary refill takes 2 to 3 seconds.   Neurological:      General: No focal deficit present.      Mental Status: Mental status is at baseline.   Psychiatric:      Comments: Agitated, very poor insight into his own disease processes          Last Recorded Vitals  /84   Pulse 85   Temp 36.2 °C (97.2 °F)   Resp 18   Wt (!) 155 kg (341 lb 11.4 oz)   SpO2 93%     Relevant Results  ECG 12 lead    Result Date: 2/28/2024  Normal sinus rhythm Cannot rule out Inferior infarct , age undetermined Anterolateral infarct , age undetermined Abnormal ECG When compared with ECG of 16-FEB-2024 20:38, Sinus rhythm has replaced Electronic ventricular pacemaker Confirmed by Jaren Chaney ([id number]) on 2/28/2024 8:56:04 AM    CT pelvis w IV contrast    Result Date: 2/27/2024  Interpreted By:  Susanna Lewis, STUDY: CT PELVIS W IV CONTRAST;  2/27/2024 9:21 pm   INDICATION: Signs/Symptoms:scrotal swelling.   COMPARISON: Scrotal ultrasound performed on the same day.   ACCESSION NUMBER(S): [id number]   ORDERING CLINICIAN: YANELY RODRIGUEZ   TECHNIQUE: CT of the pelvis was performed.  Standard contiguous axial images were obtained at 3 mm slice thickness through pelvis. Coronal and sagittal reconstructions at 3 mm slice thickness were performed.  85 ml  Indications    A-fib (H) [I48.91]           Comments:            Anticoagulation Care Providers     Provider Role Specialty Phone number    Isidro Au MD Referring Family Medicine [phone]         of contrast  Optiray 350 were administered intravenously without immediate complication.   FINDINGS: PELVIS:   BLADDER: Urinary bladder is decompressed secondary to Campos catheterization.   REPRODUCTIVE ORGANS: Prostate gland is unremarkable.   BOWEL: Visualized small bowel and large bowel loops appear grossly unremarkable.   VESSELS: Visualized vasculature appears grossly unremarkable.   PERITONEUM/RETROPERITONEUM/LYMPH NODES: There is small amount of free fluid along the right lower quadrant measuring approximately 20 Hounsfield units. This is incompletely included in the field of view. There are multiple enlarged lymph nodes along the right external iliac chain with largest measuring up to 2 cm in short axis. There are also multiple enlarged right inguinal lymph nodes with the largest measuring up to 1.8 cm in short axis. However no evidence of enlarged lymphadenopathy in the left inguinal region or in the left pelvis.   BONES AND ABDOMINAL WALL: No suspicious osseous lesions in the visualized field of view. There is small left fat containing inguinal hernia. Mild discogenic degenerative changes are noted in the lower lumbar spine. There is diffuse reticulated subcutaneous soft tissue edema throughout the visualized abdominal wall extending into the included bilateral thighs. There is diffuse subcutaneous soft tissue edema throughout the scrotum. No evidence of peripheral rim enhancing fluid collection within the scrotal wall. No evidence of soft tissue gas.       1.  Diffuse body wall edema extending into visualized bilateral lower extremities. There is also diffuse edematous thickening of the scrotum, which could again be related to similar systemic process/anasarca. No definite CT evidence of peripheral rim enhancing fluid collection to suggest an abscess. No evidence of soft tissue gas within the scrotum. 2. Multiple enlarged right inguinal and right external iliac lymph nodes as described above. This could  be related to infectious/inflammatory etiology in the right lower extremity. Recommend clinical correlation with patient's symptomatology in the right lower extremity and further evaluation as clinically warranted. 3. Small volume intraperitoneal free fluid in the right hemiabdomen. This is only partially included in the field of view and is also most likely favored to represent similar findings secondary to 3rd spacing of fluid/anasarca. Recommend correlation with patient's abdominal symptomatology and a dedicated CT abdomen pelvis with contrast can be obtained for further evaluation if clinically warranted.   MACRO: None   Signed by: Susanna Lewis 2/27/2024 10:56 PM Dictation workstation:   [id number]    CT head wo IV contrast    Result Date: 2/27/2024  Interpreted By:  Vargas Mendoza, STUDY: CT HEAD WO IV CONTRAST;  2/27/2024 9:21 pm   INDICATION: Signs/Symptoms:ams.   COMPARISON: Noncontrast head CT of 11/10/2015.   ACCESSION NUMBER(S): [id number]   ORDERING CLINICIAN: YANELY RODRIGUEZ   TECHNIQUE: Noncontrast axial CT scan of head was performed. Angled reformats in brain and bone windows were generated. The images were reviewed in bone, brain, blood and soft tissue windows.   FINDINGS: CSF Spaces: The ventricles, sulci and basal cisterns are within normal limits. There is no extraaxial fluid collection.   Parenchyma: Moderate to advanced volume loss.  There is periventricular and subcortical white matter hypoattenuation, most in keeping with chronic microvascular ischemic change. The grey-white differentiation is intact. There is no mass effect or midline shift. There is no intracranial hemorrhage.   Calvarium: The calvarium is unremarkable.   Paranasal sinuses and mastoids: Visualized paranasal sinuses and mastoids are clear.       No evidence of acute cortical infarct or intracranial hemorrhage.     MACRO: None   Signed by: Vargas Mendoza 2/27/2024 9:59 PM Dictation workstation:   [id number]    US scrotum  w doppler    Result Date: 2/27/2024  STUDY: Scrotal Ultrasound; 2/27/2024 8:12 PM. INDICATION: Scrotal pain and swelling. COMPARISON: None available. ACCESSION NUMBER(S): [id number] ORDERING CLINICIAN: YANELY RODRIGUEZ TECHNIQUE:  Ultrasound of the scrotum and testicular spectral Doppler evaluation performed. FINDINGS:    The right testicle measures 4.1 x 3.1 x 2.9 cm.  It demonstrates a normal homogeneous echotexture.  Normal color and spectral Doppler flow is demonstrated.  The right epididymis measures 1.8 x 1.0 x 1.0 cm and demonstrates normal echogenicity. The left testicle measures 4.5 x 3.0 x 3.2 cm.  It demonstrates a normal homogeneous echotexture.  Normal color and spectral Doppler flow is demonstrated.  The left epididymis measures 1.7 x 1.1 x 1.4 cm and demonstrates a simple cyst measuring 0.5 cm. There are bilateral hydroceles present. There is severe scrotal edema. The scrotal wall measures 5.2 cm on the right and 4.6 cm on the left.    Bilateral hydroceles. Severe scrotal edema. The testicles are otherwise unremarkable. Normal testicular spectral Doppler evaluation. Signed by Aubrey Zapien MD    XR chest 1 view    Result Date: 2/27/2024  STUDY: Chest Radiograph;  2/27/2024 at 19:16 hours. INDICATION: Shortness of breath. COMPARISON: XR chest 6/27/2023, 1/20/2021 and 1/11/2021. CT chest 2/14/2021 ACCESSION NUMBER(S): [id number] ORDERING CLINICIAN: YANELY RODRIGUEZ TECHNIQUE:  Frontal chest was obtained at 19:16 hours. FINDINGS: CARDIOMEDIASTINAL SILHOUETTE: Mild cardiomegaly present.  Sternotomy wires seen.  LUNGS: Lungs are clear.  ABDOMEN: No remarkable upper abdominal findings.  BONES: No acute osseous changes.    Mild cardiomegaly. Signed by Mg Almanzar MD    XR chest 1 view    Result Date: 2/19/2024  Interpreted By:  Speedy Hoff,  and Saurabh Costa STUDY: XR CHEST 1 VIEW;  2/19/2024 3:08 pm   INDICATION: Signs/Symptoms:confirm PICC placement right arm length 49 cms.   COMPARISON: Chest  radiograph 06/27/2023   ACCESSION NUMBER(S): [id number]   ORDERING CLINICIAN: SHUN SORENSON   FINDINGS: AP radiograph of the chest was provided. Right-sided rotation.   Right upper extremity PICC is present with distal tip faintly visualized overlying the right atrium.   CARDIOMEDIASTINAL SILHOUETTE: Cardiomediastinal silhouette is stable enlarged in size and configuration.   LUNGS: Decreased lung volumes are present with bronchovascular crowding and basilar opacities. There is blunting of the bilateral cardiophrenic angles with mildly increased vascular cephalization as compared to 06/27/2023 which demonstrates similar lung aeration. No discernible pneumothorax.   ABDOMEN: Nonspecific paucity of bowel gas, otherwise no remarkable upper abdominal findings.   BONES: No acute osseous changes.       1. Mildly increased pulmonary vascular cephalization and at least small bilateral pleural effusions. At most, mild interstitial pulmonary edema. 2. Right upper extremity PICC as above.   I personally reviewed the images/study and I agree with the resident findings as stated by Julissa Wiley MD. This study was interpreted at [facility], [place], [place].   MACRO: None   Signed by: Speedy Hoff 2/19/2024 3:57 PM Dictation workstation:   [id number]    Bedside PICC Imaging    Result Date: 2/19/2024  These images are not reportable by radiology and will not be interpreted by  Radiologists.    ECG 12 lead    Result Date: 2/17/2024  Sinus rhythm with occasional ventricular-paced complexes Low voltage QRS Possible Anterolateral infarct , age undetermined Abnormal ECG When compared with ECG of 27-JUN-2023 04:13, Previous ECG has undetermined rhythm, needs review See ED provider note for full interpretation and clinical correlation Confirmed by Arden Becker ([id number]) on 2/17/2024 1:44:13 AM    XR knee right 1-2 views    Result Date: 2/16/2024  Interpreted By:  Brad Hunt, STUDY: XR KNEE  RIGHT 1-2 VIEWS; ;  2/16/2024 5:48 pm   INDICATION: Signs/Symptoms:known septic joint.   COMPARISON: Right knee radiographs 01/25/2014.   ACCESSION NUMBER(S): [id number]   ORDERING CLINICIAN: MELVIN RAMSAY   FINDINGS: Abnormal findings in the knee are overall similar compared to prior radiographs 01/25/2024.   Status post total knee arthroplasty. Redemonstration of fragmentation of the femoral cement at the junction of the stem-condylar component as well as cement fragmentation of the tibial component at the stem plateau interface. Mellitus cement fragments again seen throughout the knee slight posterior translation of the tibia relative to femur is less evident than prior. Diffuse soft tissue swelling about the knee. Previously described soft tissue ulcer anteriorly is not well seen.       1.  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 2. Re-demonstration of cement fragmentation of the femoral and tibial components, with multiple cement fragments throughout the knee. 3. Previously seen posterior translation of the tibia relative to the femur is less prominent and alignment appears near anatomic. 4. Previously described ulcer in the anterior soft tissues on the lateral view is not visualized.   MACRO: None   Signed by: Brad Hunt 2/16/2024 5:56 PM Dictation workstation:   [id number]    Lower extremity venous duplex right    Result Date: 2/1/2024  Interpreted By:  Vargas Andersen and Tippareddy Charit STUDY: [facility] US LOWER EXTREMITY VENOUS DUPLEX RIGHT;  1/31/2024 3:06 pm   INDICATION: Signs/Symptoms:Exclude DVT (Septic Rt knee).   COMPARISON: Ultrasound 06/27/2023   ACCESSION NUMBER(S): [id number]   ORDERING CLINICIAN: NEVA RUST   TECHNIQUE: Vascular ultrasound of the right lower extremity was performed. Real-time compression views as well as Gray scale, color Doppler and spectral Doppler waveform analysis was performed.   FINDINGS: Evaluation of the visualized portions of the right  common femoral vein, proximal, mid, and distal femoral vein, and popliteal vein were performed.   Limitations:  Unable to evaluate the distal femoral vein and calf veins due to body habitus and edema.   The evaluated veins demonstrate normal compressibility. There is intact venous flow demonstrating normal respiratory variability and normal augmentation of flow with calf compression. Therefore, there is no ultrasonographic evidence for deep vein thrombosis within the evaluated veins.   Respiratory variation and augmentation to calf pressure was noted.   Incidental note of right inguinal lymphadenopathy with a lymph node measuring 2.6 x 1.7 x 2.1 cm, likely reactive. Extensive soft tissue edema is noted at the level of the and lower leg.       No sonographic evidence for deep vein thrombosis within the evaluated veins of the right lower extremity. Of note, unable to evaluate the distal femoral vein and calf veins due to body habitus and edema.   I personally reviewed the images/study and I agree with the findings as stated by Pham Bishop MD. This study was interpreted at [facility], [place], [place].   MACRO: None   Signed by: Vargas Andersen 2/1/2024 5:45 AM Dictation workstation:   [id number]    Transthoracic Echo (TTE) Complete    Result Date: 1/30/2024   [facility], [address] [place] [address]                Tel [phone] and Fax [phone] TRANSTHORACIC ECHOCARDIOGRAM REPORT  Patient Name:      SAM GONZALEZ          Nelly Physician:    [id number] Karrie Em MD Study Date:        1/30/2024           Ordering Provider:    [id number] BRUCE SINGH MRN/PID:           [id number]            Fellow: Accession#:        [id number]        Nurse:                Claudia Schuster RN Date of Birth/Age: 1970 / 53 years Sonographer:           Emmett Davis                                                              [facility] Gender:            M                   Additional Staff: Height:            167.64 cm           Admit Date:           1/25/2024 Weight:            149.69 kg           Admission Status:     Inpatient - Routine BSA:               2.47 m2             Encounter#:           [id number]                                        Department Location:  [facility] Non                                                              Invasive Blood Pressure: 126 /75 mmHg Study Type:    TRANSTHORACIC ECHO (TTE) COMPLETE Diagnosis/ICD: Heart failure, unspecified-I50.9; Generalized edema-R60.1 Indication:    heart failure, edema CPT Code:      Echo Complete w Full Doppler-[id number] Patient History: Pertinent History: Septic infection of R knee, afib, TIA, GI bleed, CAD s/p PCI                    and CABG (11/24/20), uncontrolled T2DM, HTN, HLD, RUPESH. Study Detail: The following Echo studies were performed: 2D, M-Mode, Doppler and               color flow. Technically challenging study due to body habitus,               patient lying in supine position, poor acoustic windows and               prominent lung artifact. Definity used as a contrast agent for               endocardial border definition. Total contrast used for this               procedure was 2.0 mL via IV push. Unable to obtain suprasternal               notch view.  PHYSICIAN INTERPRETATION: Left Ventricle: The left ventricular systolic function is moderately decreased, with an estimated ejection fraction of 30%. Wall motion is abnormal. The left ventricular cavity size is normal. Spectral Doppler shows a pseudonormal pattern of left ventricular diastolic filling. Predominant LAD wall motion abnormality with akinesis of the apex and H of anteroseptum as well as HK in the lateral wall (LCx vs diagonal territory). LV Wall Scoring: The mid and apical anterior septum, mid and apical inferior  septum, apical lateral segment, and apex are akinetic. The mid and apical anterior wall, basal and mid anterolateral wall, and apical inferior segment are hypokinetic. Left Atrium: The left atrium is mildly dilated. Right Ventricle: The right ventricle was not well visualized. There is reduced right ventricular systolic function. Right Atrium: The right atrium is mildly dilated. Aortic Valve: The aortic valve is trileaflet. There is no evidence of aortic valve regurgitation. The peak instantaneous gradient of the aortic valve is 6.7 mmHg. Mitral Valve: The mitral valve is normal in structure. There is moderate mitral valve regurgitation. Tricuspid Valve: The tricuspid valve is structurally normal. There is moderate to severe tricuspid regurgitation. The Doppler estimated RVSP is mild to moderately elevated at 46.9 mmHg. Pulmonic Valve: The pulmonic valve is not well visualized. The pulmonic valve regurgitation was not well visualized. Pericardium: There is a trivial pericardial effusion. Aorta: The aortic root is normal. Systemic Veins: The inferior vena cava appears dilated. There is IVC inspiratory collapse greater than 50%. In comparison to the previous echocardiogram(s): Compared with the prior exam from 1/12/2021, the prior study was a lmited and difficult bedside fellow exam with reduced LVEF estimated at 35-40%. Valvular strudfutres were not well seen at that time. The intraoperative ROSIE done prior to that on 11/24/2020 reported LVEF 55% wtih apical and anteroseptal hypokinesis. There has been a decline in the LVEF since both studies.  CONCLUSIONS:  1. Left ventricular systolic function is moderately decreased with a 30% estimated ejection fraction.  2. Multiple segmental abnormalities exist. See findings.  3. Poorly visualized anatomical structures due to suboptimal image quality.  4. Predominant LAD wall motion abnormality with akinesis of the apex and H of anteroseptum as well as HK in the lateral wall  (LCx vs diagonal territory).  5. Spectral Doppler shows a pseudonormal pattern of left ventricular diastolic filling.  6. There is reduced right ventricular systolic function.  7. Moderate mitral valve regurgitation.  8. Mild to moderately elevated right ventricular systolic pressure.  9. Moderate to severe tricuspid regurgitation visualized. 10. Compared with the prior exam from 1/12/2021, the prior study was a lmited and difficult bedside fellow exam with reduced LVEF estimated at 35-40%. Valvular strudfutres were not well seen at that time. The intraoperative ROSIE done prior to that on 11/24/2020 reported LVEF 55% wtih apical and anteroseptal hypokinesis. There has been a decline in the LVEF since both studies. QUANTITATIVE DATA SUMMARY: 2D MEASUREMENTS:                           Normal Ranges: Ao Root d:     3.60 cm    (2.0-3.7cm) LAs:           3.90 cm    (2.7-4.0cm) IVSd:          1.20 cm    (0.6-1.1cm) LVPWd:         1.10 cm    (0.6-1.1cm) LVIDd:         5.50 cm    (3.9-5.9cm) LVIDs:         4.50 cm LV Mass Index: 103.9 g/m2 LV % FS        18.2 % LA VOLUME:                               Normal Ranges: LA Vol A4C:        80.8 ml    (22+/-6mL/m2) LA Vol A2C:        103.7 ml LA Vol BP:         93.6 ml LA Vol Index A4C:  32.7ml/m2 LA Vol Index A2C:  41.9 ml/m2 LA Vol Index BP:   37.8 ml/m2 LA Area A4C:       25.8 cm2 LA Area A2C:       29.9 cm2 LA Major Axis A4C: 7.0 cm LA Major Axis A2C: 7.3 cm LA Volume Index:   37.8 ml/m2 RA VOLUME BY A/L METHOD:                       Normal Ranges: RA Area A4C: 20.2 cm2 AORTA MEASUREMENTS:                      Normal Ranges: Ao Sinus, d: 3.50 cm (2.1-3.5cm) Asc Ao, d:   3.40 cm (2.1-3.4cm) LV SYSTOLIC FUNCTION BY 2D PLANIMETRY (MOD):                     Normal Ranges: EF-A4C View: 26.9 % (>=55%) EF-A2C View: 32.4 % EF-Biplane:  28.1 % LV DIASTOLIC FUNCTION:                          Normal Ranges: MV Peak E:    1.22 m/s   (0.7-1.2 m/s) MV Peak A:    0.75 m/s   (0.42-0.7 m/s)  E/A Ratio:    1.64       (1.0-2.2) MV e'         0.09 m/s   (>8.0) MV lateral e' 0.17 m/s MV medial e'  0.05 m/s MV A Dur:     88.50 msec E/e' Ratio:   13.46      (<8.0) a'            0.10 m/s MV DT:        99 msec    (150-240 msec) MITRAL VALVE:                Normal Ranges: MV DT: 99 msec (150-240msec) MITRAL INSUFFICIENCY:                      Normal Ranges: MR VTI:  111.00 cm MR Vmax: 449.00 cm/s AORTIC VALVE:                         Normal Ranges: AoV Vmax:      1.29 m/s (<=1.7m/s) AoV Peak P.7 mmHg (<20mmHg) LVOT Max Aquiles:  1.06 m/s (<=1.1m/s) LVOT VTI:      17.50 cm LVOT Diameter: 2.20 cm  (1.8-2.4cm) AoV Area,Vmax: 3.12 cm2 (2.5-4.5cm2)  RIGHT VENTRICLE: TAPSE: 7.9 mm RV s'  0.06 m/s TRICUSPID VALVE/RVSP:                             Normal Ranges: Peak TR Velocity: 3.12 m/s RV Syst Pressure: 46.9 mmHg (< 30mmHg) IVC Diam:         2.40 cm PULMONIC VALVE:                         Normal Ranges: PV Accel Time: 85 msec  (>120ms) PV Max Aquiles:    1.0 m/s  (0.6-0.9m/s) PV Max PG:     3.8 mmHg  [id number] Karrie Em MD Electronically signed on 2024 at 9:16:01 PM  Wall Scoring  ** Final **       Results for orders placed or performed during the hospital encounter of 24 (from the past 24 hour(s))   CBC and Auto Differential   Result Value Ref Range    WBC 8.1 4.4 - 11.3 x10*3/uL    nRBC 1.5 (H) 0.0 - 0.0 /100 WBCs    RBC 4.05 4.00 - 5.90 x10*6/uL    Hemoglobin 9.6 (L) 12.0 - 17.5 g/dL    Hematocrit 32.6 (L) 36.0 - 52.0 %    MCV 81 80 - 100 fL    MCH 23.7 (L) 26.0 - 34.0 pg    MCHC 29.4 (L) 32.0 - 36.0 g/dL    RDW 17.2 (H) 11.5 - 14.5 %    Platelets 446 150 - 450 x10*3/uL    Neutrophils % 68.5 40.0 - 80.0 %    Immature Granulocytes %, Automated 0.5 0.0 - 0.9 %    Lymphocytes % 17.4 13.0 - 44.0 %    Monocytes % 10.1 2.0 - 10.0 %    Eosinophils % 2.1 0.0 - 6.0 %    Basophils % 1.4 0.0 - 2.0 %    Neutrophils Absolute 5.57 1.20 - 7.70 x10*3/uL    Immature Granulocytes Absolute, Automated 0.04 0.00 - 0.70 x10*3/uL     Lymphocytes Absolute 1.41 1.20 - 4.80 x10*3/uL    Monocytes Absolute 0.82 0.10 - 1.00 x10*3/uL    Eosinophils Absolute 0.17 0.00 - 0.70 x10*3/uL    Basophils Absolute 0.11 (H) 0.00 - 0.10 x10*3/uL   Comprehensive Metabolic Panel   Result Value Ref Range    Glucose 239 (H) 74 - 99 mg/dL    Sodium 136 136 - 145 mmol/L    Potassium 4.7 3.5 - 5.3 mmol/L    Chloride 108 (H) 98 - 107 mmol/L    Bicarbonate 21 21 - 32 mmol/L    Anion Gap 12 10 - 20 mmol/L    Urea Nitrogen 59 (H) 6 - 23 mg/dL    Creatinine 1.75 (H) 0.50 - 1.30 mg/dL    eGFR 34 (L) >60 mL/min/1.73m*2    Calcium 7.6 (L) 8.6 - 10.3 mg/dL    Albumin 2.7 (L) 3.4 - 5.0 g/dL    Alkaline Phosphatase 450 (H) 33 - 120 U/L    Total Protein 6.1 (L) 6.4 - 8.2 g/dL    AST 69 (H) 9 - 39 U/L    Bilirubin, Total 0.4 0.0 - 1.2 mg/dL     (H) 7 - 52 U/L   Lactate   Result Value Ref Range    Lactate 0.8 0.4 - 2.0 mmol/L   Blood Culture    Specimen: Peripheral Venipuncture; Blood culture   Result Value Ref Range    Blood Culture Loaded on Instrument - Culture in progress    Blood Culture    Specimen: Peripheral Venipuncture; Blood culture   Result Value Ref Range    Blood Culture Loaded on Instrument - Culture in progress    Troponin I, High Sensitivity   Result Value Ref Range    Troponin I, High Sensitivity 43 (H) 0 - 20 ng/L   Sedimentation rate, automated   Result Value Ref Range    Sedimentation Rate 64 (H) 0 - 30 mm/h   C-reactive protein   Result Value Ref Range    C-Reactive Protein 3.07 (H) <1.00 mg/dL   Urinalysis with Reflex Culture and Microscopic   Result Value Ref Range    Color, Urine Yellow Straw, Yellow    Appearance, Urine Hazy (N) Clear    Specific Gravity, Urine 1.014 1.005 - 1.035    pH, Urine 5.0 5.0, 5.5, 6.0, 6.5, 7.0, 7.5, 8.0    Protein, Urine >=500 (3+) (N) NEGATIVE mg/dL    Glucose, Urine 50 (1+) (A) NEGATIVE mg/dL    Blood, Urine SMALL (1+) (A) NEGATIVE    Ketones, Urine NEGATIVE NEGATIVE mg/dL    Bilirubin, Urine NEGATIVE NEGATIVE     Urobilinogen, Urine <2.0 <2.0 mg/dL    Nitrite, Urine NEGATIVE NEGATIVE    Leukocyte Esterase, Urine NEGATIVE NEGATIVE   Extra Urine Gray Tube   Result Value Ref Range    Extra Tube Hold for add-ons.    Sars-CoV-2 and Influenza A/B PCR   Result Value Ref Range    Flu A Result Not Detected Not Detected    Flu B Result Not Detected Not Detected    Coronavirus 2019, PCR Not Detected Not Detected   Urinalysis Microscopic   Result Value Ref Range    WBC, Urine 1-5 1-5, NONE /HPF    RBC, Urine 1-2 NONE, 1-2, 3-5 /HPF    Mucus, Urine 1+ Reference range not established. /LPF   ECG 12 lead   Result Value Ref Range    Ventricular Rate 83 BPM    Atrial Rate 83 BPM    SD Interval 160 ms    QRS Duration 124 ms    QT Interval 418 ms    QTC Calculation(Bazett) 491 ms    P Axis 60 degrees    R Axis -19 degrees    T Axis -56 degrees    QRS Count 14 beats    Q Onset 214 ms    P Onset 134 ms    P Offset 192 ms    T Offset 423 ms    QTC Fredericia 466 ms   Troponin I, High Sensitivity   Result Value Ref Range    Troponin I, High Sensitivity 40 (H) 0 - 20 ng/L   B-type natriuretic peptide   Result Value Ref Range    BNP 1,268 (H) 0 - 99 pg/mL   CBC   Result Value Ref Range    WBC 8.8 4.4 - 11.3 x10*3/uL    nRBC 1.1 (H) 0.0 - 0.0 /100 WBCs    RBC 3.96 (L) 4.00 - 5.90 x10*6/uL    Hemoglobin 8.9 (L) 12.0 - 17.5 g/dL    Hematocrit 31.8 (L) 36.0 - 52.0 %    MCV 80 80 - 100 fL    MCH 22.5 (L) 26.0 - 34.0 pg    MCHC 28.0 (L) 32.0 - 36.0 g/dL    RDW 17.5 (H) 11.5 - 14.5 %    Platelets 461 (H) 150 - 450 x10*3/uL   Basic Metabolic Panel   Result Value Ref Range    Glucose 230 (H) 74 - 99 mg/dL    Sodium 136 136 - 145 mmol/L    Potassium 4.7 3.5 - 5.3 mmol/L    Chloride 108 (H) 98 - 107 mmol/L    Bicarbonate 21 21 - 32 mmol/L    Anion Gap 12 10 - 20 mmol/L    Urea Nitrogen 53 (H) 6 - 23 mg/dL    Creatinine 1.74 (H) 0.50 - 1.30 mg/dL    eGFR 34 (L) >60 mL/min/1.73m*2    Calcium 7.6 (L) 8.6 - 10.3 mg/dL   Troponin I, High Sensitivity   Result Value  Ref Range    Troponin I, High Sensitivity 34 (H) 0 - 20 ng/L   Protime-INR   Result Value Ref Range    Protime 12.3 9.8 - 12.8 seconds    INR 1.1 0.9 - 1.1         Assessment/Plan     #Right knee septic arthritis, status post multiple surgical operations  #Scrotal swelling  #Uncontrolled type 2 diabetes  #History of CABG  #History of heart failure    Antibiotics  Vancomycin day 1  Zosyn day 1    -Continue antibiotics  -Psych consult to determine if patient has medical capacity for decision-making  -Patient refusing water pills for edema  -Patient willing to have antibiotics for now  -Overall very difficult patient in a very poor situation with extraordinarily poor insight into his disease processes    I reviewed and interpreted all lab test imaging studies and documentations from other healthcare providers  I am monitoring for antibiotic side effects and toxicity    Juanjose Dos Santos,

## 2024-02-28 NOTE — PROGRESS NOTES
This patient was seen by the offgoing provider.  Please see their note for full history and physical exam.    Briefly, this is a 53 y.o. male presenting to the ED with scrotal swelling and pain.  He was seen by the offline provider and signed out to me pending a CT scan.  I do concern for scrotal cellulitis he had already received vancomycin and Zosyn.  A CT scan was being ordered to evaluate for abscess or necrotizing infection.  This revealed anasarca without any acute surgical pathology.  Given that his vital signs are reassuring, he will be admitted to the hospital for continued evaluation and management with concern for scrotal cellulitis in the setting of anasarca.  His case was discussed with the on-call admitting physician and he will be monitored in the emergency department till he can be moved to his new treatment location.        Arden Bautista MD  Emergency Medicine Attending

## 2024-02-28 NOTE — PROGRESS NOTES
02/28/24 1608   Discharge Planning   Living Arrangements Alone   Support Systems None   Assistance Needed Per SNF   Type of Residence Skilled nursing facility   Home or Post Acute Services Post acute facilities (Rehab/SNF/etc)   Type of Post Acute Facility Services Skilled nursing;Rehab   Patient expects to be discharged to: [facility]   Does the patient need discharge transport arranged? Yes   RoundTrip coordination needed? Yes   Has discharge transport been arranged? No   Patient Choice   Patient / Family choosing to utilize agency / facility established prior to hospitalization Yes     Care transitions assessment completed via bedside with patient. TCC introduced self and explained role. Demographics verified. Patient from [facility] for IV antibiotics. Patient states plan is to return at discharge. DSC tasked to send return SNF referral. Awaiting if able to return. Dispo pending hospital course. Patient will need transport arranged at time of discharge.   TCC to continue to follow for discharge planning needs.      PCP: Per SNF Last seen: Per SNF   Pharmacy: Per SNF  Insurance: [facility]   Dispo: [facility] for IV antibiotics. Ref sent, awaiting if able to return.     SINDHU RIVERA, CASSANDRA TCC

## 2024-02-28 NOTE — PROGRESS NOTES
Physical Therapy                 Therapy Communication Note    Patient Name: Kody Choi  MRN: [id number]  Today's Date: 2/28/2024     Discipline: Physical Therapy    Missed Visit Reason: Missed Visit Reason: Patient refused    Missed Time: Attempt

## 2024-02-28 NOTE — PROGRESS NOTES
Pharmacy Medication History Review    Kody Choi is a 53 y.o. adult admitted for Cellulitis of scrotum. Pharmacy reviewed the patient's [id number] medications and allergies for accuracy.    The list below reflects the updated PTA list. Comments regarding how patient may be taking medications differently can be found in the Admit Orders Activity  Prior to Admission Medications   Prescriptions Last Dose Informant Patient Reported?   DAPTOmycin (Cubicin) 500 mg/50 mL IV  Other Yes   Sig: Infuse 50 mL (500 mg) into a venous catheter once every 24 hours.   acetaminophen (Tylenol) 325 mg tablet  Other No   Sig: Take 3 tablets (975 mg) by mouth every 8 hours if needed for headaches, mild pain (1 - 3), moderate pain (4 - 6) or fever (temp greater than 38.0 C).   alteplase (Cathflo Activase) 2 mg injection Not Taking Other No   Si mL (2 mg) by intra-catheter route if needed (Line care).   Patient not taking: Reported on 2024   atorvastatin (Lipitor) 40 mg tablet  Other No   Sig: Take 1 tablet (40 mg) by mouth once daily. Take in evening.   fluticasone (Flonase) 50 mcg/actuation nasal spray  Other, Self Yes   Sig: Administer 2 sprays into each nostril once daily as needed for rhinitis. Shake gently. Before first use, prime pump. After use, clean tip and replace cap.   heparin sodium,porcine (heparin, porcine,) 5,000 unit/mL injection  Other No   Sig: Inject 1.5 mL (7,500 Units) under the skin every 8 hours.   insulin glargine (Lantus) 100 unit/mL injection  Other No   Sig: Inject 35 Units under the skin once daily at bedtime. Take as directed per insulin instructions.   insulin lispro (HumaLOG) 100 unit/mL injection Not Taking Other No   Sig: Inject 0-0.1 mL (0-10 Units) under the skin 3 times a day with meals. Take as directed per insulin instructions.   Patient not taking: Reported on 2024   metoprolol succinate XL (Toprol-XL) 25 mg 24 hr tablet   No   Sig: Take 1 tablet (25 mg) by mouth once daily for  "6 doses. Do not crush or chew.   omeprazole (PriLOSEC) 20 mg DR capsule  Self, Other Yes   Sig: Take 1 capsule (20 mg) by mouth once daily in the morning. Take before meals.   pen needle, diabetic 31 gauge x 5/16\" needle  Self No   Sig: Use to inject 1-4 times daily as directed.   sodium chloride 0.9% parenteral solution 50 mL with DAPTOmycin 50 mg/mL recon soln 950 mg  Other No   Sig: Infuse 950 mg at 138 mL/hr over 30 minutes into a venous catheter once every 24 hours for 25 days. Do not start before February 22, 2024.   traMADol (Ultram) 50 mg tablet  Other No   Sig: Take 1 tablet (50 mg) by mouth every 8 hours if needed for severe pain (7 - 10).      Facility-Administered Medications: None        The list below reflects the updated allergy list. Please review each documented allergy for additional clarification and justification.  Allergies  Reviewed by Letty Mack PharmD on 2/28/2024        Severity Reactions Comments    Loratadine High Cough, Palpitations A fib Palpitations    Pollen Extracts Not Specified Other, Runny nose Sneezing, nasal congestion    Lisinopril Low Cough \"Really bad cough\"            Patient was unable to be assessed for M2B at discharge. Pharmacy has been updated to N/A - patient is from facility.    Sources used to complete the med history include   [facility] Order Summary Report generated 2/28/24    Below are additional concerns with the patient's PTA list.  Daptomycin scheduled to end 3/20/24    Letty Mack PharmD   Transitions of Care Pharmacist  [facility]  Please reach out via Secure Chat for questions, or if no response call [phone] or vocera Med[facility]    "

## 2024-02-28 NOTE — CONSULTS
Inpatient consult to Cardiology  Consult performed by: Jaren Chaney MD PhD  Consult ordered by: LAURENT Duran-CNP  Reason for consult: Elevated troponin        History Of Present Illness:    Kody Choi is a 53 y.o. adult presenting to the emergency department for evaluation of scrotal pain and edema.  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  Patient did finally admit to a PICC line and admission to skilled nursing with IV antibiotics.  Patient states that he noticed scrotal edema worsening over the last several days.  Patient denies chest pain or dizziness.  Patient denies nausea, vomiting, diarrhea.     In ED, urine negative for infection, CRP 3.07, sed rate 64, troponin 43 with a repeat pending.  Glucose 239, chloride 108, BUN 59, creatinine 1.75, GFR 34, calcium 7.6, alk phos 450, hemoglobin 9.6, hematocrit 32.6.  BNP 1268.  Chest x-ray showing mild cardiomegaly.  Ultrasound of the scrotum showing bilateral hydrocele and scrotal edema.  CT of the head completed and negative for acute process.  CT of the abdomen pelvis completed showing diffuse scrotal wall thickening extending into the bilateral lower extremities, anasarca.  Blood cultures pending.  Consult placed to cardiology.  Last echocardiogram on file 1/30/2024 with an EF of 30%.  Consult to infectious disease due to patient having a PICC line for recurrent right knee septic arthritis and on IV antibiotics at skilled nursing.  Patient started on IV vancomycin and Zosyn in ED.  Patient admitted to telemetry under the care of Dr. Lyndon Metgzer will continue to follow.         Past Medical History  T2DM, CAD s/p multiple stents and CABG, heart failure (EF 30% 1/30/2024) A-fib, RUPESH, TKA in 2015, recurrent septic arthritis of the right knee  Surgical  "History  TKA 2015, CABG, PICC line  Social History  Former smoker, no drug use, no alcohol use  Family History  Reviewed and noncontributory     Allergies  Loratadine, Pollen extracts, and Lisinopril     Review of Systems  A 10 point review of systems was completed and negative except what is listed in HPI  Physical Exam  Constitutional:       Appearance: He is obese.   HENT:      Mouth/Throat:      Mouth: Mucous membranes are dry.      Pharynx: Oropharynx is clear.   Eyes:      Pupils: Pupils are equal, round, and reactive to light.   Cardiovascular:      Rate and Rhythm: Rhythm irregularly irregular.   Pulmonary:      Breath sounds: Normal breath sounds.   Abdominal:      General: Bowel sounds are normal.   Musculoskeletal:      Right knee: Swelling present.      Comments: Scrotal edema, erythema   Skin:     General: Skin is warm and dry.      Capillary Refill: Capillary refill takes less than 2 seconds.   Neurological:      General: No focal deficit present.      Mental Status: He is alert and oriented to person, place, and time.   Psychiatric:         Mood and Affect: Mood normal. Affect is angry.         Last Recorded Vitals:  Vitals:    02/28/24 0021 02/28/24 0422 02/28/24 0700 02/28/24 0801   BP: (!) 182/95 127/79  130/85   BP Location: Left arm      Patient Position: Lying      Pulse: 90 84  85   Resp:       Temp:  35.7 °C (96.3 °F)  35.7 °C (96.3 °F)   SpO2: 95% 95%  93%   Weight: 150 kg (330 lb)  (!) 155 kg (341 lb 11.4 oz)    Height: 1.676 m (5' 6\")          Last Labs:  CBC - 2/28/2024:  6:07 AM  8.8 8.9 461    31.8      CMP - 2/28/2024:  6:07 AM  7.6 6.1 69 --- 0.4   4.5 2.7 225 450      PTT - 2/17/2024:  5:17 PM  1.1   12.3 34     Troponin I, High Sensitivity   Date/Time Value Ref Range Status   02/28/2024 06:58 AM 34 (H) 0 - 20 ng/L Final   02/28/2024 02:09 AM 40 (H) 0 - 20 ng/L Final   02/27/2024 07:10 PM 43 (H) 0 - 20 ng/L Final     BNP   Date/Time Value Ref Range Status   02/28/2024 02:09 AM 1,962 " (H) 0 - 99 pg/mL Final   04/12/2021 06:51  (H) 0 - 99 pg/mL Final     Comment:     .  <100 pg/mL - Heart failure unlikely  100-299 pg/mL - Intermediate probability of acute heart  .               failure exacerbation. Correlate with clinical  .               context and patient history.    >=300 pg/mL - Heart Failure likely. Correlate with clinical  .               context and patient history.   Biotin interference may cause falsely decreased results.   Patients taking a Biotin dose of up to 5 mg/day should   refrain from taking Biotin for 24 hours before sample   collection. Providers may contact their local laboratory   for further information.       Hemoglobin A1C   Date/Time Value Ref Range Status   01/26/2024 04:13 AM 16.6 (H) see below % Final     Comment:     Hemoglobin A1c is >15%. Marked elevations in HbA1c are commonly due to poor glycemic control in diabetic patients. Rarely, hemoglobin variants may cause false elevation of HbA1c that is discordant with glycemic control status.    10/15/2023 05:28 PM 14.7 (H) see below % Final     VLDL   Date/Time Value Ref Range Status   09/24/2020 05:30 AM 70 (H) 0 - 40 mg/dL Final   06/03/2020 05:37 PM 60 (H) 0 - 40 mg/dL Final   03/28/2019 05:00 AM 46 (H) 0 - 40 mg/dL Final      Last I/O:  I/O last 3 completed shifts:  In: - (0 mL/kg)   Out: 1500 (9.4 mL/kg) [Urine:1500 (0.3 mL/kg/hr)]  Dosing Weight: 159 kg     Past Cardiology Tests (Last 3 Years):  EKG:  ECG 12 lead 02/16/2024    Echo:  Transthoracic Echo (TTE) Complete 01/30/2024    Ejection Fractions:  EF   Date/Time Value Ref Range Status   01/30/2024 10:45 AM 28 %      Cath:  No results found for this or any previous visit from the past 1095 days.    Stress Test:  No results found for this or any previous visit from the past 1095 days.    Cardiac Imaging:  No results found for this or any previous visit from the past 1095 days.      Past Medical History:  He has a past medical history of Personal history  of other endocrine, nutritional and metabolic disease and Unspecified astigmatism, bilateral (01/04/2016).    Past Surgical History:  He has a past surgical history that includes Knee surgery (10/29/2014).      Social History:  He reports that he has never smoked. He has never been exposed to tobacco smoke. He has never used smokeless tobacco. No history on file for alcohol use and drug use.    Family History:  No family history on file.     Allergies:  Loratadine, Pollen extracts, and Lisinopril    Inpatient Medications:  Scheduled medications   Medication Dose Route Frequency    heparin (porcine)  7,500 Units subcutaneous q8h SUDHIR    insulin lispro  0-10 Units subcutaneous Before meals & nightly    pantoprazole  40 mg intravenous Daily    piperacillin-tazobactam  3.375 g intravenous q6h    vancomycin  1,250 mg intravenous q24h     PRN medications   Medication    acetaminophen    dextrose 10 % in water (D10W)    dextrose    glucagon    metoprolol    traMADol    vancomycin     Continuous Medications   Medication Dose Last Rate     Outpatient Medications:  Current Outpatient Medications   Medication Instructions    acetaminophen (TYLENOL) 975 mg, oral, Every 8 hours PRN    alteplase (CATHFLO ACTIVASE) 2 mg, intra-catheter, As needed    atorvastatin (LIPITOR) 40 mg, oral, Daily, Take in evening.    DAPTOmycin (Cubicin) 500 mg/50 mL IV 50 mL, intravenous, Every 24 hours    fluticasone (Flonase) 50 mcg/actuation nasal spray 2 sprays, Each Nostril, Daily PRN, Shake gently. Before first use, prime pump. After use, clean tip and replace cap.    heparin (porcine) 7,500 Units, subcutaneous, Every 8 hours scheduled    insulin glargine (LANTUS) 35 Units, subcutaneous, Nightly, Take as directed per insulin instructions.    insulin lispro (HUMALOG) 0-10 Units, subcutaneous, 3 times daily with meals, Take as directed per insulin instructions.    metoprolol succinate XL (TOPROL-XL) 25 mg, oral, Daily, Do not crush or chew.     "omeprazole (PRILOSEC) 20 mg, oral, Daily before breakfast    pen needle, diabetic 31 gauge x 5/16\" needle Use to inject 1-4 times daily as directed.    sodium chloride 0.9% parenteral solution 50 mL with DAPTOmycin 50 mg/mL recon soln 950 mg 6 mg/kg, intravenous, Every 24 hours    traMADol (ULTRAM) 50 mg, oral, Every 8 hours PRN            Assessment/Plan   Acute on chronic decompensated combined systolic and diastolic heart failure  Elevated troponin  Diabetes mellitus type 2  Recurrent infection  Morbid obesity    Patient has atypical chest pain complaint with complaining of pain all over the body.  His EKG shows evidence of a Q wave in anterior leads and low voltage which could be due to body habitus as well as evidence for prior infarcts.  His troponin elevation is mild and stable.  More likely this represents mostly type II myocardial infarction in the setting of decompensated heart failure and his recurrent infection.  His diabetes has been poorly controlled with hemoglobin A1c above 16.  At this point patient is not a good candidate for any invasive test from cardiac standpoint such as heart catheterization in the absence of anginal symptoms and with a stable troponin.  I suggest continue with baby aspirin, statin, diabetes control and blood pressure control.  He will need close follow-up in cardiology office for guideline directed medical therapy for his heart failure.  Will continue with IV Lasix for volume control.  Eventually depending on his clinical progress and after clearing the infection, if his EF remains to be low, further considerations such as heart catheterization as well as consideration for primary prevention ICD can be discussed but first there are several other steps that needs to be followed.  I will continue to monitor the patient while he is in the hospital.          I appreciate the opportunity to participate in this patient's care.      Jaren Chaney MD, PhD, FACC, " [facility]  Interventional Cardiology, [facility]  Associate Professor of Medicine, [facility]  [email or web address]   Office: [phone]      Macro dragon disclaimer: This note was dictated by speech recognition. Minor errors in transcription may be present.

## 2024-02-28 NOTE — PROGRESS NOTES
Occupational Therapy                 Therapy Communication Note    Patient Name: Kody Choi  MRN: [id number]  Today's Date: 2/28/2024     Discipline: Occupational Therapy    Missed Visit Reason: Missed Visit Reason:  (patient ademantly refusing despite max encouragement/education. patient reporting he would be agreeable to work with OT tomorrow. will reattempt as able/appropriate)

## 2024-02-28 NOTE — CONSULTS
"Psych Consult      Consult Requested By: Lyndon Metzger    Reason for Consultation:  capacity    HISTORY OF PRESENT ILLNESS    Per ED - Kody Choi is a 53 y.o. adult presenting to the emergency department for evaluation of scrotal pain and edema.  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  Patient did finally admit to a PICC line and admission to skilled nursing with IV antibiotics.  Patient states that he noticed scrotal edema worsening over the last several days.  Patient denies chest pain or dizziness.  Patient denies nausea, vomiting, diarrhea.     In ED, urine negative for infection, CRP 3.07, sed rate 64, troponin 43 with a repeat pending.  Glucose 239, chloride 108, BUN 59, creatinine 1.75, GFR 34, calcium 7.6, alk phos 450, hemoglobin 9.6, hematocrit 32.6.  BNP 1268.  Chest x-ray showing mild cardiomegaly.  Ultrasound of the scrotum showing bilateral hydrocele and scrotal edema.  CT of the head completed and negative for acute process.  CT of the abdomen pelvis completed showing diffuse scrotal wall thickening extending into the bilateral lower extremities, anasarca.  Blood cultures pending.  Consult placed to cardiology.  Last echocardiogram on file 1/30/2024 with an EF of 30%.  Consult to infectious disease due to patient having a PICC line for recurrent right knee septic arthritis and on IV antibiotics at skilled nursing.  Patient started on IV vancomycin and Zosyn in ED.      HPI:  Pt is a 53 yr old CM being seen for capacity    Pt is calm and cooperative, AxO X3  Pt behavior is in control, no acute agitation  Pt lives in an [facility] alone [place]. 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  Pt has never been , no children.  Denies drug or etoh use  Has seen psychiatric provider in the past and taken " "psychoactive medication for depression. Cannot recall the name of the medication he was RX.  Pt currently denies feeling depressed, does not feel that he needs any psychoactive medication at this time.  Denies SI, no plan or intent to harm self  Pt denies sleep disturbance, denies any change in appetite    Pt speech is clear, thoughts are organized  States that he had knee surgery 5 years ago and \"knee has never been right since\" Pt states that he has had chronic problems for 5 years since the surgery.  Currently staying at [facility] due to IV antibiotic treatment  Denies wanting to take \"water pills\". States that his father was on them and he \"couldn't even make it through a baseball game. Have you ever been on those pills. What kind of life is that. The swelling will go down it will just take some time\". Pt is aware he has a ulloa and might only need the medication temporarily, still not interested in taking any diuretics.  Pt also denies wanting to have leg amputated. States that he is aware of the complications that could continue to happen if infection does not clear and he is ok with the result (even death) but does not want his leg to be removed.        PSYCHIATRIC REVIEW OF SYSTEMS  SI: Denies    Depression: Denies    Thu: Denies    Panic: Denies    Generalized Anxiety: Denies    PTSD: Denies    OCD: Denies    Psychosis: Denies    Eating Disorder: Denies    Current Outpatient Medications   Medication Instructions    acetaminophen (TYLENOL) 975 mg, oral, Every 8 hours PRN    alteplase (CATHFLO ACTIVASE) 2 mg, intra-catheter, As needed    atorvastatin (LIPITOR) 40 mg, oral, Daily, Take in evening.    DAPTOmycin (Cubicin) 500 mg/50 mL IV 50 mL, intravenous, Every 24 hours    fluticasone (Flonase) 50 mcg/actuation nasal spray 2 sprays, Each Nostril, Daily PRN, Shake gently. Before first use, prime pump. After use, clean tip and replace cap.    heparin (porcine) 7,500 Units, subcutaneous, Every 8 hours scheduled    " "insulin glargine (LANTUS) 35 Units, subcutaneous, Nightly, Take as directed per insulin instructions.    insulin lispro (HUMALOG) 0-10 Units, subcutaneous, 3 times daily with meals, Take as directed per insulin instructions.    metoprolol succinate XL (TOPROL-XL) 25 mg, oral, Daily, Do not crush or chew.    omeprazole (PRILOSEC) 20 mg, oral, Daily before breakfast    pen needle, diabetic 31 gauge x 5/16\" needle Use to inject 1-4 times daily as directed.    sodium chloride 0.9% parenteral solution 50 mL with DAPTOmycin 50 mg/mL recon soln 950 mg 6 mg/kg, intravenous, Every 24 hours    traMADol (ULTRAM) 50 mg, oral, Every 8 hours PRN        OARRS:   380    Past Medical History:   Diagnosis Date    Personal history of other endocrine, nutritional and metabolic disease     History of type 2 diabetes mellitus    Unspecified astigmatism, bilateral 01/04/2016    Astigmatism, bilateral        Past Surgical History:   Procedure Laterality Date    KNEE SURGERY  10/29/2014    Knee Surgery        No family history on file.     Social History     Substance and Sexual Activity   Alcohol Use None        Social History     Substance and Sexual Activity   Drug Use Not on file        Social History     Tobacco Use   Smoking Status Never    Passive exposure: Never   Smokeless Tobacco Never          MENTAL STATUS EXAM  Physical Exam  Psychiatric:         Attention and Perception: Attention and perception normal.         Mood and Affect: Mood normal. Affect is flat.         Speech: Speech normal.         Behavior: Behavior normal. Behavior is cooperative.         Thought Content: Thought content normal.         Cognition and Memory: Cognition and memory normal.         Judgment: Judgment normal.         Vitals:    02/28/24 0021 02/28/24 0422 02/28/24 0700 02/28/24 0801   BP: (!) 182/95 127/79  130/85   BP Location: Left arm      Patient Position: Lying      Pulse: 90 84  85   Resp:       Temp:  35.7 °C (96.3 °F)  35.7 °C (96.3 °F) " "  SpO2: 95% 95%  93%   Weight: 150 kg (330 lb)  (!) 155 kg (341 lb 11.4 oz)    Height: 1.676 m (5' 6\")           Recent Results (from the past 72 hour(s))   CBC and Auto Differential    Collection Time: 02/27/24  7:10 PM   Result Value Ref Range    WBC 8.1 4.4 - 11.3 x10*3/uL    nRBC 1.5 (H) 0.0 - 0.0 /100 WBCs    RBC 4.05 4.00 - 5.90 x10*6/uL    Hemoglobin 9.6 (L) 12.0 - 17.5 g/dL    Hematocrit 32.6 (L) 36.0 - 52.0 %    MCV 81 80 - 100 fL    MCH 23.7 (L) 26.0 - 34.0 pg    MCHC 29.4 (L) 32.0 - 36.0 g/dL    RDW 17.2 (H) 11.5 - 14.5 %    Platelets 446 150 - 450 x10*3/uL    Neutrophils % 68.5 40.0 - 80.0 %    Immature Granulocytes %, Automated 0.5 0.0 - 0.9 %    Lymphocytes % 17.4 13.0 - 44.0 %    Monocytes % 10.1 2.0 - 10.0 %    Eosinophils % 2.1 0.0 - 6.0 %    Basophils % 1.4 0.0 - 2.0 %    Neutrophils Absolute 5.57 1.20 - 7.70 x10*3/uL    Immature Granulocytes Absolute, Automated 0.04 0.00 - 0.70 x10*3/uL    Lymphocytes Absolute 1.41 1.20 - 4.80 x10*3/uL    Monocytes Absolute 0.82 0.10 - 1.00 x10*3/uL    Eosinophils Absolute 0.17 0.00 - 0.70 x10*3/uL    Basophils Absolute 0.11 (H) 0.00 - 0.10 x10*3/uL   Comprehensive Metabolic Panel    Collection Time: 02/27/24  7:10 PM   Result Value Ref Range    Glucose 239 (H) 74 - 99 mg/dL    Sodium 136 136 - 145 mmol/L    Potassium 4.7 3.5 - 5.3 mmol/L    Chloride 108 (H) 98 - 107 mmol/L    Bicarbonate 21 21 - 32 mmol/L    Anion Gap 12 10 - 20 mmol/L    Urea Nitrogen 59 (H) 6 - 23 mg/dL    Creatinine 1.75 (H) 0.50 - 1.30 mg/dL    eGFR 34 (L) >60 mL/min/1.73m*2    Calcium 7.6 (L) 8.6 - 10.3 mg/dL    Albumin 2.7 (L) 3.4 - 5.0 g/dL    Alkaline Phosphatase 450 (H) 33 - 120 U/L    Total Protein 6.1 (L) 6.4 - 8.2 g/dL    AST 69 (H) 9 - 39 U/L    Bilirubin, Total 0.4 0.0 - 1.2 mg/dL     (H) 7 - 52 U/L   Lactate    Collection Time: 02/27/24  7:10 PM   Result Value Ref Range    Lactate 0.8 0.4 - 2.0 mmol/L   Blood Culture    Collection Time: 02/27/24  7:10 PM    Specimen: " Peripheral Venipuncture; Blood culture   Result Value Ref Range    Blood Culture Loaded on Instrument - Culture in progress    Blood Culture    Collection Time: 02/27/24  7:10 PM    Specimen: Peripheral Venipuncture; Blood culture   Result Value Ref Range    Blood Culture Loaded on Instrument - Culture in progress    Troponin I, High Sensitivity    Collection Time: 02/27/24  7:10 PM   Result Value Ref Range    Troponin I, High Sensitivity 43 (H) 0 - 20 ng/L   Sedimentation rate, automated    Collection Time: 02/27/24  7:10 PM   Result Value Ref Range    Sedimentation Rate 64 (H) 0 - 30 mm/h   C-reactive protein    Collection Time: 02/27/24  7:10 PM   Result Value Ref Range    C-Reactive Protein 3.07 (H) <1.00 mg/dL   Urinalysis with Reflex Culture and Microscopic    Collection Time: 02/27/24  7:11 PM   Result Value Ref Range    Color, Urine Yellow Straw, Yellow    Appearance, Urine Hazy (N) Clear    Specific Gravity, Urine 1.014 1.005 - 1.035    pH, Urine 5.0 5.0, 5.5, 6.0, 6.5, 7.0, 7.5, 8.0    Protein, Urine >=500 (3+) (N) NEGATIVE mg/dL    Glucose, Urine 50 (1+) (A) NEGATIVE mg/dL    Blood, Urine SMALL (1+) (A) NEGATIVE    Ketones, Urine NEGATIVE NEGATIVE mg/dL    Bilirubin, Urine NEGATIVE NEGATIVE    Urobilinogen, Urine <2.0 <2.0 mg/dL    Nitrite, Urine NEGATIVE NEGATIVE    Leukocyte Esterase, Urine NEGATIVE NEGATIVE   Extra Urine Gray Tube    Collection Time: 02/27/24  7:11 PM   Result Value Ref Range    Extra Tube Hold for add-ons.    Sars-CoV-2 and Influenza A/B PCR    Collection Time: 02/27/24  7:11 PM   Result Value Ref Range    Flu A Result Not Detected Not Detected    Flu B Result Not Detected Not Detected    Coronavirus 2019, PCR Not Detected Not Detected   Urinalysis Microscopic    Collection Time: 02/27/24  7:11 PM   Result Value Ref Range    WBC, Urine 1-5 1-5, NONE /HPF    RBC, Urine 1-2 NONE, 1-2, 3-5 /HPF    Mucus, Urine 1+ Reference range not established. /LPF   ECG 12 lead    Collection Time:  02/27/24  7:11 PM   Result Value Ref Range    Ventricular Rate 83 BPM    Atrial Rate 83 BPM    FL Interval 160 ms    QRS Duration 124 ms    QT Interval 418 ms    QTC Calculation(Bazett) 491 ms    P Axis 60 degrees    R Axis -19 degrees    T Axis -56 degrees    QRS Count 14 beats    Q Onset 214 ms    P Onset 134 ms    P Offset 192 ms    T Offset 423 ms    QTC Fredericia 466 ms   Troponin I, High Sensitivity    Collection Time: 02/28/24  2:09 AM   Result Value Ref Range    Troponin I, High Sensitivity 40 (H) 0 - 20 ng/L   B-type natriuretic peptide    Collection Time: 02/28/24  2:09 AM   Result Value Ref Range    BNP 1,268 (H) 0 - 99 pg/mL   CBC    Collection Time: 02/28/24  6:07 AM   Result Value Ref Range    WBC 8.8 4.4 - 11.3 x10*3/uL    nRBC 1.1 (H) 0.0 - 0.0 /100 WBCs    RBC 3.96 (L) 4.00 - 5.90 x10*6/uL    Hemoglobin 8.9 (L) 12.0 - 17.5 g/dL    Hematocrit 31.8 (L) 36.0 - 52.0 %    MCV 80 80 - 100 fL    MCH 22.5 (L) 26.0 - 34.0 pg    MCHC 28.0 (L) 32.0 - 36.0 g/dL    RDW 17.5 (H) 11.5 - 14.5 %    Platelets 461 (H) 150 - 450 x10*3/uL   Basic Metabolic Panel    Collection Time: 02/28/24  6:07 AM   Result Value Ref Range    Glucose 230 (H) 74 - 99 mg/dL    Sodium 136 136 - 145 mmol/L    Potassium 4.7 3.5 - 5.3 mmol/L    Chloride 108 (H) 98 - 107 mmol/L    Bicarbonate 21 21 - 32 mmol/L    Anion Gap 12 10 - 20 mmol/L    Urea Nitrogen 53 (H) 6 - 23 mg/dL    Creatinine 1.74 (H) 0.50 - 1.30 mg/dL    eGFR 34 (L) >60 mL/min/1.73m*2    Calcium 7.6 (L) 8.6 - 10.3 mg/dL   Troponin I, High Sensitivity    Collection Time: 02/28/24  6:58 AM   Result Value Ref Range    Troponin I, High Sensitivity 34 (H) 0 - 20 ng/L   Protime-INR    Collection Time: 02/28/24  8:00 AM   Result Value Ref Range    Protime 12.3 9.8 - 12.8 seconds    INR 1.1 0.9 - 1.1        ECG 12 lead    Result Date: 2/28/2024  Normal sinus rhythm Cannot rule out Inferior infarct , age undetermined Anterolateral infarct , age undetermined Abnormal ECG When compared  with ECG of 16-FEB-2024 20:38, Sinus rhythm has replaced Electronic ventricular pacemaker Confirmed by Jaren Chaney ([id number]) on 2/28/2024 8:56:04 AM    CT pelvis w IV contrast    Result Date: 2/27/2024  Interpreted By:  Susanna Lewis, STUDY: CT PELVIS W IV CONTRAST;  2/27/2024 9:21 pm   INDICATION: Signs/Symptoms:scrotal swelling.   COMPARISON: Scrotal ultrasound performed on the same day.   ACCESSION NUMBER(S): [id number]   ORDERING CLINICIAN: YANELY RODRIGUEZ   TECHNIQUE: CT of the pelvis was performed.  Standard contiguous axial images were obtained at 3 mm slice thickness through pelvis. Coronal and sagittal reconstructions at 3 mm slice thickness were performed.  85 ml of contrast  Optiray 350 were administered intravenously without immediate complication.   FINDINGS: PELVIS:   BLADDER: Urinary bladder is decompressed secondary to Campos catheterization.   REPRODUCTIVE ORGANS: Prostate gland is unremarkable.   BOWEL: Visualized small bowel and large bowel loops appear grossly unremarkable.   VESSELS: Visualized vasculature appears grossly unremarkable.   PERITONEUM/RETROPERITONEUM/LYMPH NODES: There is small amount of free fluid along the right lower quadrant measuring approximately 20 Hounsfield units. This is incompletely included in the field of view. There are multiple enlarged lymph nodes along the right external iliac chain with largest measuring up to 2 cm in short axis. There are also multiple enlarged right inguinal lymph nodes with the largest measuring up to 1.8 cm in short axis. However no evidence of enlarged lymphadenopathy in the left inguinal region or in the left pelvis.   BONES AND ABDOMINAL WALL: No suspicious osseous lesions in the visualized field of view. There is small left fat containing inguinal hernia. Mild discogenic degenerative changes are noted in the lower lumbar spine. There is diffuse reticulated subcutaneous soft tissue edema throughout the visualized abdominal wall  extending into the included bilateral thighs. There is diffuse subcutaneous soft tissue edema throughout the scrotum. No evidence of peripheral rim enhancing fluid collection within the scrotal wall. No evidence of soft tissue gas.       1.  Diffuse body wall edema extending into visualized bilateral lower extremities. There is also diffuse edematous thickening of the scrotum, which could again be related to similar systemic process/anasarca. No definite CT evidence of peripheral rim enhancing fluid collection to suggest an abscess. No evidence of soft tissue gas within the scrotum. 2. Multiple enlarged right inguinal and right external iliac lymph nodes as described above. This could be related to infectious/inflammatory etiology in the right lower extremity. Recommend clinical correlation with patient's symptomatology in the right lower extremity and further evaluation as clinically warranted. 3. Small volume intraperitoneal free fluid in the right hemiabdomen. This is only partially included in the field of view and is also most likely favored to represent similar findings secondary to 3rd spacing of fluid/anasarca. Recommend correlation with patient's abdominal symptomatology and a dedicated CT abdomen pelvis with contrast can be obtained for further evaluation if clinically warranted.   MACRO: None   Signed by: Susanna Lewis 2/27/2024 10:56 PM Dictation workstation:   [id number]    CT head wo IV contrast    Result Date: 2/27/2024  Interpreted By:  Vargas Mendoza, STUDY: CT HEAD WO IV CONTRAST;  2/27/2024 9:21 pm   INDICATION: Signs/Symptoms:ams.   COMPARISON: Noncontrast head CT of 11/10/2015.   ACCESSION NUMBER(S): [id number]   ORDERING CLINICIAN: YANELY RODRIGUEZ   TECHNIQUE: Noncontrast axial CT scan of head was performed. Angled reformats in brain and bone windows were generated. The images were reviewed in bone, brain, blood and soft tissue windows.   FINDINGS: CSF Spaces: The ventricles, sulci and  basal cisterns are within normal limits. There is no extraaxial fluid collection.   Parenchyma: Moderate to advanced volume loss.  There is periventricular and subcortical white matter hypoattenuation, most in keeping with chronic microvascular ischemic change. The grey-white differentiation is intact. There is no mass effect or midline shift. There is no intracranial hemorrhage.   Calvarium: The calvarium is unremarkable.   Paranasal sinuses and mastoids: Visualized paranasal sinuses and mastoids are clear.       No evidence of acute cortical infarct or intracranial hemorrhage.     MACRO: None   Signed by: Vargas Mendoza 2/27/2024 9:59 PM Dictation workstation:   [id number]    US scrotum w doppler    Result Date: 2/27/2024  STUDY: Scrotal Ultrasound; 2/27/2024 8:12 PM. INDICATION: Scrotal pain and swelling. COMPARISON: None available. ACCESSION NUMBER(S): [id number] ORDERING CLINICIAN: YANELY RODRIGUEZ TECHNIQUE:  Ultrasound of the scrotum and testicular spectral Doppler evaluation performed. FINDINGS:    The right testicle measures 4.1 x 3.1 x 2.9 cm.  It demonstrates a normal homogeneous echotexture.  Normal color and spectral Doppler flow is demonstrated.  The right epididymis measures 1.8 x 1.0 x 1.0 cm and demonstrates normal echogenicity. The left testicle measures 4.5 x 3.0 x 3.2 cm.  It demonstrates a normal homogeneous echotexture.  Normal color and spectral Doppler flow is demonstrated.  The left epididymis measures 1.7 x 1.1 x 1.4 cm and demonstrates a simple cyst measuring 0.5 cm. There are bilateral hydroceles present. There is severe scrotal edema. The scrotal wall measures 5.2 cm on the right and 4.6 cm on the left.    Bilateral hydroceles. Severe scrotal edema. The testicles are otherwise unremarkable. Normal testicular spectral Doppler evaluation. Signed by Aubrey Zapien MD    XR chest 1 view    Result Date: 2/27/2024  STUDY: Chest Radiograph;  2/27/2024 at 19:16 hours. INDICATION: Shortness of  breath. COMPARISON: XR chest 6/27/2023, 1/20/2021 and 1/11/2021. CT chest 2/14/2021 ACCESSION NUMBER(S): [id number] ORDERING CLINICIAN: YANELY RODRIGUEZ TECHNIQUE:  Frontal chest was obtained at 19:16 hours. FINDINGS: CARDIOMEDIASTINAL SILHOUETTE: Mild cardiomegaly present.  Sternotomy wires seen.  LUNGS: Lungs are clear.  ABDOMEN: No remarkable upper abdominal findings.  BONES: No acute osseous changes.    Mild cardiomegaly. Signed by Mg Almanzar MD       ASSESSMENT AND PLAN  DX: adjustment disorder  PLAN: Pt has capacity at this time for medical decision making at this time  Declines needing any psychiatric care at this time    Thank you for allowing us to participate in the care of this patient. We will continue to follow-up with you. .    I spent 60 minutes in the professional and overall care of this patient.      Beni Davey, APRN-CNP

## 2024-02-29 ENCOUNTER — APPOINTMENT (OUTPATIENT)
Dept: INFECTIOUS DISEASES | Facility: CLINIC | Age: 54
End: 2024-02-29
Payer: COMMERCIAL

## 2024-02-29 LAB
ANION GAP SERPL CALC-SCNC: 12 MMOL/L (ref 10–20)
BUN SERPL-MCNC: 45 MG/DL (ref 6–23)
CALCIUM SERPL-MCNC: 8.3 MG/DL (ref 8.6–10.3)
CHLORIDE SERPL-SCNC: 110 MMOL/L (ref 98–107)
CK SERPL-CCNC: 59 U/L (ref 0–325)
CO2 SERPL-SCNC: 22 MMOL/L (ref 21–32)
CREAT SERPL-MCNC: 1.56 MG/DL (ref 0.5–1.3)
EGFRCR SERPLBLD CKD-EPI 2021: 39 ML/MIN/1.73M*2
ERYTHROCYTE [DISTWIDTH] IN BLOOD BY AUTOMATED COUNT: 17.2 % (ref 11.5–14.5)
GLUCOSE SERPL-MCNC: 156 MG/DL (ref 74–99)
HCT VFR BLD AUTO: 33.5 % (ref 36–52)
HGB BLD-MCNC: 9.7 G/DL (ref 12–17.5)
HOLD SPECIMEN: NORMAL
MAGNESIUM SERPL-MCNC: 1.95 MG/DL (ref 1.6–2.4)
MCH RBC QN AUTO: 23.2 PG (ref 26–34)
MCHC RBC AUTO-ENTMCNC: 29 G/DL (ref 32–36)
MCV RBC AUTO: 80 FL (ref 80–100)
NRBC BLD-RTO: 1.6 /100 WBCS (ref 0–0)
PLATELET # BLD AUTO: 474 X10*3/UL (ref 150–450)
POTASSIUM SERPL-SCNC: 4.8 MMOL/L (ref 3.5–5.3)
RBC # BLD AUTO: 4.19 X10*6/UL (ref 4–5.9)
SODIUM SERPL-SCNC: 139 MMOL/L (ref 136–145)
VANCOMYCIN SERPL-MCNC: 13.6 UG/ML (ref 5–20)
WBC # BLD AUTO: 8.5 X10*3/UL (ref 4.4–11.3)

## 2024-02-29 PROCEDURE — 2500000002 HC RX 250 W HCPCS SELF ADMINISTERED DRUGS (ALT 637 FOR MEDICARE OP, ALT 636 FOR OP/ED): Performed by: PHYSICIAN ASSISTANT

## 2024-02-29 PROCEDURE — 99232 SBSQ HOSP IP/OBS MODERATE 35: CPT | Performed by: STUDENT IN AN ORGANIZED HEALTH CARE EDUCATION/TRAINING PROGRAM

## 2024-02-29 PROCEDURE — 85027 COMPLETE CBC AUTOMATED: CPT | Performed by: INTERNAL MEDICINE

## 2024-02-29 PROCEDURE — 2500000002 HC RX 250 W HCPCS SELF ADMINISTERED DRUGS (ALT 637 FOR MEDICARE OP, ALT 636 FOR OP/ED): Performed by: NURSE PRACTITIONER

## 2024-02-29 PROCEDURE — 2500000004 HC RX 250 GENERAL PHARMACY W/ HCPCS (ALT 636 FOR OP/ED): Performed by: STUDENT IN AN ORGANIZED HEALTH CARE EDUCATION/TRAINING PROGRAM

## 2024-02-29 PROCEDURE — 2500000001 HC RX 250 WO HCPCS SELF ADMINISTERED DRUGS (ALT 637 FOR MEDICARE OP): Performed by: NURSE PRACTITIONER

## 2024-02-29 PROCEDURE — 97161 PT EVAL LOW COMPLEX 20 MIN: CPT | Mod: GP

## 2024-02-29 PROCEDURE — 36415 COLL VENOUS BLD VENIPUNCTURE: CPT | Performed by: INTERNAL MEDICINE

## 2024-02-29 PROCEDURE — 83735 ASSAY OF MAGNESIUM: CPT | Performed by: INTERNAL MEDICINE

## 2024-02-29 PROCEDURE — 97165 OT EVAL LOW COMPLEX 30 MIN: CPT | Mod: GO

## 2024-02-29 PROCEDURE — 80202 ASSAY OF VANCOMYCIN: CPT | Performed by: INTERNAL MEDICINE

## 2024-02-29 PROCEDURE — C9113 INJ PANTOPRAZOLE SODIUM, VIA: HCPCS | Performed by: NURSE PRACTITIONER

## 2024-02-29 PROCEDURE — 2500000004 HC RX 250 GENERAL PHARMACY W/ HCPCS (ALT 636 FOR OP/ED): Performed by: NURSE PRACTITIONER

## 2024-02-29 PROCEDURE — 82550 ASSAY OF CK (CPK): CPT

## 2024-02-29 PROCEDURE — 2500000004 HC RX 250 GENERAL PHARMACY W/ HCPCS (ALT 636 FOR OP/ED): Performed by: INTERNAL MEDICINE

## 2024-02-29 PROCEDURE — 80048 BASIC METABOLIC PNL TOTAL CA: CPT | Performed by: INTERNAL MEDICINE

## 2024-02-29 PROCEDURE — 2500000001 HC RX 250 WO HCPCS SELF ADMINISTERED DRUGS (ALT 637 FOR MEDICARE OP): Performed by: STUDENT IN AN ORGANIZED HEALTH CARE EDUCATION/TRAINING PROGRAM

## 2024-02-29 PROCEDURE — 99232 SBSQ HOSP IP/OBS MODERATE 35: CPT | Performed by: INTERNAL MEDICINE

## 2024-02-29 PROCEDURE — 1100000001 HC PRIVATE ROOM DAILY

## 2024-02-29 RX ORDER — DAPTOMYCIN 50 MG/ML
950 INJECTION, POWDER, LYOPHILIZED, FOR SOLUTION INTRAVENOUS DAILY
Qty: 17100 MG | Refills: 0 | Status: SHIPPED | OUTPATIENT
Start: 2024-02-29 | End: 2024-03-18

## 2024-02-29 RX ORDER — DAPTOMYCIN 50 MG/ML
950 INJECTION, POWDER, LYOPHILIZED, FOR SOLUTION INTRAVENOUS DAILY
Qty: 17100 MG | Refills: 0 | Status: SHIPPED | OUTPATIENT
Start: 2024-02-29 | End: 2024-02-29 | Stop reason: SDUPTHER

## 2024-02-29 RX ADMIN — HEPARIN SODIUM 7500 UNITS: 5000 INJECTION INTRAVENOUS; SUBCUTANEOUS at 18:18

## 2024-02-29 RX ADMIN — FUROSEMIDE 40 MG: 10 INJECTION, SOLUTION INTRAMUSCULAR; INTRAVENOUS at 21:48

## 2024-02-29 RX ADMIN — INSULIN LISPRO 2 UNITS: 100 INJECTION, SOLUTION INTRAVENOUS; SUBCUTANEOUS at 08:48

## 2024-02-29 RX ADMIN — DAPTOMYCIN 950 MG: 500 INJECTION, POWDER, LYOPHILIZED, FOR SOLUTION INTRAVENOUS at 12:41

## 2024-02-29 RX ADMIN — PIPERACILLIN SODIUM AND TAZOBACTAM SODIUM 3.38 G: 3; .375 INJECTION, SOLUTION INTRAVENOUS at 05:47

## 2024-02-29 RX ADMIN — TRAMADOL HYDROCHLORIDE 50 MG: 50 TABLET, COATED ORAL at 09:04

## 2024-02-29 RX ADMIN — INSULIN GLARGINE 35 UNITS: 100 INJECTION, SOLUTION SUBCUTANEOUS at 21:47

## 2024-02-29 RX ADMIN — HEPARIN SODIUM 7500 UNITS: 5000 INJECTION INTRAVENOUS; SUBCUTANEOUS at 08:48

## 2024-02-29 RX ADMIN — FUROSEMIDE 40 MG: 10 INJECTION, SOLUTION INTRAMUSCULAR; INTRAVENOUS at 08:48

## 2024-02-29 ASSESSMENT — COGNITIVE AND FUNCTIONAL STATUS - GENERAL
DAILY ACTIVITIY SCORE: 9
MOBILITY SCORE: 6
TURNING FROM BACK TO SIDE WHILE IN FLAT BAD: TOTAL
PERSONAL GROOMING: TOTAL
HELP NEEDED FOR BATHING: TOTAL
DRESSING REGULAR UPPER BODY CLOTHING: TOTAL
WALKING IN HOSPITAL ROOM: TOTAL
TOILETING: TOTAL
PERSONAL GROOMING: A LITTLE
DAILY ACTIVITIY SCORE: 12
CLIMB 3 TO 5 STEPS WITH RAILING: TOTAL
WALKING IN HOSPITAL ROOM: TOTAL
MOVING TO AND FROM BED TO CHAIR: TOTAL
TOILETING: TOTAL
MOBILITY SCORE: 6
MOVING FROM LYING ON BACK TO SITTING ON SIDE OF FLAT BED WITH BEDRAILS: TOTAL
TURNING FROM BACK TO SIDE WHILE IN FLAT BAD: TOTAL
HELP NEEDED FOR BATHING: TOTAL
CLIMB 3 TO 5 STEPS WITH RAILING: TOTAL
STANDING UP FROM CHAIR USING ARMS: TOTAL
MOVING TO AND FROM BED TO CHAIR: TOTAL
MOVING FROM LYING ON BACK TO SITTING ON SIDE OF FLAT BED WITH BEDRAILS: TOTAL
DRESSING REGULAR UPPER BODY CLOTHING: A LOT
DRESSING REGULAR LOWER BODY CLOTHING: TOTAL
DRESSING REGULAR LOWER BODY CLOTHING: TOTAL
STANDING UP FROM CHAIR USING ARMS: TOTAL

## 2024-02-29 ASSESSMENT — PAIN SCALES - GENERAL
PAINLEVEL_OUTOF10: 10 - WORST POSSIBLE PAIN
PAINLEVEL_OUTOF10: 0 - NO PAIN

## 2024-02-29 ASSESSMENT — PAIN SCALES - WONG BAKER: WONGBAKER_NUMERICALRESPONSE: HURTS WORST

## 2024-02-29 NOTE — PROGRESS NOTES
Vancomycin Dosing by Pharmacy- FOLLOW UP    Kody Choi is a 53 y.o. year old adult who Pharmacy has been consulted for vancomycin dosing for scrotal cellulitis and chronic R knee septic arthritis. Based on the patient's indication and renal status this patient is being dosed based on a goal AUC of 400-600.     Renal function is currently improving.    Current vancomycin dose: 1250 mg given every 24 hours    Estimated vancomycin AUC on current dose: 380 mg/L.hr     Visit Vitals  BP (!) 150/91   Pulse 87   Temp 36.2 °C (97.2 °F)   Resp 18        Lab Results   Component Value Date    CREATININE 1.56 (H) 02/29/2024    CREATININE 1.74 (H) 02/28/2024    CREATININE 1.75 (H) 02/27/2024    CREATININE 1.98 (H) 02/23/2024        Lab Results   Component Value Date    BLOODCULT No growth at 1 day 02/27/2024    BLOODCULT No growth at 1 day 02/27/2024    TISWDCULTSME  02/19/2024     (3+) Moderate Mixed Gram-Positive and Gram-Negative Bacteria       I/O last 3 completed shifts:  In: 300 (1.9 mL/kg) [IV Piggyback:300]  Out: 3900 (24.5 mL/kg) [Urine:3900 (0.7 mL/kg/hr)]  Dosing Weight: 159 kg       Assessment/Plan    Day #3 of vancomycin therapy.   Below goal AUC. Orders placed for new vancomcyin regimen of 1500 mg every 24 hours to begin at 2000 tonight.     This dosing regimen is predicted by [employer] to result in the following pharmacokinetic parameters:  AUC24,ss: 449 mg/L.hr  Probability of AUC24 > 400: 66 %  Ctrough,ss: 14.1 mg/L  Probability of Ctrough,ss > 20: 22 %  Probability of nephrotoxicity (Lodise EDIE 2009): 9 %    The next level will be obtained on 3/1 with AM labs. May be obtained sooner if clinically indicated.   Will continue to monitor renal function daily while on vancomycin and order serum creatinine at least every 48 hours if not already ordered.  Will follow for continued vancomycin needs, clinical response, and signs/symptoms of toxicity.     Please call with any questions.  Jaci Philip, PharmD,  [facility]  [phone]

## 2024-02-29 NOTE — CONSULTS
“Wound” Ostomy Consultation        1. Chief Complaint: wound check and evaluation   2. History of Present Illness:   right knee wound   3. Past Medical History: Reviewed   4. Past Surgical History: Reviewed  5. Allergies:     Reviewed  6. Social/Family History: Reviewed  7. Medications:  Reviewed  8. Labs/Data/Test Results: Reviewed   9. Progress Notes:  Reviewed     10. System Review: system review was performed with these findings:     Integumentary: Will be described below    11.  Physical Examination:    Integumentary: Normal skin color, texture, and turgor, No dry/scaly/cracked skin; No ecchymotic areas; No friable skin; No rash/lesions/excoriation/burns; No diaphoresis; No jaundice, osvaldo, pallor skin; right knee wound: white/greenish drainage present no odor,  per EMR no surgical interventions can be offered. Patient was offered an amputation but decline.        12. Wound Pain/Discomfort: No      Assessment/Plan/Treatment Recommendations:     Right knee: wash with wound wash, apply calcium alginate, cover with foam, change daily   Turn as per policy offloading from pressure sites(s0  Interventions as per Ricky Scale are in place    Education provided; Treatment demonstrated; Questions answered  D/W RN / MD   Orders received     I have spent minutes with the patient for physical and wound assessment, wound cleaning, dressing demonstration and answering questions. More than 50% of the time spent with the patient included education/counselling/coordination of care.     Rosario Chatman RN WOCN

## 2024-02-29 NOTE — PROGRESS NOTES
Subjective Data:  Denies having chest pain.  Having difficulty getting himself to the bed and laying flat because of body habitus and limited mobility.     Objective Data:  Last Recorded Vitals:  Vitals:    02/29/24 0420 02/29/24 0822 02/29/24 0850 02/29/24 0900   BP: 143/78 (!) 150/91 134/83 (!) 150/91   BP Location: Left arm  Left arm Right arm   Patient Position: Lying  Lying Lying   Pulse: 87  83 90   Resp: 18 18 18 18   Temp: 36.1 °C (97 °F) 36.2 °C (97.2 °F) 36.3 °C (97.3 °F) 36.2 °C (97.2 °F)   TempSrc: Temporal  Temporal Temporal   SpO2: 96%  96% 96%   Weight:       Height:           Last Labs:  CBC - 2/29/2024:  7:20 AM  8.5 9.7 474    33.5      CMP - 2/29/2024:  7:20 AM  8.3 6.1 69 --- 0.4   4.5 2.7 225 450      PTT - 2/17/2024:  5:17 PM  1.1   12.3 34     TROPHS   Date/Time Value Ref Range Status   02/28/2024 06:58 AM 34 0 - 20 ng/L Final   02/28/2024 02:09 AM 40 0 - 20 ng/L Final   02/27/2024 07:10 PM 43 0 - 20 ng/L Final     BNP   Date/Time Value Ref Range Status   02/28/2024 02:09 AM 1,268 0 - 99 pg/mL Final   04/12/2021 06:51  0 - 99 pg/mL Final     Comment:     .  <100 pg/mL - Heart failure unlikely  100-299 pg/mL - Intermediate probability of acute heart  .               failure exacerbation. Correlate with clinical  .               context and patient history.    >=300 pg/mL - Heart Failure likely. Correlate with clinical  .               context and patient history.   Biotin interference may cause falsely decreased results.   Patients taking a Biotin dose of up to 5 mg/day should   refrain from taking Biotin for 24 hours before sample   collection. Providers may contact their local laboratory   for further information.       HGBA1C   Date/Time Value Ref Range Status   01/26/2024 04:13 AM 16.6 see below % Final     Comment:     Hemoglobin A1c is >15%. Marked elevations in HbA1c are commonly due to poor glycemic control in diabetic patients. Rarely, hemoglobin variants may cause false  elevation of HbA1c that is discordant with glycemic control status.    10/15/2023 05:28 PM 14.7 see below % Final     VLDL   Date/Time Value Ref Range Status   09/24/2020 05:30 AM 70 0 - 40 mg/dL Final   06/03/2020 05:37 PM 60 0 - 40 mg/dL Final   03/28/2019 05:00 AM 46 0 - 40 mg/dL Final      Last I/O:  I/O last 3 completed shifts:  In: 300 (1.9 mL/kg) [IV Piggyback:300]  Out: 3900 (24.5 mL/kg) [Urine:3900 (0.7 mL/kg/hr)]  Dosing Weight: 159 kg     Past Cardiology Tests (Last 3 Years):  EKG:  ECG 12 lead 02/27/2024      ECG 12 lead 02/16/2024    Echo:  Transthoracic Echo (TTE) Complete 01/30/2024    Ejection Fractions:  EF   Date/Time Value Ref Range Status   01/30/2024 10:45 AM 28 %      Cath:  No results found for this or any previous visit from the past 1095 days.    Stress Test:  No results found for this or any previous visit from the past 1095 days.    Cardiac Imaging:  No results found for this or any previous visit from the past 1095 days.      Inpatient Medications:  Scheduled medications   Medication Dose Route Frequency    aspirin  81 mg oral Daily    [Held by provider] atorvastatin  40 mg oral Daily    daptomycin  6 mg/kg intravenous q24h SUDHIR    furosemide  40 mg intravenous BID    heparin (porcine)  7,500 Units subcutaneous q8h SUDHIR    insulin glargine  35 Units subcutaneous Nightly    insulin lispro  0-10 Units subcutaneous Before meals & nightly    pantoprazole  40 mg intravenous Daily     PRN medications   Medication    acetaminophen    dextrose 10 % in water (D10W)    dextrose    fluticasone    glucagon    metoprolol    traMADol     Continuous Medications   Medication Dose Last Rate       Physical Exam:  Physical Exam  Constitutional:       Appearance: He is obese.   HENT:      Mouth/Throat:      Mouth: Mucous membranes are dry.      Pharynx: Oropharynx is clear.   Eyes:      Pupils: Pupils are equal, round, and reactive to light.   Cardiovascular:      Rate and Rhythm: Rhythm irregularly irregular.    Pulmonary:      Breath sounds: Normal breath sounds.   Abdominal:      General: Bowel sounds are normal.   Musculoskeletal:      Right knee: Swelling present.      Comments: Scrotal edema, erythema   Skin:     General: Skin is warm and dry.      Capillary Refill: Capillary refill takes less than 2 seconds.   Neurological:      General: No focal deficit present.      Mental Status: He is alert and oriented to person, place, and time.   Psychiatric:         Mood and Affect: Mood normal. Affect is angry.   Assessment/Plan   Acute on chronic decompensated combined systolic and diastolic heart failure  Elevated troponin  Diabetes mellitus type 2  Recurrent infection  Morbid obesity     Patient has atypical chest pain complaint with complaining of pain all over the body.  His EKG shows evidence of a Q wave in anterior leads and low voltage which could be due to body habitus as well as evidence for prior infarcts.  His troponin elevation is mild and stable.  More likely this represents mostly type II myocardial infarction in the setting of decompensated heart failure and his recurrent infection.  His diabetes has been poorly controlled with hemoglobin A1c above 16.  At this point patient is not a good candidate for any invasive test from cardiac standpoint such as heart catheterization in the absence of anginal symptoms and with a stable troponin.  I suggest continue with baby aspirin, statin, diabetes control and blood pressure control.  He will need close follow-up in cardiology office for guideline directed medical therapy for his heart failure.  Will continue with IV Lasix for volume control.  Eventually depending on his clinical progress and after clearing the infection, if his EF remains to be low, further considerations such as heart catheterization as well as consideration for primary prevention ICD can be discussed but first there are several other steps that needs to be followed.  I will continue to monitor the  patient while he is in the hospital.     February 29, 2024  Patient is responding well to her current dose of IV Lasix.  Renal function is slightly better today at 1.5.  Will continue with current medications from cardiac standpoint.          I appreciate the opportunity to participate in this patient's care.      Jaren Chaney MD, PhD, [facility], [facility]  Interventional Cardiology, [facility] [place]  Associate Professor of Medicine, [facility]  [email or web address]   Office:       Macro dragon disclaimer: This note was dictated by speech recognition. Minor errors in transcription may be present.

## 2024-02-29 NOTE — ED PROVIDER NOTES
EMERGENCY DEPARTMENT ENCOUNTER      Pt Name: Kody Choi  MRN: [id number]  Birthdate 1970  Date of evaluation: 2/27/2024  Provider: Luis Schwarz DO    CHIEF COMPLAINT       Chief Complaint   Patient presents with    Groin Swelling    Wound Infection     Pt arrives [facility] from [facility]. Has severe lower leg and testicular swelling with inability to urinate today. Pt has MRSA in wound on right leg and is being treated with antibiotics @ facility. States SOB. Denies chest pain.     Shortness of Breath       HISTORY OF PRESENT ILLNESS    Kody Choi is a 53 y.o. adult who presents to the emergency department with EMS for decreased urine output as well as significant testicular swelling in the past 24 hours at [facility] nursing facility.  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  He states that this has not worsened.  He only reports pain in his testicles throughout.  He is uncertain why he has been unable to urinate at this time.  Reports fevers at facility although unable to clarify to what degree.  Patient is a poor medical historian and cannot provide any additional details at this time. Patient denies any associated shortness of breath for me.  Of note this was reported by triage.          Nursing Notes were reviewed.    REVIEW OF SYSTEMS     CONSTITUTIONAL: Denies fever, sweats, chills.   NEURO: Denies difficulty walking, numbness, weakness, tingling, headache.   HEENT: Denies sore throat, rhinorrhea, changes in vision.   CARDIO: Denies chest pain, palpitations.  PULM: Denies shortness of breath, cough.   GI: Denies abdominal pain, nausea, vomiting, diarrhea, constipation, melena, hematochezia.  : Endorses testicular swelling.    Denies painful urination, frequency, hematuria.   MSK: Denies recent trauma.   SKIN: Endorses chronic knee infection.  ENDOCRINE: Denies unexpected weight-loss.   HEME: Denies bleeding disorder.     PAST MEDICAL HISTORY     Past  Medical History:   Diagnosis Date    Personal history of other endocrine, nutritional and metabolic disease     History of type 2 diabetes mellitus    Unspecified astigmatism, bilateral 01/04/2016    Astigmatism, bilateral       SURGICAL HISTORY       Past Surgical History:   Procedure Laterality Date    KNEE SURGERY  10/29/2014    Knee Surgery       ALLERGIES     Loratadine, Pollen extracts, and Lisinopril    FAMILY HISTORY     No family history on file.     SOCIAL HISTORY       Social History     Socioeconomic History    Marital status: Single     Spouse name: None    Number of children: None    Years of education: None    Highest education level: None   Occupational History    None   Tobacco Use    Smoking status: Never     Passive exposure: Never    Smokeless tobacco: Never   Substance and Sexual Activity    Alcohol use: None    Drug use: None    Sexual activity: None   Other Topics Concern    None   Social History Narrative    None     Social Determinants of Health     Financial Resource Strain: Low Risk  (2/28/2024)    Overall Financial Resource Strain (CARDIA)     Difficulty of Paying Living Expenses: Not hard at all   Food Insecurity: Not on file   Transportation Needs: No Transportation Needs (2/28/2024)    PRAPARE - Transportation     Lack of Transportation (Medical): No     Lack of Transportation (Non-Medical): No   Physical Activity: Not on file   Stress: Not on file   Social Connections: Not on file   Intimate Partner Violence: Not on file   Housing Stability: Low Risk  (2/28/2024)    Housing Stability Vital Sign     Unable to Pay for Housing in the Last Year: No     Number of Places Lived in the Last Year: 2     Unstable Housing in the Last Year: No       PHYSICAL EXAM   VS: As documented in the triage note from today's date and EMR flowsheet were reviewed.  Gen: Morbidly obese. No acute distress. Seated in bed. Appears nontoxic.  Alert and oriented to person time and place although appears to be  somewhat confused at times.  Skin: Warm. Dry. Intact. No rashes or lesions.  Chronic infection of the right knee with minimal erythema.  Appears to be localized patient does have scrotal erythema no appreciable fluctuance not rapidly expanding no bulla low concern for necrotizing infection.  Eyes: Pupils equally round and reactive to light. Clear sclera.   HENT: Atraumatic appearance. Mucosal membranes moist. No oral lesions, uvula midline, airway patent.   CV: Regular rate and regular rhythm. S1, S2. No pedal edema. Warm extremities.  Resp: Nonlabored breathing Clear to auscultation bilaterally. No increased work of breathing.  Saturating appropriately on room air.  GI: Soft and nontender. No rebound or guarding. Bowel sounds x4 present.  Enriquez sign McBurney's point tenderness is negative no CVA tenderness.  MSK: Symmetric muscle bulk. No joint swelling in the extremities. Compartments are soft. Neurovascularly intact x4 extremities. Radial pulses +2 equal bilaterally.  Pedal pulses +2 equal bilaterally.  Neuro: Alert. Speech fluent. Moving all extremities. No focal deficits.   Psych: Appropriate. Kempt.    DIAGNOSTIC RESULTS   RADIOLOGY:   Non-plain film images such as CT, Ultrasound and MRI are read by the radiologist. Plain radiographic images are visualized and preliminarily interpreted by the emergency physician with the below findings: Chest x-ray shows no evidence of widened mediastinum or pneumonia.      Interpretation per the Radiologist below, if available at the time of this note:    CT head wo IV contrast   Final Result   No evidence of acute cortical infarct or intracranial hemorrhage.             MACRO:   None        Signed by: Vargas Mendoza 2/27/2024 9:59 PM   Dictation workstation:   [id number]      US scrotum w doppler   Final Result   Bilateral hydroceles. Severe scrotal edema. The testicles are   otherwise unremarkable.   Normal testicular spectral Doppler evaluation.   Signed by Aubrey Zapien  MD      XR chest 1 view   Final Result   Mild cardiomegaly.   Signed by Mg Almanzar MD            ED BEDSIDE ULTRASOUND:   Performed by ED Physician - none    LABS:  Labs Reviewed   CBC WITH AUTO DIFFERENTIAL - Abnormal       Result Value    WBC 8.1      nRBC 1.5 (*)     RBC 4.05      Hemoglobin 9.6 (*)     Hematocrit 32.6 (*)     MCV 81      MCH 23.7 (*)     MCHC 29.4 (*)     RDW 17.2 (*)     Platelets 446      Neutrophils % 68.5      Immature Granulocytes %, Automated 0.5      Lymphocytes % 17.4      Monocytes % 10.1      Eosinophils % 2.1      Basophils % 1.4      Neutrophils Absolute 5.57      Immature Granulocytes Absolute, Automated 0.04      Lymphocytes Absolute 1.41      Monocytes Absolute 0.82      Eosinophils Absolute 0.17      Basophils Absolute 0.11 (*)    COMPREHENSIVE METABOLIC PANEL - Abnormal    Glucose 239 (*)     Sodium 136      Potassium 4.7      Chloride 108 (*)     Bicarbonate 21      Anion Gap 12      Urea Nitrogen 59 (*)     Creatinine 1.75 (*)     eGFR 34 (*)     Calcium 7.6 (*)     Albumin 2.7 (*)     Alkaline Phosphatase 450 (*)     Total Protein 6.1 (*)     AST 69 (*)     Bilirubin, Total 0.4       (*)    URINALYSIS WITH REFLEX CULTURE AND MICROSCOPIC - Abnormal    Color, Urine Yellow      Appearance, Urine Hazy (*)     Specific Gravity, Urine 1.014      pH, Urine 5.0      Protein, Urine >=500 (3+) (*)     Glucose, Urine 50 (1+) (*)     Blood, Urine SMALL (1+) (*)     Ketones, Urine NEGATIVE      Bilirubin, Urine NEGATIVE      Urobilinogen, Urine <2.0      Nitrite, Urine NEGATIVE      Leukocyte Esterase, Urine NEGATIVE     TROPONIN I, HIGH SENSITIVITY - Abnormal    Troponin I, High Sensitivity 43 (*)     Narrative:     Less than 99th percentile of normal range cutoff-  Female and children under 18 years old <14 ng/L; Male <21 ng/L: Negative  Repeat testing should be performed if clinically indicated.     Female and children under 18 years old 14-50 ng/L; Male 21-50  ng/L:  Consistent with possible cardiac damage and possible increased clinical   risk. Serial measurements may help to assess extent of myocardial damage.     >50 ng/L: Consistent with cardiac damage, increased clinical risk and  myocardial infarction. Serial measurements may help assess extent of   myocardial damage.      NOTE: Children less than 1 year old may have higher baseline troponin   levels and results should be interpreted in conjunction with the overall   clinical context.     NOTE: Troponin I testing is performed using a different   testing methodology at [facility] than at other   [facility]. Direct result comparisons should only   be made within the same method.   SEDIMENTATION RATE, AUTOMATED - Abnormal    Sedimentation Rate 64 (*)    C-REACTIVE PROTEIN - Abnormal    C-Reactive Protein 3.07 (*)    TROPONIN I, HIGH SENSITIVITY - Abnormal    Troponin I, High Sensitivity 40 (*)     Narrative:     Less than 99th percentile of normal range cutoff-  Female and children under 18 years old <14 ng/L; Male <21 ng/L: Negative  Repeat testing should be performed if clinically indicated.     Female and children under 18 years old 14-50 ng/L; Male 21-50 ng/L:  Consistent with possible cardiac damage and possible increased clinical   risk. Serial measurements may help to assess extent of myocardial damage.     >50 ng/L: Consistent with cardiac damage, increased clinical risk and  myocardial infarction. Serial measurements may help assess extent of   myocardial damage.      NOTE: Children less than 1 year old may have higher baseline troponin   levels and results should be interpreted in conjunction with the overall   clinical context.     NOTE: Troponin I testing is performed using a different   testing methodology at [facility] than at other   [facility]. Direct result comparisons should only   be made within the same method.   CBC - Abnormal    WBC 8.8      nRBC 1.1 (*)      RBC 3.96 (*)     Hemoglobin 8.9 (*)     Hematocrit 31.8 (*)     MCV 80      MCH 22.5 (*)     MCHC 28.0 (*)     RDW 17.5 (*)     Platelets 461 (*)    BASIC METABOLIC PANEL - Abnormal    Glucose 230 (*)     Sodium 136      Potassium 4.7      Chloride 108 (*)     Bicarbonate 21      Anion Gap 12      Urea Nitrogen 53 (*)     Creatinine 1.74 (*)     eGFR 34 (*)     Calcium 7.6 (*)    B-TYPE NATRIURETIC PEPTIDE - Abnormal    BNP 1,268 (*)     Narrative:        <100 pg/mL - Heart failure unlikely  100-299 pg/mL - Intermediate probability of acute heart                  failure exacerbation. Correlate with clinical                  context and patient history.    >=300 pg/mL - Heart Failure likely. Correlate with clinical                  context and patient history.    BNP testing is performed using different testing methodology at [facility] than at other [facility]. Direct result comparisons should only be made within the same method.      TROPONIN I, HIGH SENSITIVITY - Abnormal    Troponin I, High Sensitivity 34 (*)     Narrative:     Less than 99th percentile of normal range cutoff-  Female and children under 18 years old <14 ng/L; Male <21 ng/L: Negative  Repeat testing should be performed if clinically indicated.     Female and children under 18 years old 14-50 ng/L; Male 21-50 ng/L:  Consistent with possible cardiac damage and possible increased clinical   risk. Serial measurements may help to assess extent of myocardial damage.     >50 ng/L: Consistent with cardiac damage, increased clinical risk and  myocardial infarction. Serial measurements may help assess extent of   myocardial damage.      NOTE: Children less than 1 year old may have higher baseline troponin   levels and results should be interpreted in conjunction with the overall   clinical context.     NOTE: Troponin I testing is performed using a different   testing methodology at [facility] than at other   [facility]  . Direct result comparisons should only   be made within the same method.   BLOOD CULTURE - Normal    Blood Culture No growth at 1 day     BLOOD CULTURE - Normal    Blood Culture No growth at 1 day     LACTATE - Normal    Lactate 0.8      Narrative:     Venipuncture immediately after or during the administration of Metamizole may lead to falsely low results. Testing should be performed immediately  prior to Metamizole dosing.   SARS-COV-2 AND INFLUENZA A/B PCR - Normal    Flu A Result Not Detected      Flu B Result Not Detected      Coronavirus 2019, PCR Not Detected      Narrative:     This assay has received FDA Emergency Use Authorization (EUA) and  is only authorized for the duration of time that circumstances exist to justify the authorization of the emergency use of in vitro diagnostic tests for the detection of SARS-CoV-2 virus and/or diagnosis of COVID-19 infection under section 564(b)(1) of the Act, 21 U.S.C. 360bbb-3(b)(1). Testing for SARS-CoV-2 is only recommended for patients who meet current clinical and/or epidemiological criteria as defined by federal, state, or local public health directives. This assay is an in vitro diagnostic nucleic acid amplification test for the qualitative detection of SARS-CoV-2, Influenza A, and Influenza B from nasopharyngeal specimens and has been validated for use at [facility]. Negative results do not preclude COVID-19 infections or Influenza A/B infections, and should not be used as the sole basis for diagnosis, treatment, or other management decisions. If Influenza A/B and RSV PCR results are negative, testing for Parainfluenza virus, Adenovirus and Metapneumovirus is routinely performed for [facility] pediatric oncology and intensive care inpatients, and is available on other patients by placing an add-on request.    PROTIME-INR - Normal    Protime 12.3      INR 1.1     URINALYSIS WITH REFLEX CULTURE AND MICROSCOPIC    Narrative:     The  following orders were created for panel order Urinalysis with Reflex Culture and Microscopic.  Procedure                               Abnormality         Status                     ---------                               -----------         ------                     Urinalysis with Reflex C...[[id number]]  Abnormal            Final result               Extra Urine Gray Tube[[id number]]                            Final result                 Please view results for these tests on the individual orders.   EXTRA URINE GRAY TUBE    Extra Tube Hold for add-ons.     VANCOMYCIN   MAGNESIUM   CBC   BASIC METABOLIC PANEL   URINALYSIS MICROSCOPIC WITH REFLEX CULTURE    WBC, Urine 1-5      RBC, Urine 1-2      Mucus, Urine 1+         All other labs were within normal range or not returned as of this dictation.    EMERGENCY DEPARTMENT COURSE/MDM:   Vitals:      I reviewed the patient's triage vitals and they are arrives to the emergency department slightly hypertensive recommended close follow-up primary physician for repeat checks.  Borderline febrile and tachycardic with slightly increased respiratory rate blood cultures including basic labs ordered as well as broad-spectrum IV antibiotics.    At this time I do have a concern for scrotal cellulitis there is no evidence of necrotizing infection on clinical examination.  He does appear to be somewhat confused at times therefore CT imaging of his head was also pursued.  Lab work reveals stable anemia.  Renal function is within normal range per baseline he is hyperglycemic in setting of known diabetic no evidence of DKA normal anion gap.  LFTs are slightly elevated although no right upper quadrant tenderness at this time will need close follow-up.  Viral swabs are negative.  Ultrasound imaging shows no evidence of torsion hydrocele with scrotal edema.  CT imaging of the head grossly unremarkable no intracranial bleed or mass.  Patient will need to be admitted to the hospital was  signed out to the oncoming physician to follow-up on CT imaging of the pelvis and disposition.    ED Course as of 02/29/24 0108   Tue Feb 27, 2024   1852 Patient was not initially in the room when I went to evaluate [MG]   1900 Lower concern for PE as patient is saturating appropriately on room air and is on heparin prophylaxis. [MG]   1926 Interpreted by the Emergency Department Attending: ECG revealed normal sinus rhythm at a rate of 83 beats per minute with MA interval 160 , QRS of 124 , QTc of 491.  No acute injury pattern. Previous EKG on February 16 revealed no significant changes.    [MG]      ED Course User Index  [MG] Luis Schwarz DO         Diagnoses as of 02/29/24 0108   Cellulitis of scrotum       Patient was counseled regarding labs, imaging, likely diagnosis, and plan. All questions were answered.     ------------------------------------------------------------------  Information provided by EMS and the patient  Consults pharmacy for antibiotic recommendations pharmacist Cat  Due to social and economic determinants resides at rehab facility  Past medical history complicating workup diabetes  Previous medical records reviewed recent hospitalization for septic joint  ------------------------------------------------------------------  ED Medications administered this visit:    Medications   sodium chloride 0.9 % bolus 1,000 mL (0 mL intravenous Stopped 2/28/24 0023)   piperacillin-tazobactam-dextrose (Zosyn) IV 4.5 g (0 g intravenous Stopped 2/27/24 1954)   vancomycin (Vancocin) 2 g in dextrose 5 % in water (D5W) 500 mL IV (0 g intravenous Stopped 2/28/24 0023)   iohexol (OMNIPaque) 350 mg iodine/mL solution 85 mL (85 mL intravenous Given 2/27/24 2121)        Final Impression:   1. Cellulitis of scrotum          Luis Gercevich, DO    (Please note that portions of this note were completed with a voice recognition program.  Efforts were made to edit the dictations but occasionally words are  mis-transcribed.)     Luis Schwarz, DO  02/29/24 0115

## 2024-02-29 NOTE — CONSULTS
Vancomycin Dosing by Pharmacy- Cessation of Therapy    Consult to pharmacy for vancomycin dosing has been discontinued by the prescriber, pharmacy will sign off at this time.    Please call pharmacy if there are further questions or re-enter a consult if vancomycin is resumed.     Jaci Philip, PharmD

## 2024-02-29 NOTE — PROGRESS NOTES
Physical Therapy    Physical Therapy    Physical Therapy Evaluation    Patient Name: Kody Choi  MRN: [id number]  Today's Date: 2/29/2024   Time Calculation  Start Time: 0915  Stop Time: 0935  Time Calculation (min): 20 min    Assessment/Plan   PT Assessment  PT Assessment Results: Decreased strength, Decreased range of motion, Decreased endurance, Impaired balance, Decreased mobility, Decreased coordination, Pain, Obesity  End of Session Communication: Bedside nurse  End of Session Patient Position: Bed, 3 rail up, Alarm on     PT Plan  Treatment/Interventions: Bed mobility, Transfer training, Strengthening  PT Plan: Skilled PT  PT Frequency: 3 times per week  PT Discharge Recommendations: Moderate intensity level of continued care  PT - OK to Discharge: Yes    Subjective     Current Problem:  Patient Active Problem List   Diagnosis    Pyogenic arthritis of right knee joint, due to unspecified organism (CMS/HCC)    Cellulitis    Cellulitis of scrotum   Psych consult adjustment d/o     General Visit Information:  General  Past Medical History Relevant to Rehab: T2DM, CAD s/p multiple stents and CABG, HFrEF (EF 30% 1/30/24), A Fib, RUPESH, TKA in 2015, and multiple septic arthritis infections in the R knee who presents recurrent right knee pain and swelling. He continues to refuse recommended therapy (amputation), orthopedic surgery not recommending a washout.     Home Living:  Home Living  Type of Home:  (from nh, non-ambulatory x years , requires 3 assist for txs w/ rw bed<>w/c, indep w/c propulsion)    Prior Level of Function:       Precautions:  Precautions  Precautions Comment: falls, ulloa cath    Vital Signs:     Objective     Pain:  Pain Assessment  Pain Assessment:  (10/10 groin, r knee)    Cognition:            Bed Mobility  Bed Mobility:  (dep of 2 to partial dangle eob, antalgic, unable to progress fxal mob as unable to sit up, high fall risk)        Extremity/Trunk Assessments:        Strength RLE  RLE  Overall Strength:  (unable to toerate mmt, d/t pain, grossly 2/5)  Strength LLE  LLE Overall Strength:  (unable to toerate mmt, d/t pain, grossly 2/5)    Outcome Measures:  [facility] Basic Mobility  Turning from your back to your side while in a flat bed without using bedrails: Total  Moving from lying on your back to sitting on the side of a flat bed without using bedrails: Total  Moving to and from bed to chair (including a wheelchair): Total  Standing up from a chair using your arms (e.g. wheelchair or bedside chair): Total  To walk in hospital room: Total  Climbing 3-5 steps with railing: Total  Basic Mobility - Total Score: 6                            Goals:  Encounter Problems       Encounter Problems (Active)       PT Problem       PT Goal 1 (Progressing)       Start:  02/29/24    Expected End:  03/14/24       Bed mob w/ min of 3         PT Goal 2 (Progressing)       Start:  02/29/24    Expected End:  03/14/24       Txs bed<>chair w/ min of 3 w/ rw          PT Goal 3 (Progressing)       Start:  02/29/24    Expected End:  03/14/24       20-30 reps ble there ex              Education Documentation  Mobility Training, taught by Jillian Way, PT at 2/29/2024 11:21 AM.  Learner: Patient  Readiness: Acceptance  Method: Explanation  Response: Verbalizes Understanding    Education Comments  No comments found.

## 2024-02-29 NOTE — PROGRESS NOTES
"Kody Choi is a 53 y.o. adult on day 2 of admission presenting with Cellulitis of scrotum.    Subjective   Continues on IV antibiotics for right knee infection.        Objective     Physical Exam  Constitutional:       Appearance: He is obese.      Comments: Not wearing any clothes, scrotum is swollen   HENT:      Head: Normocephalic and atraumatic.      Right Ear: External ear normal.      Left Ear: External ear normal.      Nose: Nose normal.      Mouth/Throat:      Mouth: Mucous membranes are dry.   Eyes:      Extraocular Movements: Extraocular movements intact.      Pupils: Pupils are equal, round, and reactive to light.   Cardiovascular:      Rate and Rhythm: Normal rate and regular rhythm.   Pulmonary:      Effort: Pulmonary effort is normal.      Breath sounds: Normal breath sounds.   Abdominal:      General: Abdomen is flat.      Palpations: Abdomen is soft.   Musculoskeletal:      Cervical back: Normal range of motion and neck supple.      Comments: Right knee erythematous and swollen   Skin:     General: Skin is warm and dry.      Capillary Refill: Capillary refill takes 2 to 3 seconds.   Neurological:      General: No focal deficit present.      Mental Status: He is alert. Mental status is at baseline.   Psychiatric:         Mood and Affect: Mood normal.         Behavior: Behavior normal.         Last Recorded Vitals  Blood pressure (!) 150/91, pulse 90, temperature 36.2 °C (97.2 °F), temperature source Temporal, resp. rate 18, height 1.676 m (5' 6\"), weight (!) 155 kg (341 lb 11.4 oz), SpO2 96 %, unknown if currently breastfeeding.  Intake/Output last 3 Shifts:  I/O last 3 completed shifts:  In: 300 (1.9 mL/kg) [IV Piggyback:300]  Out: 3900 (24.5 mL/kg) [Urine:3900 (0.7 mL/kg/hr)]  Dosing Weight: 159 kg     Relevant Results  ECG 12 lead    Result Date: 2/28/2024  Normal sinus rhythm Cannot rule out Inferior infarct , age undetermined Anterolateral infarct , age undetermined Abnormal ECG When compared " with ECG of 16-FEB-2024 20:38, Sinus rhythm has replaced Electronic ventricular pacemaker Confirmed by Jaren Chaney ([id number]) on 2/28/2024 8:56:04 AM    CT pelvis w IV contrast    Result Date: 2/27/2024  Interpreted By:  Susanna Lewis, STUDY: CT PELVIS W IV CONTRAST;  2/27/2024 9:21 pm   INDICATION: Signs/Symptoms:scrotal swelling.   COMPARISON: Scrotal ultrasound performed on the same day.   ACCESSION NUMBER(S): [id number]   ORDERING CLINICIAN: YANELY RODRIGUEZ   TECHNIQUE: CT of the pelvis was performed.  Standard contiguous axial images were obtained at 3 mm slice thickness through pelvis. Coronal and sagittal reconstructions at 3 mm slice thickness were performed.  85 ml of contrast  Optiray 350 were administered intravenously without immediate complication.   FINDINGS: PELVIS:   BLADDER: Urinary bladder is decompressed secondary to Campos catheterization.   REPRODUCTIVE ORGANS: Prostate gland is unremarkable.   BOWEL: Visualized small bowel and large bowel loops appear grossly unremarkable.   VESSELS: Visualized vasculature appears grossly unremarkable.   PERITONEUM/RETROPERITONEUM/LYMPH NODES: There is small amount of free fluid along the right lower quadrant measuring approximately 20 Hounsfield units. This is incompletely included in the field of view. There are multiple enlarged lymph nodes along the right external iliac chain with largest measuring up to 2 cm in short axis. There are also multiple enlarged right inguinal lymph nodes with the largest measuring up to 1.8 cm in short axis. However no evidence of enlarged lymphadenopathy in the left inguinal region or in the left pelvis.   BONES AND ABDOMINAL WALL: No suspicious osseous lesions in the visualized field of view. There is small left fat containing inguinal hernia. Mild discogenic degenerative changes are noted in the lower lumbar spine. There is diffuse reticulated subcutaneous soft tissue edema throughout the visualized abdominal wall  extending into the included bilateral thighs. There is diffuse subcutaneous soft tissue edema throughout the scrotum. No evidence of peripheral rim enhancing fluid collection within the scrotal wall. No evidence of soft tissue gas.       1.  Diffuse body wall edema extending into visualized bilateral lower extremities. There is also diffuse edematous thickening of the scrotum, which could again be related to similar systemic process/anasarca. No definite CT evidence of peripheral rim enhancing fluid collection to suggest an abscess. No evidence of soft tissue gas within the scrotum. 2. Multiple enlarged right inguinal and right external iliac lymph nodes as described above. This could be related to infectious/inflammatory etiology in the right lower extremity. Recommend clinical correlation with patient's symptomatology in the right lower extremity and further evaluation as clinically warranted. 3. Small volume intraperitoneal free fluid in the right hemiabdomen. This is only partially included in the field of view and is also most likely favored to represent similar findings secondary to 3rd spacing of fluid/anasarca. Recommend correlation with patient's abdominal symptomatology and a dedicated CT abdomen pelvis with contrast can be obtained for further evaluation if clinically warranted.   MACRO: None   Signed by: Susanna Lewis 2/27/2024 10:56 PM Dictation workstation:   [id number]    CT head wo IV contrast    Result Date: 2/27/2024  Interpreted By:  Vargas Mendoza, STUDY: CT HEAD WO IV CONTRAST;  2/27/2024 9:21 pm   INDICATION: Signs/Symptoms:ams.   COMPARISON: Noncontrast head CT of 11/10/2015.   ACCESSION NUMBER(S): [id number]   ORDERING CLINICIAN: YANELY RODRIGUEZ   TECHNIQUE: Noncontrast axial CT scan of head was performed. Angled reformats in brain and bone windows were generated. The images were reviewed in bone, brain, blood and soft tissue windows.   FINDINGS: CSF Spaces: The ventricles, sulci and  basal cisterns are within normal limits. There is no extraaxial fluid collection.   Parenchyma: Moderate to advanced volume loss.  There is periventricular and subcortical white matter hypoattenuation, most in keeping with chronic microvascular ischemic change. The grey-white differentiation is intact. There is no mass effect or midline shift. There is no intracranial hemorrhage.   Calvarium: The calvarium is unremarkable.   Paranasal sinuses and mastoids: Visualized paranasal sinuses and mastoids are clear.       No evidence of acute cortical infarct or intracranial hemorrhage.     MACRO: None   Signed by: Vargas Mendoza 2/27/2024 9:59 PM Dictation workstation:   [id number]    US scrotum w doppler    Result Date: 2/27/2024  STUDY: Scrotal Ultrasound; 2/27/2024 8:12 PM. INDICATION: Scrotal pain and swelling. COMPARISON: None available. ACCESSION NUMBER(S): [id number] ORDERING CLINICIAN: YANELY RODRIGUEZ TECHNIQUE:  Ultrasound of the scrotum and testicular spectral Doppler evaluation performed. FINDINGS:    The right testicle measures 4.1 x 3.1 x 2.9 cm.  It demonstrates a normal homogeneous echotexture.  Normal color and spectral Doppler flow is demonstrated.  The right epididymis measures 1.8 x 1.0 x 1.0 cm and demonstrates normal echogenicity. The left testicle measures 4.5 x 3.0 x 3.2 cm.  It demonstrates a normal homogeneous echotexture.  Normal color and spectral Doppler flow is demonstrated.  The left epididymis measures 1.7 x 1.1 x 1.4 cm and demonstrates a simple cyst measuring 0.5 cm. There are bilateral hydroceles present. There is severe scrotal edema. The scrotal wall measures 5.2 cm on the right and 4.6 cm on the left.    Bilateral hydroceles. Severe scrotal edema. The testicles are otherwise unremarkable. Normal testicular spectral Doppler evaluation. Signed by Aubrey Zapien MD    XR chest 1 view    Result Date: 2/27/2024  STUDY: Chest Radiograph;  2/27/2024 at 19:16 hours. INDICATION: Shortness of  breath. COMPARISON: XR chest 6/27/2023, 1/20/2021 and 1/11/2021. CT chest 2/14/2021 ACCESSION NUMBER(S): [id number] ORDERING CLINICIAN: YANELY RODRIGUEZ TECHNIQUE:  Frontal chest was obtained at 19:16 hours. FINDINGS: CARDIOMEDIASTINAL SILHOUETTE: Mild cardiomegaly present.  Sternotomy wires seen.  LUNGS: Lungs are clear.  ABDOMEN: No remarkable upper abdominal findings.  BONES: No acute osseous changes.    Mild cardiomegaly. Signed by Mg Almanzar MD    XR chest 1 view    Result Date: 2/19/2024  Interpreted By:  Speedy Hoff and Ravi Shweta STUDY: XR CHEST 1 VIEW;  2/19/2024 3:08 pm   INDICATION: Signs/Symptoms:confirm PICC placement right arm length 49 cms.   COMPARISON: Chest radiograph 06/27/2023   ACCESSION NUMBER(S): [id number]   ORDERING CLINICIAN: SHUN SORENSON   FINDINGS: AP radiograph of the chest was provided. Right-sided rotation.   Right upper extremity PICC is present with distal tip faintly visualized overlying the right atrium.   CARDIOMEDIASTINAL SILHOUETTE: Cardiomediastinal silhouette is stable enlarged in size and configuration.   LUNGS: Decreased lung volumes are present with bronchovascular crowding and basilar opacities. There is blunting of the bilateral cardiophrenic angles with mildly increased vascular cephalization as compared to 06/27/2023 which demonstrates similar lung aeration. No discernible pneumothorax.   ABDOMEN: Nonspecific paucity of bowel gas, otherwise no remarkable upper abdominal findings.   BONES: No acute osseous changes.       1. Mildly increased pulmonary vascular cephalization and at least small bilateral pleural effusions. At most, mild interstitial pulmonary edema. 2. Right upper extremity PICC as above.   I personally reviewed the images/study and I agree with the resident findings as stated by Julissa Wiley MD. This study was interpreted at [facility], [place], [place].   MACRO: None   Signed by: Speedy Hoff 2/19/2024 3:57  PM Dictation workstation:   [id number]    Bedside PICC Imaging    Result Date: 2/19/2024  These images are not reportable by radiology and will not be interpreted by  Radiologists.    ECG 12 lead    Result Date: 2/17/2024  Sinus rhythm with occasional ventricular-paced complexes Low voltage QRS Possible Anterolateral infarct , age undetermined Abnormal ECG When compared with ECG of 27-JUN-2023 04:13, Previous ECG has undetermined rhythm, needs review See ED provider note for full interpretation and clinical correlation Confirmed by Arden Becker ([id number]) on 2/17/2024 1:44:13 AM    XR knee right 1-2 views    Result Date: 2/16/2024  Interpreted By:  Brad Hunt, STUDY: XR KNEE RIGHT 1-2 VIEWS; ;  2/16/2024 5:48 pm   INDICATION: Signs/Symptoms:known septic joint.   COMPARISON: Right knee radiographs 01/25/2014.   ACCESSION NUMBER(S): [id number]   ORDERING CLINICIAN: MELVIN RAMSAY   FINDINGS: Abnormal findings in the knee are overall similar compared to prior radiographs 01/25/2024.   Status post total knee arthroplasty. Redemonstration of fragmentation of the femoral cement at the junction of the stem-condylar component as well as cement fragmentation of the tibial component at the stem plateau interface. Mellitus cement fragments again seen throughout the knee slight posterior translation of the tibia relative to femur is less evident than prior. Diffuse soft tissue swelling about the knee. Previously described soft tissue ulcer anteriorly is not well seen.       1.  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 2. Re-demonstration of cement fragmentation of the femoral and tibial components, with multiple cement fragments throughout the knee. 3. Previously seen posterior translation of the tibia relative to the femur is less prominent and alignment appears near anatomic. 4. Previously described ulcer in the anterior soft tissues on the lateral view is not visualized.   MACRO: None   Signed by: Brad  Gilbert 2/16/2024 5:56 PM Dictation workstation:   [id number]    Lower extremity venous duplex right    Result Date: 2/1/2024  Interpreted By:  Vargas Andersen and Tippareddy Charit STUDY: [facility] US LOWER EXTREMITY VENOUS DUPLEX RIGHT;  1/31/2024 3:06 pm   INDICATION: Signs/Symptoms:Exclude DVT (Septic Rt knee).   COMPARISON: Ultrasound 06/27/2023   ACCESSION NUMBER(S): [id number]   ORDERING CLINICIAN: NEVA RUST   TECHNIQUE: Vascular ultrasound of the right lower extremity was performed. Real-time compression views as well as Gray scale, color Doppler and spectral Doppler waveform analysis was performed.   FINDINGS: Evaluation of the visualized portions of the right common femoral vein, proximal, mid, and distal femoral vein, and popliteal vein were performed.   Limitations:  Unable to evaluate the distal femoral vein and calf veins due to body habitus and edema.   The evaluated veins demonstrate normal compressibility. There is intact venous flow demonstrating normal respiratory variability and normal augmentation of flow with calf compression. Therefore, there is no ultrasonographic evidence for deep vein thrombosis within the evaluated veins.   Respiratory variation and augmentation to calf pressure was noted.   Incidental note of right inguinal lymphadenopathy with a lymph node measuring 2.6 x 1.7 x 2.1 cm, likely reactive. Extensive soft tissue edema is noted at the level of the and lower leg.       No sonographic evidence for deep vein thrombosis within the evaluated veins of the right lower extremity. Of note, unable to evaluate the distal femoral vein and calf veins due to body habitus and edema.   I personally reviewed the images/study and I agree with the findings as stated by Pham Bishop MD. This study was interpreted at [place], [place].   MACRO: None   Signed by: Vargas Andersen 2/1/2024 5:45 AM Dictation workstation:   [id number]        ECG 12 lead    Result Date: 2/28/2024  Normal sinus rhythm Cannot rule out Inferior infarct , age undetermined Anterolateral infarct , age undetermined Abnormal ECG When compared with ECG of 16-FEB-2024 20:38, Sinus rhythm has replaced Electronic ventricular pacemaker Confirmed by Jaren Chaney ([id number]) on 2/28/2024 8:56:04 AM    CT pelvis w IV contrast    Result Date: 2/27/2024  Interpreted By:  Susanna Lewis, STUDY: CT PELVIS W IV CONTRAST;  2/27/2024 9:21 pm   INDICATION: Signs/Symptoms:scrotal swelling.   COMPARISON: Scrotal ultrasound performed on the same day.   ACCESSION NUMBER(S): [id number]   ORDERING CLINICIAN: YANELY RODRIGUEZ   TECHNIQUE: CT of the pelvis was performed.  Standard contiguous axial images were obtained at 3 mm slice thickness through pelvis. Coronal and sagittal reconstructions at 3 mm slice thickness were performed.  85 ml of contrast  Optiray 350 were administered intravenously without immediate complication.   FINDINGS: PELVIS:   BLADDER: Urinary bladder is decompressed secondary to Campos catheterization.   REPRODUCTIVE ORGANS: Prostate gland is unremarkable.   BOWEL: Visualized small bowel and large bowel loops appear grossly unremarkable.   VESSELS: Visualized vasculature appears grossly unremarkable.   PERITONEUM/RETROPERITONEUM/LYMPH NODES: There is small amount of free fluid along the right lower quadrant measuring approximately 20 Hounsfield units. This is incompletely included in the field of view. There are multiple enlarged lymph nodes along the right external iliac chain with largest measuring up to 2 cm in short axis. There are also multiple enlarged right inguinal lymph nodes with the largest measuring up to 1.8 cm in short axis. However no evidence of enlarged lymphadenopathy in the left inguinal region or in the left pelvis.   BONES AND ABDOMINAL WALL: No suspicious osseous lesions in the visualized field of view. There is small left fat containing inguinal  hernia. Mild discogenic degenerative changes are noted in the lower lumbar spine. There is diffuse reticulated subcutaneous soft tissue edema throughout the visualized abdominal wall extending into the included bilateral thighs. There is diffuse subcutaneous soft tissue edema throughout the scrotum. No evidence of peripheral rim enhancing fluid collection within the scrotal wall. No evidence of soft tissue gas.       1.  Diffuse body wall edema extending into visualized bilateral lower extremities. There is also diffuse edematous thickening of the scrotum, which could again be related to similar systemic process/anasarca. No definite CT evidence of peripheral rim enhancing fluid collection to suggest an abscess. No evidence of soft tissue gas within the scrotum. 2. Multiple enlarged right inguinal and right external iliac lymph nodes as described above. This could be related to infectious/inflammatory etiology in the right lower extremity. Recommend clinical correlation with patient's symptomatology in the right lower extremity and further evaluation as clinically warranted. 3. Small volume intraperitoneal free fluid in the right hemiabdomen. This is only partially included in the field of view and is also most likely favored to represent similar findings secondary to 3rd spacing of fluid/anasarca. Recommend correlation with patient's abdominal symptomatology and a dedicated CT abdomen pelvis with contrast can be obtained for further evaluation if clinically warranted.   MACRO: None   Signed by: Susanna Lewis 2/27/2024 10:56 PM Dictation workstation:   [id number]    CT head wo IV contrast    Result Date: 2/27/2024  Interpreted By:  Vargas Mendoza, STUDY: CT HEAD WO IV CONTRAST;  2/27/2024 9:21 pm   INDICATION: Signs/Symptoms:ams.   COMPARISON: Noncontrast head CT of 11/10/2015.   ACCESSION NUMBER(S): [id number]   ORDERING CLINICIAN: YANELY RODRIGUEZ   TECHNIQUE: Noncontrast axial CT scan of head was  performed. Angled reformats in brain and bone windows were generated. The images were reviewed in bone, brain, blood and soft tissue windows.   FINDINGS: CSF Spaces: The ventricles, sulci and basal cisterns are within normal limits. There is no extraaxial fluid collection.   Parenchyma: Moderate to advanced volume loss.  There is periventricular and subcortical white matter hypoattenuation, most in keeping with chronic microvascular ischemic change. The grey-white differentiation is intact. There is no mass effect or midline shift. There is no intracranial hemorrhage.   Calvarium: The calvarium is unremarkable.   Paranasal sinuses and mastoids: Visualized paranasal sinuses and mastoids are clear.       No evidence of acute cortical infarct or intracranial hemorrhage.     MACRO: None   Signed by: Vargas Mendoza 2/27/2024 9:59 PM Dictation workstation:   [id number]    US scrotum w doppler    Result Date: 2/27/2024  STUDY: Scrotal Ultrasound; 2/27/2024 8:12 PM. INDICATION: Scrotal pain and swelling. COMPARISON: None available. ACCESSION NUMBER(S): [id number] ORDERING CLINICIAN: YANELY RODRIGUEZ TECHNIQUE:  Ultrasound of the scrotum and testicular spectral Doppler evaluation performed. FINDINGS:    The right testicle measures 4.1 x 3.1 x 2.9 cm.  It demonstrates a normal homogeneous echotexture.  Normal color and spectral Doppler flow is demonstrated.  The right epididymis measures 1.8 x 1.0 x 1.0 cm and demonstrates normal echogenicity. The left testicle measures 4.5 x 3.0 x 3.2 cm.  It demonstrates a normal homogeneous echotexture.  Normal color and spectral Doppler flow is demonstrated.  The left epididymis measures 1.7 x 1.1 x 1.4 cm and demonstrates a simple cyst measuring 0.5 cm. There are bilateral hydroceles present. There is severe scrotal edema. The scrotal wall measures 5.2 cm on the right and 4.6 cm on the left.    Bilateral hydroceles. Severe scrotal edema. The testicles are otherwise unremarkable. Normal  testicular spectral Doppler evaluation. Signed by Aubrey Zapien MD    XR chest 1 view    Result Date: 2/27/2024  STUDY: Chest Radiograph;  2/27/2024 at 19:16 hours. INDICATION: Shortness of breath. COMPARISON: XR chest 6/27/2023, 1/20/2021 and 1/11/2021. CT chest 2/14/2021 ACCESSION NUMBER(S): [id number] ORDERING CLINICIAN: YANELY RODRIGUEZ TECHNIQUE:  Frontal chest was obtained at 19:16 hours. FINDINGS: CARDIOMEDIASTINAL SILHOUETTE: Mild cardiomegaly present.  Sternotomy wires seen.  LUNGS: Lungs are clear.  ABDOMEN: No remarkable upper abdominal findings.  BONES: No acute osseous changes.    Mild cardiomegaly. Signed by Mg Almanzar MD    XR chest 1 view    Result Date: 2/19/2024  Interpreted By:  Speedy Hoff,  and Saurabh Costa STUDY: XR CHEST 1 VIEW;  2/19/2024 3:08 pm   INDICATION: Signs/Symptoms:confirm PICC placement right arm length 49 cms.   COMPARISON: Chest radiograph 06/27/2023   ACCESSION NUMBER(S): [id number]   ORDERING CLINICIAN: SHUN SORENSON   FINDINGS: AP radiograph of the chest was provided. Right-sided rotation.   Right upper extremity PICC is present with distal tip faintly visualized overlying the right atrium.   CARDIOMEDIASTINAL SILHOUETTE: Cardiomediastinal silhouette is stable enlarged in size and configuration.   LUNGS: Decreased lung volumes are present with bronchovascular crowding and basilar opacities. There is blunting of the bilateral cardiophrenic angles with mildly increased vascular cephalization as compared to 06/27/2023 which demonstrates similar lung aeration. No discernible pneumothorax.   ABDOMEN: Nonspecific paucity of bowel gas, otherwise no remarkable upper abdominal findings.   BONES: No acute osseous changes.       1. Mildly increased pulmonary vascular cephalization and at least small bilateral pleural effusions. At most, mild interstitial pulmonary edema. 2. Right upper extremity PICC as above.   I personally reviewed the images/study and I agree with the  resident findings as stated by Julissa Wiley MD. This study was interpreted at [facility], [place], [place].   MACRO: None   Signed by: Speedy Hoff 2/19/2024 3:57 PM Dictation workstation:   [id number]    Bedside PICC Imaging    Result Date: 2/19/2024  These images are not reportable by radiology and will not be interpreted by  Radiologists.    ECG 12 lead    Result Date: 2/17/2024  Sinus rhythm with occasional ventricular-paced complexes Low voltage QRS Possible Anterolateral infarct , age undetermined Abnormal ECG When compared with ECG of 27-JUN-2023 04:13, Previous ECG has undetermined rhythm, needs review See ED provider note for full interpretation and clinical correlation Confirmed by Arden Becker ([id number]) on 2/17/2024 1:44:13 AM    XR knee right 1-2 views    Result Date: 2/16/2024  Interpreted By:  Brad Hunt, STUDY: XR KNEE RIGHT 1-2 VIEWS; ;  2/16/2024 5:48 pm   INDICATION: Signs/Symptoms:known septic joint.   COMPARISON: Right knee radiographs 01/25/2014.   ACCESSION NUMBER(S): [id number]   ORDERING CLINICIAN: MELVIN RAMSAY   FINDINGS: Abnormal findings in the knee are overall similar compared to prior radiographs 01/25/2024.   Status post total knee arthroplasty. Redemonstration of fragmentation of the femoral cement at the junction of the stem-condylar component as well as cement fragmentation of the tibial component at the stem plateau interface. Mellitus cement fragments again seen throughout the knee slight posterior translation of the tibia relative to femur is less evident than prior. Diffuse soft tissue swelling about the knee. Previously described soft tissue ulcer anteriorly is not well seen.       1.  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 2. Re-demonstration of cement fragmentation of the femoral and tibial components, with multiple cement fragments throughout the knee. 3. Previously seen posterior translation of the tibia relative to  the femur is less prominent and alignment appears near anatomic. 4. Previously described ulcer in the anterior soft tissues on the lateral view is not visualized.   MACRO: None   Signed by: Brad Hunt 2/16/2024 5:56 PM Dictation workstation:   [id number]    Lower extremity venous duplex right    Result Date: 2/1/2024  Interpreted By:  Vargas Andersen and Tippareddy Charit STUDY: [facility] US LOWER EXTREMITY VENOUS DUPLEX RIGHT;  1/31/2024 3:06 pm   INDICATION: Signs/Symptoms:Exclude DVT (Septic Rt knee).   COMPARISON: Ultrasound 06/27/2023   ACCESSION NUMBER(S): [id number]   ORDERING CLINICIAN: NEVA RUST   TECHNIQUE: Vascular ultrasound of the right lower extremity was performed. Real-time compression views as well as Gray scale, color Doppler and spectral Doppler waveform analysis was performed.   FINDINGS: Evaluation of the visualized portions of the right common femoral vein, proximal, mid, and distal femoral vein, and popliteal vein were performed.   Limitations:  Unable to evaluate the distal femoral vein and calf veins due to body habitus and edema.   The evaluated veins demonstrate normal compressibility. There is intact venous flow demonstrating normal respiratory variability and normal augmentation of flow with calf compression. Therefore, there is no ultrasonographic evidence for deep vein thrombosis within the evaluated veins.   Respiratory variation and augmentation to calf pressure was noted.   Incidental note of right inguinal lymphadenopathy with a lymph node measuring 2.6 x 1.7 x 2.1 cm, likely reactive. Extensive soft tissue edema is noted at the level of the and lower leg.       No sonographic evidence for deep vein thrombosis within the evaluated veins of the right lower extremity. Of note, unable to evaluate the distal femoral vein and calf veins due to body habitus and edema.   I personally reviewed the images/study and I agree with the findings as stated by Pham Bishop,  MD. This study was interpreted at [place], [place].   MACRO: None   Signed by: Vargas Andersen 2/1/2024 5:45 AM Dictation workstation:   [id number]      Assessment/Plan     #Right knee septic arthritis, status post multiple surgical operations  #Scrotal swelling  #Uncontrolled type 2 diabetes  #History of CABG  #History of heart failure     Antibiotics  Vancomycin day , stop 2/29  Zosyn day 2, stop 2/29  Dapto Day 1     -Continue antibiotics change back to dapto, will need Dapto until March 18  -Follow up with ID Dr. Goodwin   -neeal ordered   -ok to discharge from ID perspective       I reviewed and interpreted all lab test imaging studies and documentations from other healthcare providers  I am monitoring for antibiotic side effects and toxicity      Juanjose Dos Santos DO

## 2024-02-29 NOTE — PROGRESS NOTES
SW asked DSC to send referral to [facility] as previous referral was sent to [facility]. Per TCC on 2/28, pt was return to [facility]. SW will await [facility] response and continue to follow.  UPDATE 11:32am  SW sent return [facility] referral and asked facility to confirm if he can return. SW will continue to follow.  UPDATE 11:42am  [facility] confirmed that pt was with their facility skilled and will need new precert. SW asked  precert team to initiate. SW will continue to follow.  UPDATE 11:46am   Precert team unable to start precert due to dual complete, SW asked facility to start and update when auth is obtained. SW will continue to follow.  UPDATE 3:36pm  SW sent updated MAR to [facility]. SW asked about precert update. ANGELIA will continue to follow.  AMARJIT CRUZ

## 2024-02-29 NOTE — ED PROVIDER NOTES
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  Patient is on chronic cephalexin and Bactrim p.o. at home.  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  No plan for debridement of the sternotomy wires due to concern for reinfection with active infective source.    During his most recent hospitalization was evaluated orthopedic surgery, only able to offer him an AKA which the patient refused.    Patient states he discussed with his PCP, felt he needed to be admitted for further management given his worsening symptoms and difficulties with ADLs.    Records Reviewed:  Recent available ED and inpatient notes reviewed in EMR.    PMHx/PSHx:  Per HPI.   - has a past medical history of Personal history of other endocrine, nutritional and metabolic disease and Unspecified astigmatism, bilateral (01/04/2016).  - has a past surgical history that includes Knee surgery (10/29/2014).    Medications:  Reviewed in EMR. See EMR for complete list of medications and doses.    Allergies:  Loratadine and Lisinopril    Social History:  - Tobacco:  has no history on file for tobacco use.   - Alcohol:  has no history on file for alcohol use.   - Illicit Drugs:  has no history on file for drug use.     ROS:  Per HPI.       ???????????????????????????????????????????????????????????????  Triage Vitals:  T 36.9 °C (98.4 °F)    /85  RR 18  O2 94 %      Physical Exam  Constitutional:       Appearance: He is obese. He is ill-appearing (Chronically).   Eyes:      Comments: Strabismus present   Cardiovascular:      Rate and Rhythm: Normal rate and regular rhythm.   Pulmonary:      Effort: Pulmonary effort is normal.      Breath sounds: Normal breath sounds.   Abdominal:       Palpations: Abdomen is soft.      Tenderness: There is no abdominal tenderness.   Musculoskeletal:         General: Swelling and tenderness present.      Right lower leg: Edema present.      Left lower leg: Edema present.      Comments: Significant swelling and tenderness diffusely over the right knee.   Neurological:      General: No focal deficit present.      Mental Status: He is oriented to person, place, and time.       ???????????????????????????????????????????????????????????????  Assessment and Plan:  53-year-old male with history of chronic infected right knee hardware presents to the emergency department with concern for worsening of chronic symptoms.  Is hemodynamically stable on arrival, afebrile, borderline tachycardic, not hypotensive.  Not septic and low concern for bacteremia at this time.  Lab work including inflammatory markers were drawn along with an x-ray of the right knee.    ED Course:  X-ray imaging demonstrates swelling and chronic hardware failure.  Lab work with elevated inflammatory markers as well as significantly elevated glucose to 660.  Patient does take insulin at home, was ordered 10 units subcutaneous lispro.  Patient was signed out to oncoming provider pending discussed with orthopedic surgery.  I expect that he will need to be admitted to medicine potentially for reinitiation of IV antibiotics, and for repeat assessment of management options of his chronic infection as well as PT/OT.    Disposition:  Signout to oncoming provider    --  Lance Boyle MD  Emergency Medicine, PGY-3      Procedures ? SmartLinks last updated 10/15/2023 7:35 PM        Lance Boyle MD  Resident  10/15/23 3470     Additional Notes: The pelvic exam has been discussed with the patient and the patient's consent is documented. Detail Level: Simple

## 2024-02-29 NOTE — PROGRESS NOTES
Sent to [facility] for acceptance.  CB CAUSEY KD        Medication Administration Report  for Kody Choi as of 02/29/24 1533    1 Day 3 Days 7 Days 10 Days < Today >   Legend:       Medications 02/26/24 02/27/24 02/28/24 02/29/24   acetaminophen (Tylenol) tablet 650 mg  Dose: 650 mg  Freq: Every 4 hours PRN Route: oral  PRN Reasons: pain - mild (1-3), first line,fever (temp greater than 38.0 C),pain - moderate (4-6), first line  Start: 02/28/24 0037   Order specific questions:             aspirin chewable tablet 81 mg  Dose: 81 mg  Freq: Daily Route: oral  Start: 02/28/24 1530      (1530)       (0848)         atorvastatin (Lipitor) tablet 40 mg  Dose: 40 mg  Freq: Daily Route: oral  Start: 02/28/24 0915      0857   0915      (0900)         DAPTOmycin (Cubicin) 950 mg in sodium chloride 0.9% 50 mL IV  Dose: 6 mg/kg  Weight Dosing Info: 155 kg  Freq: Every 24 hours scheduled Route: IV  Last Dose: Stopped (02/29/24 1311)  Start: 02/29/24 1045 End: 03/19/24 0859   Order specific questions:          1241 (950 mg)   1311        dextrose 10 % in water (D10W) infusion  Dose: 0.3 g/kg/hr  Weight Dosing Info: 150 kg  Freq: Once as needed Route: IV  PRN Comment: For blood glucose less than 70 mg/dL after 30 minutes of intervention.  Discontinue once blood glucose reaches 100 mg/dL.  Start: 02/28/24 0039   Admin Instructions:             dextrose 50 % injection 25 g  Dose: 25 g  Freq: Every 15 min PRN Route: IV  PRN Comment: For blood glucose less than or equal to 40 mg/dL  Start: 02/28/24 0039   Admin Instructions:             fluticasone (Flonase) nasal spray 2 spray  Dose: 2 spray  Freq: Daily PRN Route: EACH Nostril  PRN Reason: rhinitis  Start: 02/28/24 0845   Admin Instructions:             furosemide (Lasix) injection 40 mg  Dose: 40 mg  Freq: 2 times daily Route: IV  Start: 02/28/24 2100      2153 (40 mg)       0848 (40 mg)   2100        glucagon (Glucagen) injection 1 mg  Dose: 1 mg  Freq: Every 15 min PRN  Route: IM  PRN Reason: low blood sugar - see comments  PRN Comment: For blood glucose less than or equal to 70 mg/dL and no IV access  Start: 02/28/24 0039   Admin Instructions:             heparin (porcine) injection 7,500 Units  Dose: 7,500 Units  Freq: Every 8 hours scheduled Route: subQ  Start: 02/28/24 0040      0129 (7,500 Units)   0831 (7,500 Units)     1800 (7,500 Units)       (0000)   0848 (7,500 Units)     1600         insulin glargine (Lantus) injection 35 Units  Dose: 35 Units  Freq: Nightly Route: subQ  Start: 02/28/24 2100      2154 (35 Units)       2100         insulin lispro (HumaLOG) injection 0-10 Units  Dose: 0-10 Units  Freq: 4 times daily before meals and nightly Route: subQ  Start: 02/28/24 0700   Admin Instructions:         (0800)   1100     1759 (6 Units)   2154 (4 Units)      0848 (2 Units)   (1100)     1600   2100        metoprolol tartrate (Lopressor) injection 5 mg  Dose: 5 mg  Freq: Every 6 hours PRN Route: IV  PRN Comment: For SBP > 185, DBP > 100; do not give if HR < 60bpm  Start: 02/28/24 2048 2206 (5 mg)          pantoprazole (ProtoNix) injection 40 mg  Dose: 40 mg  Freq: Daily Route: IV  Start: 02/28/24 0900   Admin Instructions:         0831 (40 mg)       (0848)         traMADol (Ultram) tablet 50 mg  Dose: 50 mg  Freq: Every 6 hours PRN Route: oral  PRN Reason: pain - severe (7-10), first line  Start: 02/28/24 0037   Order specific questions:          0904 (50 mg)         Completed Medications   iohexol (OMNIPaque) 350 mg iodine/mL solution 85 mL  Dose: 85 mL  Freq: Once in imaging Route: IV  Start: 02/27/24 2121 End: 02/27/24 2121 2121 (85 mL)           piperacillin-tazobactam-dextrose (Zosyn) IV 4.5 g  Dose: 4.5 g  Freq: Once Route: IV  Last Dose: Stopped (02/27/24 1954)  Start: 02/27/24 1905 End: 02/27/24 1954   Admin Instructions:      Order specific questions:        1924 (4.5 g)   1954          sodium chloride 0.9 % bolus 1,000 mL  Dose: 1,000 mL  Freq: Once Route:  IV  Last Dose: Stopped (02/28/24 0023)  Start: 02/27/24 1905 End: 02/28/24 0023     1924 (1,000 mL)       0023          vancomycin (Vancocin) 2 g in dextrose 5 % in water (D5W) 500 mL IV  Dose: 2 g  Freq: Once Route: IV  Last Dose: Stopped (02/28/24 0023)  Start: 02/27/24 1905 End: 02/28/24 0023   Order specific questions:        2005 (2 g)       0023          Discontinued Medications   DAPTOmycin (Cubicin) 950 mg in sodium chloride 0.9% 50 mL IV  Dose: 6 mg/kg  Weight Dosing Info: 158 kg  Freq: Every 24 hours Route: IV  Start: 02/28/24 0930 End: 02/28/24 0937   Order specific questions:         0930 0937-D/C'd       DAPTOmycin (Cubicin) 950 mg in sodium chloride 0.9% 50 mL IV  Dose: 6 mg/kg  Weight Dosing Info: 158 kg  Freq: Every 24 hours Route: IV  Start: 02/28/24 0915 End: 02/28/24 0901   Order specific questions:         0901-D/C'd        metoprolol tartrate (Lopressor) injection 5 mg  Dose: 5 mg  Freq: Every 6 hours PRN Route: IV  PRN Comment: For SBP > 185; do not give if HR < 60bpm  Start: 02/28/24 0037 End: 02/28/24 2048      2048-D/C'd        piperacillin-tazobactam-dextrose (Zosyn) IV 3.375 g  Dose: 3.375 g  Freq: Every 6 hours Route: IV  Last Dose: Stopped (02/29/24 0612)  Start: 02/28/24 0130 End: 02/29/24 1014   Admin Instructions:      Order specific questions:         0230 (3.375 g)   0300     1044 (3.375 g) [C]   1114     1759 (3.375 g)   1829     2349 (3.375 g)       0019   (0300) [C]     0547 (3.375 g)   0612     1014-D/C'd       traMADol (Ultram) tablet 50 mg  Dose: 50 mg  Freq: Every 8 hours PRN Route: oral  PRN Reason: pain - severe (7-10), first line  Start: 02/28/24 0845 End: 02/28/24 0858   Order specific questions:         0858-D/C'd        vancomycin (Vancocin) 1,500 mg in dextrose 5 % in water (D5W) 500 mL IV  Dose: 1,500 mg  Freq: Every 24 hours Route: IV  Start: 02/29/24 2000 End: 02/29/24 1014   Order specific questions:          1014-D/C'd       vancomycin (Vancocin)  placeholder  Freq: Daily PRN Route: misc  PRN Reason: other  Start: 02/28/24 0050 End: 02/29/24 1020   Admin Instructions:          1020-D/C'd       vancomycin 1.25 g in dextrose 5 % 250 mL IV  Dose: 1,250 mg  Freq: Every 24 hours Route: IV  Last Dose: Stopped (02/28/24 2305)  Start: 02/28/24 2000 End: 02/29/24 0902   Order specific questions:         2150 (1.25 g) [C]   2305      0902-D/C'd       Other Encounter   acetaminophen (Tylenol) tablet 650 mg  Dose: 650 mg  Freq: Every 6 hours PRN Route: oral  PRN Reason: pain - mild (1-3), first line  Start: 02/17/24 1946 End: 02/23/24 1632   Order specific questions:             acetaminophen (Tylenol) tablet 650 mg  Dose: 650 mg  Freq: Once Route: oral  Start: 02/16/24 1940 End: 02/16/24 2102   Order specific questions:             albuterol nebulizer solution 10 mg  Dose: 10 mg  Freq: Once Route: nebu  Start: 02/16/24 1935 End: 02/23/24 1632   Admin Instructions:             alteplase (Cathflo Activase) injection 2 mg  Dose: 2 mg  Freq: As needed Route: cath  PRN Reason: line care  Start: 02/19/24 1001 End: 02/23/24 1632   Admin Instructions:             aspirin chewable tablet 81 mg  Dose: 81 mg  Freq: Daily Route: oral  Start: 02/17/24 0900 End: 02/23/24 1632          atorvastatin (Lipitor) tablet 40 mg  Dose: 40 mg  Freq: Daily Route: oral  Start: 02/17/24 0900 End: 02/23/24 1632          calcium gluconate in NS IV 2 g  Dose: 2 g  Freq: Once Route: IV  Last Dose: Stopped (02/16/24 2110)  Start: 02/16/24 1840 End: 02/23/24 1632          cefepime (Maxipime) IV 2 g  Dose: 2 g  Freq: Every 12 hours Route: IV  Last Dose: Stopped (02/19/24 2147)  Start: 02/18/24 2100 End: 02/20/24 0101   Admin Instructions:      Order specific questions:             cefTRIAXone (Rocephin) 2 g IV in dextrose 5% 50 mL  Dose: 2 g  Freq: Every 24 hours Route: IV  Last Dose: Stopped (02/17/24 2246)  Start: 02/16/24 2315 End: 02/18/24 2038   Admin Instructions:      Order specific questions:              cefTRIAXone (Rocephin) IVPB 1 g  Dose: 1 g  Freq: Once Route: IV  Last Dose: Stopped (02/16/24 1843)  Start: 02/16/24 1735 End: 02/16/24 1843   Admin Instructions:      Order specific questions:             DAPTOmycin (Cubicin) 800 mg in sodium chloride 0.9% 50 mL IV  Dose: 8 mg/kg  Weight Dosing Info: 101 kg (Adjusted)  Freq: Every 24 hours scheduled Route: IV  Last Dose: Stopped (02/23/24 0852)  Start: 02/21/24 0900 End: 02/23/24 1632   Order specific questions:             DAPTOmycin (Cubicin) 800 mg in sodium chloride 0.9% 50 mL IV  Dose: 8 mg/kg  Weight Dosing Info: 101 kg (Adjusted)  Freq: Every 24 hours scheduled Route: IV  Start: 02/21/24 0900 End: 02/21/24 0653   Order specific questions:             DAPTOmycin (Cubicin) 800 mg in sodium chloride 0.9% 50 mL IV  Dose: 8 mg/kg  Weight Dosing Info: 101 kg (Adjusted)  Freq: Every 24 hours scheduled Route: IV  Last Dose: Stopped (02/20/24 0939)  Start: 02/20/24 0900 End: 02/20/24 0939   Order specific questions:             dextrose 10 % in water (D10W) infusion  Dose: 0.3 g/kg/hr  Weight Dosing Info: 101 kg (Adjusted)  Freq: Once as needed Route: IV  PRN Comment: For blood glucose less than 70 mg/dL after 30 minutes of intervention.  Discontinue once blood glucose reaches 100 mg/dL.  Last Dose: 0.3 g/kg/hr (02/21/24 0500)  Start: 02/21/24 0411 End: 02/21/24 0416   Admin Instructions:             dextrose 10 % in water (D10W) infusion  Dose: 0.3 g/kg/hr  Weight Dosing Info: 158 kg  Freq: Once as needed Route: IV  PRN Comment: For blood glucose less than 70 mg/dL after 30 minutes of intervention.  Discontinue once blood glucose reaches 100 mg/dL.  Last Dose: Stopped (02/21/24 0416)  Start: 02/21/24 0346 End: 02/21/24 0347   Admin Instructions:             dextrose 10 % in water (D10W) infusion  Dose: 0.3 g/kg/hr  Weight Dosing Info: 150 kg  Freq: Once as needed Route: IV  PRN Comment: For blood glucose less than 70 mg/dL after 30 minutes of intervention.   Discontinue once blood glucose reaches 100 mg/dL.  Last Dose: Stopped (02/21/24 0300)  Start: 02/16/24 2305 End: 02/21/24 0300   Admin Instructions:             dextrose 50 % injection 25 g  Dose: 25 g  Freq: Every 15 min PRN Route: IV  PRN Comment: For blood glucose less than or equal to 40 mg/dL  Start: 02/16/24 2305 End: 02/23/24 1632   Admin Instructions:             dextrose 50 % injection 25 g  Dose: 25 g  Freq: Once Route: IV  Start: 02/16/24 1840 End: 02/16/24 1851          glucagon (Glucagen) injection 1 mg  Dose: 1 mg  Freq: Every 15 min PRN Route: IM  PRN Reason: low blood sugar - see comments  PRN Comment: For blood glucose less than or equal to 70 mg/dL and no IV access  Start: 02/16/24 2305 End: 02/23/24 1632   Admin Instructions:             heparin (porcine) injection 7,500 Units  Dose: 7,500 Units  Freq: Every 8 hours scheduled Route: subQ  Start: 02/17/24 0200 End: 02/23/24 1632          insulin glargine (Lantus) injection 40 Units  Dose: 40 Units  Freq: Nightly Route: subQ  Start: 02/22/24 2100 End: 02/23/24 1632          insulin glargine (Lantus) injection 50 Units  Dose: 50 Units  Freq: Nightly Route: subQ  Start: 02/22/24 2100 End: 02/22/24 1214          insulin glargine (Lantus) injection 70 Units  Dose: 70 Units  Freq: Every 12 hours Route: subQ  Start: 02/16/24 2310 End: 02/17/24 0509          insulin glargine (Lantus) injection 80 Units  Dose: 80 Units  Freq: Nightly Route: subQ  Start: 02/21/24 2100 End: 02/22/24 1016          insulin glargine (Lantus) injection 80 Units  Dose: 80 Units  Freq: Every 12 hours Route: subQ  Start: 02/17/24 0900 End: 02/21/24 1307          insulin lispro (HumaLOG) injection 0-10 Units  Dose: 0-10 Units  Freq: 3 times daily with meals Route: subQ  Start: 02/17/24 0800 End: 02/23/24 1632   Admin Instructions:             insulin lispro (HumaLOG) injection 10 Units  Dose: 10 Units  Freq: 3 times daily with meals Route: subQ  Start: 02/17/24 0800 End: 02/17/24  0944   Admin Instructions:             insulin lispro (HumaLOG) injection 15 Units  Dose: 15 Units  Freq: 3 times daily with meals Route: subQ  Start: 02/21/24 1700 End: 02/21/24 1307   Admin Instructions:             insulin lispro (HumaLOG) injection 25 Units  Dose: 25 Units  Freq: 3 times daily with meals Route: subQ  Start: 02/17/24 1200 End: 02/21/24 1201   Admin Instructions:             insulin lispro (HumaLOG) injection 5 Units  Dose: 5 Units  Freq: Once Route: subQ  Start: 02/16/24 2140 End: 02/17/24 0117          insulin regular (HumuLIN R,NovoLIN R) injection 5 Units  Dose: 5 Units  Freq: Once Route: IV  Start: 02/16/24 1840 End: 02/16/24 1852   Admin Instructions:             lactated Ringer's bolus 1,000 mL  Dose: 1,000 mL  Freq: Once Route: IV  Last Dose: Stopped (02/18/24 1207)  Start: 02/18/24 1000 End: 02/18/24 1207          lactated Ringer's bolus 500 mL  Dose: 500 mL  Freq: Once Route: IV  Start: 02/21/24 1445 End: 02/23/24 1632          lactated Ringer's bolus 500 mL  Dose: 500 mL  Freq: Once Route: IV  Start: 02/18/24 1000 End: 02/18/24 0941          lidocaine PF (Xylocaine) 10 mg/mL (1 %) injection 50 mg  Dose: 5 mL  Freq: Once Route: Ifil  Start: 02/19/24 1030 End: 02/23/24 1632          lisinopril tablet 5 mg  Dose: 5 mg  Freq: Daily Route: oral  Start: 02/17/24 1645 End: 02/17/24 1643          losartan (Cozaar) tablet 25 mg  Dose: 25 mg  Freq: Daily Route: oral  Start: 02/17/24 1715 End: 02/23/24 1632          melatonin tablet 3 mg  Dose: 3 mg  Freq: Nightly PRN Route: oral  PRN Reason: sleep  Start: 02/17/24 1150 End: 02/23/24 1632          metoprolol succinate XL (Toprol-XL) 24 hr tablet 25 mg  Dose: 25 mg  Freq: Daily Route: oral  Start: 02/17/24 1645 End: 02/23/24 1632   Admin Instructions:             oxyCODONE-acetaminophen (Percocet) 5-325 mg per tablet 1 tablet  Dose: 1 tablet  Freq: Once Route: oral  Start: 02/16/24 1635 End: 02/16/24 3959   Order specific questions:              sodium chloride 0.9 % bolus 500 mL  Dose: 500 mL  Freq: Once Route: IV  Last Dose: Stopped (02/23/24 1022)  Start: 02/23/24 0800 End: 02/23/24 1022          sodium chloride 0.9 % bolus 500 mL  Dose: 500 mL  Freq: Once Route: IV  Last Dose: Stopped (02/22/24 1352)  Start: 02/22/24 1130 End: 02/22/24 1352          sodium zirconium cyclosilicate (Lokelma) packet 10 g  Dose: 10 g  Freq: Once Route: oral  Start: 02/22/24 1130 End: 02/23/24 1632          sodium zirconium cyclosilicate (Lokelma) packet 5 g  Dose: 5 g  Freq: Once Route: oral  Start: 02/17/24 1915 End: 02/17/24 1956   Admin Instructions:             sodium zirconium cyclosilicate (Lokelma) packet 5 g  Dose: 5 g  Freq: Once Route: oral  Start: 02/16/24 1840 End: 02/17/24 1837   Admin Instructions:             traMADol (Ultram) tablet 50 mg  Dose: 50 mg  Freq: Every 6 hours PRN Route: oral  PRN Reason: pain - severe (7-10), first line  Start: 02/17/24 0145 End: 02/23/24 1632   Order specific questions:             traMADol (Ultram) tablet 50 mg  Dose: 50 mg  Freq: Once Route: oral  Start: 02/16/24 1800 End: 02/16/24 1827   Order specific questions:             vancomycin (Vancocin) 2,000 mg in dextrose 5% water 400 mL  Dose: 2,000 mg  Freq: Once Route: IV  Last Dose: 2,000 mg (02/16/24 2327)  Start: 02/16/24 1735 End: 02/17/24 0127   Admin Instructions:      Order specific questions:             vancomycin (Vancocin) 2000 mg/500 ml in D5W 500 mL IV piggyback 2 g  Dose: 2 g  Freq: Once Route: IV  Start: 02/18/24 0715 End: 02/18/24 1120   Order specific questions:             vancomycin (Vancocin) placeholder  Freq: Daily PRN Route: misc  PRN Reason: other  Start: 02/17/24 1121 End: 02/20/24 0105   Admin Instructions:             vancomycin in dextrose 5 % (Vancocin) IVPB 750 mg  Dose: 750 mg  Freq: Every 12 hours Route: IV  Last Dose: Stopped (02/20/24 0042)  Start: 02/19/24 1200 End: 02/20/24 0101   Admin Instructions:      Order specific questions:              Legend:       [id number]/29/24Completed MedicationsDiscontinued MedicationsOther Encounter

## 2024-02-29 NOTE — CARE PLAN
Problem: Skin  Goal: Decreased wound size/increased tissue granulation at next dressing change  Outcome: Progressing     Problem: Skin  Goal: Participates in plan/prevention/treatment measures  Outcome: Progressing     Problem: Skin  Goal: Prevent/manage excess moisture  Outcome: Progressing     Problem: Skin  Goal: Prevent/minimize sheer/friction injuries  Outcome: Progressing     Problem: Skin  Goal: Promote/optimize nutrition  Outcome: Progressing     Problem: Skin  Goal: Promote skin healing  Outcome: Progressing     Problem: Psychosocial Needs  Goal: Collaborate with me, my family, and caregiver to identify my specific goals  Outcome: Progressing     Problem: Daily Care  Goal: Daily care needs are met  Outcome: Progressing     Problem: Safety  Goal: I will remain free of falls  Outcome: Progressing   The patient's goals for the shift include      The clinical goals for the shift include safety    Problem: Psychosocial Needs  Goal: Demonstrates ability to cope with hospitalization/illness  Outcome: Not Progressing

## 2024-02-29 NOTE — H&P
History Of Present Illness  Kody Choi is a 53 y.o. adult presenting to the emergency department for evaluation of scrotal pain and edema.  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  Patient did finally admit to a PICC line and admission to skilled nursing with IV antibiotics.  Patient states that he noticed scrotal edema worsening over the last several days.  Patient denies chest pain or dizziness.  Patient denies nausea, vomiting, diarrhea.    In ED, urine negative for infection, CRP 3.07, sed rate 64, troponin 43 with a repeat pending.  Glucose 239, chloride 108, BUN 59, creatinine 1.75, GFR 34, calcium 7.6, alk phos 450, hemoglobin 9.6, hematocrit 32.6.  BNP 1268.  Chest x-ray showing mild cardiomegaly.  Ultrasound of the scrotum showing bilateral hydrocele and scrotal edema.  CT of the head completed and negative for acute process.  CT of the abdomen pelvis completed showing diffuse scrotal wall thickening extending into the bilateral lower extremities, anasarca.  Blood cultures pending.  Consult placed to cardiology.  Last echocardiogram on file 1/30/2024 with an EF of 30%.  Consult to infectious disease due to patient having a PICC line for recurrent right knee septic arthritis and on IV antibiotics at skilled nursing.  Patient started on IV vancomycin and Zosyn in ED.  Patient admitted to telemetry under the care of Dr. Lyndon Metzger will continue to follow.  I was asked to do H&P and place initial admission orders.     Past Medical History  T2DM, CAD s/p multiple stents and CABG, heart failure (EF 30% 1/30/2024) A-fib, RUPESH, TKA in 2015, recurrent septic arthritis of the right knee  Surgical History  TKA 2015, CABG, PICC line  Social History  Former smoker, no drug use, no alcohol use  Family History  Reviewed and  "noncontributory     Allergies  Loratadine, Pollen extracts, and Lisinopril    Review of Systems  A 10 point review of systems was completed and negative except what is listed in HPI  Physical Exam  Constitutional:       Appearance: He is obese.   HENT:      Mouth/Throat:      Mouth: Mucous membranes are dry.      Pharynx: Oropharynx is clear.   Eyes:      Pupils: Pupils are equal, round, and reactive to light.   Cardiovascular:      Rate and Rhythm: Rhythm irregularly irregular.   Pulmonary:      Breath sounds: Normal breath sounds.   Abdominal:      General: Bowel sounds are normal.   Musculoskeletal:      Right knee: Swelling present.      Comments: Scrotal edema, erythema   Skin:     General: Skin is warm and dry.      Capillary Refill: Capillary refill takes less than 2 seconds.   Neurological:      General: No focal deficit present.      Mental Status: He is alert and oriented to person, place, and time.   Psychiatric:         Mood and Affect: Mood normal. Affect is angry.          Last Recorded Vitals  Blood pressure (!) 144/101, pulse 99, temperature 35.8 °C (96.4 °F), resp. rate 18, height 1.676 m (5' 6\"), weight (!) 155 kg (341 lb 11.4 oz), SpO2 93 %, unknown if currently breastfeeding.    Relevant Results  CT pelvis w IV contrast    Result Date: 2/27/2024  Interpreted By:  Susanna Lewis, STUDY: CT PELVIS W IV CONTRAST;  2/27/2024 9:21 pm   INDICATION: Signs/Symptoms:scrotal swelling.   COMPARISON: Scrotal ultrasound performed on the same day.   ACCESSION NUMBER(S): [id number]   ORDERING CLINICIAN: YANELY RODRIGUEZ   TECHNIQUE: CT of the pelvis was performed.  Standard contiguous axial images were obtained at 3 mm slice thickness through pelvis. Coronal and sagittal reconstructions at 3 mm slice thickness were performed.  85 ml of contrast  Optiray 350 were administered intravenously without immediate complication.   FINDINGS: PELVIS:   BLADDER: Urinary bladder is decompressed secondary to Campos " catheterization.   REPRODUCTIVE ORGANS: Prostate gland is unremarkable.   BOWEL: Visualized small bowel and large bowel loops appear grossly unremarkable.   VESSELS: Visualized vasculature appears grossly unremarkable.   PERITONEUM/RETROPERITONEUM/LYMPH NODES: There is small amount of free fluid along the right lower quadrant measuring approximately 20 Hounsfield units. This is incompletely included in the field of view. There are multiple enlarged lymph nodes along the right external iliac chain with largest measuring up to 2 cm in short axis. There are also multiple enlarged right inguinal lymph nodes with the largest measuring up to 1.8 cm in short axis. However no evidence of enlarged lymphadenopathy in the left inguinal region or in the left pelvis.   BONES AND ABDOMINAL WALL: No suspicious osseous lesions in the visualized field of view. There is small left fat containing inguinal hernia. Mild discogenic degenerative changes are noted in the lower lumbar spine. There is diffuse reticulated subcutaneous soft tissue edema throughout the visualized abdominal wall extending into the included bilateral thighs. There is diffuse subcutaneous soft tissue edema throughout the scrotum. No evidence of peripheral rim enhancing fluid collection within the scrotal wall. No evidence of soft tissue gas.       1.  Diffuse body wall edema extending into visualized bilateral lower extremities. There is also diffuse edematous thickening of the scrotum, which could again be related to similar systemic process/anasarca. No definite CT evidence of peripheral rim enhancing fluid collection to suggest an abscess. No evidence of soft tissue gas within the scrotum. 2. Multiple enlarged right inguinal and right external iliac lymph nodes as described above. This could be related to infectious/inflammatory etiology in the right lower extremity. Recommend clinical correlation with patient's symptomatology in the right lower extremity and  further evaluation as clinically warranted. 3. Small volume intraperitoneal free fluid in the right hemiabdomen. This is only partially included in the field of view and is also most likely favored to represent similar findings secondary to 3rd spacing of fluid/anasarca. Recommend correlation with patient's abdominal symptomatology and a dedicated CT abdomen pelvis with contrast can be obtained for further evaluation if clinically warranted.   MACRO: None   Signed by: Susanna Lewis 2/27/2024 10:56 PM Dictation workstation:   [id number]    CT head wo IV contrast    Result Date: 2/27/2024  Interpreted By:  Vargas Mendoza, STUDY: CT HEAD WO IV CONTRAST;  2/27/2024 9:21 pm   INDICATION: Signs/Symptoms:ams.   COMPARISON: Noncontrast head CT of 11/10/2015.   ACCESSION NUMBER(S): [id number]   ORDERING CLINICIAN: YANELY RODRIGUEZ   TECHNIQUE: Noncontrast axial CT scan of head was performed. Angled reformats in brain and bone windows were generated. The images were reviewed in bone, brain, blood and soft tissue windows.   FINDINGS: CSF Spaces: The ventricles, sulci and basal cisterns are within normal limits. There is no extraaxial fluid collection.   Parenchyma: Moderate to advanced volume loss.  There is periventricular and subcortical white matter hypoattenuation, most in keeping with chronic microvascular ischemic change. The grey-white differentiation is intact. There is no mass effect or midline shift. There is no intracranial hemorrhage.   Calvarium: The calvarium is unremarkable.   Paranasal sinuses and mastoids: Visualized paranasal sinuses and mastoids are clear.       No evidence of acute cortical infarct or intracranial hemorrhage.     MACRO: None   Signed by: Vargas Mendoza 2/27/2024 9:59 PM Dictation workstation:   [id number]    US scrotum w doppler    Result Date: 2/27/2024  STUDY: Scrotal Ultrasound; 2/27/2024 8:12 PM. INDICATION: Scrotal pain and swelling. COMPARISON: None available. ACCESSION NUMBER(S):  [id number] ORDERING CLINICIAN: YANELY RODRIGUEZ TECHNIQUE:  Ultrasound of the scrotum and testicular spectral Doppler evaluation performed. FINDINGS:    The right testicle measures 4.1 x 3.1 x 2.9 cm.  It demonstrates a normal homogeneous echotexture.  Normal color and spectral Doppler flow is demonstrated.  The right epididymis measures 1.8 x 1.0 x 1.0 cm and demonstrates normal echogenicity. The left testicle measures 4.5 x 3.0 x 3.2 cm.  It demonstrates a normal homogeneous echotexture.  Normal color and spectral Doppler flow is demonstrated.  The left epididymis measures 1.7 x 1.1 x 1.4 cm and demonstrates a simple cyst measuring 0.5 cm. There are bilateral hydroceles present. There is severe scrotal edema. The scrotal wall measures 5.2 cm on the right and 4.6 cm on the left.    Bilateral hydroceles. Severe scrotal edema. The testicles are otherwise unremarkable. Normal testicular spectral Doppler evaluation. Signed by Aubrey Zapien MD    XR chest 1 view    Result Date: 2/27/2024  STUDY: Chest Radiograph;  2/27/2024 at 19:16 hours. INDICATION: Shortness of breath. COMPARISON: XR chest 6/27/2023, 1/20/2021 and 1/11/2021. CT chest 2/14/2021 ACCESSION NUMBER(S): [id number] ORDERING CLINICIAN: YANELY RODRIGUEZ TECHNIQUE:  Frontal chest was obtained at 19:16 hours. FINDINGS: CARDIOMEDIASTINAL SILHOUETTE: Mild cardiomegaly present.  Sternotomy wires seen.  LUNGS: Lungs are clear.  ABDOMEN: No remarkable upper abdominal findings.  BONES: No acute osseous changes.    Results for orders placed or performed during the hospital encounter of 02/27/24 (from the past 24 hour(s))   Troponin I, High Sensitivity   Result Value Ref Range    Troponin I, High Sensitivity 40 (H) 0 - 20 ng/L   B-type natriuretic peptide   Result Value Ref Range    BNP 1,268 (H) 0 - 99 pg/mL   CBC   Result Value Ref Range    WBC 8.8 4.4 - 11.3 x10*3/uL    nRBC 1.1 (H) 0.0 - 0.0 /100 WBCs    RBC 3.96 (L) 4.00 - 5.90 x10*6/uL    Hemoglobin 8.9  (L) 12.0 - 17.5 g/dL    Hematocrit 31.8 (L) 36.0 - 52.0 %    MCV 80 80 - 100 fL    MCH 22.5 (L) 26.0 - 34.0 pg    MCHC 28.0 (L) 32.0 - 36.0 g/dL    RDW 17.5 (H) 11.5 - 14.5 %    Platelets 461 (H) 150 - 450 x10*3/uL   Basic Metabolic Panel   Result Value Ref Range    Glucose 230 (H) 74 - 99 mg/dL    Sodium 136 136 - 145 mmol/L    Potassium 4.7 3.5 - 5.3 mmol/L    Chloride 108 (H) 98 - 107 mmol/L    Bicarbonate 21 21 - 32 mmol/L    Anion Gap 12 10 - 20 mmol/L    Urea Nitrogen 53 (H) 6 - 23 mg/dL    Creatinine 1.74 (H) 0.50 - 1.30 mg/dL    eGFR 34 (L) >60 mL/min/1.73m*2    Calcium 7.6 (L) 8.6 - 10.3 mg/dL   Troponin I, High Sensitivity   Result Value Ref Range    Troponin I, High Sensitivity 34 (H) 0 - 20 ng/L   Protime-INR   Result Value Ref Range    Protime 12.3 9.8 - 12.8 seconds    INR 1.1 0.9 - 1.1       Assessment/Plan   Kody is a 53-year-old male patient is alert and oriented x 3 who is presenting to ED from skilled nursing facility with complaint of scrotal edema.  Troponin and BNP elevated.  Consult to cardiology.  Imaging completed showing anasarca, scrotal edema, concern for infection.  Patient started on IV vancomycin and Zosyn.  Consult to ID due to PICC line and history of IV antibiotics at skilled nursing facility for recurrent septic arthritis of the right knee.  Patient admitted for further medical management.    Scrotal cellulitis  Admit to telemetry per Dr. Lyndon Metzger  See imaging results above  Blood cultures pending  Tylenol as needed  Zofran as needed  Continue IV vancomycin/Zosyn  Repeat labs in a.m.    Elevated troponin/elevated BNP  History heart failure (EF 30% 1/30/2024)  Consult cardiology and appreciate input  Low-sodium diet  Fluid restriction  Trend troponin    BARBIE  No IV fluids given due to elevated troponin  Continue to monitor and defer to attending for consult    Diabetes/CAD/A-fib/RUPESH/recurrent septic arthritis  PICC line  Consult infectious disease for IV antibiotics to  continue from skilled nursing facility  Diabetic diet  ISS  Hold home oral antidiabetic medications when med rec is complete  Continue home tramadol  Metoprolol IV every 6 as needed    DVT Ppx/GI PPx  SCDs  Heparin subcutaneous  Up to chair as tolerated  Protonix IV daily  PT/OT    I spent 35 minutes in the professional and overall care of this patient.      Lyndon Metzger, DO

## 2024-02-29 NOTE — CARE PLAN
The patient's goals for the shift include  safety    The clinical goals for the shift include safety         pelvic screws, plates, gastric port and band

## 2024-03-01 ENCOUNTER — APPOINTMENT (OUTPATIENT)
Dept: CARDIOLOGY | Facility: HOSPITAL | Age: 54
DRG: 727 | End: 2024-03-01
Payer: COMMERCIAL

## 2024-03-01 LAB
ANION GAP SERPL CALC-SCNC: 11 MMOL/L (ref 10–20)
BUN SERPL-MCNC: 42 MG/DL (ref 6–23)
CALCIUM SERPL-MCNC: 8.1 MG/DL (ref 8.6–10.3)
CHLORIDE SERPL-SCNC: 109 MMOL/L (ref 98–107)
CO2 SERPL-SCNC: 24 MMOL/L (ref 21–32)
CREAT SERPL-MCNC: 1.41 MG/DL (ref 0.5–1.3)
EGFRCR SERPLBLD CKD-EPI 2021: 44 ML/MIN/1.73M*2
ERYTHROCYTE [DISTWIDTH] IN BLOOD BY AUTOMATED COUNT: 17.2 % (ref 11.5–14.5)
GLUCOSE BLD MANUAL STRIP-MCNC: 108 MG/DL (ref 74–99)
GLUCOSE BLD MANUAL STRIP-MCNC: 112 MG/DL (ref 74–99)
GLUCOSE SERPL-MCNC: 108 MG/DL (ref 74–99)
HCT VFR BLD AUTO: 33.8 % (ref 36–52)
HGB BLD-MCNC: 9.7 G/DL (ref 12–17.5)
MCH RBC QN AUTO: 23 PG (ref 26–34)
MCHC RBC AUTO-ENTMCNC: 28.7 G/DL (ref 32–36)
MCV RBC AUTO: 80 FL (ref 80–100)
NRBC BLD-RTO: 1.1 /100 WBCS (ref 0–0)
PLATELET # BLD AUTO: 500 X10*3/UL (ref 150–450)
POTASSIUM SERPL-SCNC: 4.3 MMOL/L (ref 3.5–5.3)
RBC # BLD AUTO: 4.22 X10*6/UL (ref 4–5.9)
SODIUM SERPL-SCNC: 140 MMOL/L (ref 136–145)
WBC # BLD AUTO: 8 X10*3/UL (ref 4.4–11.3)

## 2024-03-01 PROCEDURE — 2500000005 HC RX 250 GENERAL PHARMACY W/O HCPCS: Performed by: NURSE PRACTITIONER

## 2024-03-01 PROCEDURE — 2500000004 HC RX 250 GENERAL PHARMACY W/ HCPCS (ALT 636 FOR OP/ED): Performed by: NURSE PRACTITIONER

## 2024-03-01 PROCEDURE — 99233 SBSQ HOSP IP/OBS HIGH 50: CPT | Performed by: INTERNAL MEDICINE

## 2024-03-01 PROCEDURE — 2500000004 HC RX 250 GENERAL PHARMACY W/ HCPCS (ALT 636 FOR OP/ED): Performed by: STUDENT IN AN ORGANIZED HEALTH CARE EDUCATION/TRAINING PROGRAM

## 2024-03-01 PROCEDURE — C9113 INJ PANTOPRAZOLE SODIUM, VIA: HCPCS | Performed by: NURSE PRACTITIONER

## 2024-03-01 PROCEDURE — 2500000005 HC RX 250 GENERAL PHARMACY W/O HCPCS: Performed by: INTERNAL MEDICINE

## 2024-03-01 PROCEDURE — 2500000004 HC RX 250 GENERAL PHARMACY W/ HCPCS (ALT 636 FOR OP/ED): Performed by: INTERNAL MEDICINE

## 2024-03-01 PROCEDURE — 93005 ELECTROCARDIOGRAM TRACING: CPT

## 2024-03-01 PROCEDURE — 93010 ELECTROCARDIOGRAM REPORT: CPT | Performed by: INTERNAL MEDICINE

## 2024-03-01 PROCEDURE — 80048 BASIC METABOLIC PNL TOTAL CA: CPT | Performed by: INTERNAL MEDICINE

## 2024-03-01 PROCEDURE — 2060000001 HC INTERMEDIATE ICU ROOM DAILY

## 2024-03-01 PROCEDURE — 2500000001 HC RX 250 WO HCPCS SELF ADMINISTERED DRUGS (ALT 637 FOR MEDICARE OP): Performed by: NURSE PRACTITIONER

## 2024-03-01 PROCEDURE — 2500000001 HC RX 250 WO HCPCS SELF ADMINISTERED DRUGS (ALT 637 FOR MEDICARE OP): Performed by: INTERNAL MEDICINE

## 2024-03-01 PROCEDURE — 85027 COMPLETE CBC AUTOMATED: CPT | Performed by: INTERNAL MEDICINE

## 2024-03-01 PROCEDURE — 82947 ASSAY GLUCOSE BLOOD QUANT: CPT

## 2024-03-01 PROCEDURE — 2500000001 HC RX 250 WO HCPCS SELF ADMINISTERED DRUGS (ALT 637 FOR MEDICARE OP): Performed by: STUDENT IN AN ORGANIZED HEALTH CARE EDUCATION/TRAINING PROGRAM

## 2024-03-01 PROCEDURE — 99232 SBSQ HOSP IP/OBS MODERATE 35: CPT | Performed by: STUDENT IN AN ORGANIZED HEALTH CARE EDUCATION/TRAINING PROGRAM

## 2024-03-01 RX ORDER — MAGNESIUM SULFATE HEPTAHYDRATE 40 MG/ML
2 INJECTION, SOLUTION INTRAVENOUS ONCE
Status: COMPLETED | OUTPATIENT
Start: 2024-03-01 | End: 2024-03-01

## 2024-03-01 RX ORDER — METOPROLOL TARTRATE 1 MG/ML
5 INJECTION, SOLUTION INTRAVENOUS EVERY 6 HOURS PRN
Status: DISCONTINUED | OUTPATIENT
Start: 2024-03-01 | End: 2024-03-04 | Stop reason: HOSPADM

## 2024-03-01 RX ORDER — METOPROLOL SUCCINATE 25 MG/1
25 TABLET, EXTENDED RELEASE ORAL DAILY
Status: CANCELLED | OUTPATIENT
Start: 2024-03-01

## 2024-03-01 RX ORDER — METOPROLOL SUCCINATE 25 MG/1
25 TABLET, EXTENDED RELEASE ORAL DAILY
Status: DISCONTINUED | OUTPATIENT
Start: 2024-03-01 | End: 2024-03-01

## 2024-03-01 RX ORDER — METOPROLOL SUCCINATE 25 MG/1
25 TABLET, EXTENDED RELEASE ORAL DAILY
Status: DISCONTINUED | OUTPATIENT
Start: 2024-03-01 | End: 2024-03-01 | Stop reason: SDUPTHER

## 2024-03-01 RX ORDER — NAPROXEN SODIUM 220 MG/1
81 TABLET, FILM COATED ORAL DAILY
Start: 2024-03-02

## 2024-03-01 RX ORDER — METOPROLOL SUCCINATE 50 MG/1
50 TABLET, EXTENDED RELEASE ORAL DAILY
Status: DISCONTINUED | OUTPATIENT
Start: 2024-03-02 | End: 2024-03-04 | Stop reason: HOSPADM

## 2024-03-01 RX ORDER — METOPROLOL SUCCINATE 25 MG/1
25 TABLET, EXTENDED RELEASE ORAL ONCE
Status: COMPLETED | OUTPATIENT
Start: 2024-03-01 | End: 2024-03-01

## 2024-03-01 RX ADMIN — DAPTOMYCIN 950 MG: 500 INJECTION, POWDER, LYOPHILIZED, FOR SOLUTION INTRAVENOUS at 13:05

## 2024-03-01 RX ADMIN — HEPARIN SODIUM 7500 UNITS: 5000 INJECTION INTRAVENOUS; SUBCUTANEOUS at 23:30

## 2024-03-01 RX ADMIN — HEPARIN SODIUM 7500 UNITS: 5000 INJECTION INTRAVENOUS; SUBCUTANEOUS at 15:27

## 2024-03-01 RX ADMIN — METOPROLOL TARTRATE 5 MG: 5 INJECTION INTRAVENOUS at 06:02

## 2024-03-01 RX ADMIN — TRAMADOL HYDROCHLORIDE 50 MG: 50 TABLET, COATED ORAL at 11:02

## 2024-03-01 RX ADMIN — MAGNESIUM SULFATE HEPTAHYDRATE 2 G: 40 INJECTION, SOLUTION INTRAVENOUS at 10:51

## 2024-03-01 RX ADMIN — HEPARIN SODIUM 7500 UNITS: 5000 INJECTION INTRAVENOUS; SUBCUTANEOUS at 08:11

## 2024-03-01 RX ADMIN — ASPIRIN 81 MG 81 MG: 81 TABLET ORAL at 08:11

## 2024-03-01 RX ADMIN — FUROSEMIDE 40 MG: 10 INJECTION, SOLUTION INTRAMUSCULAR; INTRAVENOUS at 08:11

## 2024-03-01 RX ADMIN — HEPARIN SODIUM 7500 UNITS: 5000 INJECTION INTRAVENOUS; SUBCUTANEOUS at 00:58

## 2024-03-01 RX ADMIN — FUROSEMIDE 40 MG: 10 INJECTION, SOLUTION INTRAMUSCULAR; INTRAVENOUS at 20:55

## 2024-03-01 RX ADMIN — PANTOPRAZOLE SODIUM 40 MG: 40 INJECTION, POWDER, FOR SOLUTION INTRAVENOUS at 08:11

## 2024-03-01 RX ADMIN — METOPROLOL SUCCINATE 25 MG: 25 TABLET, EXTENDED RELEASE ORAL at 10:51

## 2024-03-01 RX ADMIN — METOPROLOL SUCCINATE 25 MG: 25 TABLET, EXTENDED RELEASE ORAL at 08:11

## 2024-03-01 RX ADMIN — ACETAMINOPHEN 650 MG: 325 TABLET ORAL at 21:09

## 2024-03-01 RX ADMIN — METOPROLOL TARTRATE 5 MG: 5 INJECTION INTRAVENOUS at 13:05

## 2024-03-01 ASSESSMENT — COGNITIVE AND FUNCTIONAL STATUS - GENERAL
TOILETING: TOTAL
TURNING FROM BACK TO SIDE WHILE IN FLAT BAD: A LOT
CLIMB 3 TO 5 STEPS WITH RAILING: TOTAL
HELP NEEDED FOR BATHING: A LOT
DRESSING REGULAR LOWER BODY CLOTHING: TOTAL
DRESSING REGULAR UPPER BODY CLOTHING: A LOT
STANDING UP FROM CHAIR USING ARMS: A LOT
WALKING IN HOSPITAL ROOM: TOTAL
MOVING TO AND FROM BED TO CHAIR: A LOT
PERSONAL GROOMING: A LITTLE
DAILY ACTIVITIY SCORE: 13
MOBILITY SCORE: 11
MOVING FROM LYING ON BACK TO SITTING ON SIDE OF FLAT BED WITH BEDRAILS: A LITTLE

## 2024-03-01 ASSESSMENT — PAIN DESCRIPTION - LOCATION
LOCATION: KNEE
LOCATION: SCROTUM

## 2024-03-01 ASSESSMENT — PAIN - FUNCTIONAL ASSESSMENT
PAIN_FUNCTIONAL_ASSESSMENT: 0-10
PAIN_FUNCTIONAL_ASSESSMENT: 0-10

## 2024-03-01 ASSESSMENT — PAIN SCALES - GENERAL
PAINLEVEL_OUTOF10: 10 - WORST POSSIBLE PAIN
PAINLEVEL_OUTOF10: 0 - NO PAIN
PAINLEVEL_OUTOF10: 7
PAINLEVEL_OUTOF10: 0 - NO PAIN

## 2024-03-01 ASSESSMENT — PAIN DESCRIPTION - ORIENTATION: ORIENTATION: RIGHT

## 2024-03-01 NOTE — PROGRESS NOTES
Kody Choi is a 53 y.o. adult on day 3 of admission presenting with Cellulitis of scrotum.      Subjective   Rapid response called for A-fib RVR overnight.  Called by nursing.   ordered increase metoprolol and starting magnesium IV.  Patient seen and examined at bedside.  He is very ornery and demanding discharge.  Advised that he is not medically ready for discharge.  He will have to sign out AMA.  Discussed that he would risk not being allowed to go back to the SNF.  Patient agreeable to stay through the weekend for diuresis.       Objective     Last Recorded Vitals  /79   Pulse 82   Temp 36.1 °C (97 °F)   Resp 20   Wt (!) 179 kg (394 lb 10 oz)   SpO2 95%   Intake/Output last 3 Shifts:    Intake/Output Summary (Last 24 hours) at 3/1/2024 1827  Last data filed at 3/1/2024 1506  Gross per 24 hour   Intake 339 ml   Output 2800 ml   Net -2461 ml         Admission Weight  Weight: (!) 159 kg (350 lb) (02/27/24 1845)    Daily Weight  03/01/24 : (!) 179 kg (394 lb 10 oz)    Image Results  ECG 12 lead  Normal sinus rhythm  Cannot rule out Inferior infarct , age undetermined  Anterolateral infarct , age undetermined  Abnormal ECG  When compared with ECG of 16-FEB-2024 20:38,  Sinus rhythm has replaced Electronic ventricular pacemaker  Confirmed by Jaren Chaney ([id number]) on 2/28/2024 8:56:04 AM      Physical Exam  Constitutional:       Appearance: He is obese.   HENT:      Mouth/Throat:      Mouth: Mucous membranes are dry.      Pharynx: Oropharynx is clear.   Eyes:      Pupils: Pupils are equal, round, and reactive to light.   Cardiovascular:      Rate and Rhythm: Rhythm irregularly irregular.   Pulmonary:      Breath sounds: Normal breath sounds.   Abdominal:      General: Bowel sounds are normal.   Musculoskeletal:      Right knee: Swelling present.      Comments: Scrotal edema, erythema   Skin:     General: Skin is warm and dry.      Capillary Refill: Capillary refill takes less than 2 seconds.    Neurological:      General: No focal deficit present.      Mental Status: He is alert and oriented to person, place, and time.   Psychiatric:         Mood and Affect: Mood normal. Affect is angry.      Relevant Results               Assessment/Plan   This patient currently has cardiac telemetry ordered; if you would like to modify or discontinue the telemetry order, click here to go to the orders activity to modify/discontinue the order.    This patient has a central line   Reason for the central line remaining today? Parenteral medication    This patient has a urinary catheter   Reason for the urinary catheter remaining today? urinary retention/bladder outlet obstruction, acute or chronic    Principal Problem:    Cellulitis of scrotum    Kody is a 53-year-old male patient is alert and oriented x 3 who is presenting to ED from skilled nursing facility with complaint of scrotal edema.  Troponin and BNP elevated.  Consult to cardiology.  Imaging completed showing anasarca, scrotal edema, concern for infection.  Patient started on IV vancomycin and Zosyn.  Consult to ID due to PICC line and history of IV antibiotics at skilled nursing facility for recurrent septic arthritis of the right knee.  Patient admitted for further medical management.     Scrotal cellulitis  Admit to telemetry per Dr. Lyndon Metzger  See imaging results above  Blood cultures pending  Tylenol as needed  Zofran as needed  Continue IV vancomycin/Zosyn  Repeat labs in a.m.     Elevated troponin/elevated BNP  History heart failure (EF 30% 1/30/2024)  Consult cardiology and appreciate input  Low-sodium diet  Fluid restriction  Trend troponin     BARBIE  No IV fluids given due to elevated troponin  Continue to monitor and defer to attending for consult     Diabetes/CAD/A-fib/RUPESH/recurrent septic arthritis  PICC line  Consult infectious disease for IV antibiotics to continue from skilled nursing facility  Diabetic diet  ISS  Hold home oral  antidiabetic medications when med rec is complete  Continue home tramadol  Metoprolol IV every 6 as needed     DVT Ppx/GI PPx  SCDs  Heparin subcutaneous  Up to chair as tolerated  Protonix IV daily  PT/OT     2/29: Patient diuresed 2.1 L yesterday and additional 2 L so far today.  Creatinine improved from 1.7 down to 1.5.  Continue Campos.  Continue diuresis.  Ultimately, patient will return to [facility].    3/1: Patient diuresed 4.7 L.  Into A-fib RVR.  Increase metoprolol to 50 mg.   patient given 2 g IV mag.  Creatinine improved from 1.5 down to 1.4.  Infectious disease changed antibiotics back to daptomycin.  Bariatric panel ordered.  Continue diuresis over the weekend and anticipate discharge early next week.            Lyndon Metzger, DO

## 2024-03-01 NOTE — PROGRESS NOTES
Kody Choi is a 53 y.o. adult on day 2 of admission presenting with Cellulitis of scrotum.      Subjective   No events overnight.  Patient diuresing well.  Seen and examined at bedside.  He is calm and more cooperative today.  He is anxious for discharge.       Objective     Last Recorded Vitals  /87 (BP Location: Right arm, Patient Position: Lying)   Pulse 101   Temp 36.2 °C (97.2 °F) (Temporal)   Resp 18   Wt (!) 155 kg (341 lb 11.4 oz)   SpO2 96%   Intake/Output last 3 Shifts:    Intake/Output Summary (Last 24 hours) at 2/29/2024 1925  Last data filed at 2/29/2024 1530  Gross per 24 hour   Intake 509 ml   Output 3950 ml   Net -3441 ml       Admission Weight  Weight: (!) 159 kg (350 lb) (02/27/24 1845)    Daily Weight  02/28/24 : (!) 155 kg (341 lb 11.4 oz)    Image Results  ECG 12 lead  Normal sinus rhythm  Cannot rule out Inferior infarct , age undetermined  Anterolateral infarct , age undetermined  Abnormal ECG  When compared with ECG of 16-FEB-2024 20:38,  Sinus rhythm has replaced Electronic ventricular pacemaker  Confirmed by Jaren Chaney ([id number]) on 2/28/2024 8:56:04 AM      Physical Exam  Constitutional:       Appearance: He is obese.   HENT:      Mouth/Throat:      Mouth: Mucous membranes are dry.      Pharynx: Oropharynx is clear.   Eyes:      Pupils: Pupils are equal, round, and reactive to light.   Cardiovascular:      Rate and Rhythm: Rhythm irregularly irregular.   Pulmonary:      Breath sounds: Normal breath sounds.   Abdominal:      General: Bowel sounds are normal.   Musculoskeletal:      Right knee: Swelling present.      Comments: Scrotal edema, erythema   Skin:     General: Skin is warm and dry.      Capillary Refill: Capillary refill takes less than 2 seconds.   Neurological:      General: No focal deficit present.      Mental Status: He is alert and oriented to person, place, and time.   Psychiatric:         Mood and Affect: Mood normal. Affect is angry.      Relevant  Results               Assessment/Plan        This patient has a central line   Reason for the central line remaining today? Parenteral medication    This patient has a urinary catheter   Reason for the urinary catheter remaining today? urinary retention/bladder outlet obstruction, acute or chronic    Principal Problem:    Cellulitis of scrotum    Kody is a 53-year-old male patient is alert and oriented x 3 who is presenting to ED from skilled nursing facility with complaint of scrotal edema.  Troponin and BNP elevated.  Consult to cardiology.  Imaging completed showing anasarca, scrotal edema, concern for infection.  Patient started on IV vancomycin and Zosyn.  Consult to ID due to PICC line and history of IV antibiotics at skilled nursing facility for recurrent septic arthritis of the right knee.  Patient admitted for further medical management.     Scrotal cellulitis  Admit to telemetry per Dr. Lyndon Metzger  See imaging results above  Blood cultures pending  Tylenol as needed  Zofran as needed  Continue IV vancomycin/Zosyn  Repeat labs in a.m.     Elevated troponin/elevated BNP  History heart failure (EF 30% 1/30/2024)  Consult cardiology and appreciate input  Low-sodium diet  Fluid restriction  Trend troponin     BARBIE  No IV fluids given due to elevated troponin  Continue to monitor and defer to attending for consult     Diabetes/CAD/A-fib/RUPESH/recurrent septic arthritis  PICC line  Consult infectious disease for IV antibiotics to continue from skilled nursing facility  Diabetic diet  ISS  Hold home oral antidiabetic medications when med rec is complete  Continue home tramadol  Metoprolol IV every 6 as needed     DVT Ppx/GI PPx  SCDs  Heparin subcutaneous  Up to chair as tolerated  Protonix IV daily  PT/OT     2/29: Patient diuresed 2.1 L yesterday and additional 2 L so far today.  Creatinine improved from 1.7 down to 1.5.  Continue Campos.  Continue diuresis.  Ultimately, patient will return to [facility].               Lyndon Metzger, DO

## 2024-03-01 NOTE — CODE DOCUMENTATION
At 04:41 a rapid response was called due to pt's high heart rate and abnormal EKG. Doctors came, assessed pt, and determined at this time to transfer pt to higher level of care.     05:30- pt was transferred to room 832 and handed of to Ana TRUJILLO

## 2024-03-01 NOTE — SIGNIFICANT EVENT
"Hospital Medicine Rapid Response Note    Name: Kody Choi  Age: 53 y.o.  Location: [facility]  Code Status: Full Code      Narrative Summary:    Rapid response was called at 04 18 for A-fib with RVR.  Briefly, this is a patient with history of type II DM, CABG, HFrEF, A-fib not on AC, who is currently being treated for right knee septic arthritis.  When I arrived to bedside patient's heart rate was in the 120s.  When talking to the patient he says that he is completely asymptomatic and further denying chest pain, palpitations, heart racing, anxiety.  He states that he does have a history of A-fib however is unable to recall the medications he is on for it.  He denies being on any anticoagulation.  He is normotensive throughout entire encounter.     Assessment/Plan:    A-fib with RVR, asymptomatic  -[id number]  PLAN:  -Due to [id number] score would recommend AC; defer to primary team for anticoagulation  -Patient refusing IVP Lopressor, will resume home Toprol 25 mg  -Transfer to telemetry floor for further management  -Notified primary of patient's condition    Disposition: Transfer to stepdown unit for telemetry    Physical Exam:    /88   Pulse (!) 131   Temp 36 °C (96.8 °F)   Resp 18   Ht 1.676 m (5' 6\")   Wt (!) 155 kg (341 lb 11.4 oz)   SpO2 96%   BMI 55.15 kg/m²     Constitutional: Morbidly obese, A&Ox3, no acute distress, Campos in place  Eyes: EOMI, clear sclera  Respiratory/Thorax: Diminished breath sounds, good chest expansion, no acute respiratory distress  Cardiovascular: Irregularly irregular however very distant heart sounds, 2+ equal pulses of the extremities  Gastrointestinal: Obese habitus, soft, non-tender  Extremities: Right knee with dressing applied, no cyanosis edema, no clubbing  Psychological: Appropriate mood and behavior  Skin: Warm and dry    Trey Salas, DO  PGY-1, Internal Medicine  Please SecureChat for any further questions  This is a preliminary note, please await " attending attestation for final A/P

## 2024-03-01 NOTE — PROGRESS NOTES
"Kody Choi is a 53 y.o. adult on day 3 of admission presenting with Cellulitis of scrotum.    Subjective   Continues on IV antibiotics for right knee infection.  In A-fib RVR overnight.       Objective     Physical Exam  Constitutional:       Appearance: He is obese.      Comments: Not wearing any clothes, scrotum is swollen   HENT:      Head: Normocephalic and atraumatic.      Right Ear: External ear normal.      Left Ear: External ear normal.      Nose: Nose normal.      Mouth/Throat:      Mouth: Mucous membranes are dry.   Eyes:      Extraocular Movements: Extraocular movements intact.      Pupils: Pupils are equal, round, and reactive to light.   Cardiovascular:      Comments: Irregular, tachycardic  Pulmonary:      Effort: Pulmonary effort is normal.      Breath sounds: Normal breath sounds.   Abdominal:      General: Abdomen is flat.      Palpations: Abdomen is soft.   Musculoskeletal:      Cervical back: Normal range of motion and neck supple.      Comments: Right knee erythematous and swollen   Skin:     General: Skin is warm and dry.      Capillary Refill: Capillary refill takes 2 to 3 seconds.   Neurological:      General: No focal deficit present.      Mental Status: He is alert. Mental status is at baseline.   Psychiatric:         Mood and Affect: Mood normal.         Behavior: Behavior normal.         Last Recorded Vitals  Blood pressure 106/63, pulse (!) 136, temperature 35.9 °C (96.6 °F), temperature source Temporal, resp. rate 20, height 1.676 m (5' 6\"), weight (!) 179 kg (394 lb 10 oz), SpO2 95 %, unknown if currently breastfeeding.  Intake/Output last 3 Shifts:  I/O last 3 completed shifts:  In: 729 (4.6 mL/kg) [P.O.:460; IV Piggyback:269]  Out: 6750 (42.5 mL/kg) [Urine:6750 (1.2 mL/kg/hr)]  Dosing Weight: 159 kg     Relevant Results  ECG 12 lead    Result Date: 2/28/2024  Normal sinus rhythm Cannot rule out Inferior infarct , age undetermined Anterolateral infarct , age undetermined Abnormal ECG " When compared with ECG of 16-FEB-2024 20:38, Sinus rhythm has replaced Electronic ventricular pacemaker Confirmed by Jaren Chaney ([id number]) on 2/28/2024 8:56:04 AM    CT pelvis w IV contrast    Result Date: 2/27/2024  Interpreted By:  Susanna Lewis, STUDY: CT PELVIS W IV CONTRAST;  2/27/2024 9:21 pm   INDICATION: Signs/Symptoms:scrotal swelling.   COMPARISON: Scrotal ultrasound performed on the same day.   ACCESSION NUMBER(S): [id number]   ORDERING CLINICIAN: YANELY RODRIGUEZ   TECHNIQUE: CT of the pelvis was performed.  Standard contiguous axial images were obtained at 3 mm slice thickness through pelvis. Coronal and sagittal reconstructions at 3 mm slice thickness were performed.  85 ml of contrast  Optiray 350 were administered intravenously without immediate complication.   FINDINGS: PELVIS:   BLADDER: Urinary bladder is decompressed secondary to Campos catheterization.   REPRODUCTIVE ORGANS: Prostate gland is unremarkable.   BOWEL: Visualized small bowel and large bowel loops appear grossly unremarkable.   VESSELS: Visualized vasculature appears grossly unremarkable.   PERITONEUM/RETROPERITONEUM/LYMPH NODES: There is small amount of free fluid along the right lower quadrant measuring approximately 20 Hounsfield units. This is incompletely included in the field of view. There are multiple enlarged lymph nodes along the right external iliac chain with largest measuring up to 2 cm in short axis. There are also multiple enlarged right inguinal lymph nodes with the largest measuring up to 1.8 cm in short axis. However no evidence of enlarged lymphadenopathy in the left inguinal region or in the left pelvis.   BONES AND ABDOMINAL WALL: No suspicious osseous lesions in the visualized field of view. There is small left fat containing inguinal hernia. Mild discogenic degenerative changes are noted in the lower lumbar spine. There is diffuse reticulated subcutaneous soft tissue edema throughout the visualized  abdominal wall extending into the included bilateral thighs. There is diffuse subcutaneous soft tissue edema throughout the scrotum. No evidence of peripheral rim enhancing fluid collection within the scrotal wall. No evidence of soft tissue gas.       1.  Diffuse body wall edema extending into visualized bilateral lower extremities. There is also diffuse edematous thickening of the scrotum, which could again be related to similar systemic process/anasarca. No definite CT evidence of peripheral rim enhancing fluid collection to suggest an abscess. No evidence of soft tissue gas within the scrotum. 2. Multiple enlarged right inguinal and right external iliac lymph nodes as described above. This could be related to infectious/inflammatory etiology in the right lower extremity. Recommend clinical correlation with patient's symptomatology in the right lower extremity and further evaluation as clinically warranted. 3. Small volume intraperitoneal free fluid in the right hemiabdomen. This is only partially included in the field of view and is also most likely favored to represent similar findings secondary to 3rd spacing of fluid/anasarca. Recommend correlation with patient's abdominal symptomatology and a dedicated CT abdomen pelvis with contrast can be obtained for further evaluation if clinically warranted.   MACRO: None   Signed by: Susanna Lewis 2/27/2024 10:56 PM Dictation workstation:   [id number]    CT head wo IV contrast    Result Date: 2/27/2024  Interpreted By:  Vargas Mendoza, STUDY: CT HEAD WO IV CONTRAST;  2/27/2024 9:21 pm   INDICATION: Signs/Symptoms:ams.   COMPARISON: Noncontrast head CT of 11/10/2015.   ACCESSION NUMBER(S): [id number]   ORDERING CLINICIAN: YANELY RODRIGUEZ   TECHNIQUE: Noncontrast axial CT scan of head was performed. Angled reformats in brain and bone windows were generated. The images were reviewed in bone, brain, blood and soft tissue windows.   FINDINGS: CSF Spaces: The  ventricles, sulci and basal cisterns are within normal limits. There is no extraaxial fluid collection.   Parenchyma: Moderate to advanced volume loss.  There is periventricular and subcortical white matter hypoattenuation, most in keeping with chronic microvascular ischemic change. The grey-white differentiation is intact. There is no mass effect or midline shift. There is no intracranial hemorrhage.   Calvarium: The calvarium is unremarkable.   Paranasal sinuses and mastoids: Visualized paranasal sinuses and mastoids are clear.       No evidence of acute cortical infarct or intracranial hemorrhage.     MACRO: None   Signed by: Vargas Mendoza 2/27/2024 9:59 PM Dictation workstation:   [id number]    US scrotum w doppler    Result Date: 2/27/2024  STUDY: Scrotal Ultrasound; 2/27/2024 8:12 PM. INDICATION: Scrotal pain and swelling. COMPARISON: None available. ACCESSION NUMBER(S): [id number] ORDERING CLINICIAN: YANELY RODRIGUEZ TECHNIQUE:  Ultrasound of the scrotum and testicular spectral Doppler evaluation performed. FINDINGS:    The right testicle measures 4.1 x 3.1 x 2.9 cm.  It demonstrates a normal homogeneous echotexture.  Normal color and spectral Doppler flow is demonstrated.  The right epididymis measures 1.8 x 1.0 x 1.0 cm and demonstrates normal echogenicity. The left testicle measures 4.5 x 3.0 x 3.2 cm.  It demonstrates a normal homogeneous echotexture.  Normal color and spectral Doppler flow is demonstrated.  The left epididymis measures 1.7 x 1.1 x 1.4 cm and demonstrates a simple cyst measuring 0.5 cm. There are bilateral hydroceles present. There is severe scrotal edema. The scrotal wall measures 5.2 cm on the right and 4.6 cm on the left.    Bilateral hydroceles. Severe scrotal edema. The testicles are otherwise unremarkable. Normal testicular spectral Doppler evaluation. Signed by Aubrey Zapien MD    XR chest 1 view    Result Date: 2/27/2024  STUDY: Chest Radiograph;  2/27/2024 at 19:16 hours.  INDICATION: Shortness of breath. COMPARISON: XR chest 6/27/2023, 1/20/2021 and 1/11/2021. CT chest 2/14/2021 ACCESSION NUMBER(S): [id number] ORDERING CLINICIAN: YANELY RODRIGUEZ TECHNIQUE:  Frontal chest was obtained at 19:16 hours. FINDINGS: CARDIOMEDIASTINAL SILHOUETTE: Mild cardiomegaly present.  Sternotomy wires seen.  LUNGS: Lungs are clear.  ABDOMEN: No remarkable upper abdominal findings.  BONES: No acute osseous changes.    Mild cardiomegaly. Signed by Mg Almanzar MD    XR chest 1 view    Result Date: 2/19/2024  Interpreted By:  Speedy Hoff,  and Saurabh Costa STUDY: XR CHEST 1 VIEW;  2/19/2024 3:08 pm   INDICATION: Signs/Symptoms:confirm PICC placement right arm length 49 cms.   COMPARISON: Chest radiograph 06/27/2023   ACCESSION NUMBER(S): [id number]   ORDERING CLINICIAN: SHUN SORENSON   FINDINGS: AP radiograph of the chest was provided. Right-sided rotation.   Right upper extremity PICC is present with distal tip faintly visualized overlying the right atrium.   CARDIOMEDIASTINAL SILHOUETTE: Cardiomediastinal silhouette is stable enlarged in size and configuration.   LUNGS: Decreased lung volumes are present with bronchovascular crowding and basilar opacities. There is blunting of the bilateral cardiophrenic angles with mildly increased vascular cephalization as compared to 06/27/2023 which demonstrates similar lung aeration. No discernible pneumothorax.   ABDOMEN: Nonspecific paucity of bowel gas, otherwise no remarkable upper abdominal findings.   BONES: No acute osseous changes.       1. Mildly increased pulmonary vascular cephalization and at least small bilateral pleural effusions. At most, mild interstitial pulmonary edema. 2. Right upper extremity PICC as above.   I personally reviewed the images/study and I agree with the resident findings as stated by Julissa Wiley MD. This study was interpreted at [facility], [place], [place].   MACRO: None   Signed by:  Speedy Noraimtaiz 2/19/2024 3:57 PM Dictation workstation:   [id number]    Bedside PICC Imaging    Result Date: 2/19/2024  These images are not reportable by radiology and will not be interpreted by  Radiologists.    ECG 12 lead    Result Date: 2/17/2024  Sinus rhythm with occasional ventricular-paced complexes Low voltage QRS Possible Anterolateral infarct , age undetermined Abnormal ECG When compared with ECG of 27-JUN-2023 04:13, Previous ECG has undetermined rhythm, needs review See ED provider note for full interpretation and clinical correlation Confirmed by Arden Becker ([id number]) on 2/17/2024 1:44:13 AM    XR knee right 1-2 views    Result Date: 2/16/2024  Interpreted By:  Brad Hunt, STUDY: XR KNEE RIGHT 1-2 VIEWS; ;  2/16/2024 5:48 pm   INDICATION: Signs/Symptoms:known septic joint.   COMPARISON: Right knee radiographs 01/25/2014.   ACCESSION NUMBER(S): [id number]   ORDERING CLINICIAN: MELVIN RAMSAY   FINDINGS: Abnormal findings in the knee are overall similar compared to prior radiographs 01/25/2024.   Status post total knee arthroplasty. Redemonstration of fragmentation of the femoral cement at the junction of the stem-condylar component as well as cement fragmentation of the tibial component at the stem plateau interface. Mellitus cement fragments again seen throughout the knee slight posterior translation of the tibia relative to femur is less evident than prior. Diffuse soft tissue swelling about the knee. Previously described soft tissue ulcer anteriorly is not well seen.       1.  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 2. Re-demonstration of cement fragmentation of the femoral and tibial components, with multiple cement fragments throughout the knee. 3. Previously seen posterior translation of the tibia relative to the femur is less prominent and alignment appears near anatomic. 4. Previously described ulcer in the anterior soft tissues on the lateral view is not visualized.    MACRO: None   Signed by: Brad Gilbert 2/16/2024 5:56 PM Dictation workstation:   [id number]    Lower extremity venous duplex right    Result Date: 2/1/2024  Interpreted By:  Vargas Andersen,  Jaylen Nath STUDY: [facility] US LOWER EXTREMITY VENOUS DUPLEX RIGHT;  1/31/2024 3:06 pm   INDICATION: Signs/Symptoms:Exclude DVT (Septic Rt knee).   COMPARISON: Ultrasound 06/27/2023   ACCESSION NUMBER(S): [id number]   ORDERING CLINICIAN: NEVA RUST   TECHNIQUE: Vascular ultrasound of the right lower extremity was performed. Real-time compression views as well as Gray scale, color Doppler and spectral Doppler waveform analysis was performed.   FINDINGS: Evaluation of the visualized portions of the right common femoral vein, proximal, mid, and distal femoral vein, and popliteal vein were performed.   Limitations:  Unable to evaluate the distal femoral vein and calf veins due to body habitus and edema.   The evaluated veins demonstrate normal compressibility. There is intact venous flow demonstrating normal respiratory variability and normal augmentation of flow with calf compression. Therefore, there is no ultrasonographic evidence for deep vein thrombosis within the evaluated veins.   Respiratory variation and augmentation to calf pressure was noted.   Incidental note of right inguinal lymphadenopathy with a lymph node measuring 2.6 x 1.7 x 2.1 cm, likely reactive. Extensive soft tissue edema is noted at the level of the and lower leg.       No sonographic evidence for deep vein thrombosis within the evaluated veins of the right lower extremity. Of note, unable to evaluate the distal femoral vein and calf veins due to body habitus and edema.   I personally reviewed the images/study and I agree with the findings as stated by Pham Bishop MD. This study was interpreted at [place], [place].   MACRO: None   Signed by: Vargas Andersen 2/1/2024 5:45 AM  Dictation workstation:   [id number]       ECG 12 lead    Result Date: 2/28/2024  Normal sinus rhythm Cannot rule out Inferior infarct , age undetermined Anterolateral infarct , age undetermined Abnormal ECG When compared with ECG of 16-FEB-2024 20:38, Sinus rhythm has replaced Electronic ventricular pacemaker Confirmed by Jaren Chaney ([id number]) on 2/28/2024 8:56:04 AM    CT pelvis w IV contrast    Result Date: 2/27/2024  Interpreted By:  Susanna Lewis, STUDY: CT PELVIS W IV CONTRAST;  2/27/2024 9:21 pm   INDICATION: Signs/Symptoms:scrotal swelling.   COMPARISON: Scrotal ultrasound performed on the same day.   ACCESSION NUMBER(S): [id number]   ORDERING CLINICIAN: YANELY RODRIGUEZ   TECHNIQUE: CT of the pelvis was performed.  Standard contiguous axial images were obtained at 3 mm slice thickness through pelvis. Coronal and sagittal reconstructions at 3 mm slice thickness were performed.  85 ml of contrast  Optiray 350 were administered intravenously without immediate complication.   FINDINGS: PELVIS:   BLADDER: Urinary bladder is decompressed secondary to Campos catheterization.   REPRODUCTIVE ORGANS: Prostate gland is unremarkable.   BOWEL: Visualized small bowel and large bowel loops appear grossly unremarkable.   VESSELS: Visualized vasculature appears grossly unremarkable.   PERITONEUM/RETROPERITONEUM/LYMPH NODES: There is small amount of free fluid along the right lower quadrant measuring approximately 20 Hounsfield units. This is incompletely included in the field of view. There are multiple enlarged lymph nodes along the right external iliac chain with largest measuring up to 2 cm in short axis. There are also multiple enlarged right inguinal lymph nodes with the largest measuring up to 1.8 cm in short axis. However no evidence of enlarged lymphadenopathy in the left inguinal region or in the left pelvis.   BONES AND ABDOMINAL WALL: No suspicious osseous lesions in the visualized field of view. There  is small left fat containing inguinal hernia. Mild discogenic degenerative changes are noted in the lower lumbar spine. There is diffuse reticulated subcutaneous soft tissue edema throughout the visualized abdominal wall extending into the included bilateral thighs. There is diffuse subcutaneous soft tissue edema throughout the scrotum. No evidence of peripheral rim enhancing fluid collection within the scrotal wall. No evidence of soft tissue gas.       1.  Diffuse body wall edema extending into visualized bilateral lower extremities. There is also diffuse edematous thickening of the scrotum, which could again be related to similar systemic process/anasarca. No definite CT evidence of peripheral rim enhancing fluid collection to suggest an abscess. No evidence of soft tissue gas within the scrotum. 2. Multiple enlarged right inguinal and right external iliac lymph nodes as described above. This could be related to infectious/inflammatory etiology in the right lower extremity. Recommend clinical correlation with patient's symptomatology in the right lower extremity and further evaluation as clinically warranted. 3. Small volume intraperitoneal free fluid in the right hemiabdomen. This is only partially included in the field of view and is also most likely favored to represent similar findings secondary to 3rd spacing of fluid/anasarca. Recommend correlation with patient's abdominal symptomatology and a dedicated CT abdomen pelvis with contrast can be obtained for further evaluation if clinically warranted.   MACRO: None   Signed by: Susanna Lewis 2/27/2024 10:56 PM Dictation workstation:   [id number]    CT head wo IV contrast    Result Date: 2/27/2024  Interpreted By:  Vargas Mendoza, STUDY: CT HEAD WO IV CONTRAST;  2/27/2024 9:21 pm   INDICATION: Signs/Symptoms:ams.   COMPARISON: Noncontrast head CT of 11/10/2015.   ACCESSION NUMBER(S): [id number]   ORDERING CLINICIAN: YANELY RODRIGUEZ   TECHNIQUE:  Noncontrast axial CT scan of head was performed. Angled reformats in brain and bone windows were generated. The images were reviewed in bone, brain, blood and soft tissue windows.   FINDINGS: CSF Spaces: The ventricles, sulci and basal cisterns are within normal limits. There is no extraaxial fluid collection.   Parenchyma: Moderate to advanced volume loss.  There is periventricular and subcortical white matter hypoattenuation, most in keeping with chronic microvascular ischemic change. The grey-white differentiation is intact. There is no mass effect or midline shift. There is no intracranial hemorrhage.   Calvarium: The calvarium is unremarkable.   Paranasal sinuses and mastoids: Visualized paranasal sinuses and mastoids are clear.       No evidence of acute cortical infarct or intracranial hemorrhage.     MACRO: None   Signed by: Vargas Mendoza 2/27/2024 9:59 PM Dictation workstation:   [id number]    US scrotum w doppler    Result Date: 2/27/2024  STUDY: Scrotal Ultrasound; 2/27/2024 8:12 PM. INDICATION: Scrotal pain and swelling. COMPARISON: None available. ACCESSION NUMBER(S): [id number] ORDERING CLINICIAN: YANELY RODRIGUEZ TECHNIQUE:  Ultrasound of the scrotum and testicular spectral Doppler evaluation performed. FINDINGS:    The right testicle measures 4.1 x 3.1 x 2.9 cm.  It demonstrates a normal homogeneous echotexture.  Normal color and spectral Doppler flow is demonstrated.  The right epididymis measures 1.8 x 1.0 x 1.0 cm and demonstrates normal echogenicity. The left testicle measures 4.5 x 3.0 x 3.2 cm.  It demonstrates a normal homogeneous echotexture.  Normal color and spectral Doppler flow is demonstrated.  The left epididymis measures 1.7 x 1.1 x 1.4 cm and demonstrates a simple cyst measuring 0.5 cm. There are bilateral hydroceles present. There is severe scrotal edema. The scrotal wall measures 5.2 cm on the right and 4.6 cm on the left.    Bilateral hydroceles. Severe scrotal edema. The  testicles are otherwise unremarkable. Normal testicular spectral Doppler evaluation. Signed by Aubrey Zapien MD    XR chest 1 view    Result Date: 2/27/2024  STUDY: Chest Radiograph;  2/27/2024 at 19:16 hours. INDICATION: Shortness of breath. COMPARISON: XR chest 6/27/2023, 1/20/2021 and 1/11/2021. CT chest 2/14/2021 ACCESSION NUMBER(S): [id number] ORDERING CLINICIAN: YANELY RODRIGUEZ TECHNIQUE:  Frontal chest was obtained at 19:16 hours. FINDINGS: CARDIOMEDIASTINAL SILHOUETTE: Mild cardiomegaly present.  Sternotomy wires seen.  LUNGS: Lungs are clear.  ABDOMEN: No remarkable upper abdominal findings.  BONES: No acute osseous changes.    Mild cardiomegaly. Signed by Mg Almanzar MD    XR chest 1 view    Result Date: 2/19/2024  Interpreted By:  Speedy Hoff and Ravi Shweta STUDY: XR CHEST 1 VIEW;  2/19/2024 3:08 pm   INDICATION: Signs/Symptoms:confirm PICC placement right arm length 49 cms.   COMPARISON: Chest radiograph 06/27/2023   ACCESSION NUMBER(S): [id number]   ORDERING CLINICIAN: SHUN SORENSON   FINDINGS: AP radiograph of the chest was provided. Right-sided rotation.   Right upper extremity PICC is present with distal tip faintly visualized overlying the right atrium.   CARDIOMEDIASTINAL SILHOUETTE: Cardiomediastinal silhouette is stable enlarged in size and configuration.   LUNGS: Decreased lung volumes are present with bronchovascular crowding and basilar opacities. There is blunting of the bilateral cardiophrenic angles with mildly increased vascular cephalization as compared to 06/27/2023 which demonstrates similar lung aeration. No discernible pneumothorax.   ABDOMEN: Nonspecific paucity of bowel gas, otherwise no remarkable upper abdominal findings.   BONES: No acute osseous changes.       1. Mildly increased pulmonary vascular cephalization and at least small bilateral pleural effusions. At most, mild interstitial pulmonary edema. 2. Right upper extremity PICC as above.   I personally  reviewed the images/study and I agree with the resident findings as stated by Julissa Wiley MD. This study was interpreted at [facility], [place], [place].   MACRO: None   Signed by: Speedy Hoff 2/19/2024 3:57 PM Dictation workstation:   [id number]    Bedside PICC Imaging    Result Date: 2/19/2024  These images are not reportable by radiology and will not be interpreted by  Radiologists.    ECG 12 lead    Result Date: 2/17/2024  Sinus rhythm with occasional ventricular-paced complexes Low voltage QRS Possible Anterolateral infarct , age undetermined Abnormal ECG When compared with ECG of 27-JUN-2023 04:13, Previous ECG has undetermined rhythm, needs review See ED provider note for full interpretation and clinical correlation Confirmed by Arden Becker ([id number]) on 2/17/2024 1:44:13 AM    XR knee right 1-2 views    Result Date: 2/16/2024  Interpreted By:  Brad Hunt, STUDY: XR KNEE RIGHT 1-2 VIEWS; ;  2/16/2024 5:48 pm   INDICATION: Signs/Symptoms:known septic joint.   COMPARISON: Right knee radiographs 01/25/2014.   ACCESSION NUMBER(S): [id number]   ORDERING CLINICIAN: MELVIN RAMSAY   FINDINGS: Abnormal findings in the knee are overall similar compared to prior radiographs 01/25/2024.   Status post total knee arthroplasty. Redemonstration of fragmentation of the femoral cement at the junction of the stem-condylar component as well as cement fragmentation of the tibial component at the stem plateau interface. Mellitus cement fragments again seen throughout the knee slight posterior translation of the tibia relative to femur is less evident than prior. Diffuse soft tissue swelling about the knee. Previously described soft tissue ulcer anteriorly is not well seen.       1.  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 2. Re-demonstration of cement fragmentation of the femoral and tibial components, with multiple cement fragments throughout the knee. 3. Previously seen  posterior translation of the tibia relative to the femur is less prominent and alignment appears near anatomic. 4. Previously described ulcer in the anterior soft tissues on the lateral view is not visualized.   MACRO: None   Signed by: Brad Hunt 2/16/2024 5:56 PM Dictation workstation:   [id number]    Lower extremity venous duplex right    Result Date: 2/1/2024  Interpreted By:  Vargas Andersen and Tippareddy Charit STUDY: [facility] US LOWER EXTREMITY VENOUS DUPLEX RIGHT;  1/31/2024 3:06 pm   INDICATION: Signs/Symptoms:Exclude DVT (Septic Rt knee).   COMPARISON: Ultrasound 06/27/2023   ACCESSION NUMBER(S): [id number]   ORDERING CLINICIAN: NEVA RUST   TECHNIQUE: Vascular ultrasound of the right lower extremity was performed. Real-time compression views as well as Gray scale, color Doppler and spectral Doppler waveform analysis was performed.   FINDINGS: Evaluation of the visualized portions of the right common femoral vein, proximal, mid, and distal femoral vein, and popliteal vein were performed.   Limitations:  Unable to evaluate the distal femoral vein and calf veins due to body habitus and edema.   The evaluated veins demonstrate normal compressibility. There is intact venous flow demonstrating normal respiratory variability and normal augmentation of flow with calf compression. Therefore, there is no ultrasonographic evidence for deep vein thrombosis within the evaluated veins.   Respiratory variation and augmentation to calf pressure was noted.   Incidental note of right inguinal lymphadenopathy with a lymph node measuring 2.6 x 1.7 x 2.1 cm, likely reactive. Extensive soft tissue edema is noted at the level of the and lower leg.       No sonographic evidence for deep vein thrombosis within the evaluated veins of the right lower extremity. Of note, unable to evaluate the distal femoral vein and calf veins due to body habitus and edema.   I personally reviewed the images/study and I agree  with the findings as stated by Pham Bishop MD. This study was interpreted at [facility], [place], [place].   MACRO: None   Signed by: Vargas Andersen 2/1/2024 5:45 AM Dictation workstation:   [id number]      Assessment/Plan     #Right knee septic arthritis, status post multiple surgical operations  #Scrotal swelling  #Uncontrolled type 2 diabetes  #History of CABG  #History of heart failure     Antibiotics  Vancomycin day , stop 2/29  Zosyn day 2, stop 2/29  Dapto Day 2     -Continue antibiotics change back to dapto, will need Dapto until March 18  -Follow up with ID Dr. Christa cat to discharge from ID perspective       I reviewed and interpreted all lab test imaging studies and documentations from other healthcare providers  I am monitoring for antibiotic side effects and toxicity      Juanjose Dos Santos DO

## 2024-03-01 NOTE — PROGRESS NOTES
Subjective Data:  Denies having chest pain.  Responding well with IV diuresis with -4.7 L over the last 24 hours.  Objective Data:  Last Recorded Vitals:  Vitals:    03/01/24 0600 03/01/24 0750 03/01/24 1128 03/01/24 1544   BP:  106/63 122/84 123/79   BP Location:  Left arm     Patient Position:  Lying     Pulse:  (!) 136  82   Resp:  20     Temp:  35.9 °C (96.6 °F) 35.8 °C (96.4 °F) 36.1 °C (97 °F)   TempSrc:  Temporal     SpO2:  95% 98% 95%   Weight: (!) 179 kg (394 lb 10 oz)      Height:           Last Labs:  CBC - 3/1/2024:  5:03 AM  8.0 9.7 500    33.8      CMP - 3/1/2024:  5:03 AM  8.1 6.1 69 --- 0.4   4.5 2.7 225 450      PTT - 2/17/2024:  5:17 PM  1.1   12.3 34     TROPHS   Date/Time Value Ref Range Status   02/28/2024 06:58 AM 34 0 - 20 ng/L Final   02/28/2024 02:09 AM 40 0 - 20 ng/L Final   02/27/2024 07:10 PM 43 0 - 20 ng/L Final     BNP   Date/Time Value Ref Range Status   02/28/2024 02:09 AM 1,268 0 - 99 pg/mL Final   04/12/2021 06:51  0 - 99 pg/mL Final     Comment:     .  <100 pg/mL - Heart failure unlikely  100-299 pg/mL - Intermediate probability of acute heart  .               failure exacerbation. Correlate with clinical  .               context and patient history.    >=300 pg/mL - Heart Failure likely. Correlate with clinical  .               context and patient history.   Biotin interference may cause falsely decreased results.   Patients taking a Biotin dose of up to 5 mg/day should   refrain from taking Biotin for 24 hours before sample   collection. Providers may contact their local laboratory   for further information.       HGBA1C   Date/Time Value Ref Range Status   01/26/2024 04:13 AM 16.6 see below % Final     Comment:     Hemoglobin A1c is >15%. Marked elevations in HbA1c are commonly due to poor glycemic control in diabetic patients. Rarely, hemoglobin variants may cause false elevation of HbA1c that is discordant with glycemic control status.    10/15/2023 05:28 PM 14.7 see  below % Final     VLDL   Date/Time Value Ref Range Status   09/24/2020 05:30 AM 70 0 - 40 mg/dL Final   06/03/2020 05:37 PM 60 0 - 40 mg/dL Final   03/28/2019 05:00 AM 46 0 - 40 mg/dL Final      Last I/O:  I/O last 3 completed shifts:  In: 729 (4.6 mL/kg) [P.O.:460; IV Piggyback:269]  Out: 6750 (42.5 mL/kg) [Urine:6750 (1.2 mL/kg/hr)]  Dosing Weight: 159 kg     Past Cardiology Tests (Last 3 Years):  EKG:  ECG 12 lead 02/27/2024      ECG 12 lead 02/16/2024    Echo:  Transthoracic Echo (TTE) Complete 01/30/2024    Ejection Fractions:  EF   Date/Time Value Ref Range Status   01/30/2024 10:45 AM 28 %      Cath:  No results found for this or any previous visit from the past 1095 days.    Stress Test:  No results found for this or any previous visit from the past 1095 days.    Cardiac Imaging:  No results found for this or any previous visit from the past 1095 days.      Inpatient Medications:  Scheduled medications   Medication Dose Route Frequency    aspirin  81 mg oral Daily    [Held by provider] atorvastatin  40 mg oral Daily    daptomycin  6 mg/kg intravenous q24h SUDHIR    furosemide  40 mg intravenous BID    heparin (porcine)  7,500 Units subcutaneous q8h SUDHIR    insulin glargine  35 Units subcutaneous Nightly    insulin lispro  0-10 Units subcutaneous Before meals & nightly    [START ON 3/2/2024] metoprolol succinate XL  50 mg oral Daily    pantoprazole  40 mg intravenous Daily     PRN medications   Medication    acetaminophen    dextrose 10 % in water (D10W)    dextrose    fluticasone    glucagon    metoprolol    metoprolol    traMADol     Continuous Medications   Medication Dose Last Rate       Physical Exam:  Physical Exam  Constitutional:       Appearance: He is obese.   HENT:      Mouth/Throat:      Mouth: Mucous membranes are dry.      Pharynx: Oropharynx is clear.   Eyes:      Pupils: Pupils are equal, round, and reactive to light.   Cardiovascular:      Rate and Rhythm: Rhythm irregularly irregular.    Pulmonary:      Breath sounds: Normal breath sounds.   Abdominal:      General: Bowel sounds are normal.   Musculoskeletal:      Right knee: Swelling present.      Comments: Scrotal edema, erythema   Skin:     General: Skin is warm and dry.      Capillary Refill: Capillary refill takes less than 2 seconds.   Neurological:      General: No focal deficit present.      Mental Status: He is alert and oriented to person, place, and time.   Psychiatric:         Mood and Affect: Mood normal. Affect is angry.   Assessment/Plan   Acute on chronic decompensated combined systolic and diastolic heart failure  Elevated troponin  Diabetes mellitus type 2  Recurrent infection  Morbid obesity     Patient has atypical chest pain complaint with complaining of pain all over the body.  His EKG shows evidence of a Q wave in anterior leads and low voltage which could be due to body habitus as well as evidence for prior infarcts.  His troponin elevation is mild and stable.  More likely this represents mostly type II myocardial infarction in the setting of decompensated heart failure and his recurrent infection.  His diabetes has been poorly controlled with hemoglobin A1c above 16.  At this point patient is not a good candidate for any invasive test from cardiac standpoint such as heart catheterization in the absence of anginal symptoms and with a stable troponin.  I suggest continue with baby aspirin, statin, diabetes control and blood pressure control.  He will need close follow-up in cardiology office for guideline directed medical therapy for his heart failure.  Will continue with IV Lasix for volume control.  Eventually depending on his clinical progress and after clearing the infection, if his EF remains to be low, further considerations such as heart catheterization as well as consideration for primary prevention ICD can be discussed but first there are several other steps that needs to be followed.  I will continue to monitor the  patient while he is in the hospital.     February 29, 2024  Patient is responding well to her current dose of IV Lasix.  Renal function is slightly better today at 1.5.  Will continue with current medications from cardiac standpoint.    March 1, 2024  Creatinine is slightly better today.  Continue current management and diuresis from cardiac standpoint.  Will follow-up over the weekend.  I appreciate the opportunity to participate in this patient's care.      Jaren Chaney MD, PhD, [facility], [facility]  Interventional Cardiology, [facility]  Associate Professor of Medicine, [facility]  [email or web address]   Office:       Macro dragon disclaimer: This note was dictated by speech recognition. Minor errors in transcription may be present.

## 2024-03-01 NOTE — PROGRESS NOTES
ANGELIA was updated that this patient's auth was approved for [facility]. SW reached out to MD and bedside nurse if pt is still medically ready, MD to round today and confirm dc possibility for today. ANGELIA updated TCC and will continue to follow.  UPDATE 12:36pm  PER MD, Pt will not discharge today, SNF and TCC notified.   AMARJIT CRUZ

## 2024-03-01 NOTE — CARE PLAN
The patient's goals for the shift include      The clinical goals for the shift include safety    Progressing  Add All  Skin  Decreased wound size/increased tissue granulation at next dressing change  Add  Today at 2306 - Progressing by Boy Wyatt RN  Add  Participates in plan/prevention/treatment measures  Add  Today at 2306 - Progressing by Boy Wyatt RN  Add  Prevent/manage excess moisture  Add  Today at 2306 - Progressing by Boy Wyatt RN  Add  Prevent/minimize sheer/friction injuries  Add  Today at 2306 - Progressing by Boy Wyatt RN  Add  Promote/optimize nutrition  Add  Today at 2306 - Progressing by Boy Wyatt RN  Add  Promote skin healing  Add  Today at 2306 - Progressing by Boy Wyatt RN  Add  Pain  My pain/discomfort is manageable  Add  Today at 2306 - Progressing by Boy Wyatt RN  Add  Safety  Patient will be injury free during hospitalization  Add  Today at 2306 - Progressing by Boy Wyatt RN  Add  I will remain free of falls  Add  Today at 2306 - Progressing by Boy Wyatt RN  Add  Daily Care  Daily care needs are met  Add  Today at 2306 - Progressing by Boy Wyatt RN  Add  Psychosocial Needs  Demonstrates ability to cope with hospitalization/illness  Add  Today at 2306 - Progressing by Boy Wyatt RN  Add  Collaborate with me, my family, and caregiver to identify my specific goals  Add

## 2024-03-01 NOTE — PROGRESS NOTES
"Kody Choi is a 53 y.o. adult on day 3 of admission presenting with Cellulitis of scrotum.    Subjective   Continues on IV antibiotics for right knee infection.  In A-fib RVR overnight.       Objective     Physical Exam  Constitutional:       Appearance: He is obese.      Comments: Not wearing any clothes, scrotum is swollen   HENT:      Head: Normocephalic and atraumatic.      Right Ear: External ear normal.      Left Ear: External ear normal.      Nose: Nose normal.      Mouth/Throat:      Mouth: Mucous membranes are dry.   Eyes:      Extraocular Movements: Extraocular movements intact.      Pupils: Pupils are equal, round, and reactive to light.   Cardiovascular:      Comments: Irregular, tachycardic  Pulmonary:      Effort: Pulmonary effort is normal.      Breath sounds: Normal breath sounds.   Abdominal:      General: Abdomen is flat.      Palpations: Abdomen is soft.   Musculoskeletal:      Cervical back: Normal range of motion and neck supple.      Comments: Right knee erythematous and swollen   Skin:     General: Skin is warm and dry.      Capillary Refill: Capillary refill takes 2 to 3 seconds.   Neurological:      General: No focal deficit present.      Mental Status: He is alert. Mental status is at baseline.   Psychiatric:         Mood and Affect: Mood normal.         Behavior: Behavior normal.         Last Recorded Vitals  Blood pressure 106/63, pulse (!) 136, temperature 35.9 °C (96.6 °F), temperature source Temporal, resp. rate 20, height 1.676 m (5' 6\"), weight (!) 179 kg (394 lb 10 oz), SpO2 95 %, unknown if currently breastfeeding.  Intake/Output last 3 Shifts:  I/O last 3 completed shifts:  In: 729 (4.6 mL/kg) [P.O.:460; IV Piggyback:269]  Out: 6750 (42.5 mL/kg) [Urine:6750 (1.2 mL/kg/hr)]  Dosing Weight: 159 kg     Relevant Results  ECG 12 lead    Result Date: 2/28/2024  Normal sinus rhythm Cannot rule out Inferior infarct , age undetermined Anterolateral infarct , age undetermined Abnormal ECG " When compared with ECG of 16-FEB-2024 20:38, Sinus rhythm has replaced Electronic ventricular pacemaker Confirmed by Jaren Chaney ([id number]) on 2/28/2024 8:56:04 AM    CT pelvis w IV contrast    Result Date: 2/27/2024  Interpreted By:  Susanna Lewis, STUDY: CT PELVIS W IV CONTRAST;  2/27/2024 9:21 pm   INDICATION: Signs/Symptoms:scrotal swelling.   COMPARISON: Scrotal ultrasound performed on the same day.   ACCESSION NUMBER(S): [id number]   ORDERING CLINICIAN: YANELY RODRIGUEZ   TECHNIQUE: CT of the pelvis was performed.  Standard contiguous axial images were obtained at 3 mm slice thickness through pelvis. Coronal and sagittal reconstructions at 3 mm slice thickness were performed.  85 ml of contrast  Optiray 350 were administered intravenously without immediate complication.   FINDINGS: PELVIS:   BLADDER: Urinary bladder is decompressed secondary to Campos catheterization.   REPRODUCTIVE ORGANS: Prostate gland is unremarkable.   BOWEL: Visualized small bowel and large bowel loops appear grossly unremarkable.   VESSELS: Visualized vasculature appears grossly unremarkable.   PERITONEUM/RETROPERITONEUM/LYMPH NODES: There is small amount of free fluid along the right lower quadrant measuring approximately 20 Hounsfield units. This is incompletely included in the field of view. There are multiple enlarged lymph nodes along the right external iliac chain with largest measuring up to 2 cm in short axis. There are also multiple enlarged right inguinal lymph nodes with the largest measuring up to 1.8 cm in short axis. However no evidence of enlarged lymphadenopathy in the left inguinal region or in the left pelvis.   BONES AND ABDOMINAL WALL: No suspicious osseous lesions in the visualized field of view. There is small left fat containing inguinal hernia. Mild discogenic degenerative changes are noted in the lower lumbar spine. There is diffuse reticulated subcutaneous soft tissue edema throughout the visualized  abdominal wall extending into the included bilateral thighs. There is diffuse subcutaneous soft tissue edema throughout the scrotum. No evidence of peripheral rim enhancing fluid collection within the scrotal wall. No evidence of soft tissue gas.       1.  Diffuse body wall edema extending into visualized bilateral lower extremities. There is also diffuse edematous thickening of the scrotum, which could again be related to similar systemic process/anasarca. No definite CT evidence of peripheral rim enhancing fluid collection to suggest an abscess. No evidence of soft tissue gas within the scrotum. 2. Multiple enlarged right inguinal and right external iliac lymph nodes as described above. This could be related to infectious/inflammatory etiology in the right lower extremity. Recommend clinical correlation with patient's symptomatology in the right lower extremity and further evaluation as clinically warranted. 3. Small volume intraperitoneal free fluid in the right hemiabdomen. This is only partially included in the field of view and is also most likely favored to represent similar findings secondary to 3rd spacing of fluid/anasarca. Recommend correlation with patient's abdominal symptomatology and a dedicated CT abdomen pelvis with contrast can be obtained for further evaluation if clinically warranted.   MACRO: None   Signed by: Susanna Lewis 2/27/2024 10:56 PM Dictation workstation:   [id number]    CT head wo IV contrast    Result Date: 2/27/2024  Interpreted By:  Vargas Mendoza, STUDY: CT HEAD WO IV CONTRAST;  2/27/2024 9:21 pm   INDICATION: Signs/Symptoms:ams.   COMPARISON: Noncontrast head CT of 11/10/2015.   ACCESSION NUMBER(S): [id number]   ORDERING CLINICIAN: YANELY RODRIGUEZ   TECHNIQUE: Noncontrast axial CT scan of head was performed. Angled reformats in brain and bone windows were generated. The images were reviewed in bone, brain, blood and soft tissue windows.   FINDINGS: CSF Spaces: The  ventricles, sulci and basal cisterns are within normal limits. There is no extraaxial fluid collection.   Parenchyma: Moderate to advanced volume loss.  There is periventricular and subcortical white matter hypoattenuation, most in keeping with chronic microvascular ischemic change. The grey-white differentiation is intact. There is no mass effect or midline shift. There is no intracranial hemorrhage.   Calvarium: The calvarium is unremarkable.   Paranasal sinuses and mastoids: Visualized paranasal sinuses and mastoids are clear.       No evidence of acute cortical infarct or intracranial hemorrhage.     MACRO: None   Signed by: Vargas Mendoza 2/27/2024 9:59 PM Dictation workstation:   [id number]    US scrotum w doppler    Result Date: 2/27/2024  STUDY: Scrotal Ultrasound; 2/27/2024 8:12 PM. INDICATION: Scrotal pain and swelling. COMPARISON: None available. ACCESSION NUMBER(S): [id number] ORDERING CLINICIAN: YANELY RODRIGUEZ TECHNIQUE:  Ultrasound of the scrotum and testicular spectral Doppler evaluation performed. FINDINGS:    The right testicle measures 4.1 x 3.1 x 2.9 cm.  It demonstrates a normal homogeneous echotexture.  Normal color and spectral Doppler flow is demonstrated.  The right epididymis measures 1.8 x 1.0 x 1.0 cm and demonstrates normal echogenicity. The left testicle measures 4.5 x 3.0 x 3.2 cm.  It demonstrates a normal homogeneous echotexture.  Normal color and spectral Doppler flow is demonstrated.  The left epididymis measures 1.7 x 1.1 x 1.4 cm and demonstrates a simple cyst measuring 0.5 cm. There are bilateral hydroceles present. There is severe scrotal edema. The scrotal wall measures 5.2 cm on the right and 4.6 cm on the left.    Bilateral hydroceles. Severe scrotal edema. The testicles are otherwise unremarkable. Normal testicular spectral Doppler evaluation. Signed by Aubrey Zapien MD    XR chest 1 view    Result Date: 2/27/2024  STUDY: Chest Radiograph;  2/27/2024 at 19:16 hours.  INDICATION: Shortness of breath. COMPARISON: XR chest 6/27/2023, 1/20/2021 and 1/11/2021. CT chest 2/14/2021 ACCESSION NUMBER(S): [id number] ORDERING CLINICIAN: YANELY RODRIGUEZ TECHNIQUE:  Frontal chest was obtained at 19:16 hours. FINDINGS: CARDIOMEDIASTINAL SILHOUETTE: Mild cardiomegaly present.  Sternotomy wires seen.  LUNGS: Lungs are clear.  ABDOMEN: No remarkable upper abdominal findings.  BONES: No acute osseous changes.    Mild cardiomegaly. Signed by Mg Almanzar MD    XR chest 1 view    Result Date: 2/19/2024  Interpreted By:  Speedy Hoff,  and Saurabh Costa STUDY: XR CHEST 1 VIEW;  2/19/2024 3:08 pm   INDICATION: Signs/Symptoms:confirm PICC placement right arm length 49 cms.   COMPARISON: Chest radiograph 06/27/2023   ACCESSION NUMBER(S): [id number]   ORDERING CLINICIAN: SHUN SORENSON   FINDINGS: AP radiograph of the chest was provided. Right-sided rotation.   Right upper extremity PICC is present with distal tip faintly visualized overlying the right atrium.   CARDIOMEDIASTINAL SILHOUETTE: Cardiomediastinal silhouette is stable enlarged in size and configuration.   LUNGS: Decreased lung volumes are present with bronchovascular crowding and basilar opacities. There is blunting of the bilateral cardiophrenic angles with mildly increased vascular cephalization as compared to 06/27/2023 which demonstrates similar lung aeration. No discernible pneumothorax.   ABDOMEN: Nonspecific paucity of bowel gas, otherwise no remarkable upper abdominal findings.   BONES: No acute osseous changes.       1. Mildly increased pulmonary vascular cephalization and at least small bilateral pleural effusions. At most, mild interstitial pulmonary edema. 2. Right upper extremity PICC as above.   I personally reviewed the images/study and I agree with the resident findings as stated by Julissa Wiley MD. This study was interpreted at [facility], [place], [place].   MACRO: None   Signed by:  Speedy Noraimtiaz 2/19/2024 3:57 PM Dictation workstation:   [id number]    Bedside PICC Imaging    Result Date: 2/19/2024  These images are not reportable by radiology and will not be interpreted by  Radiologists.    ECG 12 lead    Result Date: 2/17/2024  Sinus rhythm with occasional ventricular-paced complexes Low voltage QRS Possible Anterolateral infarct , age undetermined Abnormal ECG When compared with ECG of 27-JUN-2023 04:13, Previous ECG has undetermined rhythm, needs review See ED provider note for full interpretation and clinical correlation Confirmed by Arden Becker ([id number]) on 2/17/2024 1:44:13 AM    XR knee right 1-2 views    Result Date: 2/16/2024  Interpreted By:  Brad Hunt, STUDY: XR KNEE RIGHT 1-2 VIEWS; ;  2/16/2024 5:48 pm   INDICATION: Signs/Symptoms:known septic joint.   COMPARISON: Right knee radiographs 01/25/2014.   ACCESSION NUMBER(S): [id number]   ORDERING CLINICIAN: MELVIN RAMSAY   FINDINGS: Abnormal findings in the knee are overall similar compared to prior radiographs 01/25/2024.   Status post total knee arthroplasty. Redemonstration of fragmentation of the femoral cement at the junction of the stem-condylar component as well as cement fragmentation of the tibial component at the stem plateau interface. Mellitus cement fragments again seen throughout the knee slight posterior translation of the tibia relative to femur is less evident than prior. Diffuse soft tissue swelling about the knee. Previously described soft tissue ulcer anteriorly is not well seen.       1.  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 2. Re-demonstration of cement fragmentation of the femoral and tibial components, with multiple cement fragments throughout the knee. 3. Previously seen posterior translation of the tibia relative to the femur is less prominent and alignment appears near anatomic. 4. Previously described ulcer in the anterior soft tissues on the lateral view is not visualized.    MACRO: None   Signed by: Brad Gilbert 2/16/2024 5:56 PM Dictation workstation:   [id number]    Lower extremity venous duplex right    Result Date: 2/1/2024  Interpreted By:  Vargas Andersen,  Jaylen Nath STUDY: [facility] US LOWER EXTREMITY VENOUS DUPLEX RIGHT;  1/31/2024 3:06 pm   INDICATION: Signs/Symptoms:Exclude DVT (Septic Rt knee).   COMPARISON: Ultrasound 06/27/2023   ACCESSION NUMBER(S): [id number]   ORDERING CLINICIAN: NEVA RUST   TECHNIQUE: Vascular ultrasound of the right lower extremity was performed. Real-time compression views as well as Gray scale, color Doppler and spectral Doppler waveform analysis was performed.   FINDINGS: Evaluation of the visualized portions of the right common femoral vein, proximal, mid, and distal femoral vein, and popliteal vein were performed.   Limitations:  Unable to evaluate the distal femoral vein and calf veins due to body habitus and edema.   The evaluated veins demonstrate normal compressibility. There is intact venous flow demonstrating normal respiratory variability and normal augmentation of flow with calf compression. Therefore, there is no ultrasonographic evidence for deep vein thrombosis within the evaluated veins.   Respiratory variation and augmentation to calf pressure was noted.   Incidental note of right inguinal lymphadenopathy with a lymph node measuring 2.6 x 1.7 x 2.1 cm, likely reactive. Extensive soft tissue edema is noted at the level of the and lower leg.       No sonographic evidence for deep vein thrombosis within the evaluated veins of the right lower extremity. Of note, unable to evaluate the distal femoral vein and calf veins due to body habitus and edema.   I personally reviewed the images/study and I agree with the findings as stated by Pham Bishop MD. This study was interpreted at [place], [place].   MACRO: None   Signed by: Vargas Andersen 2/1/2024 5:45 AM  Dictation workstation:   [id number]       ECG 12 lead    Result Date: 2/28/2024  Normal sinus rhythm Cannot rule out Inferior infarct , age undetermined Anterolateral infarct , age undetermined Abnormal ECG When compared with ECG of 16-FEB-2024 20:38, Sinus rhythm has replaced Electronic ventricular pacemaker Confirmed by Jaren Chaney ([id number]) on 2/28/2024 8:56:04 AM    CT pelvis w IV contrast    Result Date: 2/27/2024  Interpreted By:  Susanna Lewis, STUDY: CT PELVIS W IV CONTRAST;  2/27/2024 9:21 pm   INDICATION: Signs/Symptoms:scrotal swelling.   COMPARISON: Scrotal ultrasound performed on the same day.   ACCESSION NUMBER(S): [id number]   ORDERING CLINICIAN: YANELY RODRIGUEZ   TECHNIQUE: CT of the pelvis was performed.  Standard contiguous axial images were obtained at 3 mm slice thickness through pelvis. Coronal and sagittal reconstructions at 3 mm slice thickness were performed.  85 ml of contrast  Optiray 350 were administered intravenously without immediate complication.   FINDINGS: PELVIS:   BLADDER: Urinary bladder is decompressed secondary to Campos catheterization.   REPRODUCTIVE ORGANS: Prostate gland is unremarkable.   BOWEL: Visualized small bowel and large bowel loops appear grossly unremarkable.   VESSELS: Visualized vasculature appears grossly unremarkable.   PERITONEUM/RETROPERITONEUM/LYMPH NODES: There is small amount of free fluid along the right lower quadrant measuring approximately 20 Hounsfield units. This is incompletely included in the field of view. There are multiple enlarged lymph nodes along the right external iliac chain with largest measuring up to 2 cm in short axis. There are also multiple enlarged right inguinal lymph nodes with the largest measuring up to 1.8 cm in short axis. However no evidence of enlarged lymphadenopathy in the left inguinal region or in the left pelvis.   BONES AND ABDOMINAL WALL: No suspicious osseous lesions in the visualized field of view. There  is small left fat containing inguinal hernia. Mild discogenic degenerative changes are noted in the lower lumbar spine. There is diffuse reticulated subcutaneous soft tissue edema throughout the visualized abdominal wall extending into the included bilateral thighs. There is diffuse subcutaneous soft tissue edema throughout the scrotum. No evidence of peripheral rim enhancing fluid collection within the scrotal wall. No evidence of soft tissue gas.       1.  Diffuse body wall edema extending into visualized bilateral lower extremities. There is also diffuse edematous thickening of the scrotum, which could again be related to similar systemic process/anasarca. No definite CT evidence of peripheral rim enhancing fluid collection to suggest an abscess. No evidence of soft tissue gas within the scrotum. 2. Multiple enlarged right inguinal and right external iliac lymph nodes as described above. This could be related to infectious/inflammatory etiology in the right lower extremity. Recommend clinical correlation with patient's symptomatology in the right lower extremity and further evaluation as clinically warranted. 3. Small volume intraperitoneal free fluid in the right hemiabdomen. This is only partially included in the field of view and is also most likely favored to represent similar findings secondary to 3rd spacing of fluid/anasarca. Recommend correlation with patient's abdominal symptomatology and a dedicated CT abdomen pelvis with contrast can be obtained for further evaluation if clinically warranted.   MACRO: None   Signed by: Susanna Lewis 2/27/2024 10:56 PM Dictation workstation:   [id number]    CT head wo IV contrast    Result Date: 2/27/2024  Interpreted By:  Vargas Mendoza, STUDY: CT HEAD WO IV CONTRAST;  2/27/2024 9:21 pm   INDICATION: Signs/Symptoms:ams.   COMPARISON: Noncontrast head CT of 11/10/2015.   ACCESSION NUMBER(S): [id number]   ORDERING CLINICIAN: YANELY RODRIGUEZ   TECHNIQUE:  Noncontrast axial CT scan of head was performed. Angled reformats in brain and bone windows were generated. The images were reviewed in bone, brain, blood and soft tissue windows.   FINDINGS: CSF Spaces: The ventricles, sulci and basal cisterns are within normal limits. There is no extraaxial fluid collection.   Parenchyma: Moderate to advanced volume loss.  There is periventricular and subcortical white matter hypoattenuation, most in keeping with chronic microvascular ischemic change. The grey-white differentiation is intact. There is no mass effect or midline shift. There is no intracranial hemorrhage.   Calvarium: The calvarium is unremarkable.   Paranasal sinuses and mastoids: Visualized paranasal sinuses and mastoids are clear.       No evidence of acute cortical infarct or intracranial hemorrhage.     MACRO: None   Signed by: Vargas Mendoza 2/27/2024 9:59 PM Dictation workstation:   [id number]    US scrotum w doppler    Result Date: 2/27/2024  STUDY: Scrotal Ultrasound; 2/27/2024 8:12 PM. INDICATION: Scrotal pain and swelling. COMPARISON: None available. ACCESSION NUMBER(S): [id number] ORDERING CLINICIAN: YANELY RODRIGUEZ TECHNIQUE:  Ultrasound of the scrotum and testicular spectral Doppler evaluation performed. FINDINGS:    The right testicle measures 4.1 x 3.1 x 2.9 cm.  It demonstrates a normal homogeneous echotexture.  Normal color and spectral Doppler flow is demonstrated.  The right epididymis measures 1.8 x 1.0 x 1.0 cm and demonstrates normal echogenicity. The left testicle measures 4.5 x 3.0 x 3.2 cm.  It demonstrates a normal homogeneous echotexture.  Normal color and spectral Doppler flow is demonstrated.  The left epididymis measures 1.7 x 1.1 x 1.4 cm and demonstrates a simple cyst measuring 0.5 cm. There are bilateral hydroceles present. There is severe scrotal edema. The scrotal wall measures 5.2 cm on the right and 4.6 cm on the left.    Bilateral hydroceles. Severe scrotal edema. The  testicles are otherwise unremarkable. Normal testicular spectral Doppler evaluation. Signed by Aubrey Zapien MD    XR chest 1 view    Result Date: 2/27/2024  STUDY: Chest Radiograph;  2/27/2024 at 19:16 hours. INDICATION: Shortness of breath. COMPARISON: XR chest 6/27/2023, 1/20/2021 and 1/11/2021. CT chest 2/14/2021 ACCESSION NUMBER(S): [id number] ORDERING CLINICIAN: YANELY RODRIGUEZ TECHNIQUE:  Frontal chest was obtained at 19:16 hours. FINDINGS: CARDIOMEDIASTINAL SILHOUETTE: Mild cardiomegaly present.  Sternotomy wires seen.  LUNGS: Lungs are clear.  ABDOMEN: No remarkable upper abdominal findings.  BONES: No acute osseous changes.    Mild cardiomegaly. Signed by Mg Almanzar MD    XR chest 1 view    Result Date: 2/19/2024  Interpreted By:  Speedy Hoff and Ravi Shweta STUDY: XR CHEST 1 VIEW;  2/19/2024 3:08 pm   INDICATION: Signs/Symptoms:confirm PICC placement right arm length 49 cms.   COMPARISON: Chest radiograph 06/27/2023   ACCESSION NUMBER(S): [id number]   ORDERING CLINICIAN: SHUN SORENSON   FINDINGS: AP radiograph of the chest was provided. Right-sided rotation.   Right upper extremity PICC is present with distal tip faintly visualized overlying the right atrium.   CARDIOMEDIASTINAL SILHOUETTE: Cardiomediastinal silhouette is stable enlarged in size and configuration.   LUNGS: Decreased lung volumes are present with bronchovascular crowding and basilar opacities. There is blunting of the bilateral cardiophrenic angles with mildly increased vascular cephalization as compared to 06/27/2023 which demonstrates similar lung aeration. No discernible pneumothorax.   ABDOMEN: Nonspecific paucity of bowel gas, otherwise no remarkable upper abdominal findings.   BONES: No acute osseous changes.       1. Mildly increased pulmonary vascular cephalization and at least small bilateral pleural effusions. At most, mild interstitial pulmonary edema. 2. Right upper extremity PICC as above.   I personally  reviewed the images/study and I agree with the resident findings as stated by Julissa Wiley MD. This study was interpreted at [facility], [place], [place].   MACRO: None   Signed by: Speedy Hoff 2/19/2024 3:57 PM Dictation workstation:   [id number]    Bedside PICC Imaging    Result Date: 2/19/2024  These images are not reportable by radiology and will not be interpreted by  Radiologists.    ECG 12 lead    Result Date: 2/17/2024  Sinus rhythm with occasional ventricular-paced complexes Low voltage QRS Possible Anterolateral infarct , age undetermined Abnormal ECG When compared with ECG of 27-JUN-2023 04:13, Previous ECG has undetermined rhythm, needs review See ED provider note for full interpretation and clinical correlation Confirmed by Arden Becker ([id number]) on 2/17/2024 1:44:13 AM    XR knee right 1-2 views    Result Date: 2/16/2024  Interpreted By:  Brad Hunt, STUDY: XR KNEE RIGHT 1-2 VIEWS; ;  2/16/2024 5:48 pm   INDICATION: Signs/Symptoms:known septic joint.   COMPARISON: Right knee radiographs 01/25/2014.   ACCESSION NUMBER(S): [id number]   ORDERING CLINICIAN: MELVIN RAMSAY   FINDINGS: Abnormal findings in the knee are overall similar compared to prior radiographs 01/25/2024.   Status post total knee arthroplasty. Redemonstration of fragmentation of the femoral cement at the junction of the stem-condylar component as well as cement fragmentation of the tibial component at the stem plateau interface. Mellitus cement fragments again seen throughout the knee slight posterior translation of the tibia relative to femur is less evident than prior. Diffuse soft tissue swelling about the knee. Previously described soft tissue ulcer anteriorly is not well seen.       1.  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 2. Re-demonstration of cement fragmentation of the femoral and tibial components, with multiple cement fragments throughout the knee. 3. Previously seen  posterior translation of the tibia relative to the femur is less prominent and alignment appears near anatomic. 4. Previously described ulcer in the anterior soft tissues on the lateral view is not visualized.   MACRO: None   Signed by: Brad Hunt 2/16/2024 5:56 PM Dictation workstation:   [id number]    Lower extremity venous duplex right    Result Date: 2/1/2024  Interpreted By:  Vargas Andersen and Tippareddy Charit STUDY: [facility] US LOWER EXTREMITY VENOUS DUPLEX RIGHT;  1/31/2024 3:06 pm   INDICATION: Signs/Symptoms:Exclude DVT (Septic Rt knee).   COMPARISON: Ultrasound 06/27/2023   ACCESSION NUMBER(S): [id number]   ORDERING CLINICIAN: NEVA RUST   TECHNIQUE: Vascular ultrasound of the right lower extremity was performed. Real-time compression views as well as Gray scale, color Doppler and spectral Doppler waveform analysis was performed.   FINDINGS: Evaluation of the visualized portions of the right common femoral vein, proximal, mid, and distal femoral vein, and popliteal vein were performed.   Limitations:  Unable to evaluate the distal femoral vein and calf veins due to body habitus and edema.   The evaluated veins demonstrate normal compressibility. There is intact venous flow demonstrating normal respiratory variability and normal augmentation of flow with calf compression. Therefore, there is no ultrasonographic evidence for deep vein thrombosis within the evaluated veins.   Respiratory variation and augmentation to calf pressure was noted.   Incidental note of right inguinal lymphadenopathy with a lymph node measuring 2.6 x 1.7 x 2.1 cm, likely reactive. Extensive soft tissue edema is noted at the level of the and lower leg.       No sonographic evidence for deep vein thrombosis within the evaluated veins of the right lower extremity. Of note, unable to evaluate the distal femoral vein and calf veins due to body habitus and edema.   I personally reviewed the images/study and I agree  "with the findings as stated by Pham Bishop MD. This study was interpreted at [facility], [place], [place].   MACRO: None   Signed by: Vargas Andersen 2/1/2024 5:45 AM Dictation workstation:   [id number]      Assessment/Plan     #Right knee septic arthritis, status post multiple surgical operations  #Scrotal swelling  #Uncontrolled type 2 diabetes  #History of CABG  #History of heart failure  #Atrial fibrillation with rapid ventricular response     Antibiotics  Vancomycin day , stop 2/29  Zosyn day 2, stop 2/29  Dapto Day 2     -Continue antibiotics change back to dapto, will need Dapto until March 18  -Follow up with ID Dr. Goodwin from [facility]   -ok to discharge from ID perspective  -Patient states \"there is nothing wrong with him\" 3/1/24       I reviewed and interpreted all lab test imaging studies and documentations from other healthcare providers  I am monitoring for antibiotic side effects and toxicity      Juanjose Dos Santos DO      "

## 2024-03-02 LAB
ANION GAP SERPL CALC-SCNC: 14 MMOL/L (ref 10–20)
BUN SERPL-MCNC: 42 MG/DL (ref 6–23)
CALCIUM SERPL-MCNC: 8.7 MG/DL (ref 8.6–10.3)
CHLORIDE SERPL-SCNC: 107 MMOL/L (ref 98–107)
CO2 SERPL-SCNC: 24 MMOL/L (ref 21–32)
CREAT SERPL-MCNC: 1.51 MG/DL (ref 0.5–1.3)
EGFRCR SERPLBLD CKD-EPI 2021: 41 ML/MIN/1.73M*2
ERYTHROCYTE [DISTWIDTH] IN BLOOD BY AUTOMATED COUNT: 17.2 % (ref 11.5–14.5)
GLUCOSE BLD MANUAL STRIP-MCNC: 131 MG/DL (ref 74–99)
GLUCOSE BLD MANUAL STRIP-MCNC: 140 MG/DL (ref 74–99)
GLUCOSE BLD MANUAL STRIP-MCNC: 141 MG/DL (ref 74–99)
GLUCOSE BLD MANUAL STRIP-MCNC: 88 MG/DL (ref 74–99)
GLUCOSE SERPL-MCNC: 87 MG/DL (ref 74–99)
HCT VFR BLD AUTO: 34.3 % (ref 36–52)
HGB BLD-MCNC: 9.9 G/DL (ref 12–17.5)
MCH RBC QN AUTO: 23 PG (ref 26–34)
MCHC RBC AUTO-ENTMCNC: 28.9 G/DL (ref 32–36)
MCV RBC AUTO: 80 FL (ref 80–100)
NRBC BLD-RTO: 1.8 /100 WBCS (ref 0–0)
PLATELET # BLD AUTO: 526 X10*3/UL (ref 150–450)
POTASSIUM SERPL-SCNC: 4.7 MMOL/L (ref 3.5–5.3)
RBC # BLD AUTO: 4.31 X10*6/UL (ref 4–5.9)
SODIUM SERPL-SCNC: 140 MMOL/L (ref 136–145)
WBC # BLD AUTO: 6.8 X10*3/UL (ref 4.4–11.3)

## 2024-03-02 PROCEDURE — 99232 SBSQ HOSP IP/OBS MODERATE 35: CPT | Performed by: STUDENT IN AN ORGANIZED HEALTH CARE EDUCATION/TRAINING PROGRAM

## 2024-03-02 PROCEDURE — 2500000004 HC RX 250 GENERAL PHARMACY W/ HCPCS (ALT 636 FOR OP/ED): Performed by: NURSE PRACTITIONER

## 2024-03-02 PROCEDURE — 82947 ASSAY GLUCOSE BLOOD QUANT: CPT

## 2024-03-02 PROCEDURE — 2500000001 HC RX 250 WO HCPCS SELF ADMINISTERED DRUGS (ALT 637 FOR MEDICARE OP): Performed by: STUDENT IN AN ORGANIZED HEALTH CARE EDUCATION/TRAINING PROGRAM

## 2024-03-02 PROCEDURE — 85027 COMPLETE CBC AUTOMATED: CPT | Performed by: INTERNAL MEDICINE

## 2024-03-02 PROCEDURE — 2500000004 HC RX 250 GENERAL PHARMACY W/ HCPCS (ALT 636 FOR OP/ED): Performed by: INTERNAL MEDICINE

## 2024-03-02 PROCEDURE — C9113 INJ PANTOPRAZOLE SODIUM, VIA: HCPCS | Performed by: NURSE PRACTITIONER

## 2024-03-02 PROCEDURE — 2500000004 HC RX 250 GENERAL PHARMACY W/ HCPCS (ALT 636 FOR OP/ED): Performed by: STUDENT IN AN ORGANIZED HEALTH CARE EDUCATION/TRAINING PROGRAM

## 2024-03-02 PROCEDURE — 80048 BASIC METABOLIC PNL TOTAL CA: CPT | Performed by: INTERNAL MEDICINE

## 2024-03-02 PROCEDURE — 2060000001 HC INTERMEDIATE ICU ROOM DAILY

## 2024-03-02 PROCEDURE — 99232 SBSQ HOSP IP/OBS MODERATE 35: CPT | Performed by: INTERNAL MEDICINE

## 2024-03-02 PROCEDURE — 2500000001 HC RX 250 WO HCPCS SELF ADMINISTERED DRUGS (ALT 637 FOR MEDICARE OP): Performed by: INTERNAL MEDICINE

## 2024-03-02 PROCEDURE — 2500000001 HC RX 250 WO HCPCS SELF ADMINISTERED DRUGS (ALT 637 FOR MEDICARE OP): Performed by: NURSE PRACTITIONER

## 2024-03-02 RX ADMIN — FUROSEMIDE 40 MG: 10 INJECTION, SOLUTION INTRAMUSCULAR; INTRAVENOUS at 08:28

## 2024-03-02 RX ADMIN — HEPARIN SODIUM 7500 UNITS: 5000 INJECTION INTRAVENOUS; SUBCUTANEOUS at 15:18

## 2024-03-02 RX ADMIN — HEPARIN SODIUM 7500 UNITS: 5000 INJECTION INTRAVENOUS; SUBCUTANEOUS at 23:22

## 2024-03-02 RX ADMIN — FUROSEMIDE 40 MG: 10 INJECTION, SOLUTION INTRAMUSCULAR; INTRAVENOUS at 20:50

## 2024-03-02 RX ADMIN — DAPTOMYCIN 950 MG: 500 INJECTION, POWDER, LYOPHILIZED, FOR SOLUTION INTRAVENOUS at 10:09

## 2024-03-02 RX ADMIN — HEPARIN SODIUM 7500 UNITS: 5000 INJECTION INTRAVENOUS; SUBCUTANEOUS at 08:28

## 2024-03-02 RX ADMIN — PANTOPRAZOLE SODIUM 40 MG: 40 INJECTION, POWDER, FOR SOLUTION INTRAVENOUS at 08:28

## 2024-03-02 RX ADMIN — TRAMADOL HYDROCHLORIDE 50 MG: 50 TABLET, COATED ORAL at 08:27

## 2024-03-02 RX ADMIN — METOPROLOL SUCCINATE 50 MG: 50 TABLET, EXTENDED RELEASE ORAL at 08:28

## 2024-03-02 ASSESSMENT — PAIN - FUNCTIONAL ASSESSMENT
PAIN_FUNCTIONAL_ASSESSMENT: 0-10
PAIN_FUNCTIONAL_ASSESSMENT: 0-10

## 2024-03-02 ASSESSMENT — PAIN SCALES - GENERAL
PAINLEVEL_OUTOF10: 6
PAINLEVEL_OUTOF10: 10 - WORST POSSIBLE PAIN

## 2024-03-02 NOTE — CARE PLAN
Problem: Skin  Goal: Decreased wound size/increased tissue granulation at next dressing change  Outcome: Progressing  Flowsheets (Taken 3/2/2024 0635)  Decreased wound size/increased tissue granulation at next dressing change: Promote sleep for wound healing  Goal: Prevent/minimize sheer/friction injuries  Flowsheets (Taken 3/2/2024 0635)  Prevent/minimize sheer/friction injuries:   Complete micro-shifts as needed if patient unable. Adjust patient position to relieve pressure points, not a full turn   HOB 30 degrees or less   Use pull sheet  Note: Bariatric bed; patient turns self in bed  Goal: Promote/optimize nutrition  Flowsheets (Taken 3/2/2024 0635)  Promote/optimize nutrition: Offer water/supplements/favorite foods     Problem: Pain  Goal: My pain/discomfort is manageable  Outcome: Progressing     Problem: Safety  Goal: Patient will be injury free during hospitalization  Outcome: Progressing     Problem: Psychosocial Needs  Goal: Demonstrates ability to cope with hospitalization/illness  Outcome: Progressing    The clinical goals for the shift include comfort & rest.

## 2024-03-02 NOTE — PROGRESS NOTES
Subjective Data:  Denies having chest pain.  Responding well with IV diuresis with - 2.1 L over the last 24 hours.  Objective Data:  Last Recorded Vitals:  Vitals:    03/02/24 0339 03/02/24 0600 03/02/24 0900 03/02/24 1245   BP: (!) 144/94  147/87 129/79   BP Location:   Left arm    Patient Position:   Lying    Pulse: 89      Resp: 20      Temp: 35.9 °C (96.6 °F)  36.3 °C (97.3 °F) 36 °C (96.8 °F)   TempSrc:   Temporal    SpO2: 98%  96%    Weight:  (!) 192 kg (422 lb 6.4 oz)     Height:           Last Labs:  CBC - 3/2/2024:  7:04 AM  6.8 9.9 526    34.3      CMP - 3/2/2024:  7:04 AM  8.7 6.1 69 --- 0.4   4.5 2.7 225 450      PTT - 2/17/2024:  5:17 PM  1.1   12.3 34     TROPHS   Date/Time Value Ref Range Status   02/28/2024 06:58 AM 34 0 - 20 ng/L Final   02/28/2024 02:09 AM 40 0 - 20 ng/L Final   02/27/2024 07:10 PM 43 0 - 20 ng/L Final     BNP   Date/Time Value Ref Range Status   02/28/2024 02:09 AM 1,268 0 - 99 pg/mL Final   04/12/2021 06:51  0 - 99 pg/mL Final     Comment:     .  <100 pg/mL - Heart failure unlikely  100-299 pg/mL - Intermediate probability of acute heart  .               failure exacerbation. Correlate with clinical  .               context and patient history.    >=300 pg/mL - Heart Failure likely. Correlate with clinical  .               context and patient history.   Biotin interference may cause falsely decreased results.   Patients taking a Biotin dose of up to 5 mg/day should   refrain from taking Biotin for 24 hours before sample   collection. Providers may contact their local laboratory   for further information.       HGBA1C   Date/Time Value Ref Range Status   01/26/2024 04:13 AM 16.6 see below % Final     Comment:     Hemoglobin A1c is >15%. Marked elevations in HbA1c are commonly due to poor glycemic control in diabetic patients. Rarely, hemoglobin variants may cause false elevation of HbA1c that is discordant with glycemic control status.    10/15/2023 05:28 PM 14.7 see below %  Final     VLDL   Date/Time Value Ref Range Status   09/24/2020 05:30 AM 70 0 - 40 mg/dL Final   06/03/2020 05:37 PM 60 0 - 40 mg/dL Final   03/28/2019 05:00 AM 46 0 - 40 mg/dL Final      Last I/O:  I/O last 3 completed shifts:  In: 339 (2.1 mL/kg) [P.O.:220; I.V.:50 (0.3 mL/kg); IV Piggyback:69]  Out: 5025 (31.6 mL/kg) [Urine:5025 (0.9 mL/kg/hr)]  Dosing Weight: 159 kg     Past Cardiology Tests (Last 3 Years):  EKG:  ECG 12 lead 02/27/2024      ECG 12 lead 02/16/2024    Echo:  Transthoracic Echo (TTE) Complete 01/30/2024    Ejection Fractions:  EF   Date/Time Value Ref Range Status   01/30/2024 10:45 AM 28 %      Cath:  No results found for this or any previous visit from the past 1095 days.    Stress Test:  No results found for this or any previous visit from the past 1095 days.    Cardiac Imaging:  No results found for this or any previous visit from the past 1095 days.      Inpatient Medications:  Scheduled medications   Medication Dose Route Frequency    aspirin  81 mg oral Daily    [Held by provider] atorvastatin  40 mg oral Daily    daptomycin  6 mg/kg intravenous q24h SUDHIR    furosemide  40 mg intravenous BID    heparin (porcine)  7,500 Units subcutaneous q8h SUDHIR    insulin glargine  35 Units subcutaneous Nightly    insulin lispro  0-10 Units subcutaneous Before meals & nightly    metoprolol succinate XL  50 mg oral Daily    pantoprazole  40 mg intravenous Daily     PRN medications   Medication    acetaminophen    dextrose 10 % in water (D10W)    dextrose    fluticasone    glucagon    metoprolol    metoprolol    traMADol     Continuous Medications   Medication Dose Last Rate       Physical Exam:  Physical Exam  Constitutional:       Appearance: He is obese.   HENT:      Mouth/Throat:      Mouth: Mucous membranes are dry.      Pharynx: Oropharynx is clear.   Eyes:      Pupils: Pupils are equal, round, and reactive to light.   Cardiovascular:      Rate and Rhythm: Rhythm irregularly irregular.   Pulmonary:       Breath sounds: Normal breath sounds.   Abdominal:      General: Bowel sounds are normal.   Musculoskeletal:      Right knee: Swelling present.      Comments: Scrotal edema, erythema   Skin:     General: Skin is warm and dry.      Capillary Refill: Capillary refill takes less than 2 seconds.   Neurological:      General: No focal deficit present.      Mental Status: He is alert and oriented to person, place, and time.   Psychiatric:         Mood and Affect: Mood normal. Affect is angry.   Assessment/Plan   Acute on chronic decompensated combined systolic and diastolic heart failure  Elevated troponin  Diabetes mellitus type 2  Recurrent infection  Morbid obesity     Patient has atypical chest pain complaint with complaining of pain all over the body.  His EKG shows evidence of a Q wave in anterior leads and low voltage which could be due to body habitus as well as evidence for prior infarcts.  His troponin elevation is mild and stable.  More likely this represents mostly type II myocardial infarction in the setting of decompensated heart failure and his recurrent infection.  His diabetes has been poorly controlled with hemoglobin A1c above 16.  At this point patient is not a good candidate for any invasive test from cardiac standpoint such as heart catheterization in the absence of anginal symptoms and with a stable troponin.  I suggest continue with baby aspirin, statin, diabetes control and blood pressure control.  He will need close follow-up in cardiology office for guideline directed medical therapy for his heart failure.  Will continue with IV Lasix for volume control.  Eventually depending on his clinical progress and after clearing the infection, if his EF remains to be low, further considerations such as heart catheterization as well as consideration for primary prevention ICD can be discussed but first there are several other steps that needs to be followed.  I will continue to monitor the patient while he  is in the hospital.     February 29, 2024  Patient is responding well to her current dose of IV Lasix.  Renal function is slightly better today at 1.5.  Will continue with current medications from cardiac standpoint.    March 1, 2024  Creatinine is slightly better today.  Continue current management and diuresis from cardiac standpoint.  Will follow-up over the weekend.    March 2, 2024  Creatinine fairly stable.  Will continue current dose of IV diuresis.        I appreciate the opportunity to participate in this patient's care.      Jaren Chaney MD, PhD, [facility], [facility]  Interventional Cardiology, [facility]  Associate Professor of Medicine, [facility]  [email or web address]   Office:       Macro dragon disclaimer: This note was dictated by speech recognition. Minor errors in transcription may be present.

## 2024-03-03 LAB
ANION GAP SERPL CALC-SCNC: 16 MMOL/L (ref 10–20)
ANION GAP SERPL CALC-SCNC: 17 MMOL/L (ref 10–20)
BACTERIA BLD CULT: NORMAL
BACTERIA BLD CULT: NORMAL
BUN SERPL-MCNC: 44 MG/DL (ref 6–23)
BUN SERPL-MCNC: 46 MG/DL (ref 6–23)
CALCIUM SERPL-MCNC: 8.5 MG/DL (ref 8.6–10.3)
CALCIUM SERPL-MCNC: 8.6 MG/DL (ref 8.6–10.3)
CHLORIDE SERPL-SCNC: 104 MMOL/L (ref 98–107)
CHLORIDE SERPL-SCNC: 105 MMOL/L (ref 98–107)
CO2 SERPL-SCNC: 22 MMOL/L (ref 21–32)
CO2 SERPL-SCNC: 23 MMOL/L (ref 21–32)
CREAT SERPL-MCNC: 1.61 MG/DL (ref 0.5–1.3)
CREAT SERPL-MCNC: 1.62 MG/DL (ref 0.5–1.3)
EGFRCR SERPLBLD CKD-EPI 2021: 37 ML/MIN/1.73M*2
EGFRCR SERPLBLD CKD-EPI 2021: 38 ML/MIN/1.73M*2
ERYTHROCYTE [DISTWIDTH] IN BLOOD BY AUTOMATED COUNT: 17.3 % (ref 11.5–14.5)
GLUCOSE BLD MANUAL STRIP-MCNC: 154 MG/DL (ref 74–99)
GLUCOSE BLD MANUAL STRIP-MCNC: 162 MG/DL (ref 74–99)
GLUCOSE BLD MANUAL STRIP-MCNC: 170 MG/DL (ref 74–99)
GLUCOSE BLD MANUAL STRIP-MCNC: 212 MG/DL (ref 74–99)
GLUCOSE SERPL-MCNC: 178 MG/DL (ref 74–99)
GLUCOSE SERPL-MCNC: 192 MG/DL (ref 74–99)
HCT VFR BLD AUTO: 34.9 % (ref 36–52)
HGB BLD-MCNC: 9.8 G/DL (ref 12–17.5)
MCH RBC QN AUTO: 22.7 PG (ref 26–34)
MCHC RBC AUTO-ENTMCNC: 28.1 G/DL (ref 32–36)
MCV RBC AUTO: 81 FL (ref 80–100)
NRBC BLD-RTO: 1.4 /100 WBCS (ref 0–0)
PLATELET # BLD AUTO: 571 X10*3/UL (ref 150–450)
POTASSIUM SERPL-SCNC: 5.4 MMOL/L (ref 3.5–5.3)
POTASSIUM SERPL-SCNC: 5.6 MMOL/L (ref 3.5–5.3)
RBC # BLD AUTO: 4.32 X10*6/UL (ref 4–5.9)
SODIUM SERPL-SCNC: 137 MMOL/L (ref 136–145)
SODIUM SERPL-SCNC: 139 MMOL/L (ref 136–145)
WBC # BLD AUTO: 8.1 X10*3/UL (ref 4.4–11.3)

## 2024-03-03 PROCEDURE — 2500000001 HC RX 250 WO HCPCS SELF ADMINISTERED DRUGS (ALT 637 FOR MEDICARE OP): Performed by: INTERNAL MEDICINE

## 2024-03-03 PROCEDURE — 2500000004 HC RX 250 GENERAL PHARMACY W/ HCPCS (ALT 636 FOR OP/ED): Performed by: NURSE PRACTITIONER

## 2024-03-03 PROCEDURE — 82947 ASSAY GLUCOSE BLOOD QUANT: CPT

## 2024-03-03 PROCEDURE — 2060000001 HC INTERMEDIATE ICU ROOM DAILY

## 2024-03-03 PROCEDURE — C9113 INJ PANTOPRAZOLE SODIUM, VIA: HCPCS | Performed by: NURSE PRACTITIONER

## 2024-03-03 PROCEDURE — 99232 SBSQ HOSP IP/OBS MODERATE 35: CPT | Performed by: STUDENT IN AN ORGANIZED HEALTH CARE EDUCATION/TRAINING PROGRAM

## 2024-03-03 PROCEDURE — 2500000002 HC RX 250 W HCPCS SELF ADMINISTERED DRUGS (ALT 637 FOR MEDICARE OP, ALT 636 FOR OP/ED): Performed by: NURSE PRACTITIONER

## 2024-03-03 PROCEDURE — 80048 BASIC METABOLIC PNL TOTAL CA: CPT | Performed by: INTERNAL MEDICINE

## 2024-03-03 PROCEDURE — 2500000004 HC RX 250 GENERAL PHARMACY W/ HCPCS (ALT 636 FOR OP/ED): Performed by: STUDENT IN AN ORGANIZED HEALTH CARE EDUCATION/TRAINING PROGRAM

## 2024-03-03 PROCEDURE — 85027 COMPLETE CBC AUTOMATED: CPT | Performed by: INTERNAL MEDICINE

## 2024-03-03 PROCEDURE — 82374 ASSAY BLOOD CARBON DIOXIDE: CPT | Performed by: INTERNAL MEDICINE

## 2024-03-03 PROCEDURE — 99232 SBSQ HOSP IP/OBS MODERATE 35: CPT | Performed by: INTERNAL MEDICINE

## 2024-03-03 PROCEDURE — 2500000004 HC RX 250 GENERAL PHARMACY W/ HCPCS (ALT 636 FOR OP/ED): Performed by: INTERNAL MEDICINE

## 2024-03-03 PROCEDURE — 2500000001 HC RX 250 WO HCPCS SELF ADMINISTERED DRUGS (ALT 637 FOR MEDICARE OP): Performed by: NURSE PRACTITIONER

## 2024-03-03 RX ADMIN — HEPARIN SODIUM 7500 UNITS: 5000 INJECTION INTRAVENOUS; SUBCUTANEOUS at 08:35

## 2024-03-03 RX ADMIN — HEPARIN SODIUM 7500 UNITS: 5000 INJECTION INTRAVENOUS; SUBCUTANEOUS at 15:08

## 2024-03-03 RX ADMIN — INSULIN LISPRO 2 UNITS: 100 INJECTION, SOLUTION INTRAVENOUS; SUBCUTANEOUS at 21:29

## 2024-03-03 RX ADMIN — HEPARIN SODIUM 7500 UNITS: 5000 INJECTION INTRAVENOUS; SUBCUTANEOUS at 23:55

## 2024-03-03 RX ADMIN — FUROSEMIDE 40 MG: 10 INJECTION, SOLUTION INTRAMUSCULAR; INTRAVENOUS at 08:35

## 2024-03-03 RX ADMIN — INSULIN LISPRO 2 UNITS: 100 INJECTION, SOLUTION INTRAVENOUS; SUBCUTANEOUS at 16:16

## 2024-03-03 RX ADMIN — INSULIN LISPRO 2 UNITS: 100 INJECTION, SOLUTION INTRAVENOUS; SUBCUTANEOUS at 12:10

## 2024-03-03 RX ADMIN — METOPROLOL SUCCINATE 50 MG: 50 TABLET, EXTENDED RELEASE ORAL at 08:35

## 2024-03-03 RX ADMIN — TRAMADOL HYDROCHLORIDE 50 MG: 50 TABLET, COATED ORAL at 15:23

## 2024-03-03 RX ADMIN — INSULIN LISPRO 4 UNITS: 100 INJECTION, SOLUTION INTRAVENOUS; SUBCUTANEOUS at 08:35

## 2024-03-03 RX ADMIN — PANTOPRAZOLE SODIUM 40 MG: 40 INJECTION, POWDER, FOR SOLUTION INTRAVENOUS at 08:35

## 2024-03-03 RX ADMIN — DAPTOMYCIN 950 MG: 500 INJECTION, POWDER, LYOPHILIZED, FOR SOLUTION INTRAVENOUS at 09:58

## 2024-03-03 ASSESSMENT — COGNITIVE AND FUNCTIONAL STATUS - GENERAL
STANDING UP FROM CHAIR USING ARMS: A LOT
PERSONAL GROOMING: A LITTLE
MOBILITY SCORE: 11
WALKING IN HOSPITAL ROOM: TOTAL
MOVING FROM LYING ON BACK TO SITTING ON SIDE OF FLAT BED WITH BEDRAILS: A LITTLE
TURNING FROM BACK TO SIDE WHILE IN FLAT BAD: A LOT
TOILETING: TOTAL
DAILY ACTIVITIY SCORE: 13
HELP NEEDED FOR BATHING: A LOT
CLIMB 3 TO 5 STEPS WITH RAILING: TOTAL
DRESSING REGULAR LOWER BODY CLOTHING: TOTAL
DRESSING REGULAR UPPER BODY CLOTHING: A LOT
MOVING TO AND FROM BED TO CHAIR: A LOT

## 2024-03-03 ASSESSMENT — PAIN SCALES - GENERAL
PAINLEVEL_OUTOF10: 7
PAINLEVEL_OUTOF10: 0 - NO PAIN
PAINLEVEL_OUTOF10: 0 - NO PAIN
PAINLEVEL_OUTOF10: 5 - MODERATE PAIN
PAINLEVEL_OUTOF10: 0 - NO PAIN

## 2024-03-03 ASSESSMENT — PAIN DESCRIPTION - LOCATION: LOCATION: GROIN

## 2024-03-03 NOTE — NURSING NOTE
"Patient stated : \" I didn't eat much for dinner\", Patient Blood Sugar 141. Spoke with Dimple LOONEY CNP. Okay to hold Lantus dose this evening.     UPDATE 2100: Right Knee dressing changed    UPDATE 0530: Patient Ull[place] leaking around site, readjusted tubing to gravity, improved urine output with decreased leaking around ulloa site. Patient educated on Fluid restriction for the day. Patient requesting ice water.   "

## 2024-03-03 NOTE — PROGRESS NOTES
Kody Choi is a 53 y.o. adult on day 5 of admission presenting with Cellulitis of scrotum.      Subjective   No events overnight.  Patient seen and examined at bedside.  He is resting comfortably in bed.       Objective     Last Recorded Vitals  /86   Pulse 89   Temp 35.7 °C (96.3 °F)   Resp 20   Wt (!) 189 kg (416 lb 14.2 oz)   SpO2 93%   Intake/Output last 3 Shifts:    Intake/Output Summary (Last 24 hours) at 3/3/2024 1041  Last data filed at 3/3/2024 1023  Gross per 24 hour   Intake 69 ml   Output 1750 ml   Net -1681 ml         Admission Weight  Weight: (!) 159 kg (350 lb) (02/27/24 1845)    Daily Weight  03/03/24 : (!) 189 kg (416 lb 14.2 oz)    Image Results  ECG 12 lead  Normal sinus rhythm  Cannot rule out Inferior infarct , age undetermined  Anterolateral infarct , age undetermined  Abnormal ECG  When compared with ECG of 16-FEB-2024 20:38,  Sinus rhythm has replaced Electronic ventricular pacemaker  Confirmed by Jaren Chaney ([id number]) on 2/28/2024 8:56:04 AM      Physical Exam  Constitutional:       Appearance: He is obese.   HENT:      Mouth/Throat:      Mouth: Mucous membranes are dry.      Pharynx: Oropharynx is clear.   Eyes:      Pupils: Pupils are equal, round, and reactive to light.   Cardiovascular:      Rate and Rhythm: Rhythm irregularly irregular.   Pulmonary:      Breath sounds: Normal breath sounds.   Abdominal:      General: Bowel sounds are normal.   Musculoskeletal:      Right knee: Swelling present.      Comments: Scrotal edema, erythema   Skin:     General: Skin is warm and dry.      Capillary Refill: Capillary refill takes less than 2 seconds.   Neurological:      General: No focal deficit present.      Mental Status: He is alert and oriented to person, place, and time.   Psychiatric:         Mood and Affect: Mood normal. Affect is angry.      Relevant Results               Assessment/Plan   This patient currently has cardiac telemetry ordered; if you would like to modify or  discontinue the telemetry order, click here to go to the orders activity to modify/discontinue the order.    This patient has a central line   Reason for the central line remaining today? Parenteral medication    This patient has a urinary catheter   Reason for the urinary catheter remaining today? urinary retention/bladder outlet obstruction, acute or chronic    Principal Problem:    Cellulitis of scrotum    Kody is a 53-year-old male patient is alert and oriented x 3 who is presenting to ED from skilled nursing facility with complaint of scrotal edema.  Troponin and BNP elevated.  Consult to cardiology.  Imaging completed showing anasarca, scrotal edema, concern for infection.  Patient started on IV vancomycin and Zosyn.  Consult to ID due to PICC line and history of IV antibiotics at skilled nursing facility for recurrent septic arthritis of the right knee.  Patient admitted for further medical management.     Scrotal cellulitis  Admit to telemetry per Dr. Lyndon Metzger  See imaging results above  Blood cultures pending  Tylenol as needed  Zofran as needed  Continue IV vancomycin/Zosyn  Repeat labs in a.m.     Elevated troponin/elevated BNP  History heart failure (EF 30% 1/30/2024)  Consult cardiology and appreciate input  Low-sodium diet  Fluid restriction  Trend troponin     BARBIE  No IV fluids given due to elevated troponin  Continue to monitor and defer to attending for consult     Diabetes/CAD/A-fib/RUPESH/recurrent septic arthritis  PICC line  Consult infectious disease for IV antibiotics to continue from skilled nursing facility  Diabetic diet  ISS  Hold home oral antidiabetic medications when med rec is complete  Continue home tramadol  Metoprolol IV every 6 as needed     DVT Ppx/GI PPx  SCDs  Heparin subcutaneous  Up to chair as tolerated  Protonix IV daily  PT/OT     2/29: Patient diuresed 2.1 L yesterday and additional 2 L so far today.  Creatinine improved from 1.7 down to 1.5.  Continue Campos.   Continue diuresis.  Ultimately, patient will return to [facility].    3/1: Patient diuresed 4.7 L.  Into A-fib RVR.  Increase metoprolol to 50 mg.   patient given 2 g IV mag.  Creatinine improved from 1.5 down to 1.4.  Infectious disease changed antibiotics back to daptomycin.  Bariatric panel ordered.  Continue diuresis over the weekend and anticipate discharge early next week.    3/2: Patient diuresed 2.1 L yesterday.  Creatinine went from 1.4 up to 1.5.  Continue diuresis.  Anticipate discharge on Monday.    3/3: Patient diuresed 1750 mL yesterday. Creatinine increased to 1.6 today and potassium just above normal at 5.4. Hold Lasix. Repeat BMP. Anticipate discharge back to [facility] tomorrow.            Lyndon Metzger, DO

## 2024-03-03 NOTE — PROGRESS NOTES
Subjective Data:  Denies having chest pain.     Objective Data:  Last Recorded Vitals:  Vitals:    03/02/24 2323 03/03/24 0311 03/03/24 0600 03/03/24 0806   BP: 138/90 139/87  133/86   BP Location:       Patient Position:       Pulse: 93 90  89   Resp: 20 20     Temp: 36.2 °C (97.2 °F) 36.2 °C (97.2 °F)  35.7 °C (96.3 °F)   TempSrc:       SpO2: 94% 94%  93%   Weight:   (!) 189 kg (416 lb 14.2 oz)    Height:           Last Labs:  CBC - 3/3/2024:  6:05 AM  8.1 9.8 571    34.9      CMP - 3/3/2024:  6:05 AM  8.6 6.1 69 --- 0.4   4.5 2.7 225 450      PTT - 2/17/2024:  5:17 PM  1.1   12.3 34     TROPHS   Date/Time Value Ref Range Status   02/28/2024 06:58 AM 34 0 - 20 ng/L Final   02/28/2024 02:09 AM 40 0 - 20 ng/L Final   02/27/2024 07:10 PM 43 0 - 20 ng/L Final     BNP   Date/Time Value Ref Range Status   02/28/2024 02:09 AM 1,268 0 - 99 pg/mL Final   04/12/2021 06:51  0 - 99 pg/mL Final     Comment:     .  <100 pg/mL - Heart failure unlikely  100-299 pg/mL - Intermediate probability of acute heart  .               failure exacerbation. Correlate with clinical  .               context and patient history.    >=300 pg/mL - Heart Failure likely. Correlate with clinical  .               context and patient history.   Biotin interference may cause falsely decreased results.   Patients taking a Biotin dose of up to 5 mg/day should   refrain from taking Biotin for 24 hours before sample   collection. Providers may contact their local laboratory   for further information.       HGBA1C   Date/Time Value Ref Range Status   01/26/2024 04:13 AM 16.6 see below % Final     Comment:     Hemoglobin A1c is >15%. Marked elevations in HbA1c are commonly due to poor glycemic control in diabetic patients. Rarely, hemoglobin variants may cause false elevation of HbA1c that is discordant with glycemic control status.    10/15/2023 05:28 PM 14.7 see below % Final     VLDL   Date/Time Value Ref Range Status   09/24/2020 05:30 AM 70 0 -  40 mg/dL Final   06/03/2020 05:37 PM 60 0 - 40 mg/dL Final   03/28/2019 05:00 AM 46 0 - 40 mg/dL Final      Last I/O:  I/O last 3 completed shifts:  In: 69 (0.4 mL/kg) [IV Piggyback:69]  Out: 3975 (25 mL/kg) [Urine:3975 (0.7 mL/kg/hr)]  Dosing Weight: 159 kg     Past Cardiology Tests (Last 3 Years):  EKG:  ECG 12 lead 02/27/2024      ECG 12 lead 02/16/2024    Echo:  Transthoracic Echo (TTE) Complete 01/30/2024    Ejection Fractions:  EF   Date/Time Value Ref Range Status   01/30/2024 10:45 AM 28 %      Cath:  No results found for this or any previous visit from the past 1095 days.    Stress Test:  No results found for this or any previous visit from the past 1095 days.    Cardiac Imaging:  No results found for this or any previous visit from the past 1095 days.      Inpatient Medications:  Scheduled medications   Medication Dose Route Frequency    aspirin  81 mg oral Daily    [Held by provider] atorvastatin  40 mg oral Daily    daptomycin  6 mg/kg intravenous q24h SUDHIR    [Held by provider] furosemide  40 mg intravenous BID    heparin (porcine)  7,500 Units subcutaneous q8h SUDHIR    insulin glargine  35 Units subcutaneous Nightly    insulin lispro  0-10 Units subcutaneous Before meals & nightly    metoprolol succinate XL  50 mg oral Daily    pantoprazole  40 mg intravenous Daily     PRN medications   Medication    acetaminophen    dextrose 10 % in water (D10W)    dextrose    fluticasone    glucagon    metoprolol    metoprolol    traMADol     Continuous Medications   Medication Dose Last Rate       Physical Exam:  Physical Exam  Constitutional:       Appearance: He is obese.   HENT:      Mouth/Throat:      Mouth: Mucous membranes are dry.      Pharynx: Oropharynx is clear.   Eyes:      Pupils: Pupils are equal, round, and reactive to light.   Cardiovascular:      Rate and Rhythm: Rhythm irregularly irregular.   Pulmonary:      Breath sounds: Normal breath sounds.   Abdominal:      General: Bowel sounds are normal.    Musculoskeletal:      Right knee: Swelling present.      Comments: Scrotal edema, erythema   Skin:     General: Skin is warm and dry.      Capillary Refill: Capillary refill takes less than 2 seconds.   Neurological:      General: No focal deficit present.      Mental Status: He is alert and oriented to person, place, and time.   Psychiatric:         Mood and Affect: Mood normal. Affect is angry.   Assessment/Plan   Acute on chronic decompensated combined systolic and diastolic heart failure  Elevated troponin  Diabetes mellitus type 2  Recurrent infection  Morbid obesity     Patient has atypical chest pain complaint with complaining of pain all over the body.  His EKG shows evidence of a Q wave in anterior leads and low voltage which could be due to body habitus as well as evidence for prior infarcts.  His troponin elevation is mild and stable.  More likely this represents mostly type II myocardial infarction in the setting of decompensated heart failure and his recurrent infection.  His diabetes has been poorly controlled with hemoglobin A1c above 16.  At this point patient is not a good candidate for any invasive test from cardiac standpoint such as heart catheterization in the absence of anginal symptoms and with a stable troponin.  I suggest continue with baby aspirin, statin, diabetes control and blood pressure control.  He will need close follow-up in cardiology office for guideline directed medical therapy for his heart failure.  Will continue with IV Lasix for volume control.  Eventually depending on his clinical progress and after clearing the infection, if his EF remains to be low, further considerations such as heart catheterization as well as consideration for primary prevention ICD can be discussed but first there are several other steps that needs to be followed.  I will continue to monitor the patient while he is in the hospital.     February 29, 2024  Patient is responding well to her current dose  of IV Lasix.  Renal function is slightly better today at 1.5.  Will continue with current medications from cardiac standpoint.    March 1, 2024  Creatinine is slightly better today.  Continue current management and diuresis from cardiac standpoint.  Will follow-up over the weekend.    March 2, 2024  Creatinine fairly stable.  Will continue current dose of IV diuresis.    March 3, 2024  Creatinine is a slightly up today to 1.6.  I believe it is appropriate to continue 1 more day of IV Lasix with current dose and tomorrow based on renal function will decide about switching to p.o. torsemide probably.    I appreciate the opportunity to participate in this patient's care.      Jaren Chaney MD, PhD, [facility], [facility]  Interventional Cardiology, [facility] [place]  Associate Professor of Medicine, [facility]  [email or web address]   Office:       Macro dragon disclaimer: This note was dictated by speech recognition. Minor errors in transcription may be present.

## 2024-03-03 NOTE — PROGRESS NOTES
Kody Choi is a 53 y.o. adult on day 4 of admission presenting with Cellulitis of scrotum.      Subjective   No events overnight.  Patient seen and examined at bedside.  He is in the bariatric bed.  He diuresed 2.1 L of fluid yesterday.  He has no complaints today.  He is adamant about discharge on Monday.       Objective     Last Recorded Vitals  /84   Pulse 90   Temp 36.3 °C (97.3 °F)   Resp 20   Wt (!) 192 kg (422 lb 6.4 oz) Comment: Bariatric bed weight  SpO2 94%   Intake/Output last 3 Shifts:    Intake/Output Summary (Last 24 hours) at 3/2/2024 2032  Last data filed at 3/2/2024 1039  Gross per 24 hour   Intake 69 ml   Output 1350 ml   Net -1281 ml         Admission Weight  Weight: (!) 159 kg (350 lb) (02/27/24 1845)    Daily Weight  03/02/24 : (!) 192 kg (422 lb 6.4 oz)    Image Results  ECG 12 lead  Normal sinus rhythm  Cannot rule out Inferior infarct , age undetermined  Anterolateral infarct , age undetermined  Abnormal ECG  When compared with ECG of 16-FEB-2024 20:38,  Sinus rhythm has replaced Electronic ventricular pacemaker  Confirmed by Jaren Chaney ([id number]) on 2/28/2024 8:56:04 AM      Physical Exam  Constitutional:       Appearance: He is obese.   HENT:      Mouth/Throat:      Mouth: Mucous membranes are dry.      Pharynx: Oropharynx is clear.   Eyes:      Pupils: Pupils are equal, round, and reactive to light.   Cardiovascular:      Rate and Rhythm: Rhythm irregularly irregular.   Pulmonary:      Breath sounds: Normal breath sounds.   Abdominal:      General: Bowel sounds are normal.   Musculoskeletal:      Right knee: Swelling present.      Comments: Scrotal edema, erythema   Skin:     General: Skin is warm and dry.      Capillary Refill: Capillary refill takes less than 2 seconds.   Neurological:      General: No focal deficit present.      Mental Status: He is alert and oriented to person, place, and time.   Psychiatric:         Mood and Affect: Mood normal. Affect is angry.       Relevant Results               Assessment/Plan   This patient currently has cardiac telemetry ordered; if you would like to modify or discontinue the telemetry order, click here to go to the orders activity to modify/discontinue the order.    This patient has a central line   Reason for the central line remaining today? Parenteral medication    This patient has a urinary catheter   Reason for the urinary catheter remaining today? urinary retention/bladder outlet obstruction, acute or chronic    Principal Problem:    Cellulitis of scrotum    Kody is a 53-year-old male patient is alert and oriented x 3 who is presenting to ED from skilled nursing facility with complaint of scrotal edema.  Troponin and BNP elevated.  Consult to cardiology.  Imaging completed showing anasarca, scrotal edema, concern for infection.  Patient started on IV vancomycin and Zosyn.  Consult to ID due to PICC line and history of IV antibiotics at [facility] nursing [facility] for recurrent septic arthritis of the right knee.  Patient admitted for further medical management.     Scrotal cellulitis  Admit to telemetry per Dr. Lyndon Metzger  See imaging results above  Blood cultures pending  Tylenol as needed  Zofran as needed  Continue IV vancomycin/Zosyn  Repeat labs in a.m.     Elevated troponin/elevated BNP  History heart failure (EF 30% 1/30/2024)  Consult cardiology and appreciate input  Low-sodium diet  Fluid restriction  Trend troponin     BARBIE  No IV fluids given due to elevated troponin  Continue to monitor and defer to attending for consult     Diabetes/CAD/A-fib/RUPESH/recurrent septic arthritis  PICC line  Consult infectious disease for IV antibiotics to continue from skilled nursing facility  Diabetic diet  ISS  Hold home oral antidiabetic medications when med rec is complete  Continue home tramadol  Metoprolol IV every 6 as needed     DVT Ppx/GI PPx  SCDs  Heparin subcutaneous  Up to chair as tolerated  Protonix IV daily  PT/OT      2/29: Patient diuresed 2.1 L yesterday and additional 2 L so far today.  Creatinine improved from 1.7 down to 1.5.  Continue Campos.  Continue diuresis.  Ultimately, patient will return to [place].    3/1: Patient diuresed 4.7 L.  Into A-fib RVR.  Increase metoprolol to 50 mg.   patient given 2 g IV mag.  Creatinine improved from 1.5 down to 1.4.  Infectious disease changed antibiotics back to daptomycin.  Bariatric panel ordered.  Continue diuresis over the weekend and anticipate discharge early next week.    3/2: Patient diuresed 2.1 L yesterday.  Creatinine went from 1.4 up to 1.5.  Continue diuresis.  Anticipate discharge on Monday.            Lyndon Metzger, DO

## 2024-03-03 NOTE — CARE PLAN
The patient's goals for the shift include      The clinical goals for the shift include comfort and rest

## 2024-03-04 ENCOUNTER — APPOINTMENT (OUTPATIENT)
Dept: CARDIOLOGY | Facility: HOSPITAL | Age: 54
DRG: 727 | End: 2024-03-04
Payer: COMMERCIAL

## 2024-03-04 VITALS
RESPIRATION RATE: 20 BRPM | HEIGHT: 66 IN | HEART RATE: 99 BPM | WEIGHT: 315 LBS | OXYGEN SATURATION: 91 % | TEMPERATURE: 96.6 F | BODY MASS INDEX: 50.62 KG/M2 | SYSTOLIC BLOOD PRESSURE: 139 MMHG | DIASTOLIC BLOOD PRESSURE: 75 MMHG

## 2024-03-04 LAB
ANION GAP SERPL CALC-SCNC: 14 MMOL/L (ref 10–20)
BUN SERPL-MCNC: 45 MG/DL (ref 6–23)
CALCIUM SERPL-MCNC: 8.6 MG/DL (ref 8.6–10.3)
CHLORIDE SERPL-SCNC: 106 MMOL/L (ref 98–107)
CO2 SERPL-SCNC: 25 MMOL/L (ref 21–32)
CREAT SERPL-MCNC: 1.51 MG/DL (ref 0.5–1.3)
EGFRCR SERPLBLD CKD-EPI 2021: 41 ML/MIN/1.73M*2
ERYTHROCYTE [DISTWIDTH] IN BLOOD BY AUTOMATED COUNT: 17.3 % (ref 11.5–14.5)
GLUCOSE BLD MANUAL STRIP-MCNC: 111 MG/DL (ref 74–99)
GLUCOSE BLD MANUAL STRIP-MCNC: 126 MG/DL (ref 74–99)
GLUCOSE SERPL-MCNC: 123 MG/DL (ref 74–99)
HCT VFR BLD AUTO: 35.1 % (ref 36–52)
HGB BLD-MCNC: 10.1 G/DL (ref 12–17.5)
MCH RBC QN AUTO: 22.8 PG (ref 26–34)
MCHC RBC AUTO-ENTMCNC: 28.8 G/DL (ref 32–36)
MCV RBC AUTO: 79 FL (ref 80–100)
NRBC BLD-RTO: 0 /100 WBCS (ref 0–0)
PLATELET # BLD AUTO: 548 X10*3/UL (ref 150–450)
POTASSIUM SERPL-SCNC: 4.9 MMOL/L (ref 3.5–5.3)
RBC # BLD AUTO: 4.43 X10*6/UL (ref 4–5.9)
SODIUM SERPL-SCNC: 140 MMOL/L (ref 136–145)
WBC # BLD AUTO: 7.6 X10*3/UL (ref 4.4–11.3)

## 2024-03-04 PROCEDURE — 99233 SBSQ HOSP IP/OBS HIGH 50: CPT | Performed by: STUDENT IN AN ORGANIZED HEALTH CARE EDUCATION/TRAINING PROGRAM

## 2024-03-04 PROCEDURE — 2500000001 HC RX 250 WO HCPCS SELF ADMINISTERED DRUGS (ALT 637 FOR MEDICARE OP): Performed by: STUDENT IN AN ORGANIZED HEALTH CARE EDUCATION/TRAINING PROGRAM

## 2024-03-04 PROCEDURE — 2500000004 HC RX 250 GENERAL PHARMACY W/ HCPCS (ALT 636 FOR OP/ED): Performed by: NURSE PRACTITIONER

## 2024-03-04 PROCEDURE — 99238 HOSP IP/OBS DSCHRG MGMT 30/<: CPT | Performed by: INTERNAL MEDICINE

## 2024-03-04 PROCEDURE — 80048 BASIC METABOLIC PNL TOTAL CA: CPT | Performed by: INTERNAL MEDICINE

## 2024-03-04 PROCEDURE — 93005 ELECTROCARDIOGRAM TRACING: CPT

## 2024-03-04 PROCEDURE — 36415 COLL VENOUS BLD VENIPUNCTURE: CPT | Performed by: INTERNAL MEDICINE

## 2024-03-04 PROCEDURE — C9113 INJ PANTOPRAZOLE SODIUM, VIA: HCPCS | Performed by: NURSE PRACTITIONER

## 2024-03-04 PROCEDURE — 2500000001 HC RX 250 WO HCPCS SELF ADMINISTERED DRUGS (ALT 637 FOR MEDICARE OP): Performed by: NURSE PRACTITIONER

## 2024-03-04 PROCEDURE — 2500000001 HC RX 250 WO HCPCS SELF ADMINISTERED DRUGS (ALT 637 FOR MEDICARE OP): Performed by: INTERNAL MEDICINE

## 2024-03-04 PROCEDURE — 82947 ASSAY GLUCOSE BLOOD QUANT: CPT

## 2024-03-04 PROCEDURE — 85027 COMPLETE CBC AUTOMATED: CPT | Performed by: INTERNAL MEDICINE

## 2024-03-04 RX ORDER — TORSEMIDE 20 MG/1
40 TABLET ORAL DAILY
Status: DISCONTINUED | OUTPATIENT
Start: 2024-03-04 | End: 2024-03-04 | Stop reason: HOSPADM

## 2024-03-04 RX ORDER — METOPROLOL SUCCINATE 50 MG/1
50 TABLET, EXTENDED RELEASE ORAL DAILY
Start: 2024-03-04

## 2024-03-04 RX ADMIN — ASPIRIN 81 MG 81 MG: 81 TABLET ORAL at 10:33

## 2024-03-04 RX ADMIN — TORSEMIDE 40 MG: 20 TABLET ORAL at 10:33

## 2024-03-04 RX ADMIN — PANTOPRAZOLE SODIUM 40 MG: 40 INJECTION, POWDER, FOR SOLUTION INTRAVENOUS at 10:33

## 2024-03-04 RX ADMIN — TRAMADOL HYDROCHLORIDE 50 MG: 50 TABLET, COATED ORAL at 02:22

## 2024-03-04 RX ADMIN — METOPROLOL SUCCINATE 50 MG: 50 TABLET, EXTENDED RELEASE ORAL at 10:33

## 2024-03-04 ASSESSMENT — PAIN DESCRIPTION - DESCRIPTORS
DESCRIPTORS: ACHING
DESCRIPTORS: ACHING

## 2024-03-04 ASSESSMENT — PAIN DESCRIPTION - LOCATION: LOCATION: LEG

## 2024-03-04 ASSESSMENT — PAIN - FUNCTIONAL ASSESSMENT
PAIN_FUNCTIONAL_ASSESSMENT: 0-10
PAIN_FUNCTIONAL_ASSESSMENT: 0-10

## 2024-03-04 ASSESSMENT — PAIN SCALES - GENERAL
PAINLEVEL_OUTOF10: 0 - NO PAIN
PAINLEVEL_OUTOF10: 7
PAINLEVEL_OUTOF10: 0 - NO PAIN

## 2024-03-04 NOTE — PROGRESS NOTES
ANGELIA was advised that this patient is ready to discharge to [place]. ANGELIA asked [facility] to arrange transport via stretcher.   UPDATE 10:33am  The BLS Vehicle you requested for Kody SHAH in unit/room 832A on 03/04/2024 is scheduled to arrive at 11:30am EST! [facility] is handling this ride and you can contact them at [phone]. ANGELIA updated TCC and bedside nurse. Sw attempted to see patient at bedside, pt in with nurse, nurse updated pt on discharge time. ANGELIA will continue to follow.  AMARJIT CRUZ

## 2024-03-04 NOTE — PROGRESS NOTES
"Kody Choi is a 53 y.o. adult on day 6 of admission presenting with Cellulitis of scrotum.    Subjective   Continues on IV antibiotics for right knee infection.        Objective     Physical Exam  Constitutional:       Appearance: He is obese.      Comments: Not wearing any clothes, scrotum is swollen   HENT:      Head: Normocephalic and atraumatic.      Right Ear: External ear normal.      Left Ear: External ear normal.      Nose: Nose normal.      Mouth/Throat:      Mouth: Mucous membranes are dry.   Eyes:      Extraocular Movements: Extraocular movements intact.      Pupils: Pupils are equal, round, and reactive to light.   Cardiovascular:      Rate and Rhythm: Normal rate and regular rhythm.   Pulmonary:      Effort: Pulmonary effort is normal.      Breath sounds: Normal breath sounds.   Abdominal:      General: Abdomen is flat.      Palpations: Abdomen is soft.   Musculoskeletal:      Cervical back: Normal range of motion and neck supple.      Comments: Right knee erythematous and swollen   Skin:     General: Skin is warm and dry.      Capillary Refill: Capillary refill takes 2 to 3 seconds.   Neurological:      General: No focal deficit present.      Mental Status: He is alert. Mental status is at baseline.   Psychiatric:         Mood and Affect: Mood normal.         Behavior: Behavior normal.         Last Recorded Vitals  Blood pressure 139/75, pulse 99, temperature 35.9 °C (96.6 °F), resp. rate 20, height 1.676 m (5' 6\"), weight (!) 189 kg (416 lb 14.2 oz), SpO2 91 %, unknown if currently breastfeeding.  Intake/Output last 3 Shifts:  I/O last 3 completed shifts:  In: 659 (4.1 mL/kg) [P.O.:590; IV Piggyback:69]  Out: 3200 (20.1 mL/kg) [Urine:3200 (0.6 mL/kg/hr)]  Dosing Weight: 159 kg     Relevant Results  ECG 12 lead    Result Date: 2/28/2024  Normal sinus rhythm Cannot rule out Inferior infarct , age undetermined Anterolateral infarct , age undetermined Abnormal ECG When compared with ECG of 16-FEB-2024 " 20:38, Sinus rhythm has replaced Electronic ventricular pacemaker Confirmed by Jaren Chaney ([id number]) on 2/28/2024 8:56:04 AM    CT pelvis w IV contrast    Result Date: 2/27/2024  Interpreted By:  Susanna Lewis, STUDY: CT PELVIS W IV CONTRAST;  2/27/2024 9:21 pm   INDICATION: Signs/Symptoms:scrotal swelling.   COMPARISON: Scrotal ultrasound performed on the same day.   ACCESSION NUMBER(S): [id number]   ORDERING CLINICIAN: YANELY RODRIGUEZ   TECHNIQUE: CT of the pelvis was performed.  Standard contiguous axial images were obtained at 3 mm slice thickness through pelvis. Coronal and sagittal reconstructions at 3 mm slice thickness were performed.  85 ml of contrast  Optiray 350 were administered intravenously without immediate complication.   FINDINGS: PELVIS:   BLADDER: Urinary bladder is decompressed secondary to Campos catheterization.   REPRODUCTIVE ORGANS: Prostate gland is unremarkable.   BOWEL: Visualized small bowel and large bowel loops appear grossly unremarkable.   VESSELS: Visualized vasculature appears grossly unremarkable.   PERITONEUM/RETROPERITONEUM/LYMPH NODES: There is small amount of free fluid along the right lower quadrant measuring approximately 20 Hounsfield units. This is incompletely included in the field of view. There are multiple enlarged lymph nodes along the right external iliac chain with largest measuring up to 2 cm in short axis. There are also multiple enlarged right inguinal lymph nodes with the largest measuring up to 1.8 cm in short axis. However no evidence of enlarged lymphadenopathy in the left inguinal region or in the left pelvis.   BONES AND ABDOMINAL WALL: No suspicious osseous lesions in the visualized field of view. There is small left fat containing inguinal hernia. Mild discogenic degenerative changes are noted in the lower lumbar spine. There is diffuse reticulated subcutaneous soft tissue edema throughout the visualized abdominal wall extending into the  included bilateral thighs. There is diffuse subcutaneous soft tissue edema throughout the scrotum. No evidence of peripheral rim enhancing fluid collection within the scrotal wall. No evidence of soft tissue gas.       1.  Diffuse body wall edema extending into visualized bilateral lower extremities. There is also diffuse edematous thickening of the scrotum, which could again be related to similar systemic process/anasarca. No definite CT evidence of peripheral rim enhancing fluid collection to suggest an abscess. No evidence of soft tissue gas within the scrotum. 2. Multiple enlarged right inguinal and right external iliac lymph nodes as described above. This could be related to infectious/inflammatory etiology in the right lower extremity. Recommend clinical correlation with patient's symptomatology in the right lower extremity and further evaluation as clinically warranted. 3. Small volume intraperitoneal free fluid in the right hemiabdomen. This is only partially included in the field of view and is also most likely favored to represent similar findings secondary to 3rd spacing of fluid/anasarca. Recommend correlation with patient's abdominal symptomatology and a dedicated CT abdomen pelvis with contrast can be obtained for further evaluation if clinically warranted.   MACRO: None   Signed by: Susanna Lewis 2/27/2024 10:56 PM Dictation workstation:   [id number]    CT head wo IV contrast    Result Date: 2/27/2024  Interpreted By:  Vargas Mendoza, STUDY: CT HEAD WO IV CONTRAST;  2/27/2024 9:21 pm   INDICATION: Signs/Symptoms:ams.   COMPARISON: Noncontrast head CT of 11/10/2015.   ACCESSION NUMBER(S): [id number]   ORDERING CLINICIAN: YANELY RODRIGUEZ   TECHNIQUE: Noncontrast axial CT scan of head was performed. Angled reformats in brain and bone windows were generated. The images were reviewed in bone, brain, blood and soft tissue windows.   FINDINGS: CSF Spaces: The ventricles, sulci and basal cisterns are  within normal limits. There is no extraaxial fluid collection.   Parenchyma: Moderate to advanced volume loss.  There is periventricular and subcortical white matter hypoattenuation, most in keeping with chronic microvascular ischemic change. The grey-white differentiation is intact. There is no mass effect or midline shift. There is no intracranial hemorrhage.   Calvarium: The calvarium is unremarkable.   Paranasal sinuses and mastoids: Visualized paranasal sinuses and mastoids are clear.       No evidence of acute cortical infarct or intracranial hemorrhage.     MACRO: None   Signed by: Vargas Mendoza 2/27/2024 9:59 PM Dictation workstation:   [id number]    US scrotum w doppler    Result Date: 2/27/2024  STUDY: Scrotal Ultrasound; 2/27/2024 8:12 PM. INDICATION: Scrotal pain and swelling. COMPARISON: None available. ACCESSION NUMBER(S): [id number] ORDERING CLINICIAN: YANELY RODRIGUEZ TECHNIQUE:  Ultrasound of the scrotum and testicular spectral Doppler evaluation performed. FINDINGS:    The right testicle measures 4.1 x 3.1 x 2.9 cm.  It demonstrates a normal homogeneous echotexture.  Normal color and spectral Doppler flow is demonstrated.  The right epididymis measures 1.8 x 1.0 x 1.0 cm and demonstrates normal echogenicity. The left testicle measures 4.5 x 3.0 x 3.2 cm.  It demonstrates a normal homogeneous echotexture.  Normal color and spectral Doppler flow is demonstrated.  The left epididymis measures 1.7 x 1.1 x 1.4 cm and demonstrates a simple cyst measuring 0.5 cm. There are bilateral hydroceles present. There is severe scrotal edema. The scrotal wall measures 5.2 cm on the right and 4.6 cm on the left.    Bilateral hydroceles. Severe scrotal edema. The testicles are otherwise unremarkable. Normal testicular spectral Doppler evaluation. Signed by Aubrey Zapien MD    XR chest 1 view    Result Date: 2/27/2024  STUDY: Chest Radiograph;  2/27/2024 at 19:16 hours. INDICATION: Shortness of breath. COMPARISON:  XR chest 6/27/2023, 1/20/2021 and 1/11/2021. CT chest 2/14/2021 ACCESSION NUMBER(S): [id number] ORDERING CLINICIAN: YANELY RODRIGUEZ TECHNIQUE:  Frontal chest was obtained at 19:16 hours. FINDINGS: CARDIOMEDIASTINAL SILHOUETTE: Mild cardiomegaly present.  Sternotomy wires seen.  LUNGS: Lungs are clear.  ABDOMEN: No remarkable upper abdominal findings.  BONES: No acute osseous changes.    Mild cardiomegaly. Signed by Mg Almanzar MD    XR chest 1 view    Result Date: 2/19/2024  Interpreted By:  Speedy Hoff,  and Saurabh Costa STUDY: XR CHEST 1 VIEW;  2/19/2024 3:08 pm   INDICATION: Signs/Symptoms:confirm PICC placement right arm length 49 cms.   COMPARISON: Chest radiograph 06/27/2023   ACCESSION NUMBER(S): [id number]   ORDERING CLINICIAN: SHUN SORENSON   FINDINGS: AP radiograph of the chest was provided. Right-sided rotation.   Right upper extremity PICC is present with distal tip faintly visualized overlying the right atrium.   CARDIOMEDIASTINAL SILHOUETTE: Cardiomediastinal silhouette is stable enlarged in size and configuration.   LUNGS: Decreased lung volumes are present with bronchovascular crowding and basilar opacities. There is blunting of the bilateral cardiophrenic angles with mildly increased vascular cephalization as compared to 06/27/2023 which demonstrates similar lung aeration. No discernible pneumothorax.   ABDOMEN: Nonspecific paucity of bowel gas, otherwise no remarkable upper abdominal findings.   BONES: No acute osseous changes.       1. Mildly increased pulmonary vascular cephalization and at least small bilateral pleural effusions. At most, mild interstitial pulmonary edema. 2. Right upper extremity PICC as above.   I personally reviewed the images/study and I agree with the resident findings as stated by Julissa Wiley MD. This study was interpreted at [facility], [place], [place].   MACRO: None   Signed by: Speedy Hoff 2/19/2024 3:57 PM Dictation  workstation:   [id number]    Bedside PICC Imaging    Result Date: 2/19/2024  These images are not reportable by radiology and will not be interpreted by  Radiologists.    ECG 12 lead    Result Date: 2/17/2024  Sinus rhythm with occasional ventricular-paced complexes Low voltage QRS Possible Anterolateral infarct , age undetermined Abnormal ECG When compared with ECG of 27-JUN-2023 04:13, Previous ECG has undetermined rhythm, needs review See ED provider note for full interpretation and clinical correlation Confirmed by Arden Becker ([id number]) on 2/17/2024 1:44:13 AM    XR knee right 1-2 views    Result Date: 2/16/2024  Interpreted By:  Brad Hunt, STUDY: XR KNEE RIGHT 1-2 VIEWS; ;  2/16/2024 5:48 pm   INDICATION: Signs/Symptoms:known septic joint.   COMPARISON: Right knee radiographs 01/25/2014.   ACCESSION NUMBER(S): [id number]   ORDERING CLINICIAN: MELVIN RAMSAY   FINDINGS: Abnormal findings in the knee are overall similar compared to prior radiographs 01/25/2024.   Status post total knee arthroplasty. Redemonstration of fragmentation of the femoral cement at the junction of the stem-condylar component as well as cement fragmentation of the tibial component at the stem plateau interface. Mellitus cement fragments again seen throughout the knee slight posterior translation of the tibia relative to femur is less evident than prior. Diffuse soft tissue swelling about the knee. Previously described soft tissue ulcer anteriorly is not well seen.       1.  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 2. Re-demonstration of cement fragmentation of the femoral and tibial components, with multiple cement fragments throughout the knee. 3. Previously seen posterior translation of the tibia relative to the femur is less prominent and alignment appears near anatomic. 4. Previously described ulcer in the anterior soft tissues on the lateral view is not visualized.   MACRO: None   Signed by: Brad Hunt  2/16/2024 5:56 PM Dictation workstation:   [id number]    Lower extremity venous duplex right    Result Date: 2/1/2024  Interpreted By:  Vargas Andersen and Tippareddy Charit STUDY: [facility] US LOWER EXTREMITY VENOUS DUPLEX RIGHT;  1/31/2024 3:06 pm   INDICATION: Signs/Symptoms:Exclude DVT (Septic Rt knee).   COMPARISON: Ultrasound 06/27/2023   ACCESSION NUMBER(S): [id number]   ORDERING CLINICIAN: NEVA RUST   TECHNIQUE: Vascular ultrasound of the right lower extremity was performed. Real-time compression views as well as Gray scale, color Doppler and spectral Doppler waveform analysis was performed.   FINDINGS: Evaluation of the visualized portions of the right common femoral vein, proximal, mid, and distal femoral vein, and popliteal vein were performed.   Limitations:  Unable to evaluate the distal femoral vein and calf veins due to body habitus and edema.   The evaluated veins demonstrate normal compressibility. There is intact venous flow demonstrating normal respiratory variability and normal augmentation of flow with calf compression. Therefore, there is no ultrasonographic evidence for deep vein thrombosis within the evaluated veins.   Respiratory variation and augmentation to calf pressure was noted.   Incidental note of right inguinal lymphadenopathy with a lymph node measuring 2.6 x 1.7 x 2.1 cm, likely reactive. Extensive soft tissue edema is noted at the level of the and lower leg.       No sonographic evidence for deep vein thrombosis within the evaluated veins of the right lower extremity. Of note, unable to evaluate the distal femoral vein and calf veins due to body habitus and edema.   I personally reviewed the images/study and I agree with the findings as stated by Pham Bishop MD. This study was interpreted at [place], [place].   MACRO: None   Signed by: Vargas Andersen 2/1/2024 5:45 AM Dictation workstation:   [id number]        ECG 12 lead    Result Date: 2/28/2024  Normal sinus rhythm Cannot rule out Inferior infarct , age undetermined Anterolateral infarct , age undetermined Abnormal ECG When compared with ECG of 16-FEB-2024 20:38, Sinus rhythm has replaced Electronic ventricular pacemaker Confirmed by Jaren Chaney ([id number]) on 2/28/2024 8:56:04 AM    CT pelvis w IV contrast    Result Date: 2/27/2024  Interpreted By:  Susanna Lewis, STUDY: CT PELVIS W IV CONTRAST;  2/27/2024 9:21 pm   INDICATION: Signs/Symptoms:scrotal swelling.   COMPARISON: Scrotal ultrasound performed on the same day.   ACCESSION NUMBER(S): [id number]   ORDERING CLINICIAN: YANELY RODRIGUEZ   TECHNIQUE: CT of the pelvis was performed.  Standard contiguous axial images were obtained at 3 mm slice thickness through pelvis. Coronal and sagittal reconstructions at 3 mm slice thickness were performed.  85 ml of contrast  Optiray 350 were administered intravenously without immediate complication.   FINDINGS: PELVIS:   BLADDER: Urinary bladder is decompressed secondary to Campos catheterization.   REPRODUCTIVE ORGANS: Prostate gland is unremarkable.   BOWEL: Visualized small bowel and large bowel loops appear grossly unremarkable.   VESSELS: Visualized vasculature appears grossly unremarkable.   PERITONEUM/RETROPERITONEUM/LYMPH NODES: There is small amount of free fluid along the right lower quadrant measuring approximately 20 Hounsfield units. This is incompletely included in the field of view. There are multiple enlarged lymph nodes along the right external iliac chain with largest measuring up to 2 cm in short axis. There are also multiple enlarged right inguinal lymph nodes with the largest measuring up to 1.8 cm in short axis. However no evidence of enlarged lymphadenopathy in the left inguinal region or in the left pelvis.   BONES AND ABDOMINAL WALL: No suspicious osseous lesions in the visualized field of view. There is small left fat containing inguinal  hernia. Mild discogenic degenerative changes are noted in the lower lumbar spine. There is diffuse reticulated subcutaneous soft tissue edema throughout the visualized abdominal wall extending into the included bilateral thighs. There is diffuse subcutaneous soft tissue edema throughout the scrotum. No evidence of peripheral rim enhancing fluid collection within the scrotal wall. No evidence of soft tissue gas.       1.  Diffuse body wall edema extending into visualized bilateral lower extremities. There is also diffuse edematous thickening of the scrotum, which could again be related to similar systemic process/anasarca. No definite CT evidence of peripheral rim enhancing fluid collection to suggest an abscess. No evidence of soft tissue gas within the scrotum. 2. Multiple enlarged right inguinal and right external iliac lymph nodes as described above. This could be related to infectious/inflammatory etiology in the right lower extremity. Recommend clinical correlation with patient's symptomatology in the right lower extremity and further evaluation as clinically warranted. 3. Small volume intraperitoneal free fluid in the right hemiabdomen. This is only partially included in the field of view and is also most likely favored to represent similar findings secondary to 3rd spacing of fluid/anasarca. Recommend correlation with patient's abdominal symptomatology and a dedicated CT abdomen pelvis with contrast can be obtained for further evaluation if clinically warranted.   MACRO: None   Signed by: Susanna Lewis 2/27/2024 10:56 PM Dictation workstation:   [id number]    CT head wo IV contrast    Result Date: 2/27/2024  Interpreted By:  Vargas Mendoza, STUDY: CT HEAD WO IV CONTRAST;  2/27/2024 9:21 pm   INDICATION: Signs/Symptoms:ams.   COMPARISON: Noncontrast head CT of 11/10/2015.   ACCESSION NUMBER(S): [id number]   ORDERING CLINICIAN: YANELY RODRIGUEZ   TECHNIQUE: Noncontrast axial CT scan of head was  performed. Angled reformats in brain and bone windows were generated. The images were reviewed in bone, brain, blood and soft tissue windows.   FINDINGS: CSF Spaces: The ventricles, sulci and basal cisterns are within normal limits. There is no extraaxial fluid collection.   Parenchyma: Moderate to advanced volume loss.  There is periventricular and subcortical white matter hypoattenuation, most in keeping with chronic microvascular ischemic change. The grey-white differentiation is intact. There is no mass effect or midline shift. There is no intracranial hemorrhage.   Calvarium: The calvarium is unremarkable.   Paranasal sinuses and mastoids: Visualized paranasal sinuses and mastoids are clear.       No evidence of acute cortical infarct or intracranial hemorrhage.     MACRO: None   Signed by: Vargas Mendoza 2/27/2024 9:59 PM Dictation workstation:   [id number]    US scrotum w doppler    Result Date: 2/27/2024  STUDY: Scrotal Ultrasound; 2/27/2024 8:12 PM. INDICATION: Scrotal pain and swelling. COMPARISON: None available. ACCESSION NUMBER(S): [id number] ORDERING CLINICIAN: YANELY RODRIGUEZ TECHNIQUE:  Ultrasound of the scrotum and testicular spectral Doppler evaluation performed. FINDINGS:    The right testicle measures 4.1 x 3.1 x 2.9 cm.  It demonstrates a normal homogeneous echotexture.  Normal color and spectral Doppler flow is demonstrated.  The right epididymis measures 1.8 x 1.0 x 1.0 cm and demonstrates normal echogenicity. The left testicle measures 4.5 x 3.0 x 3.2 cm.  It demonstrates a normal homogeneous echotexture.  Normal color and spectral Doppler flow is demonstrated.  The left epididymis measures 1.7 x 1.1 x 1.4 cm and demonstrates a simple cyst measuring 0.5 cm. There are bilateral hydroceles present. There is severe scrotal edema. The scrotal wall measures 5.2 cm on the right and 4.6 cm on the left.    Bilateral hydroceles. Severe scrotal edema. The testicles are otherwise unremarkable. Normal  testicular spectral Doppler evaluation. Signed by Aubrey Zapien MD    XR chest 1 view    Result Date: 2/27/2024  STUDY: Chest Radiograph;  2/27/2024 at 19:16 hours. INDICATION: Shortness of breath. COMPARISON: XR chest 6/27/2023, 1/20/2021 and 1/11/2021. CT chest 2/14/2021 ACCESSION NUMBER(S): [id number] ORDERING CLINICIAN: YANELY RODRIGUEZ TECHNIQUE:  Frontal chest was obtained at 19:16 hours. FINDINGS: CARDIOMEDIASTINAL SILHOUETTE: Mild cardiomegaly present.  Sternotomy wires seen.  LUNGS: Lungs are clear.  ABDOMEN: No remarkable upper abdominal findings.  BONES: No acute osseous changes.    Mild cardiomegaly. Signed by Mg Almanzar MD    XR chest 1 view    Result Date: 2/19/2024  Interpreted By:  Speedy Hoff,  and Saurabh Costa STUDY: XR CHEST 1 VIEW;  2/19/2024 3:08 pm   INDICATION: Signs/Symptoms:confirm PICC placement right arm length 49 cms.   COMPARISON: Chest radiograph 06/27/2023   ACCESSION NUMBER(S): [id number]   ORDERING CLINICIAN: SHUN SORENSON   FINDINGS: AP radiograph of the chest was provided. Right-sided rotation.   Right upper extremity PICC is present with distal tip faintly visualized overlying the right atrium.   CARDIOMEDIASTINAL SILHOUETTE: Cardiomediastinal silhouette is stable enlarged in size and configuration.   LUNGS: Decreased lung volumes are present with bronchovascular crowding and basilar opacities. There is blunting of the bilateral cardiophrenic angles with mildly increased vascular cephalization as compared to 06/27/2023 which demonstrates similar lung aeration. No discernible pneumothorax.   ABDOMEN: Nonspecific paucity of bowel gas, otherwise no remarkable upper abdominal findings.   BONES: No acute osseous changes.       1. Mildly increased pulmonary vascular cephalization and at least small bilateral pleural effusions. At most, mild interstitial pulmonary edema. 2. Right upper extremity PICC as above.   I personally reviewed the images/study and I agree with the  resident findings as stated by Julissa Wiley MD. This study was interpreted at [facility], [place], [place].   MACRO: None   Signed by: Speedy Hoff 2/19/2024 3:57 PM Dictation workstation:   [id number]    Bedside PICC Imaging    Result Date: 2/19/2024  These images are not reportable by radiology and will not be interpreted by  Radiologists.    ECG 12 lead    Result Date: 2/17/2024  Sinus rhythm with occasional ventricular-paced complexes Low voltage QRS Possible Anterolateral infarct , age undetermined Abnormal ECG When compared with ECG of 27-JUN-2023 04:13, Previous ECG has undetermined rhythm, needs review See ED provider note for full interpretation and clinical correlation Confirmed by Arden Becker ([id number]) on 2/17/2024 1:44:13 AM    XR knee right 1-2 views    Result Date: 2/16/2024  Interpreted By:  Brad Hunt, STUDY: XR KNEE RIGHT 1-2 VIEWS; ;  2/16/2024 5:48 pm   INDICATION: Signs/Symptoms:known septic joint.   COMPARISON: Right knee radiographs 01/25/2014.   ACCESSION NUMBER(S): [id number]   ORDERING CLINICIAN: MELVIN RAMSAY   FINDINGS: Abnormal findings in the knee are overall similar compared to prior radiographs 01/25/2024.   Status post total knee arthroplasty. Redemonstration of fragmentation of the femoral cement at the junction of the stem-condylar component as well as cement fragmentation of the tibial component at the stem plateau interface. Mellitus cement fragments again seen throughout the knee slight posterior translation of the tibia relative to femur is less evident than prior. Diffuse soft tissue swelling about the knee. Previously described soft tissue ulcer anteriorly is not well seen.       1.  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 2. Re-demonstration of cement fragmentation of the femoral and tibial components, with multiple cement fragments throughout the knee. 3. Previously seen posterior translation of the tibia relative to  the femur is less prominent and alignment appears near anatomic. 4. Previously described ulcer in the anterior soft tissues on the lateral view is not visualized.   MACRO: None   Signed by: Brad Hunt 2/16/2024 5:56 PM Dictation workstation:   [id number]    Lower extremity venous duplex right    Result Date: 2/1/2024  Interpreted By:  Vargas Andersen and Tippareddy Charit STUDY: [facility] US LOWER EXTREMITY VENOUS DUPLEX RIGHT;  1/31/2024 3:06 pm   INDICATION: Signs/Symptoms:Exclude DVT (Septic Rt knee).   COMPARISON: Ultrasound 06/27/2023   ACCESSION NUMBER(S): [id number]   ORDERING CLINICIAN: NEVA RUST   TECHNIQUE: Vascular ultrasound of the right lower extremity was performed. Real-time compression views as well as Gray scale, color Doppler and spectral Doppler waveform analysis was performed.   FINDINGS: Evaluation of the visualized portions of the right common femoral vein, proximal, mid, and distal femoral vein, and popliteal vein were performed.   Limitations:  Unable to evaluate the distal femoral vein and calf veins due to body habitus and edema.   The evaluated veins demonstrate normal compressibility. There is intact venous flow demonstrating normal respiratory variability and normal augmentation of flow with calf compression. Therefore, there is no ultrasonographic evidence for deep vein thrombosis within the evaluated veins.   Respiratory variation and augmentation to calf pressure was noted.   Incidental note of right inguinal lymphadenopathy with a lymph node measuring 2.6 x 1.7 x 2.1 cm, likely reactive. Extensive soft tissue edema is noted at the level of the and lower leg.       No sonographic evidence for deep vein thrombosis within the evaluated veins of the right lower extremity. Of note, unable to evaluate the distal femoral vein and calf veins due to body habitus and edema.   I personally reviewed the images/study and I agree with the findings as stated by Pham Bishop,  "MD. This study was interpreted at [facility], [place], [place].   MACRO: None   Signed by: Vargas Andersen 2/1/2024 5:45 AM Dictation workstation:   [id number]      Assessment/Plan     #Right knee septic arthritis, status post multiple surgical operations  #Scrotal swelling  #Uncontrolled type 2 diabetes  #History of CABG  #History of heart failure  #Atrial fibrillation with rapid ventricular response     Antibiotics  Dapto Day 5     -Continue antibiotics change back to dapto, will need Dapto until March 18, script in chart   -Follow up with ID Dr. Goodwin from [facility]   -ok to discharge from ID perspective  -Patient states \"there is nothing wrong with him\" 3/1/24       I reviewed and interpreted all lab test imaging studies and documentations from other healthcare providers  I am monitoring for antibiotic side effects and toxicity      Juanjose Dos Santos DO      "

## 2024-03-04 NOTE — CARE PLAN
Problem: Skin  Goal: Decreased wound size/increased tissue granulation at next dressing change  Outcome: Progressing  Goal: Participates in plan/prevention/treatment measures  Outcome: Progressing  Goal: Prevent/manage excess moisture  Outcome: Progressing  Goal: Prevent/minimize sheer/friction injuries  Outcome: Progressing  Goal: Promote/optimize nutrition  Outcome: Progressing  Goal: Promote skin healing  Outcome: Progressing     Problem: Pain  Goal: My pain/discomfort is manageable  Outcome: Progressing     Problem: Safety  Goal: Patient will be injury free during hospitalization  Outcome: Progressing  Goal: I will remain free of falls  Outcome: Progressing     Problem: Daily Care  Goal: Daily care needs are met  Outcome: Progressing     Problem: Psychosocial Needs  Goal: Demonstrates ability to cope with hospitalization/illness  Outcome: Progressing  Goal: Collaborate with me, my family, and caregiver to identify my specific goals  Outcome: Progressing   The patient's goals for the shift include      The clinical goals for the shift include maintain safety and comfort

## 2024-03-04 NOTE — PROGRESS NOTES
Subjective Data:  Patient is very angry this morning.  Yelling.  Denies chest pain or shortness of breath.  Would like to be transferred back to his facility.    Objective Data:  Last Recorded Vitals:  Vitals:    03/03/24 1943 03/03/24 2312 03/04/24 0326 03/04/24 0742   BP: (!) 158/92 (!) 152/92 132/77 171/90   BP Location:    Right arm   Patient Position:    Lying   Pulse: 98 96 95 94   Resp: 19 20 20 20   Temp: 36 °C (96.8 °F) 36.5 °C (97.7 °F) 36.5 °C (97.7 °F) 35.7 °C (96.3 °F)   TempSrc:    Temporal   SpO2: 94% 94% 94% 92%   Weight:       Height:           Last Labs:  CBC - 3/4/2024:  5:47 AM  7.6 10.1 548    35.1      CMP - 3/4/2024:  5:47 AM  8.6 6.1 69 --- 0.4   4.5 2.7 225 450      PTT - 2/17/2024:  5:17 PM  1.1   12.3 34     TROPHS   Date/Time Value Ref Range Status   02/28/2024 06:58 AM 34 0 - 20 ng/L Final   02/28/2024 02:09 AM 40 0 - 20 ng/L Final   02/27/2024 07:10 PM 43 0 - 20 ng/L Final     BNP   Date/Time Value Ref Range Status   02/28/2024 02:09 AM 1,268 0 - 99 pg/mL Final   04/12/2021 06:51  0 - 99 pg/mL Final     Comment:     .  <100 pg/mL - Heart failure unlikely  100-299 pg/mL - Intermediate probability of acute heart  .               failure exacerbation. Correlate with clinical  .               context and patient history.    >=300 pg/mL - Heart Failure likely. Correlate with clinical  .               context and patient history.   Biotin interference may cause falsely decreased results.   Patients taking a Biotin dose of up to 5 mg/day should   refrain from taking Biotin for 24 hours before sample   collection. Providers may contact their local laboratory   for further information.       HGBA1C   Date/Time Value Ref Range Status   01/26/2024 04:13 AM 16.6 see below % Final     Comment:     Hemoglobin A1c is >15%. Marked elevations in HbA1c are commonly due to poor glycemic control in diabetic patients. Rarely, hemoglobin variants may cause false elevation of HbA1c that is discordant  with glycemic control status.    10/15/2023 05:28 PM 14.7 see below % Final     VLDL   Date/Time Value Ref Range Status   09/24/2020 05:30 AM 70 0 - 40 mg/dL Final   06/03/2020 05:37 PM 60 0 - 40 mg/dL Final   03/28/2019 05:00 AM 46 0 - 40 mg/dL Final      Last I/O:  I/O last 3 completed shifts:  In: 659 (4.1 mL/kg) [P.O.:590; IV Piggyback:69]  Out: 3200 (20.1 mL/kg) [Urine:3200 (0.6 mL/kg/hr)]  Dosing Weight: 159 kg     Past Cardiology Tests (Last 3 Years):  EKG:  ECG 12 lead 02/27/2024      ECG 12 lead 02/16/2024    Echo:  Transthoracic Echo (TTE) Complete 01/30/2024    Ejection Fractions:  EF   Date/Time Value Ref Range Status   01/30/2024 10:45 AM 28 %      Cath:  No results found for this or any previous visit from the past 1095 days.    Stress Test:  No results found for this or any previous visit from the past 1095 days.    Cardiac Imaging:  No results found for this or any previous visit from the past 1095 days.      Inpatient Medications:  Scheduled medications   Medication Dose Route Frequency    aspirin  81 mg oral Daily    [Held by provider] atorvastatin  40 mg oral Daily    daptomycin  6 mg/kg intravenous q24h SUDHIR    heparin (porcine)  7,500 Units subcutaneous q8h SUDHIR    insulin glargine  35 Units subcutaneous Nightly    insulin lispro  0-10 Units subcutaneous Before meals & nightly    metoprolol succinate XL  50 mg oral Daily    pantoprazole  40 mg intravenous Daily    torsemide  40 mg oral Daily     PRN medications   Medication    acetaminophen    dextrose 10 % in water (D10W)    dextrose    fluticasone    glucagon    metoprolol    metoprolol    traMADol     Continuous Medications   Medication Dose Last Rate       Physical Exam:  Physical Exam  Constitutional:       Appearance: He is obese.   HENT:      Mouth/Throat:      Mouth: Mucous membranes are dry.      Pharynx: Oropharynx is clear.   Eyes:      Pupils: Pupils are equal, round, and reactive to light.   Cardiovascular:      Rate and Rhythm:  Rhythm irregularly irregular.   Pulmonary:      Breath sounds: Normal breath sounds.   Abdominal:      General: Bowel sounds are normal.   Musculoskeletal:      Right knee: Swelling present.      Comments: Scrotal edema, erythema   Skin:     General: Skin is warm and dry.      Capillary Refill: Capillary refill takes less than 2 seconds.   Neurological:      General: No focal deficit present.      Mental Status: He is alert and oriented to person, place, and time.   Psychiatric:         Mood and Affect: Mood normal. Affect is angry.   Assessment/Plan   Acute on chronic decompensated combined systolic and diastolic heart failure  Elevated troponin  Diabetes mellitus type 2  Recurrent infection  Morbid obesity     Patient has atypical chest pain complaint with complaining of pain all over the body.  His EKG shows evidence of a Q wave in anterior leads and low voltage which could be due to body habitus as well as evidence for prior infarcts.  His troponin elevation is mild and stable.  More likely this represents mostly type II myocardial infarction in the setting of decompensated heart failure and his recurrent infection.  His diabetes has been poorly controlled with hemoglobin A1c above 16.  At this point patient is not a good candidate for any invasive test from cardiac standpoint such as heart catheterization in the absence of anginal symptoms and with a stable troponin.  I suggest continue with baby aspirin, statin, diabetes control and blood pressure control.  He will need close follow-up in cardiology office for guideline directed medical therapy for his heart failure.  Will continue with IV Lasix for volume control.  Eventually depending on his clinical progress and after clearing the infection, if his EF remains to be low, further considerations such as heart catheterization as well as consideration for primary prevention ICD can be discussed but first there are several other steps that needs to be  followed.  I will continue to monitor the patient while he is in the hospital.     February 29, 2024  Patient is responding well to her current dose of IV Lasix.  Renal function is slightly better today at 1.5.  Will continue with current medications from cardiac standpoint.    March 1, 2024  Creatinine is slightly better today.  Continue current management and diuresis from cardiac standpoint.  Will follow-up over the weekend.    March 2, 2024  Creatinine fairly stable.  Will continue current dose of IV diuresis.    March 3, 2024  Creatinine is a slightly up today to 1.6.  I believe it is appropriate to continue 1 more day of IV Lasix with current dose and tomorrow based on renal function will decide about switching to p.o. torsemide probably.    March 4, 2024  Creatinine better today at 1.5.  Lower extremity edema has significantly improved with good diuresis over the last few days.  Will switch his IV Lasix to torsemide 40 mg daily for maintenance diuresis for now.  I will initiate Entresto for his heart failure and blood pressure control.  Will continue with Toprol.  Will consider adding Aldactone in outpatient setting.  He may not be a good candidate for SGL 2 inhibitor considering his risk of infection.  He will definitely benefit from good control of diabetes and other cardiac risk factors.  Will defer discharge planning to primary team.  He will need follow-up in cardiology clinic in 2 to 3 weeks for further care.    I appreciate the opportunity to participate in this patient's care.      Jaren Chaney MD, PhD, FAC, [facility]  Interventional Cardiology, [facility]  Associate Professor of Medicine, [facility]  [email or web address]   Office:       Macro dragon disclaimer: This note was dictated by speech recognition. Minor errors in transcription may be present.

## 2024-03-04 NOTE — CARE PLAN
Problem: Skin  Goal: Decreased wound size/increased tissue granulation at next dressing change  3/3/2024 2312 by Gustavo Sinha RN  Outcome: Progressing  Flowsheets (Taken 3/3/2024 2312)  Decreased wound size/increased tissue granulation at next dressing change: Promote sleep for wound healing  3/3/2024 2310 by Gustavo Sinha RN  Outcome: Progressing  Goal: Participates in plan/prevention/treatment measures  3/3/2024 2312 by Gustavo Sinha RN  Outcome: Progressing  Flowsheets (Taken 3/3/2024 2312)  Participates in plan/prevention/treatment measures: Increase activity/out of bed for meals  3/3/2024 2310 by Gustavo Sinha RN  Outcome: Progressing  Goal: Prevent/manage excess moisture  3/3/2024 2312 by Gustavo Sinha RN  Outcome: Progressing  Flowsheets (Taken 3/3/2024 0939 by Shaina Ariza RN)  Prevent/manage excess moisture: Cleanse incontinence/protect with barrier cream  3/3/2024 2310 by Gustavo Sinha RN  Outcome: Progressing  Goal: Prevent/minimize sheer/friction injuries  3/3/2024 2312 by Gustavo Sinha RN  Outcome: Progressing  Flowsheets (Taken 3/3/2024 2312)  Prevent/minimize sheer/friction injuries:   Turn/reposition every 2 hours/use positioning/transfer devices   HOB 30 degrees or less  3/3/2024 2310 by Gustavo Sinha RN  Outcome: Progressing  Goal: Promote/optimize nutrition  3/3/2024 2312 by Gustavo Sinha RN  Outcome: Progressing  Flowsheets (Taken 3/3/2024 2312)  Promote/optimize nutrition: Monitor/record intake including meals  3/3/2024 2310 by Gustavo Sinha RN  Outcome: Progressing  Goal: Promote skin healing  3/3/2024 2312 by Gustavo Sinha RN  Outcome: Progressing  Flowsheets (Taken 3/3/2024 2312)  Promote skin healing:   Ensure correct size (line/device) and apply per  instructions   Assess skin/pad under line(s)/device(s)  3/3/2024 2310 by Gustavo Sinha RN  Outcome: Progressing   The patient's goals for the shift include      The clinical goals for the shift include Patient remain free  of falls

## 2024-03-05 ENCOUNTER — NURSING HOME VISIT (OUTPATIENT)
Dept: POST ACUTE CARE | Facility: EXTERNAL LOCATION | Age: 54
End: 2024-03-05
Payer: COMMERCIAL

## 2024-03-05 DIAGNOSIS — I48.0 PAROXYSMAL ATRIAL FIBRILLATION (MULTI): ICD-10-CM

## 2024-03-05 DIAGNOSIS — I50.22 CHRONIC SYSTOLIC (CONGESTIVE) HEART FAILURE (MULTI): ICD-10-CM

## 2024-03-05 DIAGNOSIS — K21.9 GASTROESOPHAGEAL REFLUX DISEASE WITHOUT ESOPHAGITIS: ICD-10-CM

## 2024-03-05 DIAGNOSIS — E11.9 DIABETES MELLITUS TYPE 2 WITHOUT RETINOPATHY (MULTI): ICD-10-CM

## 2024-03-05 DIAGNOSIS — E78.2 MIXED HYPERLIPIDEMIA: ICD-10-CM

## 2024-03-05 DIAGNOSIS — I25.10 ARTERIOSCLEROSIS OF CORONARY ARTERY: Primary | ICD-10-CM

## 2024-03-05 PROCEDURE — 99305 1ST NF CARE MODERATE MDM 35: CPT | Performed by: INTERNAL MEDICINE

## 2024-03-05 NOTE — LETTER
Patient: Kody Choi  : 1970    Encounter Date: 2024    HISTORY & PHYSICAL    Subjective  Chief complaint: Kody Choi is a 53 y.o. adult who is a acute skilled care patient being seen and evaluated for multiple medical problems.  Patient presents for weakness    HPI:  Patient is a 53 year old male with a past medical history of diabetes, astigmatism, and weakness. Patient was admitted to the nursing facility after being in the hospital. Patient presented to the ED for scrotal pain and edema. Patient is receiving IV antibiotics. Patient had PICC line placed. Patient was given Zosyn and vancomycin. Patient was evaluated and discharged to the SNF.      Past Medical History:   Diagnosis Date   • Cellulitis of scrotum 2024   • Personal history of other endocrine, nutritional and metabolic disease     History of type 2 diabetes mellitus   • Unspecified astigmatism, bilateral 2016    Astigmatism, bilateral       Past Surgical History:   Procedure Laterality Date   • KNEE SURGERY  10/29/2014    Knee Surgery       No family history on file.    Social History     Socioeconomic History   • Marital status: Single     Spouse name: Not on file   • Number of children: Not on file   • Years of education: Not on file   • Highest education level: Not on file   Occupational History   • Not on file   Tobacco Use   • Smoking status: Never     Passive exposure: Never   • Smokeless tobacco: Never   Substance and Sexual Activity   • Alcohol use: Not on file   • Drug use: Not on file   • Sexual activity: Not on file   Other Topics Concern   • Not on file   Social History Narrative   • Not on file     Social Determinants of Health     Financial Resource Strain: Low Risk  (2024)    Overall Financial Resource Strain (CARDIA)    • Difficulty of Paying Living Expenses: Not hard at all   Food Insecurity: At Risk (10/8/2020)    Received from [facility]     Food Insecurity    • Food: 2   Transportation Needs: No  Transportation Needs (2/28/2024)    PRAPARE - Transportation    • Lack of Transportation (Medical): No    • Lack of Transportation (Non-Medical): No   Physical Activity: At Risk (10/8/2020)    Received from [facility]     Physical Activity    • Physical Activity: 2   Stress: Not on File (10/8/2020)    Received from [facility]     Stress    • Stress: 0   Social Connections: At Risk (10/8/2020)    Received from [facility]     Social Connections    • Social Connections and Isolation: 2   Intimate Partner Violence: Not on file   Housing Stability: Low Risk  (2/28/2024)    Housing Stability Vital Sign    • Unable to Pay for Housing in the Last Year: No    • Number of Places Lived in the Last Year: 2    • Unstable Housing in the Last Year: No       Vital signs: 147/98, 97.6, 99, 20, 117.0, 98%    Objective  Physical Exam  Constitutional:       Appearance: He is obese.   HENT:      Head: Normocephalic and atraumatic.      Nose: Nose normal.      Mouth/Throat:      Mouth: Mucous membranes are moist.      Pharynx: Oropharynx is clear.   Eyes:      Extraocular Movements: Extraocular movements intact.      Pupils: Pupils are equal, round, and reactive to light.   Cardiovascular:      Rate and Rhythm: Normal rate and regular rhythm.   Pulmonary:      Effort: No respiratory distress.      Breath sounds: Normal breath sounds. No wheezing, rhonchi or rales.   Abdominal:      General: Bowel sounds are normal. There is no distension.      Palpations: Abdomen is soft.      Tenderness: There is no abdominal tenderness. There is no guarding.   Musculoskeletal:      Right lower leg: No edema.      Left lower leg: No edema.   Skin:     General: Skin is warm and dry.   Neurological:      Mental Status: He is alert and oriented to person, place, and time. Mental status is at baseline.         Assessment/Plan  Problem List Items Addressed This Visit       Hyperlipidemia     Continue current medications          Gastroesophageal reflux disease     Continue  current medications          Diabetes mellitus type 2 without retinopathy (Multi)     Continue current medications          Chronic systolic (congestive) heart failure (Multi)     Continue current medications          Paroxysmal atrial fibrillation (Multi)     Continue current medications          Arteriosclerosis of coronary artery - Primary     Continue current medications           Medications, treatments, and labs reviewed  Continue medications and treatments as listed in [facility]    Scribe Attestation  Jen JEFFERSON Scribe   attest that this documentation has been prepared under the direction and in the presence of Lyndon Metzger DO.    Provider Attestation - Scribe documentation  All medical record entries made by the Scribe were at my direction and personally dictated by me. I have reviewed the chart and agree that the record accurately reflects my personal performance of the history, physical exam, discussion and plan.    Lyndon Metzger DO          Electronically Signed By: Lyndon Metzger DO   4/17/24  4:50 PM

## 2024-03-06 ENCOUNTER — NURSING HOME VISIT (OUTPATIENT)
Dept: POST ACUTE CARE | Facility: EXTERNAL LOCATION | Age: 54
End: 2024-03-06
Payer: COMMERCIAL

## 2024-03-06 DIAGNOSIS — M79.89 SWELLING OF RIGHT UPPER EXTREMITY: ICD-10-CM

## 2024-03-06 DIAGNOSIS — N49.2 CELLULITIS OF SCROTUM: Primary | ICD-10-CM

## 2024-03-06 DIAGNOSIS — R60.9 PERIPHERAL EDEMA: ICD-10-CM

## 2024-03-06 DIAGNOSIS — I50.22 CHRONIC SYSTOLIC (CONGESTIVE) HEART FAILURE (MULTI): ICD-10-CM

## 2024-03-06 DIAGNOSIS — E11.9 DIABETES MELLITUS TYPE 2 WITHOUT RETINOPATHY (MULTI): ICD-10-CM

## 2024-03-06 DIAGNOSIS — T84.53XD INFECTION OF TOTAL RIGHT KNEE REPLACEMENT, SUBSEQUENT ENCOUNTER: ICD-10-CM

## 2024-03-06 DIAGNOSIS — B95.62 METHICILLIN RESISTANT STAPHYLOCOCCUS AUREUS INFECTION AS THE CAUSE OF DISEASES CLASSIFIED ELSEWHERE: ICD-10-CM

## 2024-03-06 PROCEDURE — 99309 SBSQ NF CARE MODERATE MDM 30: CPT

## 2024-03-06 NOTE — LETTER
Patient: Kody Choi  : 1970    Encounter Date: 2024    PROGRESS NOTE    Subjective  Chief complaint: Kody Choi is a 53 y.o. adult who is an acute skilled patient being seen and evaluated for weakness    HPI:  Patient seen and evaluated for general medical care.  Admitted to facility 3/4 s/p IPA for cellulitis of scrotum.  PMH includes DM, CHF, A-fib, RUPESH, ongoing cellulitis RLE with numerous infections s/p replacement, MRSA, CAD, RUPESH, HLD, HTN, CABG, pyogenic arthritis.  Patient continues to decline amputation of RLE.  Admitted to facility with orders for wound care R knee, continue daptomycin IVPB, pain management and therapy.  Patient uses walker and WC for mobility.  RUE noted to be edematous.  PICC line in place.  No F/C/N/V/D.  No concerns per nursing          Objective  Vital signs:  148/90-97.1-97-19-94%    Physical Exam  Constitutional:       Appearance: Normal appearance. He is obese.   HENT:      Head: Normocephalic.      Mouth/Throat:      Mouth: Mucous membranes are moist.   Eyes:      Extraocular Movements: Extraocular movements intact.   Cardiovascular:      Rate and Rhythm: Normal rate and regular rhythm.      Pulses: Normal pulses.      Heart sounds: Normal heart sounds.   Pulmonary:      Effort: Pulmonary effort is normal.      Breath sounds: Normal breath sounds.   Abdominal:      General: Bowel sounds are normal.      Palpations: Abdomen is soft.   Genitourinary:     Comments:  scrotal swelling with taut tissue-  decreasing redness  Musculoskeletal:         General: Normal range of motion.      Cervical back: Normal range of motion and neck supple.      Right lower leg: Edema present.      Left lower leg: Edema present.      Comments: R knee swollen, red with 2 open areas (1cm diam, 2.5 cm diam) draining yellowish drainage- tissue surrounding wounds is firm- edema radiates to foot and up to thigh  RUE edematous MSP intact     Skin:     General: Skin is warm and dry.      Capillary  Refill: Capillary refill takes 2 to 3 seconds.   Neurological:      Mental Status: He is alert and oriented to person, place, and time.   Psychiatric:         Mood and Affect: Mood normal.         Behavior: Behavior normal.         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)      continue IV daptomycin   Wound care to right knee   Monitor for fever, chills   monitor CBC   pain management   elevation         Diabetes mellitus type 2 without retinopathy (CMS/HCC)      Stable   BG 92   Humalog, Lantus   monitor BG         Chronic systolic (congestive) heart failure (CMS/HCC)      Stable   no SOB, Rales   BLE edema   Entresto, torsemide   monitor weights   fluid restriction   SIMA diet           Swelling of right upper extremity      swelling above PICC line RUE   US to R/O DVT   Remove PICC and establish new PICC in  L UE          Medications, treatments, and labs reviewed  Continue medications and treatments as listed in EMR    GLADIS Martinez      Electronically Signed By: GLADIS Martinez   3/13/24  7:55 PM

## 2024-03-08 ENCOUNTER — NURSING HOME VISIT (OUTPATIENT)
Dept: POST ACUTE CARE | Facility: EXTERNAL LOCATION | Age: 54
End: 2024-03-08
Payer: COMMERCIAL

## 2024-03-08 DIAGNOSIS — T84.53XD INFECTION OF TOTAL RIGHT KNEE REPLACEMENT, SUBSEQUENT ENCOUNTER: ICD-10-CM

## 2024-03-08 DIAGNOSIS — I50.22 CHRONIC SYSTOLIC (CONGESTIVE) HEART FAILURE (MULTI): ICD-10-CM

## 2024-03-08 DIAGNOSIS — K21.9 GASTROESOPHAGEAL REFLUX DISEASE WITHOUT ESOPHAGITIS: ICD-10-CM

## 2024-03-08 DIAGNOSIS — E11.9 DIABETES MELLITUS TYPE 2 WITHOUT RETINOPATHY (MULTI): ICD-10-CM

## 2024-03-08 DIAGNOSIS — N49.2 CELLULITIS OF SCROTUM: Primary | ICD-10-CM

## 2024-03-08 PROCEDURE — 99309 SBSQ NF CARE MODERATE MDM 30: CPT

## 2024-03-08 NOTE — LETTER
Patient: Kody Choi  : 1970    Encounter Date: 2024    PROGRESS NOTE    Subjective  Chief complaint: Kody Choi is a 53 y.o. adult who is an acute skilled patient being seen and evaluated for weakness    HPI:  Patient continues to work with therapy for ADLs and self-care.  Ambulates with walker and wheelchair assist.  Tolerating IV daptomycin well.  US RUE negative for DVT.  Patient argumentative with staff regarding insulin.  Education provided.  Scrotal cellulitis improving.  Right knee unchanged.  No F/C/N/V/D, SOB, cough.  No concerns per nursing          Objective  Vital signs:  132/74-97.9-86-18-97%    Physical Exam  Constitutional:       Appearance: Normal appearance. He is obese.   HENT:      Head: Normocephalic.      Mouth/Throat:      Mouth: Mucous membranes are moist.   Eyes:      Extraocular Movements: Extraocular movements intact.   Cardiovascular:      Rate and Rhythm: Normal rate and regular rhythm.      Pulses: Normal pulses.      Heart sounds: Normal heart sounds.   Pulmonary:      Effort: Pulmonary effort is normal.      Breath sounds: Normal breath sounds.   Abdominal:      General: Bowel sounds are normal.      Palpations: Abdomen is soft.   Genitourinary:     Comments: .  Scrotal swelling  Musculoskeletal:         General: Swelling present. Normal range of motion.      Cervical back: Normal range of motion and neck supple.      Right lower leg: Edema present.      Left lower leg: Edema present.      Comments:  RLE edematous- 2 open wounds to right knee draining yellowish drainage   Skin:     General: Skin is warm and dry.      Capillary Refill: Capillary refill takes less than 2 seconds.      Comments: , open wounds to right knee   Neurological:      Mental Status: He is alert and oriented to person, place, and time.   Psychiatric:         Mood and Affect: Mood normal.         Behavior: Behavior normal.         Assessment/Plan  Problem List Items Addressed This Visit        Cellulitis of scrotum - Primary      Improving- decreased redness per patient   Daptomycin   monitor for urinary retention   Scrotal sling and elevation   pain management   monitor         Infection of total right knee replacement (CMS/[facility])      continue IV daptomycin   Wound care to right knee   Monitor for fever, chills   monitor CBC   pain management   elevation         Gastroesophageal reflux disease      Stable   Omeprazole   monitor         Diabetes mellitus type 2 without retinopathy (CMS/[facility])      Stable   Patient refusing insulin- education provided      Humalog, Lantus   monitor BG         Chronic systolic (congestive) heart failure (CMS/[facility])      Stable   no SOB, Rales   BLE edema   Entresto, torsemide   monitor weights   fluid restriction   SIMA diet            Medications, treatments, and labs reviewed  Continue medications and treatments as listed in EMR    GLADIS Martinez      Electronically Signed By: GLADIS Martinez   3/13/24  8:08 PM

## 2024-03-09 NOTE — DOCUMENTATION CLARIFICATION NOTE
"    PATIENT:               SAM GONZALEZ  ACCT #:                  [id number]  MRN:                       [id number]  :                       1970  ADMIT DATE:       2024 3:54 PM  DISCH DATE:        2024 2:30 PM  RESPONDING PROVIDER #:        [id number]          PROVIDER RESPONSE TEXT:    Sepsis in the setting of R TKA infection    CDI QUERY TEXT:    [facility]_CV Sepsis        Instruction:  Based on your assessment of the patient and the clinical information, please provide the requested documentation by clicking on the appropriate radio button and enter any additional information if prompted.    Question: Sepsis was documented in the medical record. Based on the documentation and the clinical information, can the diagnosis be further clarified as      When answering this query, please exercise your independent professional judgment. The fact that a question is being asked, does not imply that any particular answer is desired or expected.    The patient's clinical indicators include:  Clinical Information:  23 ID note Dr. Solitario and Dr. Alvarez  \"53-year-old man with a past medical history of T2DM, CAD s/p multiple stents and CABG, HFrEF (EF 30% 24), A Fib, RUPESH, TKA in , and multiple septic arthritis infections in the R knee who presents recurrent right knee pain and swelling.\"    Documented Diagnosis:  23 ID note Dr. Solitario and Dr. Alvarez  \"Pt understands risks of further sepsis and potential fatality without amputation.\"    Clinical Indicators:  -Vital Signs: 24 37.1-96-/84 98%  -WBC: 24 11.5  -Microbiology Results: -No Staph Aureus isolated from nares  tissue wound culture smear-  (2+) Few Polymorphonuclear leukocytes    (3+) Moderate Mixed Gram positive and Gram negative bacteria    -Band Neutrophil Count/percent Band Neutrophil:  8.85  77.2 percent  -Lactic acid:  1.4  1.8  -BUN/Creat:  33/1.63 (from  ID note: \"baseline SCr recorded as 1.6-1.8 " "during Oct admission and on day prior to recent discharge patient's Cr was stable   1.4\")  -Blood cultures: 2/16 No growth  -Bilirubin: 2/16 0.3  -MAP: 98  -[place] Coma Scale: 2/16-ED note Dr. Aleksandra Angel \"He is alert and oriented to person, place, and time\"  -PAO2/FIO2: VBG 2/16 7.29-47-34 FIO2 100  -Procalcitonin: na  -Platelets: 491  -Other clinical indicators:  2/16/24 ED note Dr. Aleksandra Angel \"Low concern for sepsis at this time\"  2/17/24 ID Consult Dr. Christa Bradley \"impression Persistent prosthetic joint infection\"    Treatment:  antibiotics  Treatment towards Persistent prosthetic joint infection  labs    Risk Factors: noncompliance with medical treatment, diabetes  Options provided:  -- Sepsis was a differential diagnosis and ruled out after study  -- Sepsis with other organ dysfunction, Please specify sepsis associated organ dysfunction below  -- Other - I will add my own diagnosis  -- Refer to Clinical Documentation Reviewer    Query created by: Donna Pro on 3/8/2024 7:08 AM      Electronically signed by:  SHUN SORENSON DO 3/9/2024 12:35 AM          "

## 2024-03-11 LAB
ATRIAL RATE: 153 BPM
Q ONSET: 220 MS
QRS COUNT: 24 BEATS
QRS DURATION: 130 MS
QT INTERVAL: 280 MS
QTC CALCULATION(BAZETT): 433 MS
QTC FREDERICIA: 374 MS
R AXIS: -11 DEGREES
T AXIS: 213 DEGREES
T OFFSET: 360 MS
VENTRICULAR RATE: 144 BPM

## 2024-03-12 ENCOUNTER — NURSING HOME VISIT (OUTPATIENT)
Dept: POST ACUTE CARE | Facility: EXTERNAL LOCATION | Age: 54
End: 2024-03-12
Payer: COMMERCIAL

## 2024-03-12 DIAGNOSIS — T84.53XD INFECTION OF TOTAL RIGHT KNEE REPLACEMENT, SUBSEQUENT ENCOUNTER: ICD-10-CM

## 2024-03-12 DIAGNOSIS — M79.89 SWELLING OF RIGHT UPPER EXTREMITY: Primary | ICD-10-CM

## 2024-03-12 DIAGNOSIS — E11.9 DIABETES MELLITUS TYPE 2 WITHOUT RETINOPATHY (MULTI): ICD-10-CM

## 2024-03-12 DIAGNOSIS — N49.2 CELLULITIS OF SCROTUM: ICD-10-CM

## 2024-03-12 DIAGNOSIS — R60.9 PERIPHERAL EDEMA: ICD-10-CM

## 2024-03-12 PROBLEM — J18.9 PNEUMONIA, UNSPECIFIED ORGANISM: Status: ACTIVE | Noted: 2020-12-04

## 2024-03-12 PROBLEM — Z95.5 S/P CORONARY ARTERY STENT PLACEMENT: Status: ACTIVE | Noted: 2024-03-12

## 2024-03-12 PROBLEM — F17.210 NICOTINE DEPENDENCE, CIGARETTES, UNCOMPLICATED: Status: ACTIVE | Noted: 2021-02-19

## 2024-03-12 PROBLEM — M19.90 DEGENERATIVE JOINT DISEASE: Status: ACTIVE | Noted: 2024-03-12

## 2024-03-12 PROBLEM — H26.491 PCO (POSTERIOR CAPSULAR OPACIFICATION), RIGHT: Status: ACTIVE | Noted: 2024-03-12

## 2024-03-12 PROBLEM — K21.9 GASTROESOPHAGEAL REFLUX DISEASE: Status: ACTIVE | Noted: 2024-03-12

## 2024-03-12 PROBLEM — J90 PLEURAL EFFUSION, NOT ELSEWHERE CLASSIFIED: Status: ACTIVE | Noted: 2020-12-01

## 2024-03-12 PROBLEM — R26.2 DIFFICULTY IN WALKING, NOT ELSEWHERE CLASSIFIED: Status: ACTIVE | Noted: 2020-12-02

## 2024-03-12 PROBLEM — R60.0 PERIPHERAL EDEMA: Status: ACTIVE | Noted: 2024-03-12

## 2024-03-12 PROBLEM — Z96.651 PRESENCE OF RIGHT ARTIFICIAL KNEE JOINT: Status: ACTIVE | Noted: 2020-12-01

## 2024-03-12 PROBLEM — B95.62 METHICILLIN RESISTANT STAPHYLOCOCCUS AUREUS INFECTION AS THE CAUSE OF DISEASES CLASSIFIED ELSEWHERE: Status: ACTIVE | Noted: 2020-12-17

## 2024-03-12 PROBLEM — I50.9 HEART FAILURE (MULTI): Status: RESOLVED | Noted: 2021-02-23 | Resolved: 2024-03-12

## 2024-03-12 PROBLEM — M62.81 MUSCLE WEAKNESS (GENERALIZED): Status: ACTIVE | Noted: 2020-12-02

## 2024-03-12 PROBLEM — R60.1 GENERALIZED EDEMA: Status: ACTIVE | Noted: 2024-01-25

## 2024-03-12 PROBLEM — E11.65 TYPE 2 DIABETES MELLITUS WITH HYPERGLYCEMIA (MULTI): Status: ACTIVE | Noted: 2021-02-23

## 2024-03-12 PROBLEM — E87.21 ACUTE METABOLIC ACIDOSIS: Status: ACTIVE | Noted: 2024-02-23

## 2024-03-12 PROBLEM — I50.9 HEART FAILURE (MULTI): Status: ACTIVE | Noted: 2021-02-23

## 2024-03-12 PROBLEM — I48.0 PAROXYSMAL ATRIAL FIBRILLATION (MULTI): Status: ACTIVE | Noted: 2021-02-19

## 2024-03-12 PROBLEM — T84.53XA INFECTION OF TOTAL RIGHT KNEE REPLACEMENT (CMS-HCC): Status: ACTIVE | Noted: 2024-03-12

## 2024-03-12 PROBLEM — E78.5 HYPERLIPIDEMIA: Status: ACTIVE | Noted: 2019-04-05

## 2024-03-12 PROBLEM — I25.10 ARTERIOSCLEROSIS OF CORONARY ARTERY: Status: ACTIVE | Noted: 2024-03-12

## 2024-03-12 PROBLEM — I21.02 STEMI INVOLVING LEFT ANTERIOR DESCENDING CORONARY ARTERY (MULTI): Status: ACTIVE | Noted: 2019-04-05

## 2024-03-12 PROBLEM — Z96.651 STATUS POST TOTAL RIGHT KNEE REPLACEMENT: Status: ACTIVE | Noted: 2024-03-12

## 2024-03-12 PROBLEM — I82.90 VENOUS THROMBOSIS: Status: ACTIVE | Noted: 2024-03-12

## 2024-03-12 PROBLEM — E66.01 MORBID OBESITY (MULTI): Status: ACTIVE | Noted: 2024-03-12

## 2024-03-12 PROBLEM — M75.41 IMPINGEMENT SYNDROME OF RIGHT SHOULDER: Status: ACTIVE | Noted: 2024-03-12

## 2024-03-12 PROBLEM — L03.115 CELLULITIS OF RIGHT LOWER EXTREMITY: Status: ACTIVE | Noted: 2024-01-25

## 2024-03-12 PROBLEM — D64.9 CHRONIC ANEMIA: Status: ACTIVE | Noted: 2024-03-12

## 2024-03-12 PROBLEM — Z86.16 PERSONAL HISTORY OF COVID-19: Status: ACTIVE | Noted: 2021-02-19

## 2024-03-12 PROBLEM — Z95.1 S/P CABG X 1: Status: ACTIVE | Noted: 2024-03-12

## 2024-03-12 PROBLEM — I50.22 CHRONIC SYSTOLIC (CONGESTIVE) HEART FAILURE (MULTI): Status: ACTIVE | Noted: 2020-12-17

## 2024-03-12 PROBLEM — Z86.73 PERSONAL HISTORY OF TRANSIENT ISCHEMIC ATTACK (TIA), AND CEREBRAL INFARCTION WITHOUT RESIDUAL DEFICITS: Status: ACTIVE | Noted: 2021-02-19

## 2024-03-12 PROCEDURE — 99309 SBSQ NF CARE MODERATE MDM 30: CPT

## 2024-03-12 NOTE — DOCUMENTATION CLARIFICATION NOTE
"    PATIENT:               SAM GONZALEZ  ACCT #:                  [id number]  MRN:                       [id number]  :                       1970  ADMIT DATE:       2024 3:54 PM  DISCH DATE:        2024 2:30 PM  RESPONDING PROVIDER #:        [id number]          PROVIDER RESPONSE TEXT:    Sepsis was a differential diagnosis and ruled out after study    CDI QUERY TEXT:    [facility]_CV Sepsis        Instruction:  Based on your assessment of the patient and the clinical information, please provide the requested documentation by clicking on the appropriate radio button and enter any additional information if prompted.    Question: Sepsis was documented in the medical record. Based on the documentation and the clinical information, can the diagnosis be further clarified as      When answering this query, please exercise your independent professional judgment. The fact that a question is being asked, does not imply that any particular answer is desired or expected.    The patient's clinical indicators include:  Clinical Information:  23 ID note Dr. Solitario and Dr. Alvarez  \"53-year-old man with a past medical history of T2DM, CAD s/p multiple stents and CABG, HFrEF (EF 30% 24), A Fib, RUPESH, TKA in , and multiple septic arthritis infections in the R knee who presents recurrent right knee pain and swelling.\"    Documented Diagnosis:  23 ID note Dr. Solitario and Dr. Alvarez  \"Pt understands risks of further sepsis and potential fatality without amputation.\"    Clinical Indicators:  -Vital Signs: 24 37.1-96-/84 98%  -WBC: 24 11.5  -Microbiology Results: -No Staph Aureus isolated from nares  tissue wound culture smear-  (2+) Few Polymorphonuclear leukocytes    (3+) Moderate Mixed Gram positive and Gram negative bacteria    -Band Neutrophil Count/percent Band Neutrophil:  8.85  77.2 percent  -Lactic acid:  1.4  1.8  -BUN/Creat:  33/1.63 (from  ID note: \"baseline SCr " "recorded as 1.6-1.8 during Oct admission and on day prior to recent discharge patient's Cr was stable   1.4\")  -Blood cultures: 2/16 No growth  -Bilirubin: 2/16 0.3  -MAP: 98  -[place] Coma Scale: 2/16-ED note Dr. Aleksandra Angel \"He is alert and oriented to person, place, and time\"  -PAO2/FIO2: VBG 2/16 7.29-47-34 FIO2 100  -Procalcitonin: na  -Platelets: 491  -Other clinical indicators:  2/16/24 ED note Dr. Aleksandra Angel \"Low concern for sepsis at this time\"  2/17/24 ID Consult Dr. Christa Bradley \"impression Persistent prosthetic joint infection\"    Treatment:  antibiotics  Treatment towards Persistent prosthetic joint infection  labs    Risk Factors: noncompliance with medical treatment, diabetes  Options provided:  -- Sepsis was a differential diagnosis and ruled out after study  -- Sepsis with other organ dysfunction, Please specify sepsis associated organ dysfunction below  -- Other - I will add my own diagnosis  -- Refer to Clinical Documentation Reviewer    Query created by: Donna Pro on 3/11/2024 8:46 AM      Electronically signed by:  SHUN SORENSON DO 3/12/2024 1:07 AM          "

## 2024-03-12 NOTE — LETTER
Patient: Kody Choi  : 1970    Encounter Date: 2024    PROGRESS NOTE    Subjective  Chief complaint: Kody Choi is a 53 y.o. adult who is an acute skilled patient being seen and evaluated for weakness    HPI:  Patient states doing well.  Continues to work with therapy for GT, transferring, ADLs and self-care.  Patient ambulated 7 feet forward and backward with FWW..  Tolerating IV daptomycin well.  No F/C/N/V/D, SOB, cough.  Wound care continues to right knee.  R knee XR done. Scrotal cellulitis improving.  Reeducated about insulin.  No concerns per nursing          Objective  Vital signs:  126/90-98.1-86-18    Physical Exam  Constitutional:       Appearance: Normal appearance. He is obese.   HENT:      Head: Normocephalic.      Mouth/Throat:      Mouth: Mucous membranes are moist.   Eyes:      Extraocular Movements: Extraocular movements intact.   Cardiovascular:      Rate and Rhythm: Normal rate and regular rhythm.      Pulses: Normal pulses.      Heart sounds: Normal heart sounds.   Pulmonary:      Effort: Pulmonary effort is normal.      Breath sounds: Normal breath sounds.   Abdominal:      General: Bowel sounds are normal.      Palpations: Abdomen is soft.   Genitourinary:     Comments:  scrotal swelling  Musculoskeletal:         General: Swelling present. Normal range of motion.      Cervical back: Normal range of motion and neck supple.      Right lower leg: Edema present.      Left lower leg: Edema present.      Comments: right knee edematous, red, firm   Skin:     General: Skin is warm and dry.      Capillary Refill: Capillary refill takes less than 2 seconds.      Comments:  open areas to right knee   Neurological:      Mental Status: He is alert and oriented to person, place, and time.   Psychiatric:         Mood and Affect: Mood normal.         Behavior: Behavior normal.         Assessment/Plan  Problem List Items Addressed This Visit       Cellulitis of scrotum      Improving- decreased  redness per patient   Daptomycin   monitor for urinary retention   Scrotal sling and elevation   pain management   monitor         Peripheral edema      elevation and Ace wrap   Torsemide   monitor         Infection of total right knee replacement (CMS/[facility])      continue IV daptomycin   Wound care to right knee   Monitor for fever, chills   monitor CBC   pain management   elevation         Diabetes mellitus type 2 without retinopathy (CMS/[facility])      Stable   Patient refusing insulin- education provided      Humalog, Lantus   monitor BG         Swelling of right upper extremity - Primary      Improving   superficial thrombus   warm compresses, elevation, Tylenol          Medications, treatments, and labs reviewed  Continue medications and treatments as listed in EMR    GLADIS Martinez      Electronically Signed By: GLADIS Martinez   3/13/24  8:22 PM

## 2024-03-13 ENCOUNTER — NURSING HOME VISIT (OUTPATIENT)
Dept: POST ACUTE CARE | Facility: EXTERNAL LOCATION | Age: 54
End: 2024-03-13
Payer: COMMERCIAL

## 2024-03-13 DIAGNOSIS — I50.22 CHRONIC SYSTOLIC (CONGESTIVE) HEART FAILURE (MULTI): ICD-10-CM

## 2024-03-13 DIAGNOSIS — N49.2 CELLULITIS OF SCROTUM: Primary | ICD-10-CM

## 2024-03-13 DIAGNOSIS — T84.53XD INFECTION OF TOTAL RIGHT KNEE REPLACEMENT, SUBSEQUENT ENCOUNTER: ICD-10-CM

## 2024-03-13 DIAGNOSIS — E11.9 DIABETES MELLITUS TYPE 2 WITHOUT RETINOPATHY (MULTI): ICD-10-CM

## 2024-03-13 PROBLEM — M79.89 SWELLING OF RIGHT UPPER EXTREMITY: Status: ACTIVE | Noted: 2024-03-13

## 2024-03-13 PROCEDURE — 99309 SBSQ NF CARE MODERATE MDM 30: CPT

## 2024-03-13 NOTE — LETTER
Patient: Kody Choi  : 1970    Encounter Date: 2024    PROGRESS NOTE    Subjective  Chief complaint: Kody Choi is a 53 y.o. adult who is an acute skilled patient being seen and evaluated for weakness    HPI:  Continues to work with therapy for mobility, balance, transferring.  Patient improving and doing his own self-care.  Tolerating IV daptomycin well.  Wound care continues to R knee. Scrotal swelling improved.  Conferred with Dr. Metzger regarding right knee appearance- monitor for F/C.  Reeducated about insulin and diuretics- patient refusing.  No F/C/N/V/D, SOB, cough.  No concerns per nursing          Objective  Vital signs: 149/89-97.6-97-19-97%    Physical Exam  Constitutional:       Appearance: Normal appearance. He is obese.   HENT:      Head: Normocephalic.      Mouth/Throat:      Mouth: Mucous membranes are moist.   Eyes:      Extraocular Movements: Extraocular movements intact.   Cardiovascular:      Rate and Rhythm: Normal rate and regular rhythm.      Pulses: Normal pulses.      Heart sounds: Normal heart sounds.   Pulmonary:      Effort: Pulmonary effort is normal.      Breath sounds: Normal breath sounds.   Abdominal:      General: Bowel sounds are normal.      Palpations: Abdomen is soft.   Genitourinary:     Comments: , scrotal swelling  Musculoskeletal:         General: Normal range of motion.      Cervical back: Normal range of motion and neck supple.      Right lower leg: Edema present.      Left lower leg: Edema present.      Comments:   Right knee edematous, red with drainage from 2 wounds- RUE swelling   Skin:     General: Skin is warm and dry.      Capillary Refill: Capillary refill takes less than 2 seconds.   Neurological:      Mental Status: He is alert and oriented to person, place, and time.   Psychiatric:         Mood and Affect: Mood normal.         Behavior: Behavior normal.         Assessment/Plan  Problem List Items Addressed This Visit       Cellulitis of scrotum -  Primary      Improving- decreased redness per patient   Daptomycin   monitor for urinary retention   Scrotal sling and elevation   pain management   monitor         Infection of total right knee replacement (CMS/[facility])      continue IV daptomycin   Wound care to right knee   Monitor for fever, chills   monitor CBC   pain management   elevation         Diabetes mellitus type 2 without retinopathy (CMS/[facility])      Stable   Patient refusing insulin- education provided   BG  to 253   Humalog, Lantus   monitor BG         Chronic systolic (congestive) heart failure (CMS/[facility])      Stable   no SOB, Rales   BLE edema   Entresto, torsemide- patient refusing torsemide   monitor weights   fluid restriction   SIMA diet            Medications, treatments, and labs reviewed  Continue medications and treatments as listed in EMR    GLADIS Martinez      Electronically Signed By: GLADIS Martinez   3/13/24  8:31 PM

## 2024-03-13 NOTE — ASSESSMENT & PLAN NOTE
Stable   no SOB, Rales   BLE edema   Entresto, torsemide   monitor weights   fluid restriction   SIMA diet

## 2024-03-13 NOTE — ASSESSMENT & PLAN NOTE
isolation precautions   IV daptomycin   follow-up with ID   monitor for fevers/chills   Wound care

## 2024-03-13 NOTE — ASSESSMENT & PLAN NOTE
Improving- decreased redness per patient   Daptomycin   monitor for urinary retention   Scrotal sling and elevation   pain management   monitor

## 2024-03-13 NOTE — ASSESSMENT & PLAN NOTE
continue IV daptomycin   Wound care to right knee   Monitor for fever, chills   monitor CBC   pain management   elevation

## 2024-03-13 NOTE — PROGRESS NOTES
PROGRESS NOTE    Subjective   Chief complaint: Kody Choi is a 53 y.o. adult who is an acute skilled patient being seen and evaluated for weakness    HPI:  Patient seen and evaluated for general medical care.  Admitted to facility 3/4 s/p IPA for cellulitis of scrotum.  PMH includes DM, CHF, A-fib, RUPESH, ongoing cellulitis RLE with numerous infections s/p replacement, MRSA, CAD, RUPESH, HLD, HTN, CABG, pyogenic arthritis.  Patient continues to decline amputation of RLE.  Admitted to facility with orders for wound care R knee, continue daptomycin IVPB, pain management and therapy.  Patient uses walker and WC for mobility.  RUE noted to be edematous.  PICC line in place.  No F/C/N/V/D.  No concerns per nursing          Objective   Vital signs:  148/90-97.1-97-19-94%    Physical Exam  Constitutional:       Appearance: Normal appearance. He is obese.   HENT:      Head: Normocephalic.      Mouth/Throat:      Mouth: Mucous membranes are moist.   Eyes:      Extraocular Movements: Extraocular movements intact.   Cardiovascular:      Rate and Rhythm: Normal rate and regular rhythm.      Pulses: Normal pulses.      Heart sounds: Normal heart sounds.   Pulmonary:      Effort: Pulmonary effort is normal.      Breath sounds: Normal breath sounds.   Abdominal:      General: Bowel sounds are normal.      Palpations: Abdomen is soft.   Genitourinary:     Comments:  scrotal swelling with taut tissue-  decreasing redness  Musculoskeletal:         General: Normal range of motion.      Cervical back: Normal range of motion and neck supple.      Right lower leg: Edema present.      Left lower leg: Edema present.      Comments: R knee swollen, red with 2 open areas (1cm diam, 2.5 cm diam) draining yellowish drainage- tissue surrounding wounds is firm- edema radiates to foot and up to thigh  RUE edematous MSP intact     Skin:     General: Skin is warm and dry.      Capillary Refill: Capillary refill takes 2 to 3 seconds.   Neurological:       Mental Status: He is alert and oriented to person, place, and time.   Psychiatric:         Mood and Affect: Mood normal.         Behavior: Behavior normal.         Assessment/Plan   Problem List Items Addressed This Visit       Cellulitis of scrotum - Primary      Improving- decreased redness per patient   Daptomycin   monitor for urinary retention   Scrotal sling and elevation   pain management   monitor         Peripheral edema      elevation and Ace wrap   Torsemide   monitor         Methicillin resistant Staphylococcus aureus infection as the cause of diseases classified elsewhere      isolation precautions   IV daptomycin   follow-up with ID   monitor for fevers/chills   Wound care         Infection of total right knee replacement (CMS/[facility])      continue IV daptomycin   Wound care to right knee   Monitor for fever, chills   monitor CBC   pain management   elevation         Diabetes mellitus type 2 without retinopathy (CMS/[facility])      Stable   BG 92   Humalog, Lantus   monitor BG         Chronic systolic (congestive) heart failure (CMS/[facility])      Stable   no SOB, Rales   BLE edema   Entresto, torsemide   monitor weights   fluid restriction   SIMA diet           Swelling of right upper extremity      swelling above PICC line RUE   US to R/O DVT   Remove PICC and establish new PICC in  L UE          Medications, treatments, and labs reviewed  Continue medications and treatments as listed in EMR    Sherley Byrd, APRN-CNP

## 2024-03-14 NOTE — ASSESSMENT & PLAN NOTE
Stable   no SOB, Rales   BLE edema   Entresto, torsemide- patient refusing torsemide   monitor weights   fluid restriction   SIMA diet

## 2024-03-14 NOTE — PROGRESS NOTES
PROGRESS NOTE    Subjective   Chief complaint: Kody Choi is a 53 y.o. adult who is an acute skilled patient being seen and evaluated for weakness    HPI:  Patient continues to work with therapy for ADLs and self-care.  Ambulates with walker and wheelchair assist.  Tolerating IV daptomycin well.  US RUE negative for DVT.  Patient argumentative with staff regarding insulin.  Education provided.  Scrotal cellulitis improving.  Right knee unchanged.  No F/C/N/V/D, SOB, cough.  No concerns per nursing          Objective   Vital signs:  132/74-97.9-86-18-97%    Physical Exam  Constitutional:       Appearance: Normal appearance. He is obese.   HENT:      Head: Normocephalic.      Mouth/Throat:      Mouth: Mucous membranes are moist.   Eyes:      Extraocular Movements: Extraocular movements intact.   Cardiovascular:      Rate and Rhythm: Normal rate and regular rhythm.      Pulses: Normal pulses.      Heart sounds: Normal heart sounds.   Pulmonary:      Effort: Pulmonary effort is normal.      Breath sounds: Normal breath sounds.   Abdominal:      General: Bowel sounds are normal.      Palpations: Abdomen is soft.   Genitourinary:     Comments: .  Scrotal swelling  Musculoskeletal:         General: Swelling present. Normal range of motion.      Cervical back: Normal range of motion and neck supple.      Right lower leg: Edema present.      Left lower leg: Edema present.      Comments:  RLE edematous- 2 open wounds to right knee draining yellowish drainage   Skin:     General: Skin is warm and dry.      Capillary Refill: Capillary refill takes less than 2 seconds.      Comments: , open wounds to right knee   Neurological:      Mental Status: He is alert and oriented to person, place, and time.   Psychiatric:         Mood and Affect: Mood normal.         Behavior: Behavior normal.         Assessment/Plan   Problem List Items Addressed This Visit       Cellulitis of scrotum - Primary      Improving- decreased redness per  patient   Daptomycin   monitor for urinary retention   Scrotal sling and elevation   pain management   monitor         Infection of total right knee replacement (CMS/[facility])      continue IV daptomycin   Wound care to right knee   Monitor for fever, chills   monitor CBC   pain management   elevation         Gastroesophageal reflux disease      Stable   Omeprazole   monitor         Diabetes mellitus type 2 without retinopathy (CMS/[facility])      Stable   Patient refusing insulin- education provided      Humalog, Lantus   monitor BG         Chronic systolic (congestive) heart failure (CMS/[facility])      Stable   no SOB, Rales   BLE edema   Entresto, torsemide   monitor weights   fluid restriction   SIMA diet            Medications, treatments, and labs reviewed  Continue medications and treatments as listed in EMR    Sherley Byrd, APRN-CNP

## 2024-03-14 NOTE — PROGRESS NOTES
PROGRESS NOTE    Subjective   Chief complaint: Kody Choi is a 53 y.o. adult who is an acute skilled patient being seen and evaluated for weakness    HPI:  Patient states doing well.  Continues to work with therapy for GT, transferring, ADLs and self-care.  Patient ambulated 7 feet forward and backward with FWW..  Tolerating IV daptomycin well.  No F/C/N/V/D, SOB, cough.  Wound care continues to right knee.  R knee XR done. Scrotal cellulitis improving.  Reeducated about insulin.  No concerns per nursing          Objective   Vital signs:  126/90-98.1-86-18    Physical Exam  Constitutional:       Appearance: Normal appearance. He is obese.   HENT:      Head: Normocephalic.      Mouth/Throat:      Mouth: Mucous membranes are moist.   Eyes:      Extraocular Movements: Extraocular movements intact.   Cardiovascular:      Rate and Rhythm: Normal rate and regular rhythm.      Pulses: Normal pulses.      Heart sounds: Normal heart sounds.   Pulmonary:      Effort: Pulmonary effort is normal.      Breath sounds: Normal breath sounds.   Abdominal:      General: Bowel sounds are normal.      Palpations: Abdomen is soft.   Genitourinary:     Comments:  scrotal swelling  Musculoskeletal:         General: Swelling present. Normal range of motion.      Cervical back: Normal range of motion and neck supple.      Right lower leg: Edema present.      Left lower leg: Edema present.      Comments: right knee edematous, red, firm   Skin:     General: Skin is warm and dry.      Capillary Refill: Capillary refill takes less than 2 seconds.      Comments:  open areas to right knee   Neurological:      Mental Status: He is alert and oriented to person, place, and time.   Psychiatric:         Mood and Affect: Mood normal.         Behavior: Behavior normal.         Assessment/Plan   Problem List Items Addressed This Visit       Cellulitis of scrotum      Improving- decreased redness per patient   Daptomycin   monitor for urinary retention    Scrotal sling and elevation   pain management   monitor         Peripheral edema      elevation and Ace wrap   Torsemide   monitor         Infection of total right knee replacement (CMS/[facility])      continue IV daptomycin   Wound care to right knee   Monitor for fever, chills   monitor CBC   pain management   elevation         Diabetes mellitus type 2 without retinopathy (CMS/[facility])      Stable   Patient refusing insulin- education provided      Humalog, Lantus   monitor BG         Swelling of right upper extremity - Primary      Improving   superficial thrombus   warm compresses, elevation, Tylenol          Medications, treatments, and labs reviewed  Continue medications and treatments as listed in EMR    Sherley Byrd, APRN-CNP

## 2024-03-14 NOTE — ASSESSMENT & PLAN NOTE
Stable   Patient refusing insulin- education provided   BG  to 253   Humalog, Lantus   monitor BG

## 2024-03-15 ENCOUNTER — NURSING HOME VISIT (OUTPATIENT)
Dept: POST ACUTE CARE | Facility: EXTERNAL LOCATION | Age: 54
End: 2024-03-15
Payer: COMMERCIAL

## 2024-03-15 DIAGNOSIS — B95.62 METHICILLIN RESISTANT STAPHYLOCOCCUS AUREUS INFECTION AS THE CAUSE OF DISEASES CLASSIFIED ELSEWHERE: ICD-10-CM

## 2024-03-15 DIAGNOSIS — E11.9 DIABETES MELLITUS TYPE 2 WITHOUT RETINOPATHY (MULTI): ICD-10-CM

## 2024-03-15 DIAGNOSIS — K21.9 GASTROESOPHAGEAL REFLUX DISEASE WITHOUT ESOPHAGITIS: ICD-10-CM

## 2024-03-15 DIAGNOSIS — N49.2 CELLULITIS OF SCROTUM: Primary | ICD-10-CM

## 2024-03-15 DIAGNOSIS — T84.53XD INFECTION OF TOTAL RIGHT KNEE REPLACEMENT, SUBSEQUENT ENCOUNTER: ICD-10-CM

## 2024-03-15 DIAGNOSIS — I50.22 CHRONIC SYSTOLIC (CONGESTIVE) HEART FAILURE (MULTI): ICD-10-CM

## 2024-03-15 PROCEDURE — 99309 SBSQ NF CARE MODERATE MDM 30: CPT

## 2024-03-15 NOTE — LETTER
Patient: Kody Choi  : 1970    Encounter Date: 03/15/2024    PROGRESS NOTE    Subjective  Chief complaint: Kody Choi is a 53 y.o. adult who is an acute skilled patient being seen and evaluated for weakness    HPI:  Patient states feeling good.  Continues to work with therapy for mobility and strengthening.  Nursing states patient is refusing torsemide.  Discussed  purpose and importance of medication.  Also refusing insulin.  .  Right knee wounds SIGRID + drainage.  Tolerating ABX without issue.  No F/C/N/V/D, SOB, cough.  No concerns per nursing          Objective  Vital signs:  118/76-97.6-84-20-97%    Physical Exam  Constitutional:       Appearance: Normal appearance. He is obese.   HENT:      Head: Normocephalic.      Mouth/Throat:      Mouth: Mucous membranes are moist.   Eyes:      Extraocular Movements: Extraocular movements intact.   Cardiovascular:      Rate and Rhythm: Normal rate and regular rhythm.      Pulses: Normal pulses.      Heart sounds: Normal heart sounds.   Pulmonary:      Effort: Pulmonary effort is normal.      Breath sounds: Normal breath sounds.   Abdominal:      General: Bowel sounds are normal.      Palpations: Abdomen is soft.   Musculoskeletal:         General: Normal range of motion.      Cervical back: Normal range of motion and neck supple.      Right lower leg: Edema present.      Left lower leg: Edema present.      Comments:  generalized weakness   Skin:     General: Skin is warm and dry.      Capillary Refill: Capillary refill takes 2 to 3 seconds.      Comments: , open wounds, right knee   Neurological:      Mental Status: He is alert and oriented to person, place, and time.   Psychiatric:         Mood and Affect: Mood normal.         Behavior: Behavior normal.         Assessment/Plan  Problem List Items Addressed This Visit       Cellulitis of scrotum - Primary      Improving- decreased redness per patient   Daptomycin   monitor for urinary retention   Scrotal sling  and elevation- noncompliant   pain management   monitor         Methicillin resistant Staphylococcus aureus infection as the cause of diseases classified elsewhere      isolation precautions   IV daptomycin   follow-up with ID   monitor for fevers/chills   Wound care         Infection of total right knee replacement (CMS/[facility])      continue IV daptomycin   Wound care to right knee   Monitor for fever, chills   monitor CBC   pain management   elevation         Gastroesophageal reflux disease      Stable   Omeprazole   monitor         Diabetes mellitus type 2 without retinopathy (CMS/[facility])      Stable      Humalog, Lantus   refusing insulin with meals   Noncompliant with diet   monitor BG         Chronic systolic (congestive) heart failure (CMS/[facility])      Stable   no SOB, Rales   BLE edema   Entresto, torsemide   refusing torsemide   monitor weights   fluid restriction   SIMA diet            Medications, treatments, and labs reviewed  Continue medications and treatments as listed in EMR    GLADIS Martinez        Electronically Signed By: GLADIS Martinez   3/24/24  5:53 PM

## 2024-03-19 ENCOUNTER — NURSING HOME VISIT (OUTPATIENT)
Dept: POST ACUTE CARE | Facility: EXTERNAL LOCATION | Age: 54
End: 2024-03-19
Payer: COMMERCIAL

## 2024-03-19 DIAGNOSIS — G89.29 CHRONIC MIDLINE LOW BACK PAIN WITHOUT SCIATICA: Primary | ICD-10-CM

## 2024-03-19 DIAGNOSIS — M54.50 CHRONIC MIDLINE LOW BACK PAIN WITHOUT SCIATICA: Primary | ICD-10-CM

## 2024-03-19 DIAGNOSIS — T84.53XD INFECTION OF TOTAL RIGHT KNEE REPLACEMENT, SUBSEQUENT ENCOUNTER: ICD-10-CM

## 2024-03-19 DIAGNOSIS — R26.2 DIFFICULTY IN WALKING, NOT ELSEWHERE CLASSIFIED: ICD-10-CM

## 2024-03-19 DIAGNOSIS — I50.22 CHRONIC SYSTOLIC (CONGESTIVE) HEART FAILURE (MULTI): ICD-10-CM

## 2024-03-19 DIAGNOSIS — R60.9 PERIPHERAL EDEMA: ICD-10-CM

## 2024-03-19 DIAGNOSIS — E11.9 DIABETES MELLITUS TYPE 2 WITHOUT RETINOPATHY (MULTI): ICD-10-CM

## 2024-03-19 PROCEDURE — 99309 SBSQ NF CARE MODERATE MDM 30: CPT

## 2024-03-19 NOTE — LETTER
Patient: Kody Choi  : 1970    Encounter Date: 2024    PROGRESS NOTE    Subjective  Chief complaint: Kody Choi is a 53 y.o. adult who is an acute skilled patient being seen and evaluated for weakness    HPI:  Continues to work with therapy for mobility and strengthening.  Patient able to ambulate with walker and wheelchair assist within room.  Nursing states patient continues to refuse insulin and torsemide stating it does nothing for him.  Reeducated about medication, diet, HF and DM. Right knee wounds continue to drain yellowish  drainage. No F/C/N/V/D, SOB, cough.  Tolerating ABX well.  No concerns per nursing          Objective  Vital signs:  134/95-97.8-84-18-97%    Physical Exam  Constitutional:       Appearance: Normal appearance. He is obese.   HENT:      Head: Normocephalic.      Mouth/Throat:      Mouth: Mucous membranes are moist.   Eyes:      Extraocular Movements: Extraocular movements intact.   Cardiovascular:      Rate and Rhythm: Normal rate and regular rhythm.      Pulses: Normal pulses.      Heart sounds: Normal heart sounds.   Pulmonary:      Effort: Pulmonary effort is normal.      Breath sounds: Normal breath sounds.   Abdominal:      General: Bowel sounds are normal.      Palpations: Abdomen is soft.   Musculoskeletal:         General: Swelling present. Normal range of motion.      Cervical back: Normal range of motion and neck supple.      Right lower leg: Edema present.      Left lower leg: Edema present.      Comments:  generalized weakness   Skin:     General: Skin is warm and dry.      Capillary Refill: Capillary refill takes less than 2 seconds.      Findings: Erythema present.      Comments:  wounds to right knee   Neurological:      Mental Status: He is alert and oriented to person, place, and time.   Psychiatric:         Mood and Affect: Mood normal.         Behavior: Behavior normal.         Assessment/Plan  Problem List Items Addressed This Visit       Peripheral edema       elevation and Ace wrap   Refusing Torsemide   monitor         Infection of total right knee replacement (CMS/[facility])      continue IV daptomycin   Wound care to right knee   Monitor for fever, chills   monitor CBC   pain management   elevation         Difficulty in walking, not elsewhere classified      mobility with wheelchair and   walker    therapy         Diabetes mellitus type 2 without retinopathy (CMS/[facility])      Stable      Humalog, Lantus   refusing insulin with meals   Noncompliant with diet   monitor BG         Chronic lower back pain - Primary      Stable   pain managed with current therapy   monitor         Chronic systolic (congestive) heart failure (CMS/[facility])      Stable   no SOB, Rales   BLE edema   Entresto, torsemide   refusing torsemide   monitor weights   fluid restriction   SIMA diet            Medications, treatments, and labs reviewed  Continue medications and treatments as listed in EMR    GLADIS Martinez      Electronically Signed By: GLADIS Martinez   3/24/24  6:01 PM

## 2024-03-20 ENCOUNTER — NURSING HOME VISIT (OUTPATIENT)
Dept: POST ACUTE CARE | Facility: EXTERNAL LOCATION | Age: 54
End: 2024-03-20
Payer: COMMERCIAL

## 2024-03-20 DIAGNOSIS — R60.1 GENERALIZED EDEMA: ICD-10-CM

## 2024-03-20 DIAGNOSIS — T84.53XD INFECTION OF TOTAL RIGHT KNEE REPLACEMENT, SUBSEQUENT ENCOUNTER: ICD-10-CM

## 2024-03-20 DIAGNOSIS — I50.22 CHRONIC SYSTOLIC (CONGESTIVE) HEART FAILURE (MULTI): Primary | ICD-10-CM

## 2024-03-20 DIAGNOSIS — E11.9 DIABETES MELLITUS TYPE 2 WITHOUT RETINOPATHY (MULTI): ICD-10-CM

## 2024-03-20 PROCEDURE — 99309 SBSQ NF CARE MODERATE MDM 30: CPT

## 2024-03-20 NOTE — LETTER
Patient: Kody Choi  : 1970    Encounter Date: 2024    PROGRESS NOTE    Subjective  Chief complaint: Kody Choi is a 53 y.o. adult who is an acute skilled patient being seen and evaluated for weakness    HPI:  Patient states doing well today.  Continues to work with therapy for strengthening and mobility. No recent falls or injury. Tolerating ABX well.  Right knee wounds SIGRID with scant drainage.  No F/C/N/V/D, SOB, cough.  BP within goal.  Patient agreeable to take medications.  No concerns per nursing          Objective  Vital signs:  132/80-97.6-88-18-96%    Physical Exam  Constitutional:       Appearance: Normal appearance. He is obese.   HENT:      Head: Normocephalic.      Mouth/Throat:      Mouth: Mucous membranes are moist.   Eyes:      Extraocular Movements: Extraocular movements intact.   Cardiovascular:      Rate and Rhythm: Normal rate and regular rhythm.      Pulses: Normal pulses.      Heart sounds: Normal heart sounds.   Pulmonary:      Effort: Pulmonary effort is normal.      Breath sounds: Normal breath sounds.   Abdominal:      General: Bowel sounds are normal.      Palpations: Abdomen is soft.   Musculoskeletal:         General: Normal range of motion.      Cervical back: Normal range of motion and neck supple.      Right lower leg: Edema present.      Left lower leg: Edema present.      Comments:  generalized weakness   Skin:     General: Skin is warm and dry.      Capillary Refill: Capillary refill takes less than 2 seconds.      Comments: , right knee wounds   Neurological:      Mental Status: He is alert and oriented to person, place, and time.   Psychiatric:         Mood and Affect: Mood normal.         Behavior: Behavior normal.         Assessment/Plan  Problem List Items Addressed This Visit       Infection of total right knee replacement (CMS/[facility])      continue IV daptomycin   Wound care to right knee   Monitor for fever, chills   monitor CBC   pain management   elevation          Generalized edema      Reinforced elevation BLE   Noncompliant with Ace wraps   Torsemide   monitor         Diabetes mellitus type 2 without retinopathy (CMS/[facility])      Stable      Humalog, Lantus   refusing insulin with meals-agreeable to take   Noncompliant with diet   monitor BG         Chronic systolic (congestive) heart failure (CMS/[facility]) - Primary      Stable   no SOB, Rales   BLE edema   Entresto, torsemide   monitor weights   fluid restriction   SIMA diet            Medications, treatments, and labs reviewed  Continue medications and treatments as listed in EMR    GLADIS Martinez      Electronically Signed By: GLADIS Martinez   3/24/24  6:09 PM

## 2024-03-21 ENCOUNTER — TELEMEDICINE (OUTPATIENT)
Dept: INFECTIOUS DISEASES | Facility: CLINIC | Age: 54
End: 2024-03-21
Payer: COMMERCIAL

## 2024-03-21 DIAGNOSIS — L03.90 CELLULITIS, UNSPECIFIED CELLULITIS SITE: ICD-10-CM

## 2024-03-21 PROCEDURE — 3060F POS MICROALBUMINURIA REV: CPT | Performed by: INTERNAL MEDICINE

## 2024-03-21 PROCEDURE — 99214 OFFICE O/P EST MOD 30 MIN: CPT | Performed by: INTERNAL MEDICINE

## 2024-03-21 PROCEDURE — 3051F HG A1C>EQUAL 7.0%<8.0%: CPT | Performed by: INTERNAL MEDICINE

## 2024-03-22 NOTE — DISCHARGE SUMMARY
Discharge Diagnosis  Cellulitis of scrotum    Issues Requiring Follow-Up  Scrotal cellulitis     Test Results Pending At Discharge  Pending Labs       No current pending labs.            Hospital Course   Kody is a 53-year-old male patient is alert and oriented x 3 who is presenting to ED from skilled nursing facility with complaint of scrotal edema.  Troponin and BNP elevated.  Consult to cardiology.  Imaging completed showing anasarca, scrotal edema, concern for infection.  Patient started on IV vancomycin and Zosyn.  Consult to ID due to PICC line and history of IV antibiotics at skilled nursing facility for recurrent septic arthritis of the right knee.  Patient admitted for further medical management.     Scrotal cellulitis  Admit to telemetry per Dr. Lyndon Metzger  See imaging results above  Blood cultures pending  Tylenol as needed  Zofran as needed  Continue IV vancomycin/Zosyn  Repeat labs in a.m.     Elevated troponin/elevated BNP  History heart failure (EF 30% 1/30/2024)  Consult cardiology and appreciate input  Low-sodium diet  Fluid restriction  Trend troponin     BARBIE  No IV fluids given due to elevated troponin  Continue to monitor and defer to attending for consult     Diabetes/CAD/A-fib/RUPESH/recurrent septic arthritis  PICC line  Consult infectious disease for IV antibiotics to continue from [facility] nursing facility  Diabetic diet  ISS  Hold home oral antidiabetic medications when med rec is complete  Continue home tramadol  Metoprolol IV every 6 as needed     DVT Ppx/GI PPx  SCDs  Heparin subcutaneous  Up to chair as tolerated  Protonix IV daily  PT/OT     2/29: Patient diuresed 2.1 L yesterday and additional 2 L so far today.  Creatinine improved from 1.7 down to 1.5.  Continue Campos.  Continue diuresis.  Ultimately, patient will return to [place].     3/1: Patient diuresed 4.7 L.  Into A-fib RVR.  Increase metoprolol to 50 mg.   patient given 2 g IV mag.  Creatinine improved from 1.5 down to  1.4.  Infectious disease changed antibiotics back to daptomycin.  Bariatric panel ordered.  Continue diuresis over the weekend and anticipate discharge early next week.     3/2: Patient diuresed 2.1 L yesterday.  Creatinine went from 1.4 up to 1.5.  Continue diuresis.  Anticipate discharge on Monday.     3/3: Patient diuresed 1750 mL yesterday. Creatinine increased to 1.6 today and potassium just above normal at 5.4. Hold Lasix. Repeat BMP. Anticipate discharge back to SNF tomorrow.     3/4: Patient diuresed 1456.  Creatinine improved from 1.6-1.5.  Potassium 4.9.  Entresto added.  Metoprolol increased.  Patient discharged on torsemide 40 mg daily.    Pertinent Physical Exam At Time of Discharge  Physical Exam  Constitutional:       Appearance: He is obese.   HENT:      Mouth/Throat:      Mouth: Mucous membranes are dry.      Pharynx: Oropharynx is clear.   Eyes:      Pupils: Pupils are equal, round, and reactive to light.   Cardiovascular:      Rate and Rhythm: Rhythm irregularly irregular.   Pulmonary:      Breath sounds: Normal breath sounds.   Abdominal:      General: Bowel sounds are normal.   Musculoskeletal:      Right knee: Swelling present.      Comments: Scrotal edema, erythema   Skin:     General: Skin is warm and dry.      Capillary Refill: Capillary refill takes less than 2 seconds.   Neurological:      General: No focal deficit present.      Mental Status: He is alert and oriented to person, place, and time.   Psychiatric:         Mood and Affect: Mood normal. Affect is angry.      Home Medications     Medication List      START taking these medications     aspirin 81 mg chewable tablet; Chew 1 tablet (81 mg) once daily. Do not   start before March 2, 2024.   sacubitriL-valsartan 49-51 mg tablet; Commonly known as: Entresto; Take   1 tablet by mouth 2 times a day.   torsemide 40 mg tablet; Take 40 mg by mouth once daily.     CHANGE how you take these medications     metoprolol succinate XL 50 mg 24 hr  "tablet; Commonly known as:   Toprol-XL; Take 1 tablet (50 mg) by mouth once daily. Do not crush or   chew.; What changed: medication strength, how much to take     CONTINUE taking these medications     acetaminophen 325 mg tablet; Commonly known as: Tylenol; Take 3 tablets   (975 mg) by mouth every 8 hours if needed for headaches, mild pain (1 -   3), moderate pain (4 - 6) or fever (temp greater than 38.0 C).   atorvastatin 40 mg tablet; Commonly known as: Lipitor; Take 1 tablet (40   mg) by mouth once daily. Take in evening.   BD Ultra-Fine Short Pen Needle 31 gauge x 5/16\" needle; Generic drug:   pen needle, diabetic; Use to inject 1-4 times daily as directed.   fluticasone 50 mcg/actuation nasal spray; Commonly known as: Flonase   heparin (porcine) 5,000 unit/mL injection; Inject 1.5 mL (7,500 Units)   under the skin every 8 hours.   insulin glargine 100 unit/mL injection; Commonly known as: Lantus;   Inject 35 Units under the skin once daily at bedtime. Take as directed per   insulin instructions.   omeprazole 20 mg DR capsule; Commonly known as: PriLOSEC   traMADol 50 mg tablet; Commonly known as: Ultram; Take 1 tablet (50 mg)   by mouth every 8 hours if needed for severe pain (7 - 10).     STOP taking these medications     alteplase 2 mg injection; Commonly known as: Cathflo Activase   DAPTOmycin 500 mg/50 mL IV; Commonly known as: Cubicin     ASK your doctor about these medications     DAPTOmycin 500 mg injection; Commonly known as: Cubicin; Infuse 950 mg   over 30 minutes into a venous catheter once daily for 18 days. Weekly labs   faxed to ID physician following at  main CBC, ESR, CRP, BMP, CPK; Ask   about: Should I take this medication?   insulin lispro 100 unit/mL injection; Commonly known as: HumaLOG; Inject   0-0.1 mL (0-10 Units) under the skin 3 times a day with meals. Take as   directed per insulin instructions.   sodium chloride 0.9% parenteral solution 50 mL with DAPTOmycin 50 mg/mL   recon soln " 950 mg; Infuse 950 mg at 138 mL/hr over 30 minutes into a   venous catheter once every 24 hours for 25 days. Do not start before   February 22, 2024.; Ask about: Should I take this medication?       Outpatient Follow-Up  No future appointments.    Lyndon Metzger, DO

## 2024-03-24 PROBLEM — N49.2 CELLULITIS OF SCROTUM: Status: RESOLVED | Noted: 2024-02-27 | Resolved: 2024-03-24

## 2024-03-24 NOTE — PROGRESS NOTES
PROGRESS NOTE    Subjective   Chief complaint: Kody Choi is a 53 y.o. adult who is an acute skilled patient being seen and evaluated for weakness    HPI:  Continues to work with therapy for mobility and strengthening.  Patient able to ambulate with walker and wheelchair assist within room.  Nursing states patient continues to refuse insulin and torsemide stating it does nothing for him.  Reeducated about medication, diet, HF and DM. Right knee wounds continue to drain yellowish  drainage. No F/C/N/V/D, SOB, cough.  Tolerating ABX well.  No concerns per nursing          Objective   Vital signs:  134/95-97.8-84-18-97%    Physical Exam  Constitutional:       Appearance: Normal appearance. He is obese.   HENT:      Head: Normocephalic.      Mouth/Throat:      Mouth: Mucous membranes are moist.   Eyes:      Extraocular Movements: Extraocular movements intact.   Cardiovascular:      Rate and Rhythm: Normal rate and regular rhythm.      Pulses: Normal pulses.      Heart sounds: Normal heart sounds.   Pulmonary:      Effort: Pulmonary effort is normal.      Breath sounds: Normal breath sounds.   Abdominal:      General: Bowel sounds are normal.      Palpations: Abdomen is soft.   Musculoskeletal:         General: Swelling present. Normal range of motion.      Cervical back: Normal range of motion and neck supple.      Right lower leg: Edema present.      Left lower leg: Edema present.      Comments:  generalized weakness   Skin:     General: Skin is warm and dry.      Capillary Refill: Capillary refill takes less than 2 seconds.      Findings: Erythema present.      Comments:  wounds to right knee   Neurological:      Mental Status: He is alert and oriented to person, place, and time.   Psychiatric:         Mood and Affect: Mood normal.         Behavior: Behavior normal.         Assessment/Plan   Problem List Items Addressed This Visit       Peripheral edema      elevation and Ace wrap   Refusing Torsemide   monitor          Infection of total right knee replacement (CMS/[facility])      continue IV daptomycin   Wound care to right knee   Monitor for fever, chills   monitor CBC   pain management   elevation         Difficulty in walking, not elsewhere classified      mobility with wheelchair and   walker    therapy         Diabetes mellitus type 2 without retinopathy (CMS/[facility])      Stable      Humalog, Lantus   refusing insulin with meals   Noncompliant with diet   monitor BG         Chronic lower back pain - Primary      Stable   pain managed with current therapy   monitor         Chronic systolic (congestive) heart failure (CMS/[facility])      Stable   no SOB, Rales   BLE edema   Entresto, torsemide   refusing torsemide   monitor weights   fluid restriction   SIMA diet            Medications, treatments, and labs reviewed  Continue medications and treatments as listed in EMR    Sherley Byrd, APRN-CNP

## 2024-03-24 NOTE — PROGRESS NOTES
PROGRESS NOTE    Subjective   Chief complaint: Kody Choi is a 53 y.o. adult who is an acute skilled patient being seen and evaluated for weakness    HPI:  Patient states doing well today.  Continues to work with therapy for strengthening and mobility. No recent falls or injury. Tolerating ABX well.  Right knee wounds SIGRID with scant drainage.  No F/C/N/V/D, SOB, cough.  BP within goal.  Patient agreeable to take medications.  No concerns per nursing          Objective   Vital signs:  132/80-97.6-88-18-96%    Physical Exam  Constitutional:       Appearance: Normal appearance. He is obese.   HENT:      Head: Normocephalic.      Mouth/Throat:      Mouth: Mucous membranes are moist.   Eyes:      Extraocular Movements: Extraocular movements intact.   Cardiovascular:      Rate and Rhythm: Normal rate and regular rhythm.      Pulses: Normal pulses.      Heart sounds: Normal heart sounds.   Pulmonary:      Effort: Pulmonary effort is normal.      Breath sounds: Normal breath sounds.   Abdominal:      General: Bowel sounds are normal.      Palpations: Abdomen is soft.   Musculoskeletal:         General: Normal range of motion.      Cervical back: Normal range of motion and neck supple.      Right lower leg: Edema present.      Left lower leg: Edema present.      Comments:  generalized weakness   Skin:     General: Skin is warm and dry.      Capillary Refill: Capillary refill takes less than 2 seconds.      Comments: , right knee wounds   Neurological:      Mental Status: He is alert and oriented to person, place, and time.   Psychiatric:         Mood and Affect: Mood normal.         Behavior: Behavior normal.         Assessment/Plan   Problem List Items Addressed This Visit       Infection of total right knee replacement (CMS/[facility])      continue IV daptomycin   Wound care to right knee   Monitor for fever, chills   monitor CBC   pain management   elevation         Generalized edema      Reinforced elevation BLE   Noncompliant  with Ace wraps   Torsemide   monitor         Diabetes mellitus type 2 without retinopathy (CMS/[facility])      Stable      Humalog, Lantus   refusing insulin with meals-agreeable to take   Noncompliant with diet   monitor BG         Chronic systolic (congestive) heart failure (CMS/[facility]) - Primary      Stable   no SOB, Rales   BLE edema   Entresto, torsemide   monitor weights   fluid restriction   SIMA diet            Medications, treatments, and labs reviewed  Continue medications and treatments as listed in EMR    Sherley Byrd, APRN-CNP

## 2024-03-24 NOTE — ASSESSMENT & PLAN NOTE
Improving- decreased redness per patient   Daptomycin   monitor for urinary retention   Scrotal sling and elevation- noncompliant   pain management   monitor

## 2024-03-24 NOTE — ASSESSMENT & PLAN NOTE
Stable      Humalog, Lantus   refusing insulin with meals-agreeable to take   Noncompliant with diet   monitor BG

## 2024-03-24 NOTE — ASSESSMENT & PLAN NOTE
Stable   no SOB, Rales   BLE edema   Entresto, torsemide   refusing torsemide   monitor weights   fluid restriction   SIMA diet

## 2024-03-24 NOTE — PROGRESS NOTES
PROGRESS NOTE    Subjective   Chief complaint: Kody Choi is a 53 y.o. adult who is an acute skilled patient being seen and evaluated for weakness    HPI:  Patient states feeling good.  Continues to work with therapy for mobility and strengthening.  Nursing states patient is refusing torsemide.  Discussed  purpose and importance of medication.  Also refusing insulin.  .  Right knee wounds SIGRID + drainage.  Tolerating ABX without issue.  No F/C/N/V/D, SOB, cough.  No concerns per nursing          Objective   Vital signs:  118/76-97.6-84-20-97%    Physical Exam  Constitutional:       Appearance: Normal appearance. He is obese.   HENT:      Head: Normocephalic.      Mouth/Throat:      Mouth: Mucous membranes are moist.   Eyes:      Extraocular Movements: Extraocular movements intact.   Cardiovascular:      Rate and Rhythm: Normal rate and regular rhythm.      Pulses: Normal pulses.      Heart sounds: Normal heart sounds.   Pulmonary:      Effort: Pulmonary effort is normal.      Breath sounds: Normal breath sounds.   Abdominal:      General: Bowel sounds are normal.      Palpations: Abdomen is soft.   Musculoskeletal:         General: Normal range of motion.      Cervical back: Normal range of motion and neck supple.      Right lower leg: Edema present.      Left lower leg: Edema present.      Comments:  generalized weakness   Skin:     General: Skin is warm and dry.      Capillary Refill: Capillary refill takes 2 to 3 seconds.      Comments: , open wounds, right knee   Neurological:      Mental Status: He is alert and oriented to person, place, and time.   Psychiatric:         Mood and Affect: Mood normal.         Behavior: Behavior normal.         Assessment/Plan   Problem List Items Addressed This Visit       Cellulitis of scrotum - Primary      Improving- decreased redness per patient   Daptomycin   monitor for urinary retention   Scrotal sling and elevation- noncompliant   pain management   monitor          Methicillin resistant Staphylococcus aureus infection as the cause of diseases classified elsewhere      isolation precautions   IV daptomycin   follow-up with ID   monitor for fevers/chills   Wound care         Infection of total right knee replacement (CMS/[facility])      continue IV daptomycin   Wound care to right knee   Monitor for fever, chills   monitor CBC   pain management   elevation         Gastroesophageal reflux disease      Stable   Omeprazole   monitor         Diabetes mellitus type 2 without retinopathy (CMS/[facility])      Stable      Humalog, Lantus   refusing insulin with meals   Noncompliant with diet   monitor BG         Chronic systolic (congestive) heart failure (CMS/[facility])      Stable   no SOB, Rales   BLE edema   Entresto, torsemide   refusing torsemide   monitor weights   fluid restriction   SIMA diet            Medications, treatments, and labs reviewed  Continue medications and treatments as listed in EMR    Sherley Byrd, APRN-CNP

## 2024-03-28 ENCOUNTER — LAB REQUISITION (OUTPATIENT)
Dept: LAB | Facility: HOSPITAL | Age: 54
End: 2024-03-28
Payer: COMMERCIAL

## 2024-03-28 LAB — EOSINOPHIL SMEAR: ABNORMAL

## 2024-03-28 PROCEDURE — 89190 NASAL SMEAR FOR EOSINOPHILS: CPT

## 2024-04-01 ENCOUNTER — NURSING HOME VISIT (OUTPATIENT)
Dept: POST ACUTE CARE | Facility: EXTERNAL LOCATION | Age: 54
End: 2024-04-01
Payer: COMMERCIAL

## 2024-04-01 DIAGNOSIS — T84.53XD INFECTION OF TOTAL RIGHT KNEE REPLACEMENT, SUBSEQUENT ENCOUNTER: ICD-10-CM

## 2024-04-01 DIAGNOSIS — R60.1 GENERALIZED EDEMA: ICD-10-CM

## 2024-04-01 DIAGNOSIS — E86.0 DEHYDRATION: ICD-10-CM

## 2024-04-01 DIAGNOSIS — I50.22 CHRONIC SYSTOLIC (CONGESTIVE) HEART FAILURE (MULTI): Primary | ICD-10-CM

## 2024-04-01 DIAGNOSIS — E11.9 DIABETES MELLITUS TYPE 2 WITHOUT RETINOPATHY (MULTI): ICD-10-CM

## 2024-04-01 PROCEDURE — 99309 SBSQ NF CARE MODERATE MDM 30: CPT

## 2024-04-01 NOTE — LETTER
Patient: Kody Choi  : 1970    Encounter Date: 2024    PROGRESS NOTE    Subjective  Chief complaint: Kody Choi is a 54 y.o. adult who is an acute skilled patient being seen and evaluated for weakness    HPI:  Patient seen and evaluated s/p readmission after IPA for dehydration and confusion.   Remains on ABX for right knee infection.  Wounds continue to drain serous fluid.  No F/C/N/V/D, SOB, cough.  Continuous O2 maintained. .  Patient to restart therapy.  No concerns from nursing.  No concerns from nursing          Objective  Vital signs: 136/84-97.8-90-18-97%    Physical Exam  Constitutional:       Appearance: Normal appearance. He is obese.   HENT:      Head: Normocephalic.      Mouth/Throat:      Mouth: Mucous membranes are moist.   Eyes:      Extraocular Movements: Extraocular movements intact.   Cardiovascular:      Rate and Rhythm: Normal rate and regular rhythm.      Pulses: Normal pulses.      Heart sounds: Normal heart sounds.   Pulmonary:      Effort: Pulmonary effort is normal.      Breath sounds: Normal breath sounds.   Abdominal:      General: Bowel sounds are normal.      Palpations: Abdomen is soft.   Musculoskeletal:         General: Normal range of motion.      Cervical back: Normal range of motion and neck supple.      Right lower leg: Edema present.      Left lower leg: Edema present.      Comments: General weakness- R knee swelling   Skin:     General: Skin is warm and dry.      Capillary Refill: Capillary refill takes less than 2 seconds.   Neurological:      Mental Status: He is alert and oriented to person, place, and time.   Psychiatric:         Mood and Affect: Mood normal.         Behavior: Behavior normal.         Assessment/Plan  Problem List Items Addressed This Visit       Infection of total right knee replacement (CMS-HCC)      continue abx   Wound care to right knee   Monitor for fever, chills   monitor CBC   pain management   elevation         Generalized edema       Reinforced elevation BLE   Noncompliant with Ace wraps   Torsemide   monitor         Diabetes mellitus type 2 without retinopathy (Multi)      Stable   BG 97   Humalog, Lantus   refusing insulin with meals-agreeable to take   Noncompliant with diet   monitor BG         Chronic systolic (congestive) heart failure (Multi) - Primary      Stable   no SOB, Rales   BLE edema   Entresto, torsemide   monitor weights   fluid restriction   SIMA diet           Dehydration      Continue to encourage fluid intake and decrease pop and coffee   monitor urine output          Medications, treatments, and labs reviewed  Continue medications and treatments as listed in EMR    GLADIS Martinez      Electronically Signed By: GLADIS Martinez   5/13/24  7:51 PM

## 2024-04-03 ENCOUNTER — NURSING HOME VISIT (OUTPATIENT)
Dept: POST ACUTE CARE | Facility: EXTERNAL LOCATION | Age: 54
End: 2024-04-03
Payer: COMMERCIAL

## 2024-04-03 DIAGNOSIS — E86.0 DEHYDRATION: ICD-10-CM

## 2024-04-03 DIAGNOSIS — T84.53XD INFECTION OF TOTAL RIGHT KNEE REPLACEMENT, SUBSEQUENT ENCOUNTER: ICD-10-CM

## 2024-04-03 DIAGNOSIS — E11.9 DIABETES MELLITUS TYPE 2 WITHOUT RETINOPATHY (MULTI): ICD-10-CM

## 2024-04-03 DIAGNOSIS — M62.81 MUSCLE WEAKNESS (GENERALIZED): ICD-10-CM

## 2024-04-03 DIAGNOSIS — I50.22 CHRONIC SYSTOLIC (CONGESTIVE) HEART FAILURE (MULTI): Primary | ICD-10-CM

## 2024-04-03 PROCEDURE — 99309 SBSQ NF CARE MODERATE MDM 30: CPT

## 2024-04-03 NOTE — LETTER
Patient: Kody Choi  : 1970    Encounter Date: 2024    PROGRESS NOTE    Subjective  Chief complaint: Kody Choi is a 54 y.o. adult who is an acute skilled patient being seen and evaluated for weakness    HPI:  24 Patient seen and evaluated s/p readmission after IPA for dehydration and confusion.   Remains on ABX for right knee infection.  Wounds continue to drain serous fluid.  No F/C/N/V/D, SOB, cough.  Continuous O2 maintained. .  Patient to restart therapy.  No concerns from nursing.  No concerns from nursing    4/3/24  Patient continues to work with therapy for mobility and ADLs  Wound care continues to right knee  Wound care continues to right knee.  Tolerating ABX well.  Reinforced elevation..  Patient more compliant with insulin when needed.  BG 98.  Noncompliant with DM management.  No F/C/N/ V/D.  No concerns per nursing          Objective  Vital signs:  142/96-97.8-86-20-96%    Physical Exam  Constitutional:       Appearance: Normal appearance. He is obese.   HENT:      Head: Normocephalic.      Mouth/Throat:      Mouth: Mucous membranes are moist.   Eyes:      Extraocular Movements: Extraocular movements intact.   Cardiovascular:      Rate and Rhythm: Normal rate and regular rhythm.      Pulses: Normal pulses.      Heart sounds: Normal heart sounds.   Pulmonary:      Effort: Pulmonary effort is normal.      Breath sounds: Normal breath sounds.   Abdominal:      General: Bowel sounds are normal.      Palpations: Abdomen is soft.   Musculoskeletal:         General: Normal range of motion.      Cervical back: Normal range of motion and neck supple.      Right lower leg: Edema present.      Left lower leg: Edema present.      Comments:  generalized weakness - right knee swelling   Skin:     General: Skin is warm and dry.      Capillary Refill: Capillary refill takes less than 2 seconds.      Comments:  open wounds right knee   Neurological:      Mental Status: He is alert and oriented to  person, place, and time.   Psychiatric:         Mood and Affect: Mood normal.         Behavior: Behavior normal.         Assessment/Plan  Problem List Items Addressed This Visit       Infection of total right knee replacement (CMS-HCC)      continue abx   Wound care to right knee   Monitor for fever, chills   monitor CBC   pain management   elevation         Diabetes mellitus type 2 without retinopathy (Multi)      Stable   BG 98   Humalog, Lantus   refusing insulin with meals-agreeable to take   Noncompliant with diet   monitor BG         Chronic systolic (congestive) heart failure (Multi) - Primary      Stable   no SOB, Rales   BLE edema   Entresto, torsemide   monitor weights   fluid restriction   SIMA diet           Muscle weakness (generalized)      continue therapy         Dehydration      Continue to encourage fluid intake and decrease pop and coffee   monitor urine output          Medications, treatments, and labs reviewed  Continue medications and treatments as listed in EMR    GLADIS Martinez      Electronically Signed By: GLADIS Martinez   5/13/24  8:07 PM

## 2024-04-09 ENCOUNTER — NURSING HOME VISIT (OUTPATIENT)
Dept: POST ACUTE CARE | Facility: EXTERNAL LOCATION | Age: 54
End: 2024-04-09
Payer: COMMERCIAL

## 2024-04-09 DIAGNOSIS — R60.1 GENERALIZED EDEMA: ICD-10-CM

## 2024-04-09 DIAGNOSIS — E11.9 DIABETES MELLITUS TYPE 2 WITHOUT RETINOPATHY (MULTI): Primary | ICD-10-CM

## 2024-04-09 DIAGNOSIS — S99.921D INJURY OF TOENAIL OF RIGHT FOOT, SUBSEQUENT ENCOUNTER: ICD-10-CM

## 2024-04-09 DIAGNOSIS — T84.53XD INFECTION OF TOTAL RIGHT KNEE REPLACEMENT, SUBSEQUENT ENCOUNTER: ICD-10-CM

## 2024-04-09 PROCEDURE — 99309 SBSQ NF CARE MODERATE MDM 30: CPT

## 2024-04-09 NOTE — LETTER
Patient: Kody Choi  : 1970    Encounter Date: 2024    PROGRESS NOTE    Subjective  Chief complaint: Kody Choi is a 54 y.o. adult who is an acute skilled patient being seen and evaluated for weakness    HPI:  24 Patient seen and evaluated s/p readmission after IPA for dehydration and confusion.   Remains on ABX for right knee infection.  Wounds continue to drain serous fluid.  No F/C/N/V/D, SOB, cough.  Continuous O2 maintained. .  Patient to restart therapy.  No concerns from nursing.  No concerns from nursing    4/3/24  Patient continues to work with therapy for mobility and ADLs  Wound care continues to right knee  Wound care continues to right knee.  Tolerating ABX well.  Reinforced elevation..  Patient more compliant with insulin when needed.  BG 98.  Noncompliant with DM management.  No F/C/N/ V/D.  No concerns per nursing    24 Patient continues to work with therapy for safe mobility, strengthening, balance, endurance.  Patient injured her toenail on right foot second toe.  Toenail removed, cleaned with wound cleanser and wrapped with bandage.  Wound care to follow.  Discussed diet and insulin.  .  Tolerating adequate amounts of fluid.  No concerns per nursing          Objective  Vital signs:  138/72-97.6-94-18-97%    Physical Exam  Constitutional:       Appearance: Normal appearance.   HENT:      Head: Normocephalic.      Mouth/Throat:      Mouth: Mucous membranes are moist.   Eyes:      Extraocular Movements: Extraocular movements intact.   Cardiovascular:      Rate and Rhythm: Normal rate and regular rhythm.      Pulses: Normal pulses.      Heart sounds: Normal heart sounds.   Pulmonary:      Effort: Pulmonary effort is normal.      Breath sounds: Normal breath sounds.   Abdominal:      General: Bowel sounds are normal.      Palpations: Abdomen is soft.   Musculoskeletal:         General: Normal range of motion.      Cervical back: Normal range of motion and neck supple.       Right lower leg: Edema present.      Left lower leg: Edema present.      Comments:  Generalized weakness - right knee swelling   Skin:     General: Skin is warm and dry.      Capillary Refill: Capillary refill takes less than 2 seconds.      Comments:  Wounds to right knee - right foot second toe- toenail removed by patient   Neurological:      Mental Status: He is alert and oriented to person, place, and time.   Psychiatric:         Mood and Affect: Mood normal.         Behavior: Behavior normal.         Assessment/Plan  Problem List Items Addressed This Visit       Infection of total right knee replacement (CMS-HCC)      continue abx   Wound care to right knee   Monitor for fever, chills   monitor CBC   pain management   elevation         Generalized edema      Reinforced elevation BLE   Noncompliant with Ace wraps   Torsemide   monitor         Diabetes mellitus type 2 without retinopathy (Multi) - Primary      Stable      Humalog, Lantus   refusing insulin with meals-agreeable to take   Noncompliant with diet   monitor BG         Injury of toenail of right foot      Patient removed right foot second toe nail   evaluated by wound care   cleanse with wound cleanser, pat dry, apply mupirocin 2% and cover with DSD.  Change daily and as needed          Medications, treatments, and labs reviewed  Continue medications and treatments as listed in EMR    GLADIS Martinez      Electronically Signed By: GLADIS Martinez   5/13/24  8:21 PM

## 2024-04-10 ENCOUNTER — NURSING HOME VISIT (OUTPATIENT)
Dept: POST ACUTE CARE | Facility: EXTERNAL LOCATION | Age: 54
End: 2024-04-10
Payer: COMMERCIAL

## 2024-04-10 DIAGNOSIS — S99.921D INJURY OF TOENAIL OF RIGHT FOOT, SUBSEQUENT ENCOUNTER: ICD-10-CM

## 2024-04-10 DIAGNOSIS — E11.9 DIABETES MELLITUS TYPE 2 WITHOUT RETINOPATHY (MULTI): ICD-10-CM

## 2024-04-10 DIAGNOSIS — T84.53XD INFECTION OF TOTAL RIGHT KNEE REPLACEMENT, SUBSEQUENT ENCOUNTER: ICD-10-CM

## 2024-04-10 DIAGNOSIS — E86.0 DEHYDRATION: ICD-10-CM

## 2024-04-10 DIAGNOSIS — D64.9 CHRONIC ANEMIA: Primary | ICD-10-CM

## 2024-04-10 PROCEDURE — 99309 SBSQ NF CARE MODERATE MDM 30: CPT

## 2024-04-10 NOTE — LETTER
Patient: Kody Choi  : 1970    Encounter Date: 04/10/2024    PROGRESS NOTE    Subjective  Chief complaint: Kody Choi is a 54 y.o. adult who is an acute skilled patient being seen and evaluated for weakness    HPI:  24 Patient seen and evaluated s/p readmission after IPA for dehydration and confusion.   Remains on ABX for right knee infection.  Wounds continue to drain serous fluid.  No F/C/N/V/D, SOB, cough.  Continuous O2 maintained. .  Patient to restart therapy.  No concerns from nursing.  No concerns from nursing    4/3/24  Patient continues to work with therapy for mobility and ADLs  Wound care continues to right knee  Wound care continues to right knee.  Tolerating ABX well.  Reinforced elevation..  Patient more compliant with insulin when needed.  BG 98.  Noncompliant with DM management.  No F/C/N/ V/D.  No concerns per nursing    24 Patient continues to work with therapy for safe mobility, strengthening, balance, endurance.  Patient injured her toenail on right foot second toe.  Toenail removed, cleaned with wound cleanser and wrapped with bandage.  Wound care to follow.  Discussed diet and insulin.  .  Tolerating adequate amounts of fluid.  No concerns per nursing    4/10/24.  Patient states doing well today.  Continues to work with therapy for mobility and transferring.  Wound care continues to right second toe.  DM stable .  Scant amount of drainage from right knee.  No F/C.  No concerns per nursing          Objective  Vital signs: 136/82-97.8-90-18-97%    Physical Exam  Constitutional:       Appearance: Normal appearance. He is obese.   HENT:      Head: Normocephalic.      Mouth/Throat:      Mouth: Mucous membranes are moist.   Eyes:      Extraocular Movements: Extraocular movements intact.   Cardiovascular:      Rate and Rhythm: Normal rate and regular rhythm.      Pulses: Normal pulses.      Heart sounds: Normal heart sounds.   Pulmonary:      Effort: Pulmonary effort is  normal.      Breath sounds: Normal breath sounds.   Abdominal:      General: Bowel sounds are normal.      Palpations: Abdomen is soft.   Musculoskeletal:         General: Normal range of motion.      Cervical back: Normal range of motion and neck supple.      Right lower leg: Edema present.      Left lower leg: Edema present.      Comments:  generalized weakness   Skin:     General: Skin is warm and dry.      Capillary Refill: Capillary refill takes less than 2 seconds.      Comments:  right knee wounds, draining scant amount of serous drainage - right second toe injury   Neurological:      Mental Status: He is alert and oriented to person, place, and time.   Psychiatric:         Mood and Affect: Mood normal.         Behavior: Behavior normal.         Assessment/Plan  Problem List Items Addressed This Visit       Chronic anemia - Primary      Stable   no SOB   monitor labs         Infection of total right knee replacement (CMS-HCC)      continue abx   Wound care to right knee   Monitor for fever, chills   monitor CBC   pain management   elevation         Diabetes mellitus type 2 without retinopathy (Multi)      Stable      Humalog, Lantus   refusing insulin with meals-agreeable to take   Noncompliant with diet   monitor BG         Dehydration      Continue to encourage fluid intake and decrease pop and coffee   monitor urine output         Injury of toenail of right foot      Patient removed right foot second toe nail   evaluated by wound care   cleanse with wound cleanser, pat dry, apply mupirocin 2% and cover with DSD.  Change daily and as needed          Medications, treatments, and labs reviewed  Continue medications and treatments as listed in EMR    GLADIS Martinez      Electronically Signed By: GLADIS Martinez   5/13/24  8:33 PM

## 2024-04-12 ENCOUNTER — APPOINTMENT (OUTPATIENT)
Dept: VASCULAR MEDICINE | Facility: HOSPITAL | Age: 54
End: 2024-04-12
Payer: COMMERCIAL

## 2024-04-12 ENCOUNTER — HOSPITAL ENCOUNTER (EMERGENCY)
Facility: HOSPITAL | Age: 54
Discharge: NURSING, REHAB OR LONG-TERM CARE | End: 2024-04-12
Attending: EMERGENCY MEDICINE
Payer: COMMERCIAL

## 2024-04-12 ENCOUNTER — APPOINTMENT (OUTPATIENT)
Dept: RADIOLOGY | Facility: HOSPITAL | Age: 54
End: 2024-04-12
Payer: COMMERCIAL

## 2024-04-12 ENCOUNTER — NURSING HOME VISIT (OUTPATIENT)
Dept: POST ACUTE CARE | Facility: EXTERNAL LOCATION | Age: 54
End: 2024-04-12

## 2024-04-12 VITALS
HEIGHT: 66 IN | RESPIRATION RATE: 18 BRPM | OXYGEN SATURATION: 98 % | BODY MASS INDEX: 50.62 KG/M2 | WEIGHT: 315 LBS | SYSTOLIC BLOOD PRESSURE: 139 MMHG | HEART RATE: 92 BPM | TEMPERATURE: 98.9 F | DIASTOLIC BLOOD PRESSURE: 88 MMHG

## 2024-04-12 DIAGNOSIS — E11.9 DIABETES MELLITUS TYPE 2 WITHOUT RETINOPATHY (MULTI): ICD-10-CM

## 2024-04-12 DIAGNOSIS — S99.921D INJURY OF TOENAIL OF RIGHT FOOT, SUBSEQUENT ENCOUNTER: ICD-10-CM

## 2024-04-12 DIAGNOSIS — R52 INADEQUATE PAIN CONTROL: Primary | ICD-10-CM

## 2024-04-12 DIAGNOSIS — M25.561 CHRONIC PAIN OF RIGHT KNEE: Primary | ICD-10-CM

## 2024-04-12 DIAGNOSIS — M79.604 PAIN IN RIGHT LEG: ICD-10-CM

## 2024-04-12 DIAGNOSIS — G89.29 CHRONIC PAIN OF RIGHT KNEE: Primary | ICD-10-CM

## 2024-04-12 DIAGNOSIS — T84.53XD INFECTION OF TOTAL RIGHT KNEE REPLACEMENT, SUBSEQUENT ENCOUNTER: ICD-10-CM

## 2024-04-12 LAB
ALBUMIN SERPL BCP-MCNC: 2.7 G/DL (ref 3.4–5)
ALP SERPL-CCNC: 126 U/L (ref 33–120)
ALT SERPL W P-5'-P-CCNC: 10 U/L (ref 7–52)
AMORPH CRY #/AREA UR COMP ASSIST: ABNORMAL /HPF
ANION GAP SERPL CALC-SCNC: 11 MMOL/L (ref 10–20)
APPEARANCE UR: CLEAR
AST SERPL W P-5'-P-CCNC: 24 U/L (ref 9–39)
BACTERIA #/AREA URNS AUTO: ABNORMAL /HPF
BASOPHILS # BLD AUTO: 0.08 X10*3/UL (ref 0–0.1)
BASOPHILS NFR BLD AUTO: 1.1 %
BILIRUB SERPL-MCNC: 0.7 MG/DL (ref 0–1.2)
BILIRUB UR STRIP.AUTO-MCNC: NEGATIVE MG/DL
BODY SURFACE AREA: 2.91 M2
BUN SERPL-MCNC: 31 MG/DL (ref 6–23)
CALCIUM SERPL-MCNC: 7.7 MG/DL (ref 8.6–10.3)
CHLORIDE SERPL-SCNC: 105 MMOL/L (ref 98–107)
CO2 SERPL-SCNC: 29 MMOL/L (ref 21–32)
COLOR UR: YELLOW
CREAT SERPL-MCNC: 1.55 MG/DL (ref 0.5–1.3)
DACRYOCYTES BLD QL SMEAR: NORMAL
EGFRCR SERPLBLD CKD-EPI 2021: 39 ML/MIN/1.73M*2
EOSINOPHIL # BLD AUTO: 0.22 X10*3/UL (ref 0–0.7)
EOSINOPHIL NFR BLD AUTO: 3.1 %
ERYTHROCYTE [DISTWIDTH] IN BLOOD BY AUTOMATED COUNT: 23.4 % (ref 11.5–14.5)
GLUCOSE SERPL-MCNC: 90 MG/DL (ref 74–99)
GLUCOSE UR STRIP.AUTO-MCNC: NEGATIVE MG/DL
HCT VFR BLD AUTO: 32.7 % (ref 36–52)
HGB BLD-MCNC: 9.8 G/DL (ref 12–17.5)
HYPOCHROMIA BLD QL SMEAR: NORMAL
IMM GRANULOCYTES # BLD AUTO: 0.02 X10*3/UL (ref 0–0.7)
IMM GRANULOCYTES NFR BLD AUTO: 0.3 % (ref 0–0.9)
KETONES UR STRIP.AUTO-MCNC: NEGATIVE MG/DL
LACTATE SERPL-SCNC: 0.8 MMOL/L (ref 0.4–2)
LEUKOCYTE ESTERASE UR QL STRIP.AUTO: NEGATIVE
LYMPHOCYTES # BLD AUTO: 1.26 X10*3/UL (ref 1.2–4.8)
LYMPHOCYTES NFR BLD AUTO: 17.7 %
MCH RBC QN AUTO: 23.3 PG (ref 26–34)
MCHC RBC AUTO-ENTMCNC: 30 G/DL (ref 32–36)
MCV RBC AUTO: 78 FL (ref 80–100)
MONOCYTES # BLD AUTO: 0.65 X10*3/UL (ref 0.1–1)
MONOCYTES NFR BLD AUTO: 9.1 %
NEUTROPHILS # BLD AUTO: 4.9 X10*3/UL (ref 1.2–7.7)
NEUTROPHILS NFR BLD AUTO: 68.7 %
NITRITE UR QL STRIP.AUTO: NEGATIVE
NRBC BLD-RTO: 0 /100 WBCS (ref 0–0)
OVALOCYTES BLD QL SMEAR: NORMAL
PH UR STRIP.AUTO: 7 [PH]
PLATELET # BLD AUTO: 329 X10*3/UL (ref 150–450)
POTASSIUM SERPL-SCNC: 4.6 MMOL/L (ref 3.5–5.3)
PROT SERPL-MCNC: 6.6 G/DL (ref 6.4–8.2)
PROT UR STRIP.AUTO-MCNC: ABNORMAL MG/DL
RBC # BLD AUTO: 4.2 X10*6/UL (ref 4–5.9)
RBC # UR STRIP.AUTO: ABNORMAL /UL
RBC #/AREA URNS AUTO: ABNORMAL /HPF
RBC MORPH BLD: NORMAL
SCHISTOCYTES BLD QL SMEAR: NORMAL
SODIUM SERPL-SCNC: 140 MMOL/L (ref 136–145)
SP GR UR STRIP.AUTO: 1.01
SQUAMOUS #/AREA URNS AUTO: ABNORMAL /HPF
TARGETS BLD QL SMEAR: NORMAL
UROBILINOGEN UR STRIP.AUTO-MCNC: <2 MG/DL
WBC # BLD AUTO: 7.1 X10*3/UL (ref 4.4–11.3)
WBC #/AREA URNS AUTO: ABNORMAL /HPF

## 2024-04-12 PROCEDURE — 80053 COMPREHEN METABOLIC PANEL: CPT | Performed by: NURSE PRACTITIONER

## 2024-04-12 PROCEDURE — 96365 THER/PROPH/DIAG IV INF INIT: CPT

## 2024-04-12 PROCEDURE — 81001 URINALYSIS AUTO W/SCOPE: CPT | Performed by: NURSE PRACTITIONER

## 2024-04-12 PROCEDURE — 99284 EMERGENCY DEPT VISIT MOD MDM: CPT | Mod: 25

## 2024-04-12 PROCEDURE — 96366 THER/PROPH/DIAG IV INF ADDON: CPT

## 2024-04-12 PROCEDURE — 87040 BLOOD CULTURE FOR BACTERIA: CPT | Mod: PARLAB,91 | Performed by: NURSE PRACTITIONER

## 2024-04-12 PROCEDURE — 2500000004 HC RX 250 GENERAL PHARMACY W/ HCPCS (ALT 636 FOR OP/ED): Performed by: NURSE PRACTITIONER

## 2024-04-12 PROCEDURE — 83605 ASSAY OF LACTIC ACID: CPT | Performed by: NURSE PRACTITIONER

## 2024-04-12 PROCEDURE — 85025 COMPLETE CBC W/AUTO DIFF WBC: CPT | Performed by: NURSE PRACTITIONER

## 2024-04-12 PROCEDURE — 71045 X-RAY EXAM CHEST 1 VIEW: CPT | Performed by: RADIOLOGY

## 2024-04-12 PROCEDURE — 96367 TX/PROPH/DG ADDL SEQ IV INF: CPT

## 2024-04-12 PROCEDURE — 71045 X-RAY EXAM CHEST 1 VIEW: CPT

## 2024-04-12 PROCEDURE — 93971 EXTREMITY STUDY: CPT | Performed by: INTERNAL MEDICINE

## 2024-04-12 PROCEDURE — 36415 COLL VENOUS BLD VENIPUNCTURE: CPT | Performed by: NURSE PRACTITIONER

## 2024-04-12 PROCEDURE — 99309 SBSQ NF CARE MODERATE MDM 30: CPT

## 2024-04-12 PROCEDURE — 93971 EXTREMITY STUDY: CPT

## 2024-04-12 RX ORDER — SODIUM CHLORIDE 9 MG/ML
75 INJECTION, SOLUTION INTRAVENOUS CONTINUOUS
Status: DISCONTINUED | OUTPATIENT
Start: 2024-04-12 | End: 2024-04-12 | Stop reason: HOSPADM

## 2024-04-12 RX ADMIN — PIPERACILLIN SODIUM AND TAZOBACTAM SODIUM 4.5 G: 4; .5 INJECTION, SOLUTION INTRAVENOUS at 11:41

## 2024-04-12 RX ADMIN — SODIUM CHLORIDE 500 ML: 9 INJECTION, SOLUTION INTRAVENOUS at 11:41

## 2024-04-12 RX ADMIN — VANCOMYCIN HYDROCHLORIDE 2 G: 1 INJECTION, POWDER, LYOPHILIZED, FOR SOLUTION INTRAVENOUS at 13:17

## 2024-04-12 ASSESSMENT — LIFESTYLE VARIABLES
TOTAL SCORE: 0
EVER HAD A DRINK FIRST THING IN THE MORNING TO STEADY YOUR NERVES TO GET RID OF A HANGOVER: NO
HAVE PEOPLE ANNOYED YOU BY CRITICIZING YOUR DRINKING: NO
EVER FELT BAD OR GUILTY ABOUT YOUR DRINKING: NO
HAVE YOU EVER FELT YOU SHOULD CUT DOWN ON YOUR DRINKING: NO

## 2024-04-12 ASSESSMENT — PAIN - FUNCTIONAL ASSESSMENT: PAIN_FUNCTIONAL_ASSESSMENT: 0-10

## 2024-04-12 ASSESSMENT — PAIN SCALES - GENERAL: PAINLEVEL_OUTOF10: 0 - NO PAIN

## 2024-04-12 NOTE — DISCHARGE INSTRUCTIONS
Normal white count and lactate, low suspicion for infection in the right lower extremity.  No blood clot in the right lower extremity.  Received 1 dose of IV morphine.  Follow-up with Dr. Metzger, resume normal medications.  Tramadol is already ordered at the facility for pain.

## 2024-04-12 NOTE — LETTER
Patient: Kody Choi  : 1970    Encounter Date: 2024    PROGRESS NOTE    Subjective  Chief complaint: Kody Choi is a 54 y.o. adult who is an acute skilled patient being seen and evaluated for weakness    HPI:  24 Patient seen and evaluated s/p readmission after IPA for dehydration and confusion.   Remains on ABX for right knee infection.  Wounds continue to drain serous fluid.  No F/C/N/V/D, SOB, cough.  Continuous O2 maintained. .  Patient to restart therapy.  No concerns from nursing.  No concerns from nursing    4/3/24  Patient continues to work with therapy for mobility and ADLs  Wound care continues to right knee  Wound care continues to right knee.  Tolerating ABX well.  Reinforced elevation..  Patient more compliant with insulin when needed.  BG 98.  Noncompliant with DM management.  No F/C/N/ V/D.  No concerns per nursing    24 Patient continues to work with therapy for safe mobility, strengthening, balance, endurance.  Patient injured her toenail on right foot second toe.  Toenail removed, cleaned with wound cleanser and wrapped with bandage.  Wound care to follow.  Discussed diet and insulin.  .  Tolerating adequate amounts of fluid.  No concerns per nursing    4/10/24.  Patient states doing well today.  Continues to work with therapy for mobility and transferring.  Wound care continues to right second toe.  DM stable .  Scant amount of drainage from right knee.  No F/C.  No concerns per nursing    24 Patient seen and evaluated s/p readmission.  Patient complained of right knee pain yesterday and demanded to go to the emergency department for evaluation. States his pcp was giving him Ultram at a higher dose and more frequently than what he is receiving at facility. Patient returned with ABX for right knee infection,  No additional pain medication.  DM stable .  No F/C/N/V/D.  No concerns per nursing          Objective  Vital signs:   128/74-97.8--94-18-97%    Physical Exam  Constitutional:       Appearance: Normal appearance. He is obese.   HENT:      Head: Normocephalic.      Mouth/Throat:      Mouth: Mucous membranes are moist.   Eyes:      Extraocular Movements: Extraocular movements intact.   Cardiovascular:      Rate and Rhythm: Normal rate and regular rhythm.      Pulses: Normal pulses.      Heart sounds: Normal heart sounds.   Pulmonary:      Effort: Pulmonary effort is normal.      Breath sounds: Normal breath sounds.   Abdominal:      General: Bowel sounds are normal.      Palpations: Abdomen is soft.   Musculoskeletal:         General: Normal range of motion.      Cervical back: Normal range of motion and neck supple.      Right lower leg: Edema present.      Left lower leg: Edema present.      Comments:  generalized weakness - right knee swelling   Skin:     General: Skin is warm and dry.      Capillary Refill: Capillary refill takes less than 2 seconds.      Comments:  right second toe followed by wound care for toenail- open wounds to right knee   Neurological:      Mental Status: He is alert and oriented to person, place, and time.   Psychiatric:         Mood and Affect: Mood normal.         Behavior: Behavior normal.         Assessment/Plan  Problem List Items Addressed This Visit       Infection of total right knee replacement (CMS-HCC)      continue abx   Wound care to right knee   Monitor for fever, chills   monitor CBC   pain management   elevation         Diabetes mellitus type 2 without retinopathy (Multi)      Stable      Humalog, Lantus   Noncompliant with diet   monitor BG         Injury of toenail of right foot      Patient removed right foot second toe nail   evaluated by wound care   cleanse with wound cleanser, pat dry, apply mupirocin 2% and cover with DSD.  Change daily and as needed         Inadequate pain control - Primary      Patient demanded ED evaluation 4/11 for right knee pain   continues taking  tramadol and Tylenol   Discussed pain management again - patient refuses          Medications, treatments, and labs reviewed  Continue medications and treatments as listed in EMR    GLADIS Martinez      Electronically Signed By: GLADIS Martinez   5/13/24  8:51 PM

## 2024-04-12 NOTE — ED PROVIDER NOTES
Limitations to History: None     HPI:      Kody Choi is a 53 y.o. with significant past medical history for arthritis, A-fib on Eliquis, CHF, hyperlipidemia, coronary artery disease, hypertension and arthritis who is a full code residing at [facility] presenting to ED today via EMS for evaluation of right knee pain.  Patient states he has had multiple revisions of his right TKA over the last 5 years and states he has MRSA.  Yesterday he started experiencing increasing pain.  No improvement with Toradol.  No falls or trauma.  Constant throbbing pain is rated 10/10.  Denies fever/chills, cough/cold symptoms, chest pain, shortness of breath, nausea/vomiting, abdominal pain, urinary symptoms, change in bowel habits or any other complaints.  Unknown social history.  PCP is Dr. Mega Metzger.    Additional History Obtained from: [facility] paperwork    ------------------------------------------------------------------------------------------------------------------------------------------    VS: As documented in the triage note and EMR flowsheet from this visit were reviewed.    Physical Exam:  Gen: Obese, chronically ill-appearing male lying in bed, appears uncomfortable but nontoxic.  Awake, oriented to name and place.  Obese, well-hydrated.  Head/Neck: NCAT, neck w/ FROM  Eyes: EOMI, PERRL, anicteric sclerae, noninjected conjunctivae  Ears: TMs clear b/l without sign of infection  Nose: Nares patent w/o rhinorrhea  Mouth:  MMM, no OP lesions noted  Heart: RRR no MRG  Lungs: CTA upper lobes bilaterally, decreased in the bases.  Abdomen: Obese, soft with bowel sounds.  Musculoskeletal: Bilateral lower extremities edematous, right greater than left.  Multiple old scars right knee.  Erythema over the right patella.  Range of motion significantly decreased secondary to discomfort.  MSPs intact.  Skin intact.  Neurologic: Alert, symmetrical facies, phonates clearly, moves all extremities equally, responsive to touch,  ambulates normally   Skin: See musculoskeletal.  No rashes noted  Psychological: calm, no SI/HI      ------------------------------------------------------------------------------------------------------------------------------------------    Medical Decision Makin y.o. with significant past medical history for arthritis, A-fib on Eliquis, CHF, hyperlipidemia, coronary artery disease, hypertension and arthritis who is a full code residing at [facility] is evaluated the bedside for right knee pain.  Over the last several the patient has had multiple revisions of a right TKA, he reports that he has had MRSA.  Patient started experience nontraumatic on arrival to the ED, pain in the right knee yesterday and was requesting transport to the hospital today.  Recent admission for scrotal cellulitis that is resolved.  On arrival to the ED, awake and alert, reluctant to give details medical history.  Heart rate 92, blood pressure 140/84 with an O2 sat of 94%.  Afebrile.  Multiple old scars on the right knee, there is mild erythema over the patella, there is significant bilateral lower extremity edema, right greater than left.  Remains neurovascularly intact.  Differential includes but is not limited to DVT, cellulitis and sepsis.  IV established, continuous cardiac/O2 sat monitoring.  Basic labs, EKG, chest x-ray, right lower extremity venous duplex and UA/urine culture will be obtained.    ED Course as of 24 1311      1148 Laboratory studies reviewed, no leukocytosis.  Hemoglobin stable at 9.8.  Kidney function stable with a BUN of 31, creatinine 1.55 and EGFR of 39.  Alk phos mildly elevated 126.  Normal electrolytes.  Lactate normal at 0.8.  Venous duplex right lower extremity shows no evidence of DVT however there are enlarged lymph nodes in the right groin.  Blood cultures, chest x-ray and UA pending. [SB]   1302 Chest x-ray shows ongoing mild CHF.  Remains hemodynamically stable here  in the emergency department.  Patient states he got minimal relief with IV morphine.  I spoke with the patient's PCP Dr. Lyndon Metzger regarding the case, with a normal white count and lactate, there is low suspicion for infection.  Repeat vital signs within normal limits.  Dr. Metzger feels the patient can be transported back to [facility].  Patient is seeing pain management.  He has exhibited pain seeking behavior and has been argumentative with staff.  Tramadol has been ordered back at the facility and will be continued for pain.  Resume normal medications.  Return precautions discussed.  Diagnosis, treatment and plan for transfer back to [facility] discussed with patient and staff, they verbalized understanding and are in agreement.  Condition stable for transport back to [place]. [SB]      ED Course User Index  [SB] LAURENT Rodriguez-CNP         Diagnoses as of 04/12/24 1311   Chronic pain of right knee       EKG interpreted by Dr. Mcfadden at 9:57 AM, sinus rhythm with PVCs at a rate of 94, no ST segment depression or elevation consistent with ischemia or infarction.    Chronic Medical Conditions Significantly Affecting Care: None    External Records Reviewed: I reviewed recent and relevant outside records including: None    Discussion of Management with Other Providers: Seen and evaluated with ED attending physician, Dr. Mcfadden, she agrees with the treatment plan and final disposition of the patient.    I discussed the patient/results with: GLADIS Zamorano  04/12/24 1311

## 2024-04-12 NOTE — ED TRIAGE NOTES
Pt presents to ED from [facility] with reports of drainage from right knee. Pt has multiple wounds and blisters on feet. Pt has a hx of MRSA. Pt is alert and oriented x3.

## 2024-04-13 LAB — HOLD SPECIMEN: NORMAL

## 2024-04-15 ENCOUNTER — NURSING HOME VISIT (OUTPATIENT)
Dept: POST ACUTE CARE | Facility: EXTERNAL LOCATION | Age: 54
End: 2024-04-15
Payer: COMMERCIAL

## 2024-04-15 DIAGNOSIS — T84.53XD INFECTION OF TOTAL RIGHT KNEE REPLACEMENT, SUBSEQUENT ENCOUNTER: ICD-10-CM

## 2024-04-15 DIAGNOSIS — R60.1 GENERALIZED EDEMA: ICD-10-CM

## 2024-04-15 DIAGNOSIS — R52 INADEQUATE PAIN CONTROL: ICD-10-CM

## 2024-04-15 DIAGNOSIS — I50.22 CHRONIC SYSTOLIC (CONGESTIVE) HEART FAILURE (MULTI): ICD-10-CM

## 2024-04-15 DIAGNOSIS — E11.9 DIABETES MELLITUS TYPE 2 WITHOUT RETINOPATHY (MULTI): ICD-10-CM

## 2024-04-15 DIAGNOSIS — Z91.89 AT RISK FOR READMISSION TO HOSPITAL: Primary | ICD-10-CM

## 2024-04-15 DIAGNOSIS — S99.921D INJURY OF TOENAIL OF RIGHT FOOT, SUBSEQUENT ENCOUNTER: ICD-10-CM

## 2024-04-15 DIAGNOSIS — R26.2 DIFFICULTY IN WALKING, NOT ELSEWHERE CLASSIFIED: ICD-10-CM

## 2024-04-15 PROCEDURE — 99309 SBSQ NF CARE MODERATE MDM 30: CPT

## 2024-04-15 NOTE — LETTER
Patient: Kody Choi  : 1970    Encounter Date: 04/15/2024    PROGRESS NOTE    Subjective  Chief complaint: Kody Choi is a 54 y.o. adult who is an acute skilled patient being seen and evaluated for weakness    HPI:  24 Patient seen and evaluated s/p readmission after IPA for dehydration and confusion.   Remains on ABX for right knee infection.  Wounds continue to drain serous fluid.  No F/C/N/V/D, SOB, cough.  Continuous O2 maintained. .  Patient to restart therapy.  No concerns from nursing.  No concerns from nursing    4/3/24  Patient continues to work with therapy for mobility and ADLs  Wound care continues to right knee  Wound care continues to right knee.  Tolerating ABX well.  Reinforced elevation..  Patient more compliant with insulin when needed.  BG 98.  Noncompliant with DM management.  No F/C/N/ V/D.  No concerns per nursing    24 Patient continues to work with therapy for safe mobility, strengthening, balance, endurance.  Patient injured her toenail on right foot second toe.  Toenail removed, cleaned with wound cleanser and wrapped with bandage.  Wound care to follow.  Discussed diet and insulin.  .  Tolerating adequate amounts of fluid.  No concerns per nursing    4/10/24.  Patient states doing well today.  Continues to work with therapy for mobility and transferring.  Wound care continues to right second toe.  DM stable .  Scant amount of drainage from right knee.  No F/C.  No concerns per nursing    24 Patient seen and evaluated s/p readmission.  Patient complained of right knee pain yesterday and demanded to go to the emergency department for evaluation. States his pcp was giving him Ultram at a higher dose and more frequently than what he is receiving at facility. Patient returned with ABX for right knee infection,  No additional pain medication.  DM stable .  No F/C/N/V/D.  No concerns per nursing    4/15/24.  Patient continues to work towards goals for  mobility, strengthening, transferring.  SW has been working with patient for discharge- scheduled for 4/17.  Patient refusing home care and OP therapy.  States he is going to try to care for himself. Disc LTC, but due to financial concerns f/u patient is not qualified for SW. Patient rolled out of bed earlier this morning - no injuries were reported. DSD maintained to right second toe.  DM stable- BG 74          Objective  Vital signs:  134/86-97.8-88-18-97%    Physical Exam  Constitutional:       Appearance: Normal appearance. He is obese.   HENT:      Head: Normocephalic.      Mouth/Throat:      Mouth: Mucous membranes are moist.   Eyes:      Extraocular Movements: Extraocular movements intact.   Cardiovascular:      Rate and Rhythm: Normal rate and regular rhythm.      Pulses: Normal pulses.      Heart sounds: Normal heart sounds.   Pulmonary:      Effort: Pulmonary effort is normal.      Breath sounds: Normal breath sounds.   Abdominal:      General: Bowel sounds are normal.      Palpations: Abdomen is soft.   Musculoskeletal:         General: Normal range of motion.      Cervical back: Normal range of motion and neck supple.      Right lower leg: Edema present.      Left lower leg: Edema present.      Comments:   Generalized weakness   Skin:     General: Skin is warm and dry.      Capillary Refill: Capillary refill takes less than 2 seconds.      Comments:  right second toenail injury - right knee wound scabbed   Neurological:      Mental Status: He is alert and oriented to person, place, and time.   Psychiatric:         Mood and Affect: Mood normal.         Behavior: Behavior normal.         Assessment/Plan  Problem List Items Addressed This Visit       Infection of total right knee replacement (CMS-HCC)      continue abx   Wound care to right knee   Monitor for fever, chills   monitor CBC   pain management   elevation         Generalized edema      Reinforced elevation BLE   Noncompliant with Ace wraps    Torsemide   monitor         Difficulty in walking, not elsewhere classified      mobility with wheelchair and  walker    therapy completed   refusing OP therapy         Diabetes mellitus type 2 without retinopathy (Multi)      Stable   BG 74   Humalog, Lantus   Noncompliant with diet   monitor BG         Chronic systolic (congestive) heart failure (Multi)      Stable   no SOB, Rales   BLE edema   Entresto, torsemide   monitor weights   fluid restriction   SIMA diet           Injury of toenail of right foot      Patient removed right foot second toe nail   evaluated by wound care   cleanse with wound cleanser, pat dry, apply mupirocin 2% and cover with DSD.  Change daily and as needed         Inadequate pain control      continues taking tramadol and Tylenol   Discussed pain management again - patient refuses         At risk for readmission to hospital - Primary      concern for patient being at high risk for readmission   refused home care and OP therapy   Patient states he will be caring for himself in his apartment   Multiple discussions with MSW regarding spending down to qualify for LTC - patient refuses   Discussed med alert device          Medications, treatments, and labs reviewed  Continue medications and treatments as listed in EMR    GLADIS Martinez      Electronically Signed By: GLADIS Martinez   5/13/24  9:08 PM

## 2024-04-16 LAB
BACTERIA BLD CULT: NORMAL
BACTERIA BLD CULT: NORMAL

## 2024-04-16 NOTE — PROGRESS NOTES
HISTORY & PHYSICAL    Subjective   Chief complaint: Kody Choi is a 53 y.o. adult who is a acute skilled care patient being seen and evaluated for multiple medical problems.  Patient presents for weakness    HPI:  Patient is a 53 year old male with a past medical history of diabetes, astigmatism, and weakness. Patient was admitted to the nursing facility after being in the hospital. Patient presented to the ED for scrotal pain and edema. Patient is receiving IV antibiotics. Patient had PICC line placed. Patient was given Zosyn and vancomycin. Patient was evaluated and discharged to the SNF.      Past Medical History:   Diagnosis Date    Cellulitis of scrotum 02/27/2024    Personal history of other endocrine, nutritional and metabolic disease     History of type 2 diabetes mellitus    Unspecified astigmatism, bilateral 01/04/2016    Astigmatism, bilateral       Past Surgical History:   Procedure Laterality Date    KNEE SURGERY  10/29/2014    Knee Surgery       No family history on file.    Social History     Socioeconomic History    Marital status: Single     Spouse name: Not on file    Number of children: Not on file    Years of education: Not on file    Highest education level: Not on file   Occupational History    Not on file   Tobacco Use    Smoking status: Never     Passive exposure: Never    Smokeless tobacco: Never   Substance and Sexual Activity    Alcohol use: Not on file    Drug use: Not on file    Sexual activity: Not on file   Other Topics Concern    Not on file   Social History Narrative    Not on file     Social Determinants of Health     Financial Resource Strain: Low Risk  (2/28/2024)    Overall Financial Resource Strain (CARDIA)     Difficulty of Paying Living Expenses: Not hard at all   Food Insecurity: At Risk (10/8/2020)    Received from [facility]     Food Insecurity     Food: 2   Transportation Needs: No Transportation Needs (2/28/2024)    PRAPARE - Transportation     Lack of Transportation  (Medical): No     Lack of Transportation (Non-Medical): No   Physical Activity: At Risk (10/8/2020)    Received from [facility]     Physical Activity     Physical Activity: 2   Stress: Not on File (10/8/2020)    Received from [facility]     Stress     Stress: 0   Social Connections: At Risk (10/8/2020)    Received from [facility]     Social Connections     Social Connections and Isolation: 2   Intimate Partner Violence: Not on file   Housing Stability: Low Risk  (2/28/2024)    Housing Stability Vital Sign     Unable to Pay for Housing in the Last Year: No     Number of Places Lived in the Last Year: 2     Unstable Housing in the Last Year: No       Vital signs: 147/98, 97.6, 99, 20, 117.0, 98%    Objective   Physical Exam  Constitutional:       Appearance: He is obese.   HENT:      Head: Normocephalic and atraumatic.      Nose: Nose normal.      Mouth/Throat:      Mouth: Mucous membranes are moist.      Pharynx: Oropharynx is clear.   Eyes:      Extraocular Movements: Extraocular movements intact.      Pupils: Pupils are equal, round, and reactive to light.   Cardiovascular:      Rate and Rhythm: Normal rate and regular rhythm.   Pulmonary:      Effort: No respiratory distress.      Breath sounds: Normal breath sounds. No wheezing, rhonchi or rales.   Abdominal:      General: Bowel sounds are normal. There is no distension.      Palpations: Abdomen is soft.      Tenderness: There is no abdominal tenderness. There is no guarding.   Musculoskeletal:      Right lower leg: No edema.      Left lower leg: No edema.   Skin:     General: Skin is warm and dry.   Neurological:      Mental Status: He is alert and oriented to person, place, and time. Mental status is at baseline.         Assessment/Plan   Problem List Items Addressed This Visit       Hyperlipidemia     Continue current medications          Gastroesophageal reflux disease     Continue current medications          Diabetes mellitus type 2 without retinopathy (Multi)      Continue current medications          Chronic systolic (congestive) heart failure (Multi)     Continue current medications          Paroxysmal atrial fibrillation (Multi)     Continue current medications          Arteriosclerosis of coronary artery - Primary     Continue current medications           Medications, treatments, and labs reviewed  Continue medications and treatments as listed in [facility]    Scribe Attestation  IJen Scribe   attest that this documentation has been prepared under the direction and in the presence of Lyndon Metzger DO.    Provider Attestation - Scribe documentation  All medical record entries made by the Scribe were at my direction and personally dictated by me. I have reviewed the chart and agree that the record accurately reflects my personal performance of the history, physical exam, discussion and plan.    Lyndon Metzger DO

## 2024-04-17 ENCOUNTER — NURSING HOME VISIT (OUTPATIENT)
Dept: POST ACUTE CARE | Facility: EXTERNAL LOCATION | Age: 54
End: 2024-04-17
Payer: COMMERCIAL

## 2024-04-17 DIAGNOSIS — I50.22 CHRONIC SYSTOLIC (CONGESTIVE) HEART FAILURE (MULTI): ICD-10-CM

## 2024-04-17 DIAGNOSIS — E11.9 DIABETES MELLITUS TYPE 2 WITHOUT RETINOPATHY (MULTI): ICD-10-CM

## 2024-04-17 DIAGNOSIS — Z91.89 AT RISK FOR READMISSION TO HOSPITAL: Primary | ICD-10-CM

## 2024-04-17 DIAGNOSIS — R26.2 DIFFICULTY IN WALKING, NOT ELSEWHERE CLASSIFIED: ICD-10-CM

## 2024-04-17 DIAGNOSIS — T84.53XD INFECTION OF TOTAL RIGHT KNEE REPLACEMENT, SUBSEQUENT ENCOUNTER: ICD-10-CM

## 2024-04-17 DIAGNOSIS — R60.1 GENERALIZED EDEMA: ICD-10-CM

## 2024-04-17 DIAGNOSIS — S99.921D INJURY OF TOENAIL OF RIGHT FOOT, SUBSEQUENT ENCOUNTER: ICD-10-CM

## 2024-04-17 PROCEDURE — 99309 SBSQ NF CARE MODERATE MDM 30: CPT

## 2024-04-17 NOTE — LETTER
Patient: Kody Choi  : 1970    Encounter Date: 2024    PROGRESS NOTE    Subjective  Chief complaint: Kody Choi is a 54 y.o. adult who is an acute skilled patient being seen and evaluated for weakness    HPI:  24 Patient seen and evaluated s/p readmission after IPA for dehydration and confusion.   Remains on ABX for right knee infection.  Wounds continue to drain serous fluid.  No F/C/N/V/D, SOB, cough.  Continuous O2 maintained. .  Patient to restart therapy.  No concerns from nursing.  No concerns from nursing    4/3/24  Patient continues to work with therapy for mobility and ADLs  Wound care continues to right knee  Wound care continues to right knee.  Tolerating ABX well.  Reinforced elevation..  Patient more compliant with insulin when needed.  BG 98.  Noncompliant with DM management.  No F/C/N/ V/D.  No concerns per nursing    24 Patient continues to work with therapy for safe mobility, strengthening, balance, endurance.  Patient injured her toenail on right foot second toe.  Toenail removed, cleaned with wound cleanser and wrapped with bandage.  Wound care to follow.  Discussed diet and insulin.  .  Tolerating adequate amounts of fluid.  No concerns per nursing    4/10/24.  Patient states doing well today.  Continues to work with therapy for mobility and transferring.  Wound care continues to right second toe.  DM stable .  Scant amount of drainage from right knee.  No F/C.  No concerns per nursing    24 Patient seen and evaluated s/p readmission.  Patient complained of right knee pain yesterday and demanded to go to the emergency department for evaluation. States his pcp was giving him Ultram at a higher dose and more frequently than what he is receiving at facility. Patient returned with ABX for right knee infection,  No additional pain medication.  DM stable .  No F/C/N/V/D.  No concerns per nursing    4/15/24.  Patient continues to work towards goals for  mobility, strengthening, transferring.  SW has been working with patient for discharge- scheduled for 4/17.  Patient refusing home care and OP therapy.  States he is going to try to care for himself. Disc LTC, but due to financial concerns f/u patient is not qualified for SW. Patient rolled out of bed earlier this morning - no injuries were reported. DSD maintained to right second toe.  DM stable- BG 74    4/17/24.  Discussed home-going instructions with patient today along with medications.  Patient continues to refuse home care and OP therapy.  Discussed safety issues and concerns.  Patient adamant that he wants to go home without assistance.  States he will managed with wound care.  Acknowledges S/S to return to ED. DM stable BG 70.  No concerns per nursing          Objective  Vital signs:  130/82-97.8-94-18-97%    Physical Exam  Constitutional:       Appearance: Normal appearance. He is obese.   HENT:      Head: Normocephalic.      Mouth/Throat:      Mouth: Mucous membranes are moist.   Eyes:      Extraocular Movements: Extraocular movements intact.   Cardiovascular:      Rate and Rhythm: Normal rate and regular rhythm.      Pulses: Normal pulses.      Heart sounds: Normal heart sounds.   Pulmonary:      Effort: Pulmonary effort is normal.      Breath sounds: Normal breath sounds.   Abdominal:      General: Bowel sounds are normal.      Palpations: Abdomen is soft.   Musculoskeletal:         General: Normal range of motion.      Cervical back: Normal range of motion and neck supple.      Right lower leg: Edema present.      Left lower leg: Edema present.      Comments:  generalized weakness   Skin:     General: Skin is warm and dry.      Capillary Refill: Capillary refill takes less than 2 seconds.      Comments:  open wounds to right knee, right second toenail   Neurological:      Mental Status: He is alert and oriented to person, place, and time.   Psychiatric:         Mood and Affect: Mood normal.          Behavior: Behavior normal.         Assessment/Plan  Problem List Items Addressed This Visit       Infection of total right knee replacement (CMS-HCC)      continue abx   Wound care to right knee   Monitor for fever, chills   monitor CBC   pain management   elevation         Generalized edema      Reinforced elevation BLE   Noncompliant with Ace wraps   Torsemide   monitor         Difficulty in walking, not elsewhere classified      mobility with wheelchair and  walker    therapy completed   refusing OP therapy         Diabetes mellitus type 2 without retinopathy (Multi)      Stable   BG 70   Humalog, Lantus   Noncompliant with diet   monitor BG         Chronic systolic (congestive) heart failure (Multi)      Stable   no SOB, Rales   BLE edema   Entresto, torsemide   monitor weights   fluid restriction   SIMA diet           Injury of toenail of right foot      Patient removed right foot second toe nail   evaluated by wound care   cleanse with wound cleanser, pat dry, apply mupirocin 2% and cover with DSD.  Change daily and as needed         At risk for readmission to hospital - Primary      concern for patient being at high risk for readmission   refused home care and OP therapy   Patient states he will be caring for himself in his apartment   Patient does not have any assistance in the home to assist with ADLs or wound care   Multiple discussions with MSW regarding spending down to qualify for LTC - patient refuses   Discussed med alert device          Medications, treatments, and labs reviewed  Continue medications and treatments as listed in EMR    GLADIS Martinez      Electronically Signed By: GLADIS Martinez   5/13/24  9:15 PM

## 2024-04-18 ENCOUNTER — APPOINTMENT (OUTPATIENT)
Dept: RADIOLOGY | Facility: HOSPITAL | Age: 54
DRG: 560 | End: 2024-04-18
Payer: COMMERCIAL

## 2024-04-18 ENCOUNTER — HOSPITAL ENCOUNTER (INPATIENT)
Facility: HOSPITAL | Age: 54
LOS: 4 days | Discharge: HOME | DRG: 560 | End: 2024-04-25
Attending: EMERGENCY MEDICINE | Admitting: STUDENT IN AN ORGANIZED HEALTH CARE EDUCATION/TRAINING PROGRAM
Payer: COMMERCIAL

## 2024-04-18 DIAGNOSIS — R62.7 FAILURE TO THRIVE IN ADULT: ICD-10-CM

## 2024-04-18 DIAGNOSIS — B95.62 METHICILLIN RESISTANT STAPHYLOCOCCUS AUREUS INFECTION AS THE CAUSE OF DISEASES CLASSIFIED ELSEWHERE: ICD-10-CM

## 2024-04-18 DIAGNOSIS — J90 PLEURAL EFFUSION, NOT ELSEWHERE CLASSIFIED: ICD-10-CM

## 2024-04-18 DIAGNOSIS — Z86.73 PERSONAL HISTORY OF TRANSIENT ISCHEMIC ATTACK (TIA), AND CEREBRAL INFARCTION WITHOUT RESIDUAL DEFICITS: ICD-10-CM

## 2024-04-18 DIAGNOSIS — E78.5 HYPERLIPIDEMIA: ICD-10-CM

## 2024-04-18 DIAGNOSIS — R60.1 GENERALIZED EDEMA: ICD-10-CM

## 2024-04-18 DIAGNOSIS — I50.22 CHRONIC SYSTOLIC (CONGESTIVE) HEART FAILURE (MULTI): ICD-10-CM

## 2024-04-18 DIAGNOSIS — I48.0 PAROXYSMAL ATRIAL FIBRILLATION (MULTI): ICD-10-CM

## 2024-04-18 DIAGNOSIS — R53.81 PHYSICAL DECONDITIONING: ICD-10-CM

## 2024-04-18 DIAGNOSIS — I21.02 STEMI INVOLVING LEFT ANTERIOR DESCENDING CORONARY ARTERY (MULTI): ICD-10-CM

## 2024-04-18 DIAGNOSIS — E66.01 MORBID OBESITY (MULTI): ICD-10-CM

## 2024-04-18 DIAGNOSIS — L03.115 CELLULITIS OF RIGHT LOWER EXTREMITY: ICD-10-CM

## 2024-04-18 DIAGNOSIS — T84.59XA CHRONIC INFECTION OF KNEE JOINT PROSTHESIS, INITIAL ENCOUNTER (CMS-HCC): Primary | ICD-10-CM

## 2024-04-18 DIAGNOSIS — T84.50XS PROSTHETIC JOINT INFECTION, SEQUELA: ICD-10-CM

## 2024-04-18 DIAGNOSIS — R26.2 DIFFICULTY IN WALKING, NOT ELSEWHERE CLASSIFIED: ICD-10-CM

## 2024-04-18 DIAGNOSIS — F17.210 NICOTINE DEPENDENCE, CIGARETTES, UNCOMPLICATED: ICD-10-CM

## 2024-04-18 DIAGNOSIS — Z86.16 PERSONAL HISTORY OF COVID-19: ICD-10-CM

## 2024-04-18 DIAGNOSIS — Z96.651 STATUS POST TOTAL RIGHT KNEE REPLACEMENT: ICD-10-CM

## 2024-04-18 DIAGNOSIS — I25.10 ARTERIOSCLEROSIS OF CORONARY ARTERY: ICD-10-CM

## 2024-04-18 DIAGNOSIS — E87.21 ACUTE METABOLIC ACIDOSIS: ICD-10-CM

## 2024-04-18 DIAGNOSIS — K21.9 GASTROESOPHAGEAL REFLUX DISEASE: ICD-10-CM

## 2024-04-18 DIAGNOSIS — M54.50 CHRONIC LOWER BACK PAIN: ICD-10-CM

## 2024-04-18 DIAGNOSIS — M62.81 MUSCLE WEAKNESS (GENERALIZED): ICD-10-CM

## 2024-04-18 DIAGNOSIS — G89.29 CHRONIC LOWER BACK PAIN: ICD-10-CM

## 2024-04-18 DIAGNOSIS — M00.9 PYOGENIC ARTHRITIS OF RIGHT KNEE JOINT, DUE TO UNSPECIFIED ORGANISM (MULTI): ICD-10-CM

## 2024-04-18 DIAGNOSIS — Z95.1 S/P CABG X 1: ICD-10-CM

## 2024-04-18 DIAGNOSIS — M75.41 IMPINGEMENT SYNDROME OF RIGHT SHOULDER: ICD-10-CM
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
## 2024-04-18 DIAGNOSIS — Z96.651 PRESENCE OF RIGHT ARTIFICIAL KNEE JOINT: ICD-10-CM

## 2024-04-18 DIAGNOSIS — R60.9 PERIPHERAL EDEMA: ICD-10-CM

## 2024-04-18 DIAGNOSIS — D64.9 CHRONIC ANEMIA: ICD-10-CM

## 2024-04-18 DIAGNOSIS — E11.65 TYPE 2 DIABETES MELLITUS WITH HYPERGLYCEMIA (MULTI): ICD-10-CM

## 2024-04-18 DIAGNOSIS — G47.33 OBSTRUCTIVE SLEEP APNEA (ADULT) (PEDIATRIC): ICD-10-CM

## 2024-04-18 DIAGNOSIS — T84.53XA: ICD-10-CM

## 2024-04-18 DIAGNOSIS — M19.90 ARTHRITIS: ICD-10-CM

## 2024-04-18 DIAGNOSIS — M19.90 DEGENERATIVE JOINT DISEASE: ICD-10-CM

## 2024-04-18 DIAGNOSIS — M79.89 SWELLING OF RIGHT UPPER EXTREMITY: ICD-10-CM

## 2024-04-18 DIAGNOSIS — H26.491 PCO (POSTERIOR CAPSULAR OPACIFICATION), RIGHT: ICD-10-CM

## 2024-04-18 DIAGNOSIS — J18.9 PNEUMONIA, UNSPECIFIED ORGANISM: ICD-10-CM

## 2024-04-18 DIAGNOSIS — E11.9 DIABETES MELLITUS TYPE 2 WITHOUT RETINOPATHY (MULTI): ICD-10-CM

## 2024-04-18 DIAGNOSIS — I82.90 VENOUS THROMBOSIS: ICD-10-CM

## 2024-04-18 DIAGNOSIS — Z95.5 S/P CORONARY ARTERY STENT PLACEMENT: ICD-10-CM

## 2024-04-18 DIAGNOSIS — Z96.659 CHRONIC INFECTION OF KNEE JOINT PROSTHESIS, INITIAL ENCOUNTER (CMS-HCC): Primary | ICD-10-CM

## 2024-04-18 LAB
ALBUMIN SERPL BCP-MCNC: 3 G/DL (ref 3.4–5)
ALP SERPL-CCNC: 205 U/L (ref 33–120)
ALT SERPL W P-5'-P-CCNC: 10 U/L (ref 7–52)
ANION GAP SERPL CALC-SCNC: 14 MMOL/L (ref 10–20)
APTT PPP: 37 SECONDS (ref 27–38)
AST SERPL W P-5'-P-CCNC: 21 U/L (ref 9–39)
BASOPHILS # BLD AUTO: 0.09 X10*3/UL (ref 0–0.1)
BASOPHILS NFR BLD AUTO: 1.1 %
BILIRUB SERPL-MCNC: 0.9 MG/DL (ref 0–1.2)
BUN SERPL-MCNC: 26 MG/DL (ref 6–23)
CALCIUM SERPL-MCNC: 8.4 MG/DL (ref 8.6–10.6)
CHLORIDE SERPL-SCNC: 104 MMOL/L (ref 98–107)
CO2 SERPL-SCNC: 28 MMOL/L (ref 21–32)
CREAT SERPL-MCNC: 1.75 MG/DL (ref 0.5–1.3)
CRP SERPL-MCNC: 5.7 MG/DL
CRP SERPL-MCNC: 5.91 MG/DL
EGFRCR SERPLBLD CKD-EPI 2021: 34 ML/MIN/1.73M*2
EOSINOPHIL # BLD AUTO: 0.23 X10*3/UL (ref 0–0.7)
EOSINOPHIL NFR BLD AUTO: 2.9 %
ERYTHROCYTE [DISTWIDTH] IN BLOOD BY AUTOMATED COUNT: 24.7 % (ref 11.5–14.5)
ERYTHROCYTE [SEDIMENTATION RATE] IN BLOOD BY WESTERGREN METHOD: 107 MM/H (ref 0–30)
EST. AVERAGE GLUCOSE BLD GHB EST-MCNC: 177 MG/DL
GLUCOSE BLD MANUAL STRIP-MCNC: 117 MG/DL (ref 74–99)
GLUCOSE BLD MANUAL STRIP-MCNC: 145 MG/DL (ref 74–99)
GLUCOSE BLD MANUAL STRIP-MCNC: 150 MG/DL (ref 74–99)
GLUCOSE BLD MANUAL STRIP-MCNC: 166 MG/DL (ref 74–99)
GLUCOSE BLD MANUAL STRIP-MCNC: 169 MG/DL (ref 74–99)
GLUCOSE BLD MANUAL STRIP-MCNC: 251 MG/DL (ref 74–99)
GLUCOSE SERPL-MCNC: 170 MG/DL (ref 74–99)
HBA1C MFR BLD: 7.8 %
HCT VFR BLD AUTO: 37 % (ref 36–52)
HGB BLD-MCNC: 11.1 G/DL (ref 12–17.5)
HYPOCHROMIA BLD QL SMEAR: NORMAL
IMM GRANULOCYTES # BLD AUTO: 0.02 X10*3/UL (ref 0–0.7)
IMM GRANULOCYTES NFR BLD AUTO: 0.3 % (ref 0–0.9)
INR PPP: 1.4 (ref 0.9–1.1)
LACTATE SERPL-SCNC: 2 MMOL/L (ref 0.4–2)
LYMPHOCYTES # BLD AUTO: 1.27 X10*3/UL (ref 1.2–4.8)
LYMPHOCYTES NFR BLD AUTO: 16.1 %
MCH RBC QN AUTO: 22.7 PG (ref 26–34)
MCHC RBC AUTO-ENTMCNC: 30 G/DL (ref 32–36)
MCV RBC AUTO: 76 FL (ref 80–100)
MONOCYTES # BLD AUTO: 0.73 X10*3/UL (ref 0.1–1)
MONOCYTES NFR BLD AUTO: 9.3 %
NEUTROPHILS # BLD AUTO: 5.55 X10*3/UL (ref 1.2–7.7)
NEUTROPHILS NFR BLD AUTO: 70.3 %
NRBC BLD-RTO: 0 /100 WBCS (ref 0–0)
PLATELET # BLD AUTO: 428 X10*3/UL (ref 150–450)
POTASSIUM SERPL-SCNC: 4.1 MMOL/L (ref 3.5–5.3)
PROT SERPL-MCNC: 7.4 G/DL (ref 6.4–8.2)
PROTHROMBIN TIME: 16 SECONDS (ref 9.8–12.8)
RBC # BLD AUTO: 4.89 X10*6/UL (ref 4–5.9)
RBC MORPH BLD: NORMAL
SODIUM SERPL-SCNC: 142 MMOL/L (ref 136–145)
WBC # BLD AUTO: 7.9 X10*3/UL (ref 4.4–11.3)

## 2024-04-18 PROCEDURE — 71045 X-RAY EXAM CHEST 1 VIEW: CPT

## 2024-04-18 PROCEDURE — 85025 COMPLETE CBC W/AUTO DIFF WBC: CPT | Performed by: STUDENT IN AN ORGANIZED HEALTH CARE EDUCATION/TRAINING PROGRAM

## 2024-04-18 PROCEDURE — 96372 THER/PROPH/DIAG INJ SC/IM: CPT

## 2024-04-18 PROCEDURE — 97162 PT EVAL MOD COMPLEX 30 MIN: CPT | Mod: GP | Performed by: PHYSICAL THERAPIST

## 2024-04-18 PROCEDURE — 99285 EMERGENCY DEPT VISIT HI MDM: CPT | Performed by: EMERGENCY MEDICINE

## 2024-04-18 PROCEDURE — 36415 COLL VENOUS BLD VENIPUNCTURE: CPT

## 2024-04-18 PROCEDURE — 83036 HEMOGLOBIN GLYCOSYLATED A1C: CPT

## 2024-04-18 PROCEDURE — 85652 RBC SED RATE AUTOMATED: CPT | Performed by: STUDENT IN AN ORGANIZED HEALTH CARE EDUCATION/TRAINING PROGRAM

## 2024-04-18 PROCEDURE — 2500000002 HC RX 250 W HCPCS SELF ADMINISTERED DRUGS (ALT 637 FOR MEDICARE OP, ALT 636 FOR OP/ED)

## 2024-04-18 PROCEDURE — G0378 HOSPITAL OBSERVATION PER HR: HCPCS

## 2024-04-18 PROCEDURE — 85730 THROMBOPLASTIN TIME PARTIAL: CPT | Mod: 91

## 2024-04-18 PROCEDURE — 99223 1ST HOSP IP/OBS HIGH 75: CPT | Performed by: INTERNAL MEDICINE

## 2024-04-18 PROCEDURE — 82947 ASSAY GLUCOSE BLOOD QUANT: CPT | Mod: 91

## 2024-04-18 PROCEDURE — 96374 THER/PROPH/DIAG INJ IV PUSH: CPT | Mod: 59

## 2024-04-18 PROCEDURE — 73564 X-RAY EXAM KNEE 4 OR MORE: CPT | Mod: 50

## 2024-04-18 PROCEDURE — 2500000004 HC RX 250 GENERAL PHARMACY W/ HCPCS (ALT 636 FOR OP/ED)

## 2024-04-18 PROCEDURE — 82947 ASSAY GLUCOSE BLOOD QUANT: CPT

## 2024-04-18 PROCEDURE — 86140 C-REACTIVE PROTEIN: CPT | Performed by: STUDENT IN AN ORGANIZED HEALTH CARE EDUCATION/TRAINING PROGRAM

## 2024-04-18 PROCEDURE — 86140 C-REACTIVE PROTEIN: CPT | Mod: 91,MUE

## 2024-04-18 PROCEDURE — 87040 BLOOD CULTURE FOR BACTERIA: CPT

## 2024-04-18 PROCEDURE — 2500000001 HC RX 250 WO HCPCS SELF ADMINISTERED DRUGS (ALT 637 FOR MEDICARE OP)

## 2024-04-18 PROCEDURE — 99221 1ST HOSP IP/OBS SF/LOW 40: CPT | Performed by: INTERNAL MEDICINE

## 2024-04-18 PROCEDURE — 83605 ASSAY OF LACTIC ACID: CPT | Performed by: STUDENT IN AN ORGANIZED HEALTH CARE EDUCATION/TRAINING PROGRAM

## 2024-04-18 PROCEDURE — 80053 COMPREHEN METABOLIC PANEL: CPT | Performed by: STUDENT IN AN ORGANIZED HEALTH CARE EDUCATION/TRAINING PROGRAM

## 2024-04-18 PROCEDURE — 99285 EMERGENCY DEPT VISIT HI MDM: CPT | Mod: 25

## 2024-04-18 RX ORDER — TRAMADOL HYDROCHLORIDE 50 MG/1
50 TABLET ORAL EVERY 8 HOURS PRN
Status: DISCONTINUED | OUTPATIENT
Start: 2024-04-18 | End: 2024-04-18

## 2024-04-18 RX ORDER — ACETAMINOPHEN 650 MG/1
650 SUPPOSITORY RECTAL EVERY 4 HOURS PRN
Status: DISCONTINUED | OUTPATIENT
Start: 2024-04-18 | End: 2024-04-25 | Stop reason: HOSPADM

## 2024-04-18 RX ORDER — TORSEMIDE 20 MG/1
40 TABLET ORAL DAILY
Status: DISCONTINUED | OUTPATIENT
Start: 2024-04-18 | End: 2024-04-20

## 2024-04-18 RX ORDER — ACETAMINOPHEN 325 MG/1
650 TABLET ORAL EVERY 4 HOURS PRN
Status: DISCONTINUED | OUTPATIENT
Start: 2024-04-18 | End: 2024-04-25 | Stop reason: HOSPADM

## 2024-04-18 RX ORDER — GUAIFENESIN 600 MG/1
600 TABLET, EXTENDED RELEASE ORAL 2 TIMES DAILY
COMMUNITY

## 2024-04-18 RX ORDER — ATORVASTATIN CALCIUM 40 MG/1
40 TABLET, FILM COATED ORAL DAILY
Status: DISCONTINUED | OUTPATIENT
Start: 2024-04-18 | End: 2024-04-25 | Stop reason: HOSPADM

## 2024-04-18 RX ORDER — FLUTICASONE PROPIONATE 50 MCG
2 SPRAY, SUSPENSION (ML) NASAL DAILY PRN
Status: DISCONTINUED | OUTPATIENT
Start: 2024-04-18 | End: 2024-04-25 | Stop reason: HOSPADM

## 2024-04-18 RX ORDER — DOXYCYCLINE HYCLATE 100 MG
100 TABLET ORAL 2 TIMES DAILY
Status: ON HOLD | COMMUNITY
Start: 2024-03-31 | End: 2024-04-24

## 2024-04-18 RX ORDER — NYSTATIN 100000 [USP'U]/G
1 POWDER TOPICAL 2 TIMES DAILY
COMMUNITY

## 2024-04-18 RX ORDER — INSULIN GLARGINE 100 [IU]/ML
35 INJECTION, SOLUTION SUBCUTANEOUS NIGHTLY
Status: DISCONTINUED | OUTPATIENT
Start: 2024-04-18 | End: 2024-04-25 | Stop reason: HOSPADM

## 2024-04-18 RX ORDER — FUROSEMIDE 10 MG/ML
40 INJECTION INTRAMUSCULAR; INTRAVENOUS DAILY
Status: DISCONTINUED | OUTPATIENT
Start: 2024-04-18 | End: 2024-04-21

## 2024-04-18 RX ORDER — MUPIROCIN 20 MG/G
OINTMENT TOPICAL NIGHTLY
COMMUNITY

## 2024-04-18 RX ORDER — HEPARIN SODIUM 5000 [USP'U]/ML
5000 INJECTION, SOLUTION INTRAVENOUS; SUBCUTANEOUS EVERY 8 HOURS
Status: DISCONTINUED | OUTPATIENT
Start: 2024-04-18 | End: 2024-04-18

## 2024-04-18 RX ORDER — METOPROLOL SUCCINATE 50 MG/1
50 TABLET, EXTENDED RELEASE ORAL DAILY
Status: DISCONTINUED | OUTPATIENT
Start: 2024-04-18 | End: 2024-04-25 | Stop reason: HOSPADM

## 2024-04-18 RX ORDER — CEPHALEXIN 500 MG/1
500 CAPSULE ORAL EVERY 8 HOURS SCHEDULED
Status: DISCONTINUED | OUTPATIENT
Start: 2024-04-18 | End: 2024-04-19

## 2024-04-18 RX ORDER — PANTOPRAZOLE SODIUM 40 MG/1
40 TABLET, DELAYED RELEASE ORAL
Status: DISCONTINUED | OUTPATIENT
Start: 2024-04-18 | End: 2024-04-25 | Stop reason: HOSPADM

## 2024-04-18 RX ORDER — NAPROXEN SODIUM 220 MG/1
81 TABLET, FILM COATED ORAL DAILY
Status: DISCONTINUED | OUTPATIENT
Start: 2024-04-18 | End: 2024-04-25 | Stop reason: HOSPADM

## 2024-04-18 RX ORDER — HEPARIN SODIUM 5000 [USP'U]/ML
7500 INJECTION, SOLUTION INTRAVENOUS; SUBCUTANEOUS EVERY 8 HOURS
Status: DISCONTINUED | OUTPATIENT
Start: 2024-04-18 | End: 2024-04-18

## 2024-04-18 RX ORDER — INSULIN LISPRO 100 [IU]/ML
0-5 INJECTION, SOLUTION INTRAVENOUS; SUBCUTANEOUS
Status: DISCONTINUED | OUTPATIENT
Start: 2024-04-18 | End: 2024-04-25 | Stop reason: HOSPADM

## 2024-04-18 RX ORDER — IPRATROPIUM BROMIDE AND ALBUTEROL SULFATE 2.5; .5 MG/3ML; MG/3ML
3 SOLUTION RESPIRATORY (INHALATION) EVERY 6 HOURS PRN
COMMUNITY

## 2024-04-18 RX ORDER — ACETAMINOPHEN 160 MG/5ML
650 SOLUTION ORAL EVERY 4 HOURS PRN
Status: DISCONTINUED | OUTPATIENT
Start: 2024-04-18 | End: 2024-04-25 | Stop reason: HOSPADM

## 2024-04-18 RX ORDER — INSULIN LISPRO 100 [IU]/ML
0-5 INJECTION, SOLUTION INTRAVENOUS; SUBCUTANEOUS EVERY 4 HOURS
Status: DISCONTINUED | OUTPATIENT
Start: 2024-04-18 | End: 2024-04-19

## 2024-04-18 RX ORDER — TRAMADOL HYDROCHLORIDE 50 MG/1
50 TABLET ORAL EVERY 8 HOURS PRN
Status: DISCONTINUED | OUTPATIENT
Start: 2024-04-18 | End: 2024-04-25 | Stop reason: HOSPADM

## 2024-04-18 RX ORDER — SENNOSIDES 8.6 MG/1
2 TABLET ORAL 2 TIMES DAILY
Status: DISCONTINUED | OUTPATIENT
Start: 2024-04-18 | End: 2024-04-25 | Stop reason: HOSPADM

## 2024-04-18 RX ADMIN — SENNOSIDES 17.2 MG: 8.6 TABLET, FILM COATED ORAL at 09:37

## 2024-04-18 RX ADMIN — PANTOPRAZOLE SODIUM 40 MG: 40 TABLET, DELAYED RELEASE ORAL at 09:50

## 2024-04-18 RX ADMIN — HEPARIN SODIUM 7500 UNITS: 5000 INJECTION INTRAVENOUS; SUBCUTANEOUS at 09:50

## 2024-04-18 RX ADMIN — METOPROLOL SUCCINATE 50 MG: 50 TABLET, FILM COATED, EXTENDED RELEASE ORAL at 09:37

## 2024-04-18 RX ADMIN — ATORVASTATIN CALCIUM 40 MG: 40 TABLET, FILM COATED ORAL at 09:37

## 2024-04-18 RX ADMIN — SACUBITRIL AND VALSARTAN 1 TABLET: 49; 51 TABLET, FILM COATED ORAL at 09:37

## 2024-04-18 RX ADMIN — FUROSEMIDE 40 MG: 10 INJECTION, SOLUTION INTRAVENOUS at 11:35

## 2024-04-18 RX ADMIN — INSULIN LISPRO 1 UNITS: 100 INJECTION, SOLUTION INTRAVENOUS; SUBCUTANEOUS at 11:32

## 2024-04-18 RX ADMIN — TORSEMIDE 40 MG: 20 TABLET ORAL at 09:37

## 2024-04-18 RX ADMIN — CEPHALEXIN 500 MG: 500 CAPSULE ORAL at 21:41

## 2024-04-18 RX ADMIN — INSULIN GLARGINE 35 UNITS: 100 INJECTION, SOLUTION SUBCUTANEOUS at 21:40

## 2024-04-18 RX ADMIN — APIXABAN 5 MG: 5 TABLET, FILM COATED ORAL at 14:34

## 2024-04-18 RX ADMIN — CEPHALEXIN 500 MG: 500 CAPSULE ORAL at 09:50

## 2024-04-18 SDOH — SOCIAL STABILITY: SOCIAL INSECURITY: WERE YOU ABLE TO COMPLETE ALL THE BEHAVIORAL HEALTH SCREENINGS?: YES

## 2024-04-18 SDOH — SOCIAL STABILITY: SOCIAL INSECURITY: ARE YOU OR HAVE YOU BEEN THREATENED OR ABUSED PHYSICALLY, EMOTIONALLY, OR SEXUALLY BY ANYONE?: NO

## 2024-04-18 SDOH — SOCIAL STABILITY: SOCIAL INSECURITY: DO YOU FEEL ANYONE HAS EXPLOITED OR TAKEN ADVANTAGE OF YOU FINANCIALLY OR OF YOUR PERSONAL PROPERTY?: NO

## 2024-04-18 SDOH — SOCIAL STABILITY: SOCIAL INSECURITY: HAVE YOU HAD THOUGHTS OF HARMING ANYONE ELSE?: NO

## 2024-04-18 SDOH — SOCIAL STABILITY: SOCIAL INSECURITY: HAS ANYONE EVER THREATENED TO HURT YOUR FAMILY OR YOUR PETS?: NO

## 2024-04-18 SDOH — SOCIAL STABILITY: SOCIAL INSECURITY: HAVE YOU HAD ANY THOUGHTS OF HARMING ANYONE ELSE?: NO

## 2024-04-18 SDOH — SOCIAL STABILITY: SOCIAL INSECURITY: DOES ANYONE TRY TO KEEP YOU FROM HAVING/CONTACTING OTHER FRIENDS OR DOING THINGS OUTSIDE YOUR HOME?: NO

## 2024-04-18 SDOH — SOCIAL STABILITY: SOCIAL INSECURITY: DO YOU FEEL UNSAFE GOING BACK TO THE PLACE WHERE YOU ARE LIVING?: NO

## 2024-04-18 SDOH — SOCIAL STABILITY: SOCIAL INSECURITY: ABUSE: ADULT

## 2024-04-18 SDOH — SOCIAL STABILITY: SOCIAL INSECURITY: ARE THERE ANY APPARENT SIGNS OF INJURIES/BEHAVIORS THAT COULD BE RELATED TO ABUSE/NEGLECT?: NO

## 2024-04-18 ASSESSMENT — COGNITIVE AND FUNCTIONAL STATUS - GENERAL
MOBILITY SCORE: 9
TOILETING: A LOT
PERSONAL GROOMING: A LOT
PERSONAL GROOMING: A LOT
MOVING FROM LYING ON BACK TO SITTING ON SIDE OF FLAT BED WITH BEDRAILS: A LOT
PERSONAL GROOMING: A LOT
STANDING UP FROM CHAIR USING ARMS: TOTAL
MOBILITY SCORE: 10
TOILETING: A LOT
WALKING IN HOSPITAL ROOM: TOTAL
CLIMB 3 TO 5 STEPS WITH RAILING: TOTAL
MOVING TO AND FROM BED TO CHAIR: A LOT
MOVING FROM LYING ON BACK TO SITTING ON SIDE OF FLAT BED WITH BEDRAILS: A LOT
WALKING IN HOSPITAL ROOM: TOTAL
HELP NEEDED FOR BATHING: A LOT
EATING MEALS: A LOT
MOVING TO AND FROM BED TO CHAIR: A LOT
MOVING FROM LYING ON BACK TO SITTING ON SIDE OF FLAT BED WITH BEDRAILS: A LOT
TURNING FROM BACK TO SIDE WHILE IN FLAT BAD: A LITTLE
DRESSING REGULAR LOWER BODY CLOTHING: A LOT
STANDING UP FROM CHAIR USING ARMS: TOTAL
DRESSING REGULAR UPPER BODY CLOTHING: A LOT
DRESSING REGULAR UPPER BODY CLOTHING: A LOT
CLIMB 3 TO 5 STEPS WITH RAILING: TOTAL
DRESSING REGULAR LOWER BODY CLOTHING: A LOT
TURNING FROM BACK TO SIDE WHILE IN FLAT BAD: A LITTLE
DRESSING REGULAR LOWER BODY CLOTHING: A LOT
TURNING FROM BACK TO SIDE WHILE IN FLAT BAD: A LITTLE
TURNING FROM BACK TO SIDE WHILE IN FLAT BAD: A LITTLE
MOVING FROM LYING ON BACK TO SITTING ON SIDE OF FLAT BED WITH BEDRAILS: A LOT
MOVING TO AND FROM BED TO CHAIR: TOTAL
WALKING IN HOSPITAL ROOM: TOTAL
STANDING UP FROM CHAIR USING ARMS: TOTAL
HELP NEEDED FOR BATHING: A LOT
DRESSING REGULAR UPPER BODY CLOTHING: A LOT
WALKING IN HOSPITAL ROOM: TOTAL
PATIENT BASELINE BEDBOUND: NO
EATING MEALS: A LOT
HELP NEEDED FOR BATHING: A LOT
DAILY ACTIVITIY SCORE: 12
MOBILITY SCORE: 10
EATING MEALS: A LOT
MOVING TO AND FROM BED TO CHAIR: A LOT
MOBILITY SCORE: 10
STANDING UP FROM CHAIR USING ARMS: TOTAL
DAILY ACTIVITIY SCORE: 12
DAILY ACTIVITIY SCORE: 12
TOILETING: A LOT
CLIMB 3 TO 5 STEPS WITH RAILING: TOTAL
CLIMB 3 TO 5 STEPS WITH RAILING: TOTAL

## 2024-04-18 ASSESSMENT — LIFESTYLE VARIABLES
HOW OFTEN DO YOU HAVE 6 OR MORE DRINKS ON ONE OCCASION: NEVER
HOW MANY STANDARD DRINKS CONTAINING ALCOHOL DO YOU HAVE ON A TYPICAL DAY: PATIENT DOES NOT DRINK
SUBSTANCE_ABUSE_PAST_12_MONTHS: NO
PRESCIPTION_ABUSE_PAST_12_MONTHS: NO
HAVE PEOPLE ANNOYED YOU BY CRITICIZING YOUR DRINKING: NO
EVER HAD A DRINK FIRST THING IN THE MORNING TO STEADY YOUR NERVES TO GET RID OF A HANGOVER: NO
TOTAL SCORE: 0
HOW OFTEN DO YOU HAVE A DRINK CONTAINING ALCOHOL: NEVER
AUDIT-C TOTAL SCORE: 0
AUDIT-C TOTAL SCORE: 0
SKIP TO QUESTIONS 9-10: 1
EVER FELT BAD OR GUILTY ABOUT YOUR DRINKING: NO
HAVE YOU EVER FELT YOU SHOULD CUT DOWN ON YOUR DRINKING: NO

## 2024-04-18 ASSESSMENT — ENCOUNTER SYMPTOMS
ENDOCRINE NEGATIVE: 1
GASTROINTESTINAL NEGATIVE: 1
JOINT SWELLING: 1
FEVER: 0
PALPITATIONS: 0
RESPIRATORY NEGATIVE: 1
SHORTNESS OF BREATH: 0
CHILLS: 0
EYES NEGATIVE: 1

## 2024-04-18 ASSESSMENT — ACTIVITIES OF DAILY LIVING (ADL)
GROOMING: NEEDS ASSISTANCE
LACK_OF_TRANSPORTATION: NO
HEARING - RIGHT EAR: FUNCTIONAL
DRESSING YOURSELF: NEEDS ASSISTANCE
JUDGMENT_ADEQUATE_SAFELY_COMPLETE_DAILY_ACTIVITIES: YES
ADEQUATE_TO_COMPLETE_ADL: YES
FEEDING YOURSELF: NEEDS ASSISTANCE
TOILETING: NEEDS ASSISTANCE
BATHING: NEEDS ASSISTANCE
WALKS IN HOME: NEEDS ASSISTANCE
HEARING - LEFT EAR: FUNCTIONAL
PATIENT'S MEMORY ADEQUATE TO SAFELY COMPLETE DAILY ACTIVITIES?: NO

## 2024-04-18 ASSESSMENT — PAIN SCALES - GENERAL
PAINLEVEL_OUTOF10: 0 - NO PAIN
PAINLEVEL_OUTOF10: 0 - NO PAIN
PAINLEVEL_OUTOF10: 10 - WORST POSSIBLE PAIN
PAINLEVEL_OUTOF10: 0 - NO PAIN

## 2024-04-18 ASSESSMENT — PAIN - FUNCTIONAL ASSESSMENT
PAIN_FUNCTIONAL_ASSESSMENT: 0-10

## 2024-04-18 NOTE — PROGRESS NOTES
Pharmacy Medication History Review    Kody Choi is a 53 y.o. adult admitted for Prosthetic joint infection, sequela. Pharmacy reviewed the patient's qdryo-xm-lrhoieavi medications and allergies for accuracy.    The list below reflects the updated PTA list. Comments regarding how patient may be taking medications differently can be found in the Admit Orders Activity  Prior to Admission Medications   Prescriptions Last Dose Informant   acetaminophen (Tylenol) 325 mg tablet Unknown Other   Sig: Take 3 tablets (975 mg) by mouth every 8 hours if needed for headaches, mild pain (1 - 3), moderate pain (4 - 6) or fever (temp greater than 38.0 C).   apixaban (Eliquis) 5 mg tablet Unknown Other   Sig: Take 1 tablet (5 mg) by mouth 2 times a day.   aspirin 81 mg chewable tablet Unknown Other   Sig: Chew 1 tablet (81 mg) once daily. Do not start before March 2, 2024.   atorvastatin (Lipitor) 40 mg tablet Unknown Other   Sig: Take 1 tablet (40 mg) by mouth once daily. Take in evening.   doxycycline (Vibra-Tabs) 100 mg tablet Unknown Other   Sig: Take 1 tablet (100 mg) by mouth 2 times a day. Take with a full glass of water and do not lie down for at least 30 minutes after.   fluticasone (Flonase) 50 mcg/actuation nasal spray Unknown Other   Sig: Administer 2 sprays into each nostril once daily as needed for rhinitis. Shake gently. Before first use, prime pump. After use, clean tip and replace cap.   guaiFENesin (Mucinex) 600 mg 12 hr tablet Unknown Other   Sig: Take 1 tablet (600 mg) by mouth 2 times a day. Do not crush, chew, or split.   insulin glargine (Lantus) 100 unit/mL injection Unknown Other   Sig: Inject 35 Units under the skin once daily at bedtime. Take as directed per insulin instructions.   insulin lispro (HumaLOG) 100 unit/mL injection Unknown Other   Sig: Inject 0-0.1 mL (0-10 Units) under the skin 3 times a day with meals. Take as directed per insulin instructions.   Patient taking differently: Inject 3 units  "three times daily before meals per [facility] med list   ipratropium-albuteroL (Duo-Neb) 0.5-2.5 mg/3 mL nebulizer solution Unknown Other   Sig: Take 3 mL by nebulization every 6 hours if needed for shortness of breath.   metoprolol succinate XL (Toprol-XL) 50 mg 24 hr tablet Unknown Other   Sig: Take 1 tablet (50 mg) by mouth once daily. Do not crush or chew.   mupirocin (Bactroban) 2 % ointment Unknown Other   Sig: Apply topically once daily at bedtime. Apply to right 2nd toe topically every night   nystatin (Mycostatin) 100,000 unit/gram powder Unknown Other   Sig: Apply 1 Application topically 2 times a day.   omeprazole (PriLOSEC) 20 mg DR capsule Unknown Other   Sig: Take 1 capsule (20 mg) by mouth once daily in the morning. Take before meals.   pen needle, diabetic 31 gauge x 5/16\" needle Unknown Other   Sig: Use to inject 1-4 times daily as directed.   sacubitriL-valsartan (Entresto) 49-51 mg tablet Not Taking Other   Sig: Take 1 tablet by mouth 2 times a day.   Patient not taking: Reported on 4/18/2024 <- not on SNF med list   torsemide 40 mg tablet Unknown Other   Sig: Take 40 mg by mouth once daily.   traMADol (Ultram) 50 mg tablet Unknown Other   Sig: Take 1 tablet (50 mg) by mouth every 8 hours if needed for severe pain (7 - 10).      Facility-Administered Medications: None        The list below reflects the updated allergy list. Please review each documented allergy for additional clarification and justification.  Allergies  Reviewed by Leatha Gray RN on 4/18/2024        Severity Reactions Comments    Loratadine Medium Palpitations, Cough     Lisinopril Low Cough \"Really bad cough\"    Pollen Extracts Low Runny nose Sneezing, nasal congestion            Patient was unable to be assessed for M2B at discharge. Pharmacy has been updated to [facility].    Sources used to complete the med history include   [facility], [facility] fill history, care everywhere  Faxed med list from [facility] " "facility (discharged from facility 4/17/2024)  Patient declined interview: patient repeatedly yelled at interviewer \"don't bother me with stuff like that\" and refused to answer questions    Below are additional concerns with the patient's PTA list.  Entresto is not on SNF list, and no fill history  Patient declined interview, med rec was done via chart review  Patient was discharged from [facility] 4/17/2024 per ED note, unknown what meds he took since leaving the facility    Medications ADDED:  Eliquis 5mg BID  Doxycycline hyclate 100mg BID  Mupirocin ointment  Nystatin powder  Mucinex  Ipratropium-albuterol nebulizer  Medications CHANGED:  Humalog U-100: inject 3 units TID before meals    Wendy Bose, PharmD, [facility] PGY1 Pharmacy Resident   Meds Ambulatory and Retail Services  Please reach out via [employer] Secure Chat for questions, or if no response call [phone] or "[employer]era \"MedRec\"    "

## 2024-04-18 NOTE — ED TRIAGE NOTES
PT ramiro in by ems for c/o of chronic mrsa in R knee and fall. Pt reports falling when trying to move himself and landing on knee. PT stated he was just released from a nursing facility today.

## 2024-04-18 NOTE — H&P
History Of Present Illness  Kody Choi is a 53 y.o. adult presenting with R knee pain after falling onto his knees and being unable to stand back up. 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 Oreilly in 2019. Pt was admitted for scrotal cellulitis two months ago and was sent to a SNF where he continued a course of Daptomycin ending 3/18 and was discharged yesterday. During this admission, orthopedics had recommended a R AKA for his chronic PJI however pt refused amputation at that time. Today, pt endorses that he regrets that decision and now wants a R AKA.     After pt's fall today, pt is complaining of worsening R knee pain. Pt was not cooperative during interview and was unable to provide pertinent history regarding duration, quality, severity of symptoms. Pt denies recent fevers/chills, N/V/D,  symptoms.     Past Medical History  Past Medical History:   Diagnosis Date    Cellulitis of scrotum 02/27/2024    Personal history of other endocrine, nutritional and metabolic disease     History of type 2 diabetes mellitus    Unspecified astigmatism, bilateral 01/04/2016    Astigmatism, bilateral       Surgical History  Past Surgical History:   Procedure Laterality Date    KNEE SURGERY  10/29/2014    Knee Surgery        Social History  He reports that he has never smoked. He has never been exposed to tobacco smoke. He has never used smokeless tobacco. No history on file for alcohol use and drug use.    Family History  No family history on file.     Allergies  Loratadine, Lisinopril, and Pollen extracts    Review of Systems   Constitutional:  Negative for chills and fever.   HENT: Negative.     Eyes: Negative.    Respiratory: Negative.  Negative for shortness of breath.    Cardiovascular:  Positive for leg swelling. Negative for chest pain and palpitations.   Gastrointestinal: Negative.    Endocrine: Negative.   "  Musculoskeletal:  Positive for joint swelling.   Skin:  Positive for rash.     Physical Exam  Vitals reviewed.   Constitutional:       General: He is not in acute distress.     Appearance: Normal appearance. He is obese. He is not ill-appearing.   HENT:      Head: Normocephalic and atraumatic.   Musculoskeletal:         General: Swelling and tenderness present. Normal range of motion.      Right lower leg: Edema present.      Left lower leg: Edema present.   Skin:     Findings: Erythema and rash present.   Neurological:      Mental Status: He is alert. Mental status is at baseline.   Psychiatric:         Mood and Affect: Mood normal. Affect is angry and inappropriate.         Behavior: Behavior is agitated, aggressive and combative.          Last Recorded Vitals  Blood pressure (!) 138/98, pulse 90, temperature 36.7 °C (98 °F), resp. rate 18, height 1.676 m (5' 6\"), weight (!) 168 kg (370 lb), SpO2 96%, unknown if currently breastfeeding.    Relevant Results  Results for orders placed or performed during the hospital encounter of 04/18/24 (from the past 24 hour(s))   CBC and Auto Differential   Result Value Ref Range    WBC 7.9 4.4 - 11.3 x10*3/uL    nRBC 0.0 0.0 - 0.0 /100 WBCs    RBC 4.89 4.00 - 5.90 x10*6/uL    Hemoglobin 11.1 (L) 12.0 - 17.5 g/dL    Hematocrit 37.0 36.0 - 52.0 %    MCV 76 (L) 80 - 100 fL    MCH 22.7 (L) 26.0 - 34.0 pg    MCHC 30.0 (L) 32.0 - 36.0 g/dL    RDW 24.7 (H) 11.5 - 14.5 %    Platelets 428 150 - 450 x10*3/uL    Neutrophils % 70.3 40.0 - 80.0 %    Immature Granulocytes %, Automated 0.3 0.0 - 0.9 %    Lymphocytes % 16.1 13.0 - 44.0 %    Monocytes % 9.3 2.0 - 10.0 %    Eosinophils % 2.9 0.0 - 6.0 %    Basophils % 1.1 0.0 - 2.0 %    Neutrophils Absolute 5.55 1.20 - 7.70 x10*3/uL    Immature Granulocytes Absolute, Automated 0.02 0.00 - 0.70 x10*3/uL    Lymphocytes Absolute 1.27 1.20 - 4.80 x10*3/uL    Monocytes Absolute 0.73 0.10 - 1.00 x10*3/uL    Eosinophils Absolute 0.23 0.00 - 0.70 " x10*3/uL    Basophils Absolute 0.09 0.00 - 0.10 x10*3/uL   Comprehensive metabolic panel   Result Value Ref Range    Glucose 170 (H) 74 - 99 mg/dL    Sodium 142 136 - 145 mmol/L    Potassium 4.1 3.5 - 5.3 mmol/L    Chloride 104 98 - 107 mmol/L    Bicarbonate 28 21 - 32 mmol/L    Anion Gap 14 10 - 20 mmol/L    Urea Nitrogen 26 (H) 6 - 23 mg/dL    Creatinine 1.75 (H) 0.50 - 1.30 mg/dL    eGFR 34 (L) >60 mL/min/1.73m*2    Calcium 8.4 (L) 8.6 - 10.6 mg/dL    Albumin 3.0 (L) 3.4 - 5.0 g/dL    Alkaline Phosphatase 205 (H) 33 - 120 U/L    Total Protein 7.4 6.4 - 8.2 g/dL    AST 21 9 - 39 U/L    Bilirubin, Total 0.9 0.0 - 1.2 mg/dL    ALT 10 7 - 52 U/L   Lactate   Result Value Ref Range    Lactate 2.0 0.4 - 2.0 mmol/L   Sedimentation rate, automated   Result Value Ref Range    Sedimentation Rate 107 (H) 0 - 30 mm/h   C-reactive protein   Result Value Ref Range    C-Reactive Protein 5.91 (H) <1.00 mg/dL   Morphology   Result Value Ref Range    RBC Morphology See Below     Hypochromia Marked    Hemoglobin A1c   Result Value Ref Range    Hemoglobin A1C 7.8 (H) see below %    Estimated Average Glucose 177 Not Established mg/dL   Coagulation Screen   Result Value Ref Range    Protime 16.0 (H) 9.8 - 12.8 seconds    INR 1.4 (H) 0.9 - 1.1    aPTT 37 27 - 38 seconds   POCT GLUCOSE   Result Value Ref Range    POCT Glucose 150 (H) 74 - 99 mg/dL   C-reactive protein   Result Value Ref Range    C-Reactive Protein 5.70 (H) <1.00 mg/dL   Blood Culture    Specimen: Peripheral Venipuncture; Blood culture   Result Value Ref Range    Blood Culture Loaded on Instrument - Culture in progress    POCT GLUCOSE   Result Value Ref Range    POCT Glucose 166 (H) 74 - 99 mg/dL          Assessment/Plan   Principal Problem:    Prosthetic joint infection, sequela    Pt is a 54 yo M with chronic R PJI and hx of MRSA, previously on daily suppressive antibiotics presenting with acute on chronic R knee pain and swelling exacerbated by a fall yesterday. Pt is  afebrile and HDS without leukocytosis however pt has markedly elevated inflammatory markers with warm/swollen R knee joint and overlying cellulitis. 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 Overall plan for cultures, aspiration/synovial fluid analysis and ID consult for antibiotic regimen recommendations.     #Chronic R Knee Prosthetic Joint Infection   #R Knee Pain  #Deconditioning   :: no fracture on xray  :: no indication for surgical intervention/AKA at this time per ortho  - IR R knee aspiration and fluid analysis pending  - continue home tramadol 50mg BID   - pt/ot    #Cellulitis   #Hx of MRSA  - extensive antibiotic use history  - hx of MRSA infection  - pt previously on suppressive antibiotics but not during the last 30 days or so  - blood cultures pending  - ID consulted for recs; pending     #Afib   - continue home eliquis 5mg BID    #T2DM  - continue Lantus 35u nightly   - SSI #1    #HFrEF  - hold home PO lasix 40mg and PO torsemide 40mg   - start IV lasix 40mg daily  - continue home metoprolol succ 50mg      Gustavo Raygoza, DO  PGY-1 Family Medicine

## 2024-04-18 NOTE — PROGRESS NOTES
Physical Therapy    Physical Therapy Evaluation    Patient Name: Kody Choi  MRN: [id number]  Today's Date: 4/18/2024   Time Calculation  Start Time: 0940  Stop Time: 1000  Time Calculation (min): 20 min    Assessment/Plan   PT Assessment  PT Assessment Results: Decreased strength, Decreased range of motion, Decreased endurance, Impaired balance, Decreased mobility, Decreased coordination, Pain, Obesity, Impaired judgement, Decreased safety awareness  Rehab Prognosis: Fair  Barriers to Participation: Comorbidities  End of Session Communication: Bedside nurse  Assessment Comment: pt is a 53 y.o male admitted s/p fall on B knees. pt is very deconditioned and unable to mobilize by himself. pt would benefit from skilled services to improve functional mobility  End of Session Patient Position: Bed, 2 rail up  IP OR SWING BED PT PLAN  Inpatient or Swing Bed: Inpatient  PT Plan  PT Plan: Skilled PT  PT Frequency: 3 times per week  PT Discharge Recommendations: Moderate intensity level of continued care (pt is unsafe, high fall risk and lives alone. pt needs moderate intensity therapy)  PT Recommended Transfer Status: Assist x2  PT - OK to Discharge: Yes (Recs made and completed.)      Subjective   General Visit Information:  General  Reason for Referral: pt admitted for fall on B knees.  Past Medical History Relevant to Rehab: 53M (HF, uncontrolled DM2, CAD sp CABG, a fib on Eliquis) presents after new fall onto b/l knees. Hx of R TKA c/b recurrent MRSA PJI’s s/p explant and abx spacer placement w Dr. Oreilly in 2019, on chronic Keflex suppression therapy. Multiple presentations to ED for similar complaint within last year, ultimately treated with vanc through PICC line. Nonambulatory at baseline. Previously refused ortho recommendation for AKA, but patient is now amenable.  Family/Caregiver Present: No  Prior to Session Communication: Bedside nurse  Patient Position Received:  (sitting on EOB)  General Comment: pt presented  sitting EOB and  willing to particiapte. pt with extreme edema in B LE and multiple skin ulcers as well.  Home Living:  Home Living  Type of Home: Apartment (pt discharged from NH yesterday but went home and fell trying to transfer to )  Lives With: Alone  Home Adaptive Equipment:  (manual chair and ww bath bench)  Home Layout: One level  Home Access: Elevator  Prior Level of Function:  Prior Function Per Pt/Caregiver Report  Level of [place]: Other (Comment) (was from NH, pt stated that he usually stands and pivots to chair . non ambulatory.)  Receives Help From:  (pt states he has no support.)  Precautions:  Precautions  Medical Precautions: Fall precautions  Precautions Comment: r/o MRSA  Vital Signs:  Vital Signs  Heart Rate: 95  Heart Rate Source: Monitor  Pulse Ox: 98 %  BP: (!) 138/92    Objective   Pain:  Pain Assessment  Pain Assessment: 0-10  Pain Score: 10 - Worst possible pain  Pain Type: Chronic pain  Pain Location: Leg  Pain Orientation: Right, Left  Pain Interventions: Repositioned (suggested legs up on chair but he wanted to return to supine instead)  Cognition:  Cognition  Overall Cognitive Status: Within Functional Limits  Orientation Level: Oriented X4  Impulsive: Moderately (pt with constant mood swings and yelling)    General Assessments:  General Observation  General Observation: pt with open wounds over B LE and feet.      Activity Tolerance  Endurance: Tolerates less than 10 min exercise with changes in vital signs    Sensation  Light Touch: Severe deficits in the RLE            Static Sitting Balance  Static Sitting-Balance Support: Feet supported, Bilateral upper extremity supported  Static Sitting-Level of Assistance: Modified independent    Static Standing Balance  Static Standing-Balance Support:  (unable to perform)  Functional Assessments:  Bed Mobility  Bed Mobility: Yes  Bed Mobility 1  Bed Mobility 1: Sitting to supine  Level of Assistance 1: Maximum assistance (for B  LE)  Bed Mobility 2  Bed Mobility  2: Scooting  Level of Assistance 2: Maximum assistance, Minimal tactile cues  Bed Mobility Comments 2: x2    Transfers  Transfer: No    Ambulation/Gait Training  Ambulation/Gait Training Performed: No    Stairs  Stairs: No  Extremity/Trunk Assessments:  RLE   RLE : Exceptions to WFL  PROM RLE (degrees)  R Knee Flexion 0-130: 60 (in sitting)  Strength RLE  RLE Overall Strength: Deficits (grossly 3-/5)  LLE   LLE : Exceptions to WFL  Strength LLE  LLE Overall Strength:  (grossly 3-/5)  Outcome Measures:  [facility] Basic Mobility  Turning from your back to your side while in a flat bed without using bedrails: A lot  Moving from lying on your back to sitting on the side of a flat bed without using bedrails: A little  Moving to and from bed to chair (including a wheelchair): Total  Standing up from a chair using your arms (e.g. wheelchair or bedside chair): Total  To walk in hospital room: Total  Climbing 3-5 steps with railing: Total  Basic Mobility - Total Score: 9    Encounter Problems       Encounter Problems (Active)       PT Transfers       STG - Transfer from bed to chair with mod assist  with sliding board. 3/5 trials.  (Progressing)       Start:  04/18/24    Expected End:  05/02/24            STG - Patient will perform bed mobility with CGA (Progressing)       Start:  04/18/24    Expected End:  05/02/24                   Education Documentation  Mobility Training, taught by Amber James, PT at 4/18/2024 12:34 PM.  Learner: Patient  Readiness: Acceptance  Method: Explanation  Response: Verbalizes Understanding    Education Comments  No comments found.

## 2024-04-18 NOTE — CONSULTS
Orthopaedic Surgery Consult H&P    HPI:   Orthopaedic Problems/Injuries: Chronic R knee PJI     53M (HF, uncontrolled DM2, CAD sp CABG, a fib on Eliquis) presents after new fall onto b/l knees. Hx of R TKA c/b recurrent MRSA PJI’s s/p explant and abx spacer placement w Dr. Oreilly in 2019, on chronic Keflex suppression therapy. Multiple presentations to ED for similar complaint within last year, ultimately treated with vanc through PICC line. Nonambulatory at baseline. Previously refused ortho recommendation for AKA, but patient is now amenable.     PMH: per above/EMR  PSH: per above/EMR  SocHx: Non-contributory to this patient's acute orthopaedic problem.       - Dc'ed from SNF yesterday  FamHx:  Non-contributory to this patient's acute orthopaedic problem.   Allergies: Reviewed in EMR  Meds: Reviewed in EMR    ROS      - 14 point ROS negative except as above    Physical Exam:  Gen: AOx3, NAD  HEENT: normocephalic atraumatic  Psych: appropriate mood and affect  Resp: nonlabored breathing  Cardiac: Extremities WWP, RRR to peripheral palpation  Neuro: CN 2-12 grossly intact  Skin: no rashes    Right lower extremity:  RLE:   - Chronic skin changes w erythema overlying anterior knee, no active drainage; indurated skin around knee.   - Tender at site of injury with painful ROM  -Motor intact in DF/PF/EHL  -SILT in saph/sural/SPN/DPN distributions  -Foot wwp, 2+ DP/PT pulse, brisk cap refill    A full secondary exam was performed and all relevant findings discussed and noted above.    Imaging:  AP and lateral radiographs of the right knee display:   1. No acute fracture or malalignment of the left knee.  2. Postsurgical changes of revision right total knee arthroplasty  with antibiotic spacers in place and similar appearance of hardware.  No definite acute fracture, however evaluation is limited by  osseous/hardware deformities. There is increasing heterotopic  ossification/calcification and soft tissue swelling about  the right  knee.    Assessment:  Injury: Chronic R knee PJI  HPI: 53M (HF, uncontrolled DM2, CAD sp CABG, a fib on Eliquis) presents after new fall onto b/l knees. Hx of R TKA c/b recurrent MRSA PJI’s s/p explant and abx spacer placement w Dr. Oreilly in 2019, on chronic Keflex suppression therapy. Multiple presentations to ED for similar complaint within last year, ultimately treated with vanc through PICC line. Nonambulatory at baseline. Previously refused ortho recommendation for AKA, but patient is now amenable. WBC 7.9, pending ESR/CRP. XRs unchanged from prior imaging in Oct 2023 w broken abx spacer.     Plan:  - No acute orthopaedic surgical intervention indicated at this time  - Ultimate treatment options for this patient's chronic PJI is a RLE AKA   - Recommend patient follow-up outpatient with index joint surgeon to discuss AKA on an elective basis  - Pain management per primary team   - WB status: WBAT BLLE  - Orthopedics is signing off.  - Please page with any questions/concerns.    Patient should follow-up with index surgeon as needed. Appointments can be made by calling [phone].     This plan was discussed with attending, Dr. Richardson.    Chucky Vargas MD  PGY-2, Orthopedic Surgery  On-call Resident (Available via Epic Chat)  Pager: [phone] (6pm-6am and on weekends)    While admitted, this patient will be followed by the Ortho Trauma Team, available via Epic Chat weekdays 6a-6p. Please page [phone] on nights and weekends.    Ortho Trauma  First Call: Nargis Fried, PGY-1  First Call: Maximiliano Hernandez, PGY-1  Second Call: Arturo Zamorano, PGY-2  Third Call: Delfin Malcolm, PGY-3

## 2024-04-18 NOTE — CONSULTS
"Inpatient consult to Infectious Diseases  Consult performed by: Soraya Warren MD  Consult ordered by: Teto Ford MD        Referred by Dr Liliana Vilchis MD: Number Reassigned to [id number] (Inactive)    Reason For Consult  prosthetic joint infection, hx of MRSA, hx of supressive abx; need abx recs       History Of Present Illness  Kody Choi is a 53 y.o. male known to ID service since 2018 for chronic R knee PJI infection. Recently finished daptomycin course of ~8 weeks (as above) and tentatively planned to continue po doxycycline indefinitely afterwards (per ID 1/31/24).    Came in to ED yesterday almost right after discharged due to worsened knee pain. States it was getting worse at his nursing home but they \"did nothing\" - note was seen in ED 4/12 and discharged home. He states this feels like his usual infections did. States he was told he was no longer offered amputation today. Does not know when toe wounds started, nor does he know when he stopped IV antibiotics.    Further knee history, copied from prior consult 1/26/24:  \"Hx of CAD afib, uncontrolled DM , s/p right knee arthroplasty in 2015.  He presented on 4/12/2019 with sign/sx of PJI with culture growing MSSA as well as MSSA bacteremia .   Had surgery ( Excisional debridement arthrotomy of right total knee with revision of poly component tibia. Placement of biodegradable antibiotic beads) on 4/12/19 . Was treated with 6 weeks cefazolin followed by oral cephalexin ( which he didn't take), then returned on 6/5/19 with the same sx with MSSA. Had hardware explant with  antibiotic infused cement spacer  on 6/17/19 and put on cefazolin, ( unclear whether he was taking oral suppressive medicine after that ).      \"Had CABG surgery on 11/25/2020.   He presented in January 2021 with knee pain swelling and joint fluid grew MRSA and was recommended  with cefepime and vancomycin for 6 weeks.  But he refused PICC line and discharged with oral ciprofloxacin " "and doxycycline which he didn't take . He was also suggested AKA which was refused.  Then returned to hospital on 2/21/2023 with the same sx and was treated with vancomycin  .     \"Then returned to hospital on 4/16/21 ,  refused to see ortho and ID made the same recommendation but this time 8 weeks of vancomycin .  He was discharged to SNF.   He was seen by ID for follow up on 9/16 and antibiotic was switched to oral doxycycline for chronic suppression.    \"He presented to [facility] on 2/20/23 with MRSA bacteremia  . Was treated with vancomycin in the hospital and discharged with oral TMP-SMX . Readmitted to [facility] in June 2023 for substernal abscess and sternal wire was removed ( it was thought substernal abscess was secondary to MRSA bacteremia).       \"He returned to  on 7/2/23 with right knee infection, AKA was suggested and declined,  joint culture grew group B streptococcus and was treated with vancomycin and piperacillin-tazobactam and discharged with cephalexin and TMP-SMX .    TMP-SMX was discontinued by his PCP and been on cephalexin ( started as QID then BID) .\"    Seen again 1/25/24 at  and treated again with daptomycin planned to tentatively end 3/7/24. Then again seen by ID on 02/19/2024, when he was admited for RLE cellulitis and treated for presumed recurrence of acute R knee PJI with daptomycin thru 3/18. Did not follow up with ID but dc'd to nursing home. Discharged yesterday.     Past Medical History  He has a past medical history of Cellulitis of scrotum (02/27/2024), Personal history of other endocrine, nutritional and metabolic disease, and Unspecified astigmatism, bilateral (01/04/2016).    Surgical History  He has a past surgical history that includes Knee surgery (10/29/2014).     Social History     Occupational History    Not on file   Tobacco Use    Smoking status: Never     Passive exposure: Never    Smokeless tobacco: Never   Substance and Sexual Activity    Alcohol use: Not on file    Drug " "use: Not on file    Sexual activity: Not on file        Family History  No family history on file.  Allergies  Loratadine, Lisinopril, and Pollen extracts     Immunization History   Administered Date(s) Administered    Moderna SARS-CoV-2 Vaccination 09/09/2021    Pfizer Purple Cap SARS-CoV-2 12/22/2020, 01/12/2021     Medications  Home medications:  (Not in a hospital admission)    Current medications:  Scheduled medications  [Held by provider] aspirin, 81 mg, oral, Daily  atorvastatin, 40 mg, oral, Daily  cephalexin, 500 mg, oral, q8h SUDHIR  furosemide, 40 mg, intravenous, Daily  heparin (porcine), 7,500 Units, subcutaneous, q8h  insulin glargine, 35 Units, subcutaneous, Nightly  insulin lispro, 0-5 Units, subcutaneous, TID with meals  insulin lispro, 0-5 Units, subcutaneous, q4h  metoprolol succinate XL, 50 mg, oral, Daily  pantoprazole, 40 mg, oral, Daily before breakfast  sacubitriL-valsartan, 1 tablet, oral, BID  sennosides, 2 tablet, oral, BID  [Held by provider] torsemide, 40 mg, oral, Daily      Continuous medications     PRN medications  PRN medications: acetaminophen **OR** acetaminophen **OR** acetaminophen, fluticasone, glucagon, traMADol    Review of Systems as above     Objective  Range of Vitals (last 24 hours)  Heart Rate:  []   Temperature:  [36.7 °C (98 °F)]   Respirations:  [17-20]   BP: (114-142)/(71-94)   Height:  [167.6 cm (5' 6\")]   Weight:  [168 kg (370 lb)]   Pulse Ox:  [74 %-98 %]   Daily Weight  04/18/24 : (!) 168 kg (370 lb)    Body mass index is 59.72 kg/m².     Physical Exam  General: overweight man in NAD, moving legs frequently and restlessly  HEENT: no conjunctival injection. anicteric.  CVS: RRR. Normal S1 and S2. No m/r/g.   RESP: ctab no w/r/r, no increased wob on room air  Abd: +BS. Soft and lax. ND. +mild blanching faint redness over lower abdomen, nontender  Ext: +R knee warm and swollen compared to L with chronic scar over patella without fluctuance without drainage, " "mildly red.  +Multiple bilateral toe wounds over both feet with old soaked dressings in place  Neuro: Alert and oriented, irritable.  Skin: no visible rashes, IV sites c/d/I, no picc line     Relevant Results    Labs  Results from last 72 hours   Lab Units 04/18/24  0126   WBC AUTO x10*3/uL 7.9   HEMOGLOBIN g/dL 11.1*   HEMATOCRIT % 37.0   PLATELETS AUTO x10*3/uL 428   NEUTROS PCT AUTO % 70.3   LYMPHS PCT AUTO % 16.1   MONOS PCT AUTO % 9.3   EOS PCT AUTO % 2.9     Results from last 72 hours   Lab Units 04/18/24  0126   SODIUM mmol/L 142   POTASSIUM mmol/L 4.1   CHLORIDE mmol/L 104   CO2 mmol/L 28   BUN mg/dL 26*   CREATININE mg/dL 1.75*   GLUCOSE mg/dL 170*   CALCIUM mg/dL 8.4*   ANION GAP mmol/L 14   EGFR mL/min/1.73m*2 34*     Results from last 72 hours   Lab Units 04/18/24  0126   ALK PHOS U/L 205*   BILIRUBIN TOTAL mg/dL 0.9   PROTEIN TOTAL g/dL 7.4   ALT U/L 10   AST U/L 21   ALBUMIN g/dL 3.0*     Estimated Creatinine Clearance (by C-G formula based on SCr of 1.75 mg/dL (H))  Female: 59.1 mL/min (A)  Male: 70.8 mL/min (A)  The patient&#39;s sex/gender information is inconclusive; review their information before proceeding.  C-Reactive Protein   Date Value Ref Range Status   04/18/2024 5.70 (H) <1.00 mg/dL Final   04/18/2024 5.91 (H) <1.00 mg/dL Final   02/27/2024 3.07 (H) <1.00 mg/dL Final     Sedimentation Rate   Date Value Ref Range Status   04/18/2024 107 (H) 0 - 30 mm/h Final   02/27/2024 64 (H) 0 - 30 mm/h Final   02/17/2024 122 (H) 0 - 30 mm/h Final     No results found for: \"HIV1X2\", \"HIVCONF\", \"HEKFBJ8OJ\"  No results found for: \"HEPCABINIT\", \"HEPCAB\", \"HCVPCRQUANT\"    Microbiology  4/11/19 knee fluid  MSSA  4/12/19  blood culture MSSA   6/5/19   knee fluid  MSSA   9/19/19 MRSA screen positive  1/11/21  knee fluid MRSA   4/12/21  knee fluid  MRSA   7/15/21  knee fluid MRSA   6/27/23  knee fluid group B streptococcus  resistant to clindamycin and erythromycin and susceptible to penicillin  01/25/24 No " growth aerobically and anaerobically   02/19/24 Moderate Mixed Gram-Positive and Gram-Negative Bacteria   04/12/24 bld ngtd x2  04/18/24 bld x1 ngtd    Imaging  Xray b/l knee 4/18  1. No acute fracture or malalignment of the left knee.  2. Postsurgical changes of revision right total knee arthroplasty  with antibiotic spacers in place and similar appearance of hardware.  No definite acute fracture, however evaluation is limited by  osseous/hardware deformities. There is increasing heterotopic  ossification/calcification and soft tissue swelling about the right  knee.    CXR 4/18  Worsening basilar edema and bilateral infiltrates  right-greater-than-left.  Increasing right pleural effusion.    Assessment/Plan   54yo male with newly-controlled DM2 (A1c 7.8% today, prior 16.6%) and hx of knee PJI with pieces of antibiotic spacer since 2019.  Multiple admissions with PJI with MSSA, then MRSA, then group B streptococcus.  History of non-adherence to oral suppressive antibiotics but has completed multiple courses of IV antiboitics, most recently IV daptomycin from end of January to 3/18/24.    Presented 4/17 for worsened knee pain and suspicion of recurrent septic arthritis. Has not been on PO antibiotics to his knowledge. Would hold antibiotics until able to obtain fluid tomorrow.    Note ortho has been consulted; currently no acute indication for amputation per their note, and recommending follow up with his initial surgeon Dr Oreilly for discussion of AKA.     #Persistent prosthetic joint infection.  #DM2, only recently controlled  #Bilateral foot wounds     Recommendations:  Please obtain R knee fluid sample for cell count and differential, culture and gram stain, crystals  AFTER this is obtained, please start vancomycin and ceftriaxone 2g q24h IV  Will follow cultures    ID will follow.  Discussed with Dr. Li Warren MD  PGY-5 ID Fellow  Team A - pager [phone]  For new consults, page [phone]

## 2024-04-18 NOTE — ED PROVIDER NOTES
CC: Fall and Knee Pain     HPI:  Patient is a 53-year-old male with PMH of arthritis, A-fib on Eliquis, CHF, HLD, CAD, HTN, and TKA c/b recurrent prosthetic joint infection s/p explant and antibiotic spacer placement w/ Denilson in 2019, on chronic Keflex suppression therapy) presenting the ED with knee pain.  Patient states he was just discharged from nursing facility yesterday when he got home slid out of wheelchair and landed on both knees.  Denies hitting his head or losing consciousness.  States he did does not feel safe at home and he cannot perform activities of daily living.  Also states he had pus coming from right knee.  States that they took out his PICC line and he is no longer on IV antibiotics.  Denies any fevers or chills.      Limitations to History: None    Additional History Obtained from: Documentation    Records Reviewed: Recent available ED and inpatient notes reviewed in EMR.    PMHx/PSHx:  Per HPI.   - has a past medical history of Cellulitis of scrotum (02/27/2024), Personal history of other endocrine, nutritional and metabolic disease, and Unspecified astigmatism, bilateral (01/04/2016).  - has a past surgical history that includes Knee surgery (10/29/2014).    Medications: Reviewed in EMR. See EMR for complete list of medications and doses.    Allergies:  Loratadine, Lisinopril, and Pollen extracts    Social History:  - Tobacco:  reports that he has never smoked. He has never been exposed to tobacco smoke. He has never used smokeless tobacco.   - Alcohol:  has no history on file for alcohol use.   - Illicit Drugs:  has no history on file for drug use.   ???????????????????????????????????????????????????????????????  Triage Vitals:  T 36.7 °C (98 °F)  HR (!) 101  BP (!) 142/94  RR 20  O2 (!) 90 % None (Room air)    PHYSICAL EXAM:   VS: As documented in the triage note and EMR flowsheet from this visit were reviewed.  Gen: obese male, NAD  Eyes: PERRL, EOMI. Clear scerla.  HENT: NC/AT,  Mucosal membranes moist.   Resp: Non-labored breathing on baseline NCc  Abd: Soft, non-distended, non-tender, no rebound or guarding  Ext: RLE chronic skin changes with erythema overlying anterior knee with 2 skin openings with no active drainage, tender at side with painful ROM, foot WWP, chronic skin breakdown to b/l feet  Neuro:  AAOx3, speech fluent, MAEx4, no focal deficit  Psych:  Appropriate mood and affect  ???????????????????????????????????????????????????????????????    ED Labs/Imaging:   Labs Reviewed   CBC WITH AUTO DIFFERENTIAL - Abnormal       Result Value    WBC 7.9      nRBC 0.0      RBC 4.89      Hemoglobin 11.1 (*)     Hematocrit 37.0      MCV 76 (*)     MCH 22.7 (*)     MCHC 30.0 (*)     RDW 24.7 (*)     Platelets 428      Neutrophils % 70.3      Immature Granulocytes %, Automated 0.3      Lymphocytes % 16.1      Monocytes % 9.3      Eosinophils % 2.9      Basophils % 1.1      Neutrophils Absolute 5.55      Immature Granulocytes Absolute, Automated 0.02      Lymphocytes Absolute 1.27      Monocytes Absolute 0.73      Eosinophils Absolute 0.23      Basophils Absolute 0.09     COMPREHENSIVE METABOLIC PANEL - Abnormal    Glucose 170 (*)     Sodium 142      Potassium 4.1      Chloride 104      Bicarbonate 28      Anion Gap 14      Urea Nitrogen 26 (*)     Creatinine 1.75 (*)     eGFR 34 (*)     Calcium 8.4 (*)     Albumin 3.0 (*)     Alkaline Phosphatase 205 (*)     Total Protein 7.4      AST 21      Bilirubin, Total 0.9      ALT 10     LACTATE - Normal    Lactate 2.0      Narrative:     Venipuncture immediately after or during the administration of Metamizole may lead to falsely low results. Testing should be performed immediately  prior to Metamizole dosing.   SEDIMENTATION RATE, AUTOMATED   C-REACTIVE PROTEIN   MORPHOLOGY    RBC Morphology See Below      Hypochromia Marked       XR knee 4+ views bilateral   Preliminary Result   1. No acute fracture or malalignment of the left knee.   2.  Postsurgical changes of right total knee arthroplasty with stable   appearance of hardware failure involving the femoral and tibial stems.   3. Overall stable appearance of the right knee without evidence of   new fracture.        MACRO:   None             Dictation workstation:   [id number]          ED Course & MDM   Diagnoses as of 04/20/24 2950   Chronic infection of knee joint prosthesis, initial encounter (CMS-HCC)   Fall, initial encounter   Failure to thrive in adult   Physical deconditioning       Medical Decision Making:  This is a 53-year-old male with above-stated history including chronic infected hardware in right knee presenting to the ED after new fall at home onto bilateral knees.  Patient chronically ill-appearing but not in acute distress on arrival.  Afebrile.  No obvious signs of trauma however given chronic skin changes to both lower extremities and degree of swelling x-rays obtained out of precaution showed no signs of acute traumatic abnormalities.  I reviewed patient's chart including last orthopedic note which states that the only thing orthopedic surgery was able to offer the patient was a above-the-knee amputation in which patient has declined.  Additionally reviewed last infectious disease note which states that patient completed full course of antibiotics via PICC line and is on chronic oral antibiotic therapy.  Patient symptoms currently not different from his baseline he has no fever or leukocytosis to suggest recurrent infection.  That being said patient cannot ambulate and will require admission for PT OT and placement.  Orthopedic surgery was consulted as patient stated to me he is now agreeable to the surgery.  Patient admitted to medicine with further orthopedic surgery plans pending.        Social Determinants Limiting Care:  None identified    Disposition:  As a result of their workup, the patient will require admission to the hospital.  The patient was informed of their  diagnosis.  Patient was given the opportunity to ask questions and answered them.  Patient agreed to be admitted to the hospital.    Patient seen and discussed with attending physician.    Concepcion Hughes MD PGY3  Emergency Medicine      Procedures ? [employer] last updated 4/18/2024 3:32 AM        Concepcion Hughes MD  Resident  04/20/24 1341     Xerosis Normal Treatment: I recommended application of Cetaphil or CeraVe numerous times a day and before going to bed to all dry areas.

## 2024-04-18 NOTE — PROGRESS NOTES
"Kody Choi is a 53 y.o. adult on day 0 of admission presenting with Prosthetic joint infection, sequela.    Assessment/Plan   SW consulted to met with Pt. Pt sitting upright at the bed. He is alert and oriented x3. He explained he is at ED for his \"mersa.\" Pt provided one word responses at times and seemed agitated by this SW's questions. He explained he left [facility] yesterday. Facility asked him to remain there long term but he stated he couldn't afford to remain there. Pt unaware of the home health company he was referred to on discharge. SW agreed to find information for him.   Pt denied he had any social supports stating , \"I've got no one to help me!\" He explained he obtains his groceries on his own by use of his wheelchair. Pt has a bathroom outfitted with grab bars and a shower bench. He denied he was connected with his insurance for case management. SW educated Pt he could have CM and subsequently, an aid at home. SW provided encouragement to follow through with them for more services in the home.   ANGELIA composed literature from [facility] site on how to obtain a CM and LTC services at home. Called [facility] [phone] and left a message for the SWer inquiring about Pt's discharge plan.  Spoke with Gabriela GALAN from [facility]. They attempted to do an interview Mariana Jeffers [phone] from [place]. Pt declined to do an assessment. Past referral reportedly resulted in denial. They attempted three times to talk with Pt and he refused to do an interview. Pt reportedly has one friend that helps with cleaning, laundry and he gets delivery for groceries.   Message left for Mariana Jeffers CM [facility].   [facility] referral built and sent to local  agencies due to SWer Gabriela reporting she has no secured provider. Pt reported she evaluated Pt for SNF and he was agreeable. She expressed Pt is not safe to return home. MD stated ADOD 48 hours.   Spoke with Pt bedside. Pt made repeated statement " "saying, \"I want to be admitted!\" SW repeated to him he is admitted and is waiting on a room assignment. SW inquired with Pt if he would like to return to [facility]. SW had confirmed with [facility] they could do a new authorization. Pt said he wanted to go elsewhere. Pt didn't have a facility in mind so a choice list was given. SW encouraged Pt to review this. Pt explained he wanted to sleep. SW observed she had to repeat to Pt why she was choicing him. SW to follow for choices.   CELESTINE Arboleda      "

## 2024-04-19 ENCOUNTER — APPOINTMENT (OUTPATIENT)
Dept: RADIOLOGY | Facility: HOSPITAL | Age: 54
DRG: 560 | End: 2024-04-19
Payer: COMMERCIAL

## 2024-04-19 LAB
CLARITY FLD: ABNORMAL
COLOR FLD: ABNORMAL
CRYSTALS FLD MICRO: NORMAL
GLUCOSE BLD MANUAL STRIP-MCNC: 100 MG/DL (ref 74–99)
GLUCOSE BLD MANUAL STRIP-MCNC: 135 MG/DL (ref 74–99)
GLUCOSE BLD MANUAL STRIP-MCNC: 213 MG/DL (ref 74–99)
GLUCOSE BLD MANUAL STRIP-MCNC: 89 MG/DL (ref 74–99)
RBC # FLD AUTO: ABNORMAL /UL
WBC # FLD AUTO: ABNORMAL /UL

## 2024-04-19 PROCEDURE — 89050 BODY FLUID CELL COUNT: CPT | Performed by: STUDENT IN AN ORGANIZED HEALTH CARE EDUCATION/TRAINING PROGRAM

## 2024-04-19 PROCEDURE — 87070 CULTURE OTHR SPECIMN AEROBIC: CPT | Performed by: STUDENT IN AN ORGANIZED HEALTH CARE EDUCATION/TRAINING PROGRAM

## 2024-04-19 PROCEDURE — 77012 CT SCAN FOR NEEDLE BIOPSY: CPT

## 2024-04-19 PROCEDURE — 2500000001 HC RX 250 WO HCPCS SELF ADMINISTERED DRUGS (ALT 637 FOR MEDICARE OP)

## 2024-04-19 PROCEDURE — 82947 ASSAY GLUCOSE BLOOD QUANT: CPT

## 2024-04-19 PROCEDURE — 2500000004 HC RX 250 GENERAL PHARMACY W/ HCPCS (ALT 636 FOR OP/ED)

## 2024-04-19 PROCEDURE — 0S9C3ZX DRAINAGE OF RIGHT KNEE JOINT, PERCUTANEOUS APPROACH, DIAGNOSTIC: ICD-10-PCS | Performed by: RADIOLOGY

## 2024-04-19 PROCEDURE — 2500000004 HC RX 250 GENERAL PHARMACY W/ HCPCS (ALT 636 FOR OP/ED): Performed by: RADIOLOGY

## 2024-04-19 PROCEDURE — 89060 EXAM SYNOVIAL FLUID CRYSTALS: CPT | Performed by: STUDENT IN AN ORGANIZED HEALTH CARE EDUCATION/TRAINING PROGRAM

## 2024-04-19 PROCEDURE — A4217 STERILE WATER/SALINE, 500 ML: HCPCS

## 2024-04-19 PROCEDURE — 87801 DETECT AGNT MULT DNA AMPLI: CPT | Performed by: INTERNAL MEDICINE

## 2024-04-19 PROCEDURE — 99232 SBSQ HOSP IP/OBS MODERATE 35: CPT | Performed by: INTERNAL MEDICINE

## 2024-04-19 PROCEDURE — G0378 HOSPITAL OBSERVATION PER HR: HCPCS

## 2024-04-19 RX ORDER — VANCOMYCIN HYDROCHLORIDE 1 G/20ML
INJECTION, POWDER, LYOPHILIZED, FOR SOLUTION INTRAVENOUS DAILY PRN
Status: DISCONTINUED | OUTPATIENT
Start: 2024-04-19 | End: 2024-04-21

## 2024-04-19 RX ORDER — FENTANYL CITRATE 50 UG/ML
INJECTION, SOLUTION INTRAMUSCULAR; INTRAVENOUS
Status: COMPLETED | OUTPATIENT
Start: 2024-04-19 | End: 2024-04-19

## 2024-04-19 RX ORDER — CEFTRIAXONE 2 G/50ML
2 INJECTION, SOLUTION INTRAVENOUS EVERY 24 HOURS
Status: DISCONTINUED | OUTPATIENT
Start: 2024-04-19 | End: 2024-04-21

## 2024-04-19 RX ADMIN — APIXABAN 5 MG: 5 TABLET, FILM COATED ORAL at 17:03

## 2024-04-19 RX ADMIN — PANTOPRAZOLE SODIUM 40 MG: 40 TABLET, DELAYED RELEASE ORAL at 07:00

## 2024-04-19 RX ADMIN — APIXABAN 5 MG: 5 TABLET, FILM COATED ORAL at 05:43

## 2024-04-19 RX ADMIN — CEPHALEXIN 500 MG: 500 CAPSULE ORAL at 05:43

## 2024-04-19 RX ADMIN — SENNOSIDES 17.2 MG: 8.6 TABLET, FILM COATED ORAL at 09:13

## 2024-04-19 RX ADMIN — VANCOMYCIN HYDROCHLORIDE 1500 MG: 5 INJECTION, POWDER, LYOPHILIZED, FOR SOLUTION INTRAVENOUS at 18:26

## 2024-04-19 RX ADMIN — INSULIN GLARGINE 35 UNITS: 100 INJECTION, SOLUTION SUBCUTANEOUS at 22:17

## 2024-04-19 RX ADMIN — METOPROLOL SUCCINATE 50 MG: 50 TABLET, FILM COATED, EXTENDED RELEASE ORAL at 09:12

## 2024-04-19 RX ADMIN — ACETAMINOPHEN 650 MG: 325 TABLET ORAL at 09:13

## 2024-04-19 RX ADMIN — ATORVASTATIN CALCIUM 40 MG: 40 TABLET, FILM COATED ORAL at 09:12

## 2024-04-19 RX ADMIN — FUROSEMIDE 40 MG: 10 INJECTION, SOLUTION INTRAVENOUS at 09:12

## 2024-04-19 RX ADMIN — CEFTRIAXONE SODIUM 2 G: 2 INJECTION, SOLUTION INTRAVENOUS at 16:58

## 2024-04-19 RX ADMIN — FENTANYL CITRATE 50 MCG: 50 INJECTION, SOLUTION INTRAMUSCULAR; INTRAVENOUS at 15:17

## 2024-04-19 ASSESSMENT — PAIN - FUNCTIONAL ASSESSMENT
PAIN_FUNCTIONAL_ASSESSMENT: 0-10

## 2024-04-19 ASSESSMENT — PAIN SCALES - GENERAL
PAINLEVEL_OUTOF10: 0 - NO PAIN
PAINLEVEL_OUTOF10: 6
PAINLEVEL_OUTOF10: 4
PAINLEVEL_OUTOF10: 0 - NO PAIN
PAINLEVEL_OUTOF10: 4

## 2024-04-19 ASSESSMENT — PAIN DESCRIPTION - LOCATION: LOCATION: LEG

## 2024-04-19 ASSESSMENT — PAIN DESCRIPTION - ORIENTATION: ORIENTATION: RIGHT

## 2024-04-19 NOTE — NURSING NOTE
4/19/24 1922 IV Team asked to place PIV. Pt has 20 G to R FA which flushes easily. Right FA appears swollen but pt states his arm has been that way. Pt agreeable with new IV placement. Pt's left arm assessed with US. Pt suddenly states he doesn't want new IV and asked to stop new IV placement. Bedside RN made aware.

## 2024-04-19 NOTE — CARE PLAN
Problem: Pain  Goal: My pain/discomfort is manageable  4/19/2024 1347 by Alanna Anderson, RN  Outcome: Progressing  4/19/2024 1346 by Alanna Anderson, RN  Outcome: Progressing   The patient's goals for the shift include pt will tolerate diet    The clinical goals for the shift include Pt will be compliant with call light policy for safety this shift    Over the shift, the patient did not make progress toward the following goals. Barriers to progression include . Recommendations to address these barriers include .

## 2024-04-19 NOTE — PRE-PROCEDURE NOTE
Interventional Radiology Preprocedure Note    Indication for procedure: The primary encounter diagnosis was Chronic infection of knee joint prosthesis, initial encounter (CMS-[facility]). Diagnoses of Fall, initial encounter, Failure to thrive in adult, and Physical deconditioning were also pertinent to this visit.    Relevant review of systems: NA    Relevant Labs:   Lab Results   Component Value Date    CREATININE 1.75 (H) 04/18/2024    EGFR 34 (L) 04/18/2024    INR 1.4 (H) 04/18/2024    PROTIME 16.0 (H) 04/18/2024       Planned Sedation/Anesthesia: Minimal    Airway assessment: normal    Directed physical examination:    Laying in bed. Uncomfortable but converses. Breathing comfortably on room air.     Mallampati: II (hard and soft palate, upper portion of tonsils anduvula visible)    ASA Score: ASA 2 - Patient with mild systemic disease with no functional limitations    Benefits, risks and alternatives of procedure and planned sedation have been discussed with the patient and/or their representative. All questions answered and they agree to proceed.

## 2024-04-19 NOTE — POST-PROCEDURE NOTE
Interventional Radiology Brief Postprocedure Note    Attending: Dr. Noemy Barrios    Assistant: Dr. Brad Hunt    Diagnosis: Right knee pain, history of prosthetic joint infection, concern for septic arthritis    Description of procedure: Informed consent obtained. Patient taken to procedure room and place supine on CT scanner table. Time-out performed. Local anesthesia and IV Fentanyl given. Intermittent CT guidance used to advance a 20g Chiba needle into the right knee joint. 8cc of blood tinged fluid was aspirated and sent for analysis.      Anesthesia:  Local and IV anesthesia with 50mcg IV Fentanyl    Complications: None    Estimated Blood Loss: minimal    Medications  As of 04/19/24 1537      sennosides (Senokot) tablet 17.2 mg (mg) Total dose:  4 tablet* Dosing weight:  168   *Administration not included in total     Date/Time Rate/Dose/Volume Action       04/18/24 0937 17.2 mg Given      2100 *17.2 mg Missed     04/19/24  0913 17.2 mg Given               aspirin chewable tablet 81 mg (mg) Total dose:  Cannot be calculated* Dosing weight:  168   *Administration dose not documented     Date/Time Rate/Dose/Volume Action       04/18/24  0615 *Not included in total Held by provider      0900 *Not included in total Automatically Held     04/19/24 0900 *Not included in total Automatically Held               atorvastatin (Lipitor) tablet 40 mg (mg) Total dose:  80 mg Dosing weight:  168      Date/Time Rate/Dose/Volume Action       04/18/24 0937 40 mg Given     04/19/24  0912 40 mg Given               torsemide (Demadex) tablet 40 mg (mg) Total dose:  40 mg Dosing weight:  168      Date/Time Rate/Dose/Volume Action       04/18/24 0937 40 mg Given      1050 *Not included in total Held by provider     04/19/24 0900 *Not included in total Automatically Held               sacubitriL-valsartan (Entresto) 49-51 mg per tablet 1 tablet (tablet) Total dose:  1 tablet* Dosing weight:  168   *Administration not  included in total     Date/Time Rate/Dose/Volume Action       04/18/24  0937 1 tablet Given      1338 *Not included in total Held by provider      2100 *1 tablet Missed     04/19/24  0900 *Not included in total Automatically Held               pantoprazole (ProtoNix) EC tablet 40 mg (mg) Total dose:  80 mg Dosing weight:  168      Date/Time Rate/Dose/Volume Action       04/18/24  0950 40 mg Given     04/19/24  0700 40 mg Given               metoprolol succinate XL (Toprol-XL) 24 hr tablet 50 mg (mg) Total dose:  100 mg Dosing weight:  168      Date/Time Rate/Dose/Volume Action       04/18/24  0937 50 mg Given     04/19/24  0912 50 mg Given               acetaminophen (Tylenol) tablet 650 mg (mg) Total dose:  650 mg Dosing weight:  168      Date/Time Rate/Dose/Volume Action       04/19/24  0913 650 mg Given               acetaminophen (Tylenol) oral liquid 650 mg (mg) Total dose:  Cannot be calculated* Dosing weight:  168   *Administration dose not documented     Date/Time Rate/Dose/Volume Action       04/19/24  0913 *Not included in total See Alternative               acetaminophen (Tylenol) suppository 650 mg (mg) Total dose:  Cannot be calculated* Dosing weight:  168   *Administration dose not documented     Date/Time Rate/Dose/Volume Action       04/19/24  0913 *Not included in total See Alternative               traMADol (Ultram) tablet 50 mg (mg) Total dose:  Cannot be calculated* Dosing weight:  168   *Administration dose not documented     Date/Time Rate/Dose/Volume Action       04/18/24  0620 *Not included in total Held by provider      0646 *Not included in total Unheld by provider               insulin glargine (Lantus) injection 35 Units (Units) Total dose:  35 Units Dosing weight:  168      Date/Time Rate/Dose/Volume Action       04/18/24  2140 35 Units Given               insulin lispro (HumaLOG) injection 0-5 Units (Units) Total dose:  1 Units* Dosing weight:  168   *Administration not included in  total     Date/Time Rate/Dose/Volume Action       04/18/24  0800 *Not included in total Missed      1132 1 Units Given      1718 *1 Units Missed     04/19/24  0800 *Not included in total Missed      1200 *Not included in total Missed               insulin lispro (HumaLOG) injection 0-5 Units (Units) Total dose:  Cannot be calculated* Dosing weight:  168   *Administration dose not documented     Date/Time Rate/Dose/Volume Action       04/18/24  0655 *Not included in total Missed      0655 *Not included in total Held by provider      0823 *Not included in total Unheld by provider      1055 *Not included in total Missed      1455 *0 mL Missed      1855 *Not included in total Missed      2255 *Not included in total Missed     04/19/24  0255 *Not included in total Missed      0655 *Not included in total Missed               heparin (porcine) injection 7,500 Units (Units) Total dose:  7,500 Units Dosing weight:  168      Date/Time Rate/Dose/Volume Action       04/18/24  0950 7,500 Units Given               cephalexin (Keflex) capsule 500 mg (mg) Total dose:  1,500 mg* Dosing weight:  168   *Administration not included in total     Date/Time Rate/Dose/Volume Action       04/18/24  0950 500 mg Given      1721 *500 mg Missed      2141 500 mg Given     04/19/24  0543 500 mg Given               furosemide (Lasix) injection 40 mg (mg) Total dose:  80 mg Dosing weight:  168      Date/Time Rate/Dose/Volume Action       04/18/24  1135 40 mg Given     04/19/24  0912 40 mg Given               apixaban (Eliquis) tablet 5 mg (mg) Total dose:  10 mg Dosing weight:  168      Date/Time Rate/Dose/Volume Action       04/18/24  1434 5 mg Given     04/19/24  0543 5 mg Given               fentaNYL PF (Sublimaze) injection (mcg) Total dose:  50 mcg      Date/Time Rate/Dose/Volume Action       04/19/24  1517 50 mcg Given                   No specimens collected      See detailed result report with images in PACS.    The patient tolerated the  procedure well without incident or complication and is in stable condition.

## 2024-04-19 NOTE — CONSULTS
"Vancomycin Dosing by Pharmacy- INITIAL    Kody Choi is a 53 y.o. year old adult who Pharmacy has been consulted for vancomycin dosing for cellulitis, skin and soft tissue. Based on the patient's indication and renal status this patient will be dosed based on a goal AUC of 400-600.     Renal function is currently stable.    Visit Vitals  /63   Pulse 84   Temp 36.1 °C (97 °F)   Resp 16        Lab Results   Component Value Date    CREATININE 1.75 (H) 04/18/2024    CREATININE 1.55 (H) 04/12/2024    CREATININE 1.51 (H) 03/04/2024    CREATININE 1.62 (H) 03/03/2024        Patient weight is No results found for: \"PTWEIGHT\"    No results found for: \"CULTURE\"     No intake/output data recorded.  [unfilled]    Lab Results   Component Value Date    PATIENTTEMP 37.0 02/17/2024    PATIENTTEMP 37.0 02/16/2024    PATIENTTEMP 37.0 01/25/2024          Assessment/Plan     Patient will not be given a loading dose.  Will initiate vancomycin maintenance,  1500 mg every 24 hours.    This dosing regimen is predicted by [employer] to result in the following pharmacokinetic parameters:    Regimen: 1500 mg IV every 24 hours.  Start time: 16:19 on 04/19/2024  Exposure target: AUC24 (range)400-600 mg/L.hr   AUC24,ss: 543 mg/L.hr  Probability of AUC24 > 400: 74 %  Ctrough,ss: 13.7 mg/L  Probability of Ctrough,ss > 20: 30 %  Probability of nephrotoxicity (Lodise EDIE 2009): 9 %    Follow-up level will be ordered on 04/20 at 0500 unless clinically indicated sooner.  Will continue to monitor renal function daily while on vancomycin and order serum creatinine at least every 48 hours if not already ordered.  Follow for continued vancomycin needs, clinical response, and signs/symptoms of toxicity.       Mercedes Yanez, PharmD       "

## 2024-04-19 NOTE — CARE PLAN
Problem: Pain  Goal: My pain/discomfort is manageable  Outcome: Progressing   The patient's goals for the shift include pt will tolerate diet    The clinical goals for the shift include Pt will be compliant with call light policy for safety this shift    Over the shift, the patient did not make progress toward the following goals. Barriers to progression include . Recommendations to address these barriers include .

## 2024-04-19 NOTE — CARE PLAN
Problem: Pain  Goal: My pain/discomfort is manageable  4/19/2024 1347 by Alanna Anderson, RN  Outcome: Progressing  4/19/2024 1346 by Alanna Anderson, RN  Outcome: Progressing   The patient's goals for the shift include pt will tolerate diet    The clinical goals for the shift include Pt will be compliant with call light policy for safety this shift    Over the shift, the patient did not make progress toward the following goals. Barriers to progression include. Recommendations to address these barriers include .

## 2024-04-19 NOTE — PROGRESS NOTES
"Kody Choi is a 53 y.o. adult on day 0 of admission presenting with Prosthetic joint infection, sequela.    Subjective   No ONE  Pt has no new/acute concerns this morning  Pt slept well  Endorses understanding of plan for today     Objective   Physical Exam  Vitals reviewed.   Constitutional:       General: He is not in acute distress.     Appearance: Normal appearance. He is obese.   Cardiovascular:      Rate and Rhythm: Normal rate and regular rhythm.      Pulses: Normal pulses.      Heart sounds: Normal heart sounds.   Pulmonary:      Effort: Pulmonary effort is normal.      Breath sounds: Normal breath sounds.   Abdominal:      General: Abdomen is flat.      Palpations: Abdomen is soft.      Tenderness: There is no abdominal tenderness.   Musculoskeletal:         General: Tenderness present. No signs of injury.      Right lower leg: Edema present.      Left lower leg: Edema present.   Skin:     Findings: Erythema and rash present.   Neurological:      Mental Status: He is alert and oriented to person, place, and time.   Psychiatric:         Mood and Affect: Mood normal.         Behavior: Behavior normal.             Last Recorded Vitals  Blood pressure 107/63, pulse 84, temperature 36.1 °C (97 °F), resp. rate 16, height 1.676 m (5' 5.98\"), weight (!) 167 kg (368 lb 2.7 oz), SpO2 96%, unknown if currently breastfeeding.  Intake/Output last 3 Shifts:  No intake/output data recorded.    Relevant Results                             Assessment/Plan   Principal Problem:    Prosthetic joint infection, sequela    Pt is a 52 yo M with chronic R PJI and hx of MRSA, previously on daily suppressive antibiotics presenting with acute on chronic R knee pain and swelling exacerbated by a fall yesterday. Pt is afebrile and HDS without leukocytosis however pt has markedly elevated inflammatory markers with warm/swollen R knee joint and overlying cellulitis. Xray R Knee is negative for acute fracture however there is c/f for " recurring acute on chronic MRSA cellulitis, septic joint. Overall plan for cultures, aspiration/synovial fluid analysis and ID consult for antibiotic regimen recommendations.     4/19:  - pt remains afebrile, HDS   - CT guided R knee arthrocentesis performed today; fluid analysis/cultures pending   - vancomycin + ceftriaxone per ID recs   - BCX NGTD     #Chronic R Knee Prosthetic Joint Infection   #R Knee Pain  #Deconditioning   :: no fracture on xray  :: no indication for surgical intervention/AKA at this time per ortho  - continue home tramadol 50mg BID   - pt/ot     #Cellulitis   #Hx of MRSA  - extensive antibiotic use history  - hx of MRSA infection  - pt previously on suppressive antibiotics but not during the last 30 days or so  - blood cultures NGTD     #Afib   - continue home eliquis 5mg BID     #T2DM  - continue Lantus 35u nightly   - SSI #1     #HFrEF  - hold home PO lasix 40mg and PO torsemide 40mg   - start IV lasix 40mg daily  - continue home metoprolol succ 50mg    Gustavo Raygoza DO  PGY-1 Family Medicine

## 2024-04-20 LAB
ANION GAP SERPL CALC-SCNC: 15 MMOL/L (ref 10–20)
BASOPHILS # BLD AUTO: 0.08 X10*3/UL (ref 0–0.1)
BASOPHILS NFR BLD AUTO: 1.2 %
BNP SERPL-MCNC: 1106 PG/ML (ref 0–99)
BUN SERPL-MCNC: 26 MG/DL (ref 6–23)
CALCIUM SERPL-MCNC: 7.8 MG/DL (ref 8.6–10.6)
CHLORIDE SERPL-SCNC: 105 MMOL/L (ref 98–107)
CO2 SERPL-SCNC: 22 MMOL/L (ref 21–32)
CREAT SERPL-MCNC: 1.75 MG/DL (ref 0.5–1.3)
CRP SERPL-MCNC: 3.23 MG/DL
EGFRCR SERPLBLD CKD-EPI 2021: 34 ML/MIN/1.73M*2
EOSINOPHIL # BLD AUTO: 0.33 X10*3/UL (ref 0–0.7)
EOSINOPHIL NFR BLD AUTO: 4.8 %
ERYTHROCYTE [DISTWIDTH] IN BLOOD BY AUTOMATED COUNT: 25 % (ref 11.5–14.5)
GLUCOSE BLD MANUAL STRIP-MCNC: 141 MG/DL (ref 74–99)
GLUCOSE BLD MANUAL STRIP-MCNC: 162 MG/DL (ref 74–99)
GLUCOSE BLD MANUAL STRIP-MCNC: 173 MG/DL (ref 74–99)
GLUCOSE BLD MANUAL STRIP-MCNC: 185 MG/DL (ref 74–99)
GLUCOSE SERPL-MCNC: 209 MG/DL (ref 74–99)
HCT VFR BLD AUTO: 41.5 % (ref 36–52)
HGB BLD-MCNC: 11.1 G/DL (ref 12–17.5)
HYPOCHROMIA BLD QL SMEAR: NORMAL
IMM GRANULOCYTES # BLD AUTO: 0.02 X10*3/UL (ref 0–0.7)
IMM GRANULOCYTES NFR BLD AUTO: 0.3 % (ref 0–0.9)
LYMPHOCYTES # BLD AUTO: 1.21 X10*3/UL (ref 1.2–4.8)
LYMPHOCYTES NFR BLD AUTO: 17.7 %
MAGNESIUM SERPL-MCNC: 1.68 MG/DL (ref 1.6–2.4)
MCH RBC QN AUTO: 23.9 PG (ref 26–34)
MCHC RBC AUTO-ENTMCNC: 26.7 G/DL (ref 32–36)
MCV RBC AUTO: 89 FL (ref 80–100)
MONOCYTES # BLD AUTO: 0.84 X10*3/UL (ref 0.1–1)
MONOCYTES NFR BLD AUTO: 12.3 %
NEUTROPHILS # BLD AUTO: 4.36 X10*3/UL (ref 1.2–7.7)
NEUTROPHILS NFR BLD AUTO: 63.7 %
NRBC BLD-RTO: 0 /100 WBCS (ref 0–0)
PLATELET # BLD AUTO: 336 X10*3/UL (ref 150–450)
POTASSIUM SERPL-SCNC: 3.9 MMOL/L (ref 3.5–5.3)
RBC # BLD AUTO: 4.64 X10*6/UL (ref 4–5.9)
RBC MORPH BLD: NORMAL
SCHISTOCYTES BLD QL SMEAR: NORMAL
SODIUM SERPL-SCNC: 138 MMOL/L (ref 136–145)
VANCOMYCIN SERPL-MCNC: 8.3 UG/ML (ref 5–20)
WBC # BLD AUTO: 6.8 X10*3/UL (ref 4.4–11.3)

## 2024-04-20 PROCEDURE — G0378 HOSPITAL OBSERVATION PER HR: HCPCS

## 2024-04-20 PROCEDURE — 83735 ASSAY OF MAGNESIUM: CPT

## 2024-04-20 PROCEDURE — 36415 COLL VENOUS BLD VENIPUNCTURE: CPT | Performed by: INTERNAL MEDICINE

## 2024-04-20 PROCEDURE — 82947 ASSAY GLUCOSE BLOOD QUANT: CPT

## 2024-04-20 PROCEDURE — 80202 ASSAY OF VANCOMYCIN: CPT

## 2024-04-20 PROCEDURE — 85025 COMPLETE CBC W/AUTO DIFF WBC: CPT

## 2024-04-20 PROCEDURE — 99232 SBSQ HOSP IP/OBS MODERATE 35: CPT | Performed by: INTERNAL MEDICINE

## 2024-04-20 PROCEDURE — 2500000002 HC RX 250 W HCPCS SELF ADMINISTERED DRUGS (ALT 637 FOR MEDICARE OP, ALT 636 FOR OP/ED)

## 2024-04-20 PROCEDURE — 2500000001 HC RX 250 WO HCPCS SELF ADMINISTERED DRUGS (ALT 637 FOR MEDICARE OP)

## 2024-04-20 PROCEDURE — 36415 COLL VENOUS BLD VENIPUNCTURE: CPT

## 2024-04-20 PROCEDURE — 2500000001 HC RX 250 WO HCPCS SELF ADMINISTERED DRUGS (ALT 637 FOR MEDICARE OP): Performed by: INTERNAL MEDICINE

## 2024-04-20 PROCEDURE — A4217 STERILE WATER/SALINE, 500 ML: HCPCS

## 2024-04-20 PROCEDURE — 80048 BASIC METABOLIC PNL TOTAL CA: CPT

## 2024-04-20 PROCEDURE — 83880 ASSAY OF NATRIURETIC PEPTIDE: CPT | Performed by: INTERNAL MEDICINE

## 2024-04-20 PROCEDURE — 2500000004 HC RX 250 GENERAL PHARMACY W/ HCPCS (ALT 636 FOR OP/ED)

## 2024-04-20 PROCEDURE — 86140 C-REACTIVE PROTEIN: CPT | Performed by: INTERNAL MEDICINE

## 2024-04-20 RX ADMIN — SACUBITRIL AND VALSARTAN 1 TABLET: 49; 51 TABLET, FILM COATED ORAL at 09:41

## 2024-04-20 RX ADMIN — FUROSEMIDE 40 MG: 10 INJECTION, SOLUTION INTRAVENOUS at 09:41

## 2024-04-20 RX ADMIN — ASPIRIN 81 MG CHEWABLE TABLET 81 MG: 81 TABLET CHEWABLE at 09:41

## 2024-04-20 RX ADMIN — APIXABAN 5 MG: 5 TABLET, FILM COATED ORAL at 21:49

## 2024-04-20 RX ADMIN — INSULIN LISPRO 1 UNITS: 100 INJECTION, SOLUTION INTRAVENOUS; SUBCUTANEOUS at 19:01

## 2024-04-20 RX ADMIN — INSULIN GLARGINE 35 UNITS: 100 INJECTION, SOLUTION SUBCUTANEOUS at 21:49

## 2024-04-20 RX ADMIN — ATORVASTATIN CALCIUM 40 MG: 40 TABLET, FILM COATED ORAL at 09:41

## 2024-04-20 RX ADMIN — METOPROLOL SUCCINATE 50 MG: 50 TABLET, FILM COATED, EXTENDED RELEASE ORAL at 09:41

## 2024-04-20 RX ADMIN — VANCOMYCIN HYDROCHLORIDE 1500 MG: 5 INJECTION, POWDER, LYOPHILIZED, FOR SOLUTION INTRAVENOUS at 17:12

## 2024-04-20 RX ADMIN — PANTOPRAZOLE SODIUM 40 MG: 40 TABLET, DELAYED RELEASE ORAL at 09:41

## 2024-04-20 RX ADMIN — SACUBITRIL AND VALSARTAN 1 TABLET: 49; 51 TABLET, FILM COATED ORAL at 21:49

## 2024-04-20 RX ADMIN — APIXABAN 5 MG: 5 TABLET, FILM COATED ORAL at 09:00

## 2024-04-20 RX ADMIN — CEFTRIAXONE SODIUM 2 G: 2 INJECTION, SOLUTION INTRAVENOUS at 16:20

## 2024-04-20 ASSESSMENT — COGNITIVE AND FUNCTIONAL STATUS - GENERAL
MOVING FROM LYING ON BACK TO SITTING ON SIDE OF FLAT BED WITH BEDRAILS: A LITTLE
DRESSING REGULAR LOWER BODY CLOTHING: A LOT
DRESSING REGULAR UPPER BODY CLOTHING: A LOT
STANDING UP FROM CHAIR USING ARMS: A LOT
STANDING UP FROM CHAIR USING ARMS: A LOT
MOBILITY SCORE: 11
MOBILITY SCORE: 11
TOILETING: A LOT
HELP NEEDED FOR BATHING: A LOT
DRESSING REGULAR UPPER BODY CLOTHING: A LOT
WALKING IN HOSPITAL ROOM: TOTAL
TURNING FROM BACK TO SIDE WHILE IN FLAT BAD: A LOT
CLIMB 3 TO 5 STEPS WITH RAILING: TOTAL
MOVING FROM LYING ON BACK TO SITTING ON SIDE OF FLAT BED WITH BEDRAILS: A LITTLE
CLIMB 3 TO 5 STEPS WITH RAILING: TOTAL
DAILY ACTIVITIY SCORE: 14
PERSONAL GROOMING: A LOT
PERSONAL GROOMING: A LOT
DAILY ACTIVITIY SCORE: 14
WALKING IN HOSPITAL ROOM: TOTAL
TOILETING: A LOT
HELP NEEDED FOR BATHING: A LOT
MOVING TO AND FROM BED TO CHAIR: A LOT
DRESSING REGULAR LOWER BODY CLOTHING: A LOT
TURNING FROM BACK TO SIDE WHILE IN FLAT BAD: A LOT
MOVING TO AND FROM BED TO CHAIR: A LOT

## 2024-04-20 ASSESSMENT — PAIN - FUNCTIONAL ASSESSMENT
PAIN_FUNCTIONAL_ASSESSMENT: 0-10
PAIN_FUNCTIONAL_ASSESSMENT: 0-10

## 2024-04-20 ASSESSMENT — PAIN SCALES - GENERAL
PAINLEVEL_OUTOF10: 0 - NO PAIN
PAINLEVEL_OUTOF10: 0 - NO PAIN

## 2024-04-20 NOTE — CARE PLAN
Problem: Pain  Goal: My pain/discomfort is manageable  Outcome: Progressing     Problem: Safety  Goal: Patient will be injury free during hospitalization  Outcome: Progressing  Goal: I will remain free of falls  Outcome: Progressing     Problem: Daily Care  Goal: Daily care needs are met  Outcome: Progressing     Problem: Psychosocial Needs  Goal: Demonstrates ability to cope with hospitalization/illness  Outcome: Progressing  Goal: Collaborate with me, my family, and caregiver to identify my specific goals  Outcome: Progressing     Problem: Discharge Barriers  Goal: My discharge needs are met  Outcome: Progressing     Problem: Skin  Goal: Decreased wound size/increased tissue granulation at next dressing change  Outcome: Progressing  Goal: Participates in plan/prevention/treatment measures  Outcome: Progressing  Goal: Prevent/manage excess moisture  Outcome: Progressing  Goal: Prevent/minimize sheer/friction injuries  Outcome: Progressing  Goal: Promote/optimize nutrition  Outcome: Progressing  Flowsheets (Taken 4/19/2024 1347 by Alanna Anderson RN)  Promote/optimize nutrition:   Consume > 50% meals/supplements   Monitor/record intake including meals  Goal: Promote skin healing  Outcome: Progressing  Flowsheets (Taken 4/19/2024 1347 by Alanna Anderson RN)  Promote skin healing:   Assess skin/pad under line(s)/device(s)   Turn/reposition every 2 hours/use positioning/transfer devices   Protective dressings over bony prominences   The patient's goals for the shift include pt will tolerate diet    The clinical goals for the shift include Pt will be compliant with call light policy for safety this shift

## 2024-04-20 NOTE — CARE PLAN
Problem: Safety  Goal: Patient will be injury free during hospitalization  Outcome: Progressing  Goal: I will remain free of falls  Outcome: Progressing   The patient's goals for the shift include pt will tolerate diet    The clinical goals for the shift include Pt will be compliant with call light policy for safety this shift    Over the shift, the patient did not make progress toward the following goals.

## 2024-04-20 NOTE — PROGRESS NOTES
"Kody Choi is a 53 y.o. adult on day 0 of admission presenting with Prosthetic joint infection, sequela.    Subjective   No ONE    Objective   Physical Exam  Vitals reviewed.   Constitutional:       General: He is not in acute distress.     Appearance: Normal appearance. He is obese.   Cardiovascular:      Rate and Rhythm: Normal rate and regular rhythm.      Pulses: Normal pulses.      Heart sounds: Normal heart sounds.   Pulmonary:      Effort: Pulmonary effort is normal.      Breath sounds: Normal breath sounds.   Abdominal:      General: Abdomen is flat.      Palpations: Abdomen is soft.      Tenderness: There is no abdominal tenderness.   Musculoskeletal:         General: Tenderness present. No signs of injury.      Right lower leg: Edema present.      Left lower leg: Edema present.   Skin:     Findings: Erythema and rash present.   Neurological:      Mental Status: He is alert and oriented to person, place, and time.   Psychiatric:         Mood and Affect: Mood normal.         Behavior: Behavior normal.       Last Recorded Vitals  Blood pressure 120/73, pulse 104, temperature 36 °C (96.8 °F), resp. rate 18, height 1.676 m (5' 5.98\"), weight (!) 167 kg (368 lb 2.7 oz), SpO2 93%, unknown if currently breastfeeding.  Intake/Output last 3 Shifts:  I/O last 3 completed shifts:  In: 910 (5.4 mL/kg) [P.O.:360; IV Piggyback:550]  Out: 925 (5.5 mL/kg) [Urine:925 (0.2 mL/kg/hr)]  Weight: 167 kg     Relevant Results    Assessment/Plan   Principal Problem:    Prosthetic joint infection, sequela    Pt is a 52 yo M with chronic R PJI and hx of MRSA, previously on daily suppressive antibiotics presenting with acute on chronic R knee pain and swelling exacerbated by a fall yesterday. Pt is afebrile and HDS without leukocytosis however pt has markedly elevated inflammatory markers with warm/swollen R knee joint and overlying cellulitis. Xray R Knee is negative for acute fracture however there is c/f for recurring acute on " chronic MRSA cellulitis, septic joint. Overall plan for cultures, aspiration/synovial fluid analysis and ID consult for antibiotic regimen recommendations.     4/20:   - pt remains afebrile, HDS   - CT guided R knee arthrocentesis performed yesterday w/ fluid analysis   - synovial fluid cx NGTD   - WBC ~ 30,000 c/f infection  - no crystals  - c/w vancomycin + ceftriaxone per ID pending cultures   - BCX NGTD  - continue diuresis for bilateral lower leg swelling     #Chronic R Knee Prosthetic Joint Infection   #R Knee Pain  #Deconditioning   :: no fracture on xray  :: no indication for surgical intervention/AKA at this time per ortho  - continue home tramadol 50mg BID   - pt/ot     #Cellulitis   #Hx of MRSA  - extensive antibiotic use history  - hx of MRSA infection  - pt previously on suppressive antibiotics but not during the last 30 days or so  - blood cultures NGTD     #Afib   - continue home eliquis 5mg BID  - continue home ASA 81mg      #T2DM  - continue Lantus 35u nightly   - SSI #1     #HFrEF  - hold home PO lasix 40mg and PO torsemide 40mg   - start IV lasix 40mg daily  - continue home metoprolol succ 50mg  - continue home Entresto 49-51    Gustavo Raygoza DO  PGY-1 Family Medicine

## 2024-04-20 NOTE — PROGRESS NOTES
Attempted to speak with pt to complete discharge planning assessment and discuss therapy recs but he was unavailable. Plan to speak with pt another time.    Annika Foss RN  Transitional Care Coordinator/TCC  [phone]

## 2024-04-20 NOTE — PROGRESS NOTES
"Vancomycin Dosing by Pharmacy- FOLLOW UP    Kody Choi is a 53 y.o. year old adult who Pharmacy has been consulted for vancomycin dosing for cellulitis, skin and soft tissue. Based on the patient's indication and renal status this patient is being dosed based on a goal AUC of 400-600.     Renal function is currently stable. Continue to monitor scr.  Scr on 4/20 and 4/19 was 1.75 compared to 4/12: 1.55.  Good fit per [employer].    Current vancomycin dose: 1500 mg given every 24 hours    Estimated vancomycin AUC on current dose: 491 mg/L.hr     Visit Vitals  /73   Pulse 104   Temp 36 °C (96.8 °F)   Resp 18        Lab Results   Component Value Date    CREATININE 1.75 (H) 04/20/2024    CREATININE 1.75 (H) 04/18/2024    CREATININE 1.55 (H) 04/12/2024    CREATININE 1.51 (H) 03/04/2024        Patient weight is No results found for: \"PTWEIGHT\"    No results found for: \"CULTURE\"     I/O last 3 completed shifts:  In: 910 (5.4 mL/kg) [P.O.:360; IV Piggyback:550]  Out: 925 (5.5 mL/kg) [Urine:925 (0.2 mL/kg/hr)]  Weight: 167 kg   [unfilled]    Lab Results   Component Value Date    PATIENTTEMP 37.0 02/17/2024    PATIENTTEMP 37.0 02/16/2024    PATIENTTEMP 37.0 01/25/2024        Assessment/Plan    Within goal AUC range. Continue current vancomycin regimen.    This dosing regimen is predicted by [employer] to result in the following pharmacokinetic parameters:  <<<<<PASTE [employer] DATA HERE>>>>>  Loading dose: N/A  Regimen: 1500 mg IV every 24 hours.  Start time: 18:26 on 04/20/2024  Exposure target: AUC24 (range)400-600 mg/L.hr   AUC24,ss: 491 mg/L.hr  Probability of AUC24 > 400: 83 %  Ctrough,ss: 15.8 mg/L  Probability of Ctrough,ss > 20: 23 %  Probability of nephrotoxicity (Lodise EDIE 2009): 11 %    The next level will be obtained on 4/23 at 055. May be obtained sooner if clinically indicated.   Will continue to monitor renal function daily while on vancomycin and order serum creatinine at least every 48 hours if not " already ordered.  Follow for continued vancomycin needs, clinical response, and signs/symptoms of toxicity.       Jennifer Mcguire, PharmD

## 2024-04-20 NOTE — PROGRESS NOTES
Kody Choi is a 53 y.o. adult on day 0 of admission presenting with Prosthetic joint infection, sequela.      Transitional Care Coordinator Note: Spoke with patient regarding discharge planning and to confirm facility choice. Patient confirmed he does not want to return to [facility]. Patient states he is not ready to make a decision at this time, but has a list that was provided to him. Patient informed in order to proceed with the process we will need to have his FOC. Patient stated he needs another day. TCC informed team will follow up with patient 4/21. Patient stated okay and hung up before TCC could continue to ask further discharge assessment questions. Patient noted to be  irritable during conversation. TCC team will continue to follow up.   Janell Lema RN TCC via Epic.

## 2024-04-21 ENCOUNTER — APPOINTMENT (OUTPATIENT)
Dept: RADIOLOGY | Facility: HOSPITAL | Age: 54
DRG: 560 | End: 2024-04-21
Payer: COMMERCIAL

## 2024-04-21 PROBLEM — Z96.659: Status: ACTIVE | Noted: 2024-04-21

## 2024-04-21 PROBLEM — T84.59XA: Status: ACTIVE | Noted: 2024-04-21

## 2024-04-21 LAB
ANION GAP SERPL CALC-SCNC: 14 MMOL/L (ref 10–20)
BASOPHILS # BLD MANUAL: 0.12 X10*3/UL (ref 0–0.1)
BASOPHILS NFR BLD MANUAL: 1.8 %
BUN SERPL-MCNC: 30 MG/DL (ref 6–23)
CALCIUM SERPL-MCNC: 8.2 MG/DL (ref 8.6–10.6)
CHLORIDE SERPL-SCNC: 104 MMOL/L (ref 98–107)
CO2 SERPL-SCNC: 27 MMOL/L (ref 21–32)
CREAT SERPL-MCNC: 1.91 MG/DL (ref 0.5–1.3)
EGFRCR SERPLBLD CKD-EPI 2021: 31 ML/MIN/1.73M*2
EOSINOPHIL # BLD MANUAL: 0.42 X10*3/UL (ref 0–0.7)
EOSINOPHIL NFR BLD MANUAL: 6.2 %
ERYTHROCYTE [DISTWIDTH] IN BLOOD BY AUTOMATED COUNT: 24.4 % (ref 11.5–14.5)
GLUCOSE BLD MANUAL STRIP-MCNC: 117 MG/DL (ref 74–99)
GLUCOSE BLD MANUAL STRIP-MCNC: 141 MG/DL (ref 74–99)
GLUCOSE BLD MANUAL STRIP-MCNC: 222 MG/DL (ref 74–99)
GLUCOSE SERPL-MCNC: 113 MG/DL (ref 74–99)
HCT VFR BLD AUTO: 38 % (ref 36–52)
HGB BLD-MCNC: 10.8 G/DL (ref 12–17.5)
HYPOCHROMIA BLD QL SMEAR: ABNORMAL
IMM GRANULOCYTES # BLD AUTO: 0.03 X10*3/UL (ref 0–0.7)
IMM GRANULOCYTES NFR BLD AUTO: 0.4 % (ref 0–0.9)
LYMPHOCYTES # BLD MANUAL: 0.54 X10*3/UL (ref 1.2–4.8)
LYMPHOCYTES NFR BLD MANUAL: 8 %
MAGNESIUM SERPL-MCNC: 1.66 MG/DL (ref 1.6–2.4)
MCH RBC QN AUTO: 23.3 PG (ref 26–34)
MCHC RBC AUTO-ENTMCNC: 28.4 G/DL (ref 32–36)
MCV RBC AUTO: 82 FL (ref 80–100)
MONOCYTES # BLD MANUAL: 0.36 X10*3/UL (ref 0.1–1)
MONOCYTES NFR BLD MANUAL: 5.3 %
NEUTS SEG # BLD MANUAL: 5.04 X10*3/UL (ref 1.2–7)
NEUTS SEG NFR BLD MANUAL: 75.2 %
NRBC BLD-RTO: 0 /100 WBCS (ref 0–0)
OVALOCYTES BLD QL SMEAR: ABNORMAL
PLATELET # BLD AUTO: 374 X10*3/UL (ref 150–450)
POTASSIUM SERPL-SCNC: 3.8 MMOL/L (ref 3.5–5.3)
RBC # BLD AUTO: 4.64 X10*6/UL (ref 4–5.9)
RBC MORPH BLD: ABNORMAL
SODIUM SERPL-SCNC: 141 MMOL/L (ref 136–145)
TOTAL CELLS COUNTED BLD: 113
VARIANT LYMPHS # BLD MANUAL: 0.23 X10*3/UL (ref 0–0.5)
VARIANT LYMPHS NFR BLD: 3.5 %
WBC # BLD AUTO: 6.7 X10*3/UL (ref 4.4–11.3)

## 2024-04-21 PROCEDURE — 80048 BASIC METABOLIC PNL TOTAL CA: CPT

## 2024-04-21 PROCEDURE — 99233 SBSQ HOSP IP/OBS HIGH 50: CPT | Performed by: INTERNAL MEDICINE

## 2024-04-21 PROCEDURE — 2500000002 HC RX 250 W HCPCS SELF ADMINISTERED DRUGS (ALT 637 FOR MEDICARE OP, ALT 636 FOR OP/ED): Mod: MUE | Performed by: INTERNAL MEDICINE

## 2024-04-21 PROCEDURE — 2500000002 HC RX 250 W HCPCS SELF ADMINISTERED DRUGS (ALT 637 FOR MEDICARE OP, ALT 636 FOR OP/ED): Performed by: INTERNAL MEDICINE

## 2024-04-21 PROCEDURE — 2500000001 HC RX 250 WO HCPCS SELF ADMINISTERED DRUGS (ALT 637 FOR MEDICARE OP)

## 2024-04-21 PROCEDURE — 1100000001 HC PRIVATE ROOM DAILY

## 2024-04-21 PROCEDURE — 85007 BL SMEAR W/DIFF WBC COUNT: CPT

## 2024-04-21 PROCEDURE — 82947 ASSAY GLUCOSE BLOOD QUANT: CPT

## 2024-04-21 PROCEDURE — 83735 ASSAY OF MAGNESIUM: CPT

## 2024-04-21 PROCEDURE — 2500000001 HC RX 250 WO HCPCS SELF ADMINISTERED DRUGS (ALT 637 FOR MEDICARE OP): Performed by: INTERNAL MEDICINE

## 2024-04-21 PROCEDURE — 2500000004 HC RX 250 GENERAL PHARMACY W/ HCPCS (ALT 636 FOR OP/ED): Mod: JZ | Performed by: INTERNAL MEDICINE

## 2024-04-21 PROCEDURE — 36415 COLL VENOUS BLD VENIPUNCTURE: CPT

## 2024-04-21 PROCEDURE — 94640 AIRWAY INHALATION TREATMENT: CPT

## 2024-04-21 PROCEDURE — 99232 SBSQ HOSP IP/OBS MODERATE 35: CPT | Performed by: INTERNAL MEDICINE

## 2024-04-21 PROCEDURE — 85027 COMPLETE CBC AUTOMATED: CPT

## 2024-04-21 RX ORDER — IPRATROPIUM BROMIDE AND ALBUTEROL SULFATE 2.5; .5 MG/3ML; MG/3ML
3 SOLUTION RESPIRATORY (INHALATION) EVERY 6 HOURS
Status: DISCONTINUED | OUTPATIENT
Start: 2024-04-21 | End: 2024-04-22

## 2024-04-21 RX ORDER — LIDOCAINE HYDROCHLORIDE 10 MG/ML
5 INJECTION INFILTRATION; PERINEURAL ONCE
Status: DISCONTINUED | OUTPATIENT
Start: 2024-04-21 | End: 2024-04-25 | Stop reason: HOSPADM

## 2024-04-21 RX ORDER — FUROSEMIDE 10 MG/ML
60 INJECTION INTRAMUSCULAR; INTRAVENOUS EVERY 12 HOURS
Status: DISCONTINUED | OUTPATIENT
Start: 2024-04-21 | End: 2024-04-25

## 2024-04-21 RX ORDER — IPRATROPIUM BROMIDE AND ALBUTEROL SULFATE 2.5; .5 MG/3ML; MG/3ML
3 SOLUTION RESPIRATORY (INHALATION) EVERY 8 HOURS PRN
Status: DISCONTINUED | OUTPATIENT
Start: 2024-04-21 | End: 2024-04-25 | Stop reason: HOSPADM

## 2024-04-21 RX ADMIN — SACUBITRIL AND VALSARTAN 1 TABLET: 49; 51 TABLET, FILM COATED ORAL at 21:43

## 2024-04-21 RX ADMIN — METOPROLOL SUCCINATE 50 MG: 50 TABLET, FILM COATED, EXTENDED RELEASE ORAL at 09:16

## 2024-04-21 RX ADMIN — APIXABAN 5 MG: 5 TABLET, FILM COATED ORAL at 21:40

## 2024-04-21 RX ADMIN — ATORVASTATIN CALCIUM 40 MG: 40 TABLET, FILM COATED ORAL at 09:16

## 2024-04-21 RX ADMIN — ACETAMINOPHEN 650 MG: 325 TABLET ORAL at 17:09

## 2024-04-21 RX ADMIN — FUROSEMIDE 60 MG: 10 INJECTION, SOLUTION INTRAMUSCULAR; INTRAVENOUS at 14:07

## 2024-04-21 RX ADMIN — SACUBITRIL AND VALSARTAN 1 TABLET: 49; 51 TABLET, FILM COATED ORAL at 09:16

## 2024-04-21 RX ADMIN — IPRATROPIUM BROMIDE AND ALBUTEROL SULFATE 3 ML: .5; 3 SOLUTION RESPIRATORY (INHALATION) at 22:00

## 2024-04-21 RX ADMIN — PANTOPRAZOLE SODIUM 40 MG: 40 TABLET, DELAYED RELEASE ORAL at 14:07

## 2024-04-21 RX ADMIN — DALBAVANCIN 1500 MG: 500 INJECTION, POWDER, FOR SOLUTION INTRAVENOUS at 17:42

## 2024-04-21 RX ADMIN — IPRATROPIUM BROMIDE AND ALBUTEROL SULFATE 3 ML: .5; 3 SOLUTION RESPIRATORY (INHALATION) at 16:00

## 2024-04-21 RX ADMIN — TRAMADOL HYDROCHLORIDE 50 MG: 50 TABLET, COATED ORAL at 21:41

## 2024-04-21 RX ADMIN — ASPIRIN 81 MG CHEWABLE TABLET 81 MG: 81 TABLET CHEWABLE at 09:16

## 2024-04-21 RX ADMIN — TRAMADOL HYDROCHLORIDE 50 MG: 50 TABLET, COATED ORAL at 14:15

## 2024-04-21 RX ADMIN — APIXABAN 5 MG: 5 TABLET, FILM COATED ORAL at 09:16

## 2024-04-21 RX ADMIN — INSULIN GLARGINE 35 UNITS: 100 INJECTION, SOLUTION SUBCUTANEOUS at 21:47

## 2024-04-21 ASSESSMENT — PAIN SCALES - GENERAL
PAINLEVEL_OUTOF10: 10 - WORST POSSIBLE PAIN

## 2024-04-21 ASSESSMENT — PAIN - FUNCTIONAL ASSESSMENT
PAIN_FUNCTIONAL_ASSESSMENT: 0-10
PAIN_FUNCTIONAL_ASSESSMENT: 0-10

## 2024-04-21 ASSESSMENT — PAIN DESCRIPTION - LOCATION
LOCATION: KNEE
LOCATION: KNEE

## 2024-04-21 ASSESSMENT — PAIN DESCRIPTION - ORIENTATION: ORIENTATION: RIGHT

## 2024-04-21 NOTE — PROGRESS NOTES
Kody Choi is a 53 y.o. adult on day 0 of admission presenting with Prosthetic joint infection, sequela.    Subjective   Met with patient at bedside along with TCC to obtain SNF choices; he reports his choices as 1) [facility] 2) [facility] 3) [facility]. Requested that [facility] build and send referrals in [facility] [place].    ADOD 1-2 days per primary team.    -Cayla ÁLVAREZ MA, LSW  [phone] or [facility] Secure Chat  Care Transitions

## 2024-04-21 NOTE — PROGRESS NOTES
Vancomycin Dosing by Pharmacy- Cessation of Therapy    Consult to pharmacy for vancomycin dosing has been discontinued by the prescriber, pharmacy will sign off at this time.    Please call pharmacy if there are further questions or re-enter a consult if vancomycin is resumed.     Chuy AtkinsD

## 2024-04-21 NOTE — PROGRESS NOTES
Pharmacy Admission Order Reconciliation Review    Kody Choi is a 53 y.o. adult admitted for Prosthetic joint infection, sequela. Pharmacy reviewed the patient's unreconciled admission medications.    Prior to admission medications that were reviewed and acted on by the pharmacist include:  Apixaban  Doxycycline  Guaifenesin    These medications have been reconciled.     Any other unreconcilied medications have been addressed and will be ordered or held by the patient's medical team. Medications addressed by the pharmacist may be added or changed by the patient's medical team at any time.    Christy Parish, PharmD  Transitions of Care Pharmacist  [facility]  Please reach out via Secure Chat for questions, or if no response call [phone]

## 2024-04-21 NOTE — PROGRESS NOTES
"Kody Choi is a 53 y.o. adult on day 0 of admission presenting with Prosthetic joint infection, sequela.    Subjective   No ONE    Objective   Physical Exam  Vitals reviewed.   Constitutional:       General: He is not in acute distress.     Appearance: Normal appearance. He is obese.   HENT:      Mouth/Throat:      Mouth: Mucous membranes are moist.   Cardiovascular:      Rate and Rhythm: Normal rate and regular rhythm.      Pulses: Normal pulses.      Heart sounds: Normal heart sounds.   Pulmonary:      Effort: Pulmonary effort is normal.      Breath sounds: Wheezing and rhonchi present.   Abdominal:      General: Abdomen is flat.      Palpations: Abdomen is soft.      Tenderness: There is no abdominal tenderness.   Genitourinary:     Comments: Swollen scrotum-oedema   Musculoskeletal:         General: Swelling, tenderness and deformity present. No signs of injury.      Right lower leg: Edema present.      Left lower leg: Edema present.   Skin:     Findings: Erythema and rash present.   Neurological:      Mental Status: He is alert and oriented to person, place, and time.   Psychiatric:         Mood and Affect: Mood normal.         Behavior: Behavior normal.       Last Recorded Vitals  Blood pressure 139/83, pulse 93, temperature 37.4 °C (99.3 °F), resp. rate 16, height 1.676 m (5' 5.98\"), weight (!) 167 kg (368 lb 2.7 oz), SpO2 93%, unknown if currently breastfeeding.  Intake/Output last 3 Shifts:  I/O last 3 completed shifts:  In: 550 (3.3 mL/kg) [IV Piggyback:550]  Out: 1275 (7.6 mL/kg) [Urine:1275 (0.2 mL/kg/hr)]  Weight: 167 kg     Relevant Results    Assessment/Plan   Principal Problem:    Prosthetic joint infection, sequela  Active Problems:    Chronic infection of knee joint prosthesis, initial encounter (CMS-[facility])    Pt is a 52 yo M with chronic R PJI and hx of MRSA, previously on daily suppressive antibiotics presenting with acute on chronic R knee pain and swelling exacerbated by a fall yesterday. Pt is " afebrile and HDS without leukocytosis however pt has markedly elevated inflammatory markers with warm/swollen R knee joint and overlying cellulitis. 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 Overall plan for cultures, aspiration/synovial fluid analysis and ID consult for antibiotic regimen recommendations.   Continue with diuresis ( Bnpep > 1K), will repeat after diuresis lasix 60 mg bid. Eric Legs swollen, chest ronchi.    4/20:   - pt remains afebrile, HDS   - CT guided R knee arthrocentesis performed yesterday w/ fluid analysis   - synovial fluid cx NGTD   - WBC ~ 30,000 c/f infection  - no crystals  - c/w vancomycin + ceftriaxone per ID pending cultures   - BCX NGTD  - continue diuresis for bilateral lower leg swelling     #Chronic R Knee Prosthetic Joint Infection   #R Knee Pain  #Deconditioning   :: no fracture on xray  :: no indication for surgical intervention/AKA at this time per ortho  - continue home tramadol 50mg BID   - pt/ot     #Cellulitis   #Hx of MRSA  - extensive antibiotic use history  - hx of MRSA infection  - pt previously on suppressive antibiotics but not during the last 30 days or so  - blood cultures NGTD     #Afib   - continue home eliquis 5mg BID  - continue home ASA 81mg      #T2DM  - continue Lantus 35u nightly   - SSI #1     #HFrEF  - hold home PO lasix 40mg and PO torsemide 40mg   - start IV lasix 40mg daily  - continue home metoprolol succ 50mg  - continue home Entresto 49-51    I saw and evaluated the patient.  I personally obtained the key and critical portions of the history and physical exam or was physically present for key and critical portions performed by the resident. I reviewed the resident's documentation and discussed the patient with the resident.  I agree with the resident's medical decision making as documented in the note.   spoke to the pt, explained the medical and social conditions  and the reason for this admission explained our plan and recommendations.  Time spent on the assessment of patient, gathering and interpreting data, review of medical record/patient history, personally reviewing radiographic imaging. With greater than 50% spent in personal discussion with patient.  Time > 35 min       Upodates@3:00pm per ID : dalbavancin 1500mg IV x2 doses (one here before discharge and then one a week later, to cover him for at least 6 weeks).

## 2024-04-21 NOTE — PROGRESS NOTES
Kody Choi is a 53 y.o. male on day 0 of admission presenting with Prosthetic joint infection, sequela.    Subjective   Interval History: Seen today, feels ok but knee still sore after arthrocentesis Friday. Less uncomfortable. Discussed need for IV therapy upfront then probably PO suppression. He had no issues with doxycycline, which he confirms he was taking prior to admission.        Review of Systems as above    Objective   Range of Vitals (last 24 hours)  Heart Rate:  [93-98]   Temp:  [36.6 °C (97.9 °F)-37.4 °C (99.3 °F)]   Resp:  [16-18]   BP: (134-150)/(83-87)   SpO2:  [93 %-97 %]   Daily Weight  04/18/24 : (!) 167 kg (368 lb 2.7 oz)    Body mass index is 59.45 kg/m².    Physical Exam  Overweight man, no acute distress  R eye strabismus  Warm and well perfused  Conversant without increased work of breathing on room air  +R knee remains stable from prior, perhaps less warm, no increased redness and less pain to touch    Relevant Results  Labs  Results from last 72 hours   Lab Units 04/21/24  0745 04/20/24  0738   WBC AUTO x10*3/uL 6.7 6.8   HEMOGLOBIN g/dL 10.8* 11.1*   HEMATOCRIT % 38.0 41.5   PLATELETS AUTO x10*3/uL 374 336   NEUTROS PCT AUTO %  --  63.7   LYMPHO PCT MAN % 8.0  --    LYMPHS PCT AUTO %  --  17.7   MONO PCT MAN % 5.3  --    MONOS PCT AUTO %  --  12.3   EOSINO PCT MAN % 6.2  --    EOS PCT AUTO %  --  4.8     Results from last 72 hours   Lab Units 04/21/24  0745 04/20/24  0738   SODIUM mmol/L 141 138   POTASSIUM mmol/L 3.8 3.9   CHLORIDE mmol/L 104 105   CO2 mmol/L 27 22   BUN mg/dL 30* 26*   CREATININE mg/dL 1.91* 1.75*   GLUCOSE mg/dL 113* 209*   CALCIUM mg/dL 8.2* 7.8*   ANION GAP mmol/L 14 15   EGFR mL/min/1.73m*2 31* 34*         Estimated Creatinine Clearance (by C-G formula based on SCr of 1.91 mg/dL (H))  Female: 53.6 mL/min (A)  Male: 64.9 mL/min (A)  The patient&#39;s sex/gender information is inconclusive; review their information before proceeding.    C-Reactive Protein   Date Value  Ref Range Status   04/20/2024 3.23 (H) <1.00 mg/dL Final   04/18/2024 5.70 (H) <1.00 mg/dL Final   04/18/2024 5.91 (H) <1.00 mg/dL Final     Microbiology  4/18 bld ngtd  4/19 synovial R knee (mislabeled as tissue) ng=      Assessment/Plan   52yo male with newly-controlled DM2 (A1c 7.8% today, prior 16.6%) and hx of knee PJI with pieces of antibiotic spacer since 2019.  Multiple admissions with PJI with MSSA, then MRSA, then group B streptococcus.  History of non-adherence to oral suppressive antibiotics but has completed multiple courses of IV antiboitics, most recently IV daptomycin from end of January to 3/18/24.     Presented 4/17 for worsened knee pain and suspicion of recurrent septic arthritis. Had been on doxycycline at nursing home, then given cephalexin inpatient prior to synovial fluid sample.     Synovial fluid with 33k WBC (no differential), 89 RBC, consistent with PJI. No growth on sample; we have asked lab for extended incubation and 16s testing.    Note ortho has been consulted; currently no acute indication for amputation per their note, and recommending follow up with his initial surgeon Dr Oreilly for discussion of AKA.     #Persistent prosthetic joint infection.  #DM2, only recently controlled  #Bilateral foot wounds     Recommendations:  - stop vancomycin and ceftriaxone  - please give 1 dose dalbavancin 1500mg today or tomorrow and arrange for 2nd dose to be given after 7 days   - if SNF cannot give second dose of dalbavancin, will need to arrange for outpatient infusion  - on 6/10/24, should start doxycycline 100mg bid PO indefinitely  - ID will arrange follow up appointment with Dr Goodwin in 6-8 weeks  - please arrange follow up with Dr Oreilly from orthopedics to discuss possible AKA as patient is amenable to this option     ID will sign off. Please reach out with any questions.   Discussed with Dr. Camilo Warren MD  PGY-5 ID Fellow  Team A - pager [phone]  For new consults, page  [phone]       Correction (4/24/2024): Second dose of Dalbavancin wiill be 1000 mg IV on May 7th, 2024 (2 weeks after initial), to be administered at [facility] infusion [address].

## 2024-04-22 ENCOUNTER — APPOINTMENT (OUTPATIENT)
Dept: RADIOLOGY | Facility: HOSPITAL | Age: 54
DRG: 560 | End: 2024-04-22
Payer: COMMERCIAL

## 2024-04-22 LAB
ANION GAP SERPL CALC-SCNC: 14 MMOL/L (ref 10–20)
BACTERIA BLD CULT: NORMAL
BASOPHILS # BLD AUTO: 0.05 X10*3/UL (ref 0–0.1)
BASOPHILS NFR BLD AUTO: 0.8 %
BUN SERPL-MCNC: 32 MG/DL (ref 6–23)
CALCIUM SERPL-MCNC: 8.3 MG/DL (ref 8.6–10.6)
CHLORIDE SERPL-SCNC: 104 MMOL/L (ref 98–107)
CO2 SERPL-SCNC: 27 MMOL/L (ref 21–32)
CREAT SERPL-MCNC: 1.73 MG/DL (ref 0.5–1.3)
EGFRCR SERPLBLD CKD-EPI 2021: 35 ML/MIN/1.73M*2
EOSINOPHIL # BLD AUTO: 0.42 X10*3/UL (ref 0–0.7)
EOSINOPHIL NFR BLD AUTO: 6.9 %
ERYTHROCYTE [DISTWIDTH] IN BLOOD BY AUTOMATED COUNT: 24 % (ref 11.5–14.5)
GLUCOSE BLD MANUAL STRIP-MCNC: 113 MG/DL (ref 74–99)
GLUCOSE BLD MANUAL STRIP-MCNC: 132 MG/DL (ref 74–99)
GLUCOSE BLD MANUAL STRIP-MCNC: 155 MG/DL (ref 74–99)
GLUCOSE BLD MANUAL STRIP-MCNC: 60 MG/DL (ref 74–99)
GLUCOSE BLD MANUAL STRIP-MCNC: 91 MG/DL (ref 74–99)
GLUCOSE SERPL-MCNC: 85 MG/DL (ref 74–99)
HCT VFR BLD AUTO: 36.6 % (ref 36–52)
HGB BLD-MCNC: 11 G/DL (ref 12–17.5)
IMM GRANULOCYTES # BLD AUTO: 0.02 X10*3/UL (ref 0–0.7)
IMM GRANULOCYTES NFR BLD AUTO: 0.3 % (ref 0–0.9)
LYMPHOCYTES # BLD AUTO: 1.14 X10*3/UL (ref 1.2–4.8)
LYMPHOCYTES NFR BLD AUTO: 18.6 %
MAGNESIUM SERPL-MCNC: 1.64 MG/DL (ref 1.6–2.4)
MCH RBC QN AUTO: 23.7 PG (ref 26–34)
MCHC RBC AUTO-ENTMCNC: 30.1 G/DL (ref 32–36)
MCV RBC AUTO: 79 FL (ref 80–100)
MONOCYTES # BLD AUTO: 0.54 X10*3/UL (ref 0.1–1)
MONOCYTES NFR BLD AUTO: 8.8 %
NEUTROPHILS # BLD AUTO: 3.95 X10*3/UL (ref 1.2–7.7)
NEUTROPHILS NFR BLD AUTO: 64.6 %
NRBC BLD-RTO: 0 /100 WBCS (ref 0–0)
OVALOCYTES BLD QL SMEAR: NORMAL
PLATELET # BLD AUTO: 357 X10*3/UL (ref 150–450)
POTASSIUM SERPL-SCNC: 3.7 MMOL/L (ref 3.5–5.3)
RBC # BLD AUTO: 4.64 X10*6/UL (ref 4–5.9)
RBC MORPH BLD: NORMAL
SODIUM SERPL-SCNC: 141 MMOL/L (ref 136–145)
TARGETS BLD QL SMEAR: NORMAL
WBC # BLD AUTO: 6.1 X10*3/UL (ref 4.4–11.3)

## 2024-04-22 PROCEDURE — 2500000002 HC RX 250 W HCPCS SELF ADMINISTERED DRUGS (ALT 637 FOR MEDICARE OP, ALT 636 FOR OP/ED): Performed by: INTERNAL MEDICINE

## 2024-04-22 PROCEDURE — 82947 ASSAY GLUCOSE BLOOD QUANT: CPT

## 2024-04-22 PROCEDURE — 1100000001 HC PRIVATE ROOM DAILY

## 2024-04-22 PROCEDURE — 2500000001 HC RX 250 WO HCPCS SELF ADMINISTERED DRUGS (ALT 637 FOR MEDICARE OP): Performed by: INTERNAL MEDICINE

## 2024-04-22 PROCEDURE — 2500000001 HC RX 250 WO HCPCS SELF ADMINISTERED DRUGS (ALT 637 FOR MEDICARE OP)

## 2024-04-22 PROCEDURE — 36415 COLL VENOUS BLD VENIPUNCTURE: CPT | Performed by: INTERNAL MEDICINE

## 2024-04-22 PROCEDURE — 94640 AIRWAY INHALATION TREATMENT: CPT

## 2024-04-22 PROCEDURE — 85025 COMPLETE CBC W/AUTO DIFF WBC: CPT | Performed by: INTERNAL MEDICINE

## 2024-04-22 PROCEDURE — 99232 SBSQ HOSP IP/OBS MODERATE 35: CPT

## 2024-04-22 PROCEDURE — 83735 ASSAY OF MAGNESIUM: CPT | Performed by: INTERNAL MEDICINE

## 2024-04-22 PROCEDURE — 2500000002 HC RX 250 W HCPCS SELF ADMINISTERED DRUGS (ALT 637 FOR MEDICARE OP, ALT 636 FOR OP/ED): Mod: MUE | Performed by: INTERNAL MEDICINE

## 2024-04-22 PROCEDURE — 2500000004 HC RX 250 GENERAL PHARMACY W/ HCPCS (ALT 636 FOR OP/ED): Performed by: INTERNAL MEDICINE

## 2024-04-22 PROCEDURE — 80048 BASIC METABOLIC PNL TOTAL CA: CPT | Performed by: INTERNAL MEDICINE

## 2024-04-22 RX ADMIN — SACUBITRIL AND VALSARTAN 1 TABLET: 49; 51 TABLET, FILM COATED ORAL at 21:05

## 2024-04-22 RX ADMIN — ACETAMINOPHEN 650 MG: 325 TABLET ORAL at 01:20

## 2024-04-22 RX ADMIN — INSULIN GLARGINE 35 UNITS: 100 INJECTION, SOLUTION SUBCUTANEOUS at 21:55

## 2024-04-22 RX ADMIN — ATORVASTATIN CALCIUM 40 MG: 40 TABLET, FILM COATED ORAL at 09:52

## 2024-04-22 RX ADMIN — PANTOPRAZOLE SODIUM 40 MG: 40 TABLET, DELAYED RELEASE ORAL at 12:10

## 2024-04-22 RX ADMIN — TRAMADOL HYDROCHLORIDE 50 MG: 50 TABLET, COATED ORAL at 16:59

## 2024-04-22 RX ADMIN — FUROSEMIDE 60 MG: 10 INJECTION, SOLUTION INTRAMUSCULAR; INTRAVENOUS at 00:57

## 2024-04-22 RX ADMIN — IPRATROPIUM BROMIDE AND ALBUTEROL SULFATE 3 ML: .5; 3 SOLUTION RESPIRATORY (INHALATION) at 02:36

## 2024-04-22 RX ADMIN — APIXABAN 5 MG: 5 TABLET, FILM COATED ORAL at 21:05

## 2024-04-22 RX ADMIN — TRAMADOL HYDROCHLORIDE 50 MG: 50 TABLET, COATED ORAL at 09:47

## 2024-04-22 RX ADMIN — ACETAMINOPHEN 650 MG: 325 TABLET ORAL at 21:05

## 2024-04-22 RX ADMIN — METOPROLOL SUCCINATE 50 MG: 50 TABLET, FILM COATED, EXTENDED RELEASE ORAL at 09:48

## 2024-04-22 RX ADMIN — APIXABAN 5 MG: 5 TABLET, FILM COATED ORAL at 09:48

## 2024-04-22 RX ADMIN — SENNOSIDES 17.2 MG: 8.6 TABLET, FILM COATED ORAL at 21:05

## 2024-04-22 RX ADMIN — ASPIRIN 81 MG CHEWABLE TABLET 81 MG: 81 TABLET CHEWABLE at 09:48

## 2024-04-22 RX ADMIN — FUROSEMIDE 60 MG: 10 INJECTION, SOLUTION INTRAMUSCULAR; INTRAVENOUS at 13:59

## 2024-04-22 RX ADMIN — SACUBITRIL AND VALSARTAN 1 TABLET: 49; 51 TABLET, FILM COATED ORAL at 12:09

## 2024-04-22 RX ADMIN — SENNOSIDES 17.2 MG: 8.6 TABLET, FILM COATED ORAL at 09:48

## 2024-04-22 ASSESSMENT — COGNITIVE AND FUNCTIONAL STATUS - GENERAL
TOILETING: A LOT
PERSONAL GROOMING: A LOT
HELP NEEDED FOR BATHING: A LOT
DRESSING REGULAR LOWER BODY CLOTHING: A LOT
DAILY ACTIVITIY SCORE: 14
DRESSING REGULAR UPPER BODY CLOTHING: A LOT

## 2024-04-22 ASSESSMENT — PAIN SCALES - GENERAL
PAINLEVEL_OUTOF10: 4
PAINLEVEL_OUTOF10: 4
PAINLEVEL_OUTOF10: 6
PAINLEVEL_OUTOF10: 4
PAINLEVEL_OUTOF10: 6

## 2024-04-22 ASSESSMENT — PAIN DESCRIPTION - LOCATION: LOCATION: KNEE

## 2024-04-22 NOTE — CARE PLAN
Problem: Pain  Goal: My pain/discomfort is manageable  Outcome: Progressing     Problem: Safety  Goal: Patient will be injury free during hospitalization  Outcome: Progressing  Goal: I will remain free of falls  Outcome: Progressing     Problem: Daily Care  Goal: Daily care needs are met  Outcome: Progressing     Problem: Psychosocial Needs  Goal: Demonstrates ability to cope with hospitalization/illness  Outcome: Progressing  Goal: Collaborate with me, my family, and caregiver to identify my specific goals  Outcome: Progressing

## 2024-04-22 NOTE — CARE PLAN
The patient's goals for the shift include pt will tolerate diet    The clinical goals for the shift include Pt will maintain glucose levels above 70 & below 150 this shift    Over the shift, the patient did not make progress toward the following goals. Barriers to progression include . Recommendations to address these barriers include   Problem: Pain  Goal: My pain/discomfort is manageable  Outcome: Progressing   .

## 2024-04-22 NOTE — PROGRESS NOTES
"Kody Choi is a 53 y.o. adult on day 1 of admission presenting with Prosthetic joint infection, sequela.    Subjective   No ONE  No new concerns   Pt endorses feeling generally fatigued but that his knee feels much better from when he came in      Objective   Physical Exam  Vitals reviewed.   Constitutional:       General: He is not in acute distress.     Appearance: Normal appearance. He is obese.   HENT:      Mouth/Throat:      Mouth: Mucous membranes are moist.   Cardiovascular:      Rate and Rhythm: Normal rate and regular rhythm.      Pulses: Normal pulses.      Heart sounds: Normal heart sounds.   Pulmonary:      Effort: Pulmonary effort is normal.      Breath sounds: Normal breath sounds.   Abdominal:      General: Abdomen is flat.      Palpations: Abdomen is soft.      Tenderness: There is no abdominal tenderness.   Genitourinary:     Comments: Swollen scrotum-oedema   Musculoskeletal:         General: Swelling, tenderness and deformity present. No signs of injury.      Right lower leg: Edema present.      Left lower leg: Edema present.   Skin:     Findings: Erythema and rash present.      Comments: Right knee cellulitis improving   Neurological:      Mental Status: He is alert and oriented to person, place, and time.   Psychiatric:         Mood and Affect: Mood normal.         Behavior: Behavior normal.       Last Recorded Vitals  Blood pressure 133/77, pulse 88, temperature 36.7 °C (98 °F), resp. rate 20, height 1.676 m (5' 5.98\"), weight (!) 167 kg (368 lb 2.7 oz), SpO2 94%, unknown if currently breastfeeding.  Intake/Output last 3 Shifts:  I/O last 3 completed shifts:  In: 860 (5.1 mL/kg) [P.O.:360; IV Piggyback:500]  Out: 2500 (15 mL/kg) [Urine:2500 (0.4 mL/kg/hr)]  Weight: 167 kg     Relevant Results    Assessment/Plan   Principal Problem:    Prosthetic joint infection, sequela  Active Problems:    Chronic infection of knee joint prosthesis, initial encounter (CMS-[facility])    Pt is a 54 yo M with chronic R " PJI and hx of MRSA, previously on daily suppressive antibiotics presenting with acute on chronic R knee pain and swelling exacerbated by a fall yesterday. Pt is afebrile and HDS without leukocytosis however pt has markedly elevated inflammatory markers with warm/swollen R knee joint and overlying cellulitis. 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 R knee arthrocentesis positive for infection and treating per ID recs. Will continue with diuresis due to elevated BNP and fluid overload.     4/22:   - pt remains afebrile, HDS   - per ID recs   - vanx + ctx discontinued  - s/p 1 dose dalbavancin yesterday; 2/2 dose in six days + daily indefinite doxycycline to start in June   - pt with significant clinical improvement;   - pt medically ready pending SNF pacement      #Chronic R Knee Prosthetic Joint Infection   #R Knee Pain  #Deconditioning   :: no fracture on xray  :: no indication for surgical intervention/AKA at this time per ortho  - continue home tramadol 50mg BID   - pt/ot     #Cellulitis   #Hx of MRSA  - extensive antibiotic use history  - hx of MRSA infection  - pt previously on suppressive antibiotics but not during the last 30 days or so  - blood cultures NGTD     #Afib   - continue home eliquis 5mg BID  - continue home ASA 81mg      #T2DM  - continue Lantus 35u nightly   - SSI #1     #HFrEF  - hold home PO lasix 40mg and PO torsemide 40mg   - start IV lasix 40mg daily  - continue home metoprolol succ 50mg  - continue home Entresto 49-51    Gustavo Raygoza, DO  PGY-1 Family Medicine

## 2024-04-23 LAB
ANION GAP SERPL CALC-SCNC: 14 MMOL/L (ref 10–20)
BASOPHILS # BLD AUTO: 0.08 X10*3/UL (ref 0–0.1)
BASOPHILS NFR BLD AUTO: 1.3 %
BUN SERPL-MCNC: 41 MG/DL (ref 6–23)
CALCIUM SERPL-MCNC: 8.5 MG/DL (ref 8.6–10.6)
CHLORIDE SERPL-SCNC: 102 MMOL/L (ref 98–107)
CO2 SERPL-SCNC: 28 MMOL/L (ref 21–32)
CREAT SERPL-MCNC: 1.82 MG/DL (ref 0.5–1.3)
EGFRCR SERPLBLD CKD-EPI 2021: 32 ML/MIN/1.73M*2
EOSINOPHIL # BLD AUTO: 0.45 X10*3/UL (ref 0–0.7)
EOSINOPHIL NFR BLD AUTO: 7 %
ERYTHROCYTE [DISTWIDTH] IN BLOOD BY AUTOMATED COUNT: 24.1 % (ref 11.5–14.5)
GLUCOSE BLD MANUAL STRIP-MCNC: 119 MG/DL (ref 74–99)
GLUCOSE BLD MANUAL STRIP-MCNC: 130 MG/DL (ref 74–99)
GLUCOSE BLD MANUAL STRIP-MCNC: 157 MG/DL (ref 74–99)
GLUCOSE BLD MANUAL STRIP-MCNC: 186 MG/DL (ref 74–99)
GLUCOSE SERPL-MCNC: 119 MG/DL (ref 74–99)
HCT VFR BLD AUTO: 35.3 % (ref 36–52)
HGB BLD-MCNC: 10.3 G/DL (ref 12–17.5)
IMM GRANULOCYTES # BLD AUTO: 0.01 X10*3/UL (ref 0–0.7)
IMM GRANULOCYTES NFR BLD AUTO: 0.2 % (ref 0–0.9)
LYMPHOCYTES # BLD AUTO: 1.32 X10*3/UL (ref 1.2–4.8)
LYMPHOCYTES NFR BLD AUTO: 20.6 %
MAGNESIUM SERPL-MCNC: 1.64 MG/DL (ref 1.6–2.4)
MCH RBC QN AUTO: 23.4 PG (ref 26–34)
MCHC RBC AUTO-ENTMCNC: 29.2 G/DL (ref 32–36)
MCV RBC AUTO: 80 FL (ref 80–100)
MONOCYTES # BLD AUTO: 0.64 X10*3/UL (ref 0.1–1)
MONOCYTES NFR BLD AUTO: 10 %
NEUTROPHILS # BLD AUTO: 3.9 X10*3/UL (ref 1.2–7.7)
NEUTROPHILS NFR BLD AUTO: 60.9 %
NRBC BLD-RTO: 0 /100 WBCS (ref 0–0)
OVALOCYTES BLD QL SMEAR: NORMAL
PLATELET # BLD AUTO: 368 X10*3/UL (ref 150–450)
POLYCHROMASIA BLD QL SMEAR: NORMAL
POTASSIUM SERPL-SCNC: 3.9 MMOL/L (ref 3.5–5.3)
RBC # BLD AUTO: 4.41 X10*6/UL (ref 4–5.9)
RBC MORPH BLD: NORMAL
SCHISTOCYTES BLD QL SMEAR: NORMAL
SODIUM SERPL-SCNC: 140 MMOL/L (ref 136–145)
VANCOMYCIN SERPL-MCNC: 8.2 UG/ML (ref 5–20)
WBC # BLD AUTO: 6.4 X10*3/UL (ref 4.4–11.3)

## 2024-04-23 PROCEDURE — 1100000001 HC PRIVATE ROOM DAILY

## 2024-04-23 PROCEDURE — 2500000001 HC RX 250 WO HCPCS SELF ADMINISTERED DRUGS (ALT 637 FOR MEDICARE OP)

## 2024-04-23 PROCEDURE — 83735 ASSAY OF MAGNESIUM: CPT | Performed by: INTERNAL MEDICINE

## 2024-04-23 PROCEDURE — 85025 COMPLETE CBC W/AUTO DIFF WBC: CPT | Performed by: INTERNAL MEDICINE

## 2024-04-23 PROCEDURE — 2500000001 HC RX 250 WO HCPCS SELF ADMINISTERED DRUGS (ALT 637 FOR MEDICARE OP): Performed by: INTERNAL MEDICINE

## 2024-04-23 PROCEDURE — 80202 ASSAY OF VANCOMYCIN: CPT

## 2024-04-23 PROCEDURE — 2500000002 HC RX 250 W HCPCS SELF ADMINISTERED DRUGS (ALT 637 FOR MEDICARE OP, ALT 636 FOR OP/ED)

## 2024-04-23 PROCEDURE — 97165 OT EVAL LOW COMPLEX 30 MIN: CPT | Mod: GO

## 2024-04-23 PROCEDURE — 82947 ASSAY GLUCOSE BLOOD QUANT: CPT | Mod: 91

## 2024-04-23 PROCEDURE — 2500000004 HC RX 250 GENERAL PHARMACY W/ HCPCS (ALT 636 FOR OP/ED): Performed by: INTERNAL MEDICINE

## 2024-04-23 PROCEDURE — 36415 COLL VENOUS BLD VENIPUNCTURE: CPT | Performed by: INTERNAL MEDICINE

## 2024-04-23 PROCEDURE — 80048 BASIC METABOLIC PNL TOTAL CA: CPT | Performed by: INTERNAL MEDICINE

## 2024-04-23 PROCEDURE — 99232 SBSQ HOSP IP/OBS MODERATE 35: CPT

## 2024-04-23 RX ADMIN — INSULIN GLARGINE 35 UNITS: 100 INJECTION, SOLUTION SUBCUTANEOUS at 22:17

## 2024-04-23 RX ADMIN — TRAMADOL HYDROCHLORIDE 50 MG: 50 TABLET, COATED ORAL at 03:54

## 2024-04-23 RX ADMIN — ASPIRIN 81 MG CHEWABLE TABLET 81 MG: 81 TABLET CHEWABLE at 09:17

## 2024-04-23 RX ADMIN — FUROSEMIDE 60 MG: 10 INJECTION, SOLUTION INTRAMUSCULAR; INTRAVENOUS at 01:35

## 2024-04-23 RX ADMIN — APIXABAN 5 MG: 5 TABLET, FILM COATED ORAL at 09:18

## 2024-04-23 RX ADMIN — SACUBITRIL AND VALSARTAN 1 TABLET: 49; 51 TABLET, FILM COATED ORAL at 21:44

## 2024-04-23 RX ADMIN — INSULIN LISPRO 2 UNITS: 100 INJECTION, SOLUTION INTRAVENOUS; SUBCUTANEOUS at 16:55

## 2024-04-23 RX ADMIN — SACUBITRIL AND VALSARTAN 1 TABLET: 49; 51 TABLET, FILM COATED ORAL at 09:17

## 2024-04-23 RX ADMIN — TRAMADOL HYDROCHLORIDE 50 MG: 50 TABLET, COATED ORAL at 11:20

## 2024-04-23 RX ADMIN — METOPROLOL SUCCINATE 50 MG: 50 TABLET, FILM COATED, EXTENDED RELEASE ORAL at 09:16

## 2024-04-23 RX ADMIN — ACETAMINOPHEN 650 MG: 325 TABLET ORAL at 09:18

## 2024-04-23 RX ADMIN — APIXABAN 5 MG: 5 TABLET, FILM COATED ORAL at 21:45

## 2024-04-23 RX ADMIN — PANTOPRAZOLE SODIUM 40 MG: 40 TABLET, DELAYED RELEASE ORAL at 05:22

## 2024-04-23 RX ADMIN — FUROSEMIDE 60 MG: 10 INJECTION, SOLUTION INTRAMUSCULAR; INTRAVENOUS at 13:30

## 2024-04-23 RX ADMIN — ATORVASTATIN CALCIUM 40 MG: 40 TABLET, FILM COATED ORAL at 09:17

## 2024-04-23 RX ADMIN — SENNOSIDES 17.2 MG: 8.6 TABLET, FILM COATED ORAL at 09:17

## 2024-04-23 ASSESSMENT — COGNITIVE AND FUNCTIONAL STATUS - GENERAL
MOBILITY SCORE: 10
CLIMB 3 TO 5 STEPS WITH RAILING: TOTAL
HELP NEEDED FOR BATHING: A LITTLE
TURNING FROM BACK TO SIDE WHILE IN FLAT BAD: A LITTLE
DAILY ACTIVITIY SCORE: 17
MOVING TO AND FROM BED TO CHAIR: TOTAL
DRESSING REGULAR UPPER BODY CLOTHING: A LITTLE
MOVING FROM LYING ON BACK TO SITTING ON SIDE OF FLAT BED WITH BEDRAILS: A LITTLE
HELP NEEDED FOR BATHING: A LOT
TOILETING: A LOT
DRESSING REGULAR LOWER BODY CLOTHING: A LOT
WALKING IN HOSPITAL ROOM: TOTAL
DRESSING REGULAR UPPER BODY CLOTHING: A LITTLE
TOILETING: A LOT
DAILY ACTIVITIY SCORE: 19
DRESSING REGULAR LOWER BODY CLOTHING: A LITTLE
STANDING UP FROM CHAIR USING ARMS: TOTAL

## 2024-04-23 ASSESSMENT — PAIN SCALES - GENERAL
PAINLEVEL_OUTOF10: 4
PAINLEVEL_OUTOF10: 7
PAINLEVEL_OUTOF10: 5 - MODERATE PAIN
PAINLEVEL_OUTOF10: 6
PAINLEVEL_OUTOF10: 4

## 2024-04-23 ASSESSMENT — PAIN - FUNCTIONAL ASSESSMENT: PAIN_FUNCTIONAL_ASSESSMENT: 0-10

## 2024-04-23 ASSESSMENT — ACTIVITIES OF DAILY LIVING (ADL): BATHING_ASSISTANCE: MODERATE

## 2024-04-23 NOTE — CARE PLAN
Problem: Pain  Goal: My pain/discomfort is manageable  Outcome: Progressing  Flowsheets (Taken 4/23/2024 7640)  Resident's pain/discomfort is manageable: Offer non-pharmacological pain management interventions   The patient's goals for the shift include pt will tolerate diet    The clinical goals for the shift include Pt will maintain glucose levels above 70 & below 150 this shift    Over the shift, the patient did not make progress toward the following goals. Barriers to progression include . Recommendations to address these barriers include .

## 2024-04-23 NOTE — PROGRESS NOTES
Occupational Therapy    Evaluation    Patient Name: Kody Choi  MRN: [id number]  Today's Date: 4/23/2024  Time Calculation  Start Time: 1056  Stop Time: 1120  Time Calculation (min): 24 min    Assessment  IP OT Assessment  OT Assessment: Patient demo difficulty coping with CLOF. Pt. demo UE movements and verbalized understanding of benefit of UE HEP (declined need for HEP education or handout as he knows what exercises he can complete). Recommend MOD intensity OT services when medically appropriate for discharge.  Prognosis: Fair  Barriers to Discharge: Decreased caregiver support  Evaluation/Treatment Tolerance: Patient limited by fatigue, Patient limited by pain  End of Session Communication: Bedside nurse  End of Session Patient Position: Alarm off, caregiver present (seated EOB)  Plan:  Treatment Interventions: ADL retraining, Visual perceptual retraining, Functional transfer training, UE strengthening/ROM, Endurance training, Patient/family training, Neuromuscular reeducation, Compensatory technique education, Cognitive reorientation, Equipment evaluation/education, Fine motor coordination activities, UE splinting  OT Frequency: 2 times per week  OT Discharge Recommendations: Moderate intensity level of continued care  OT Recommended Transfer Status:  (to be determined)  OT - OK to Discharge: Yes (when medically appropriate)    Subjective   Current Problem:  1. Chronic infection of knee joint prosthesis, initial encounter (CMS-[facility])  Broad Range PCR-Bacteria    Broad Range PCR-Bacteria    Broad Range PCR-Bacteria      2. Fall, initial encounter  Broad Range PCR-Bacteria    Broad Range PCR-Bacteria    Broad Range PCR-Bacteria      3. Failure to thrive in adult  Broad Range PCR-Bacteria    Broad Range PCR-Bacteria    Broad Range PCR-Bacteria      4. Physical deconditioning  Broad Range PCR-Bacteria    Broad Range PCR-Bacteria    Broad Range PCR-Bacteria      5. Prosthetic joint infection, sequela  Broad Range  PCR-Bacteria    Broad Range PCR-Bacteria    Broad Range PCR-Bacteria      6. Swelling of right upper extremity  Broad Range PCR-Bacteria    Broad Range PCR-Bacteria    Broad Range PCR-Bacteria      7. Pneumonia, unspecified organism  Broad Range PCR-Bacteria    Broad Range PCR-Bacteria    Broad Range PCR-Bacteria      8. Pleural effusion, not elsewhere classified  Broad Range PCR-Bacteria    Broad Range PCR-Bacteria    Broad Range PCR-Bacteria      9. Personal history of transient ischemic attack (TIA), and cerebral infarction without residual deficits  Broad Range PCR-Bacteria    Broad Range PCR-Bacteria    Broad Range PCR-Bacteria      10. Personal history of COVID-19  Broad Range PCR-Bacteria    Broad Range PCR-Bacteria    Broad Range PCR-Bacteria      11. STEMI involving left anterior descending coronary artery (Multi)  Broad Range PCR-Bacteria    Broad Range PCR-Bacteria    Broad Range PCR-Bacteria      12. Muscle weakness (generalized)  Broad Range PCR-Bacteria    Broad Range PCR-Bacteria    Broad Range PCR-Bacteria      13. Acute metabolic acidosis  Broad Range PCR-Bacteria    Broad Range PCR-Bacteria    Broad Range PCR-Bacteria      14. Arteriosclerosis of coronary artery  Broad Range PCR-Bacteria    Broad Range PCR-Bacteria    Broad Range PCR-Bacteria      15. Paroxysmal atrial fibrillation (Multi)  Broad Range PCR-Bacteria    Broad Range PCR-Bacteria    Broad Range PCR-Bacteria      16. Cellulitis of right lower extremity  Broad Range PCR-Bacteria    Broad Range PCR-Bacteria    Broad Range PCR-Bacteria      17. Chronic systolic (congestive) heart failure (Multi)  Broad Range PCR-Bacteria    Broad Range PCR-Bacteria    Broad Range PCR-Bacteria      18. Chronic lower back pain  Broad Range PCR-Bacteria    Broad Range PCR-Bacteria    Broad Range PCR-Bacteria      19. Arthritis  Broad Range PCR-Bacteria    Broad Range PCR-Bacteria    Broad Range PCR-Bacteria      20. Degenerative joint disease  Broad Range  PCR-Bacteria    Broad Range PCR-Bacteria    Broad Range PCR-Bacteria      21. Diabetes mellitus type 2 without retinopathy (Multi)  Broad Range PCR-Bacteria    Broad Range PCR-Bacteria    Broad Range PCR-Bacteria      22. Difficulty in walking, not elsewhere classified  Broad Range PCR-Bacteria    Broad Range PCR-Bacteria    Broad Range PCR-Bacteria      23. Gastroesophageal reflux disease  Broad Range PCR-Bacteria    Broad Range PCR-Bacteria    Broad Range PCR-Bacteria      24. Generalized edema  Broad Range PCR-Bacteria    Broad Range PCR-Bacteria    Broad Range PCR-Bacteria      25. Hyperlipidemia  Broad Range PCR-Bacteria    Broad Range PCR-Bacteria    Broad Range PCR-Bacteria      26. Impingement syndrome of right shoulder  Broad Range PCR-Bacteria    Broad Range PCR-Bacteria    Broad Range PCR-Bacteria      27. Infection of total right knee replacement (CMS-[facility])  Broad Range PCR-Bacteria    Broad Range PCR-Bacteria    Broad Range PCR-Bacteria      28. Presence of right artificial knee joint  Broad Range PCR-Bacteria    Broad Range PCR-Bacteria    Broad Range PCR-Bacteria      29. Methicillin resistant Staphylococcus aureus infection as the cause of diseases classified elsewhere  Broad Range PCR-Bacteria    Broad Range PCR-Bacteria    Broad Range PCR-Bacteria      30. Morbid obesity (Multi)  Broad Range PCR-Bacteria    Broad Range PCR-Bacteria    Broad Range PCR-Bacteria      31. Nicotine dependence, cigarettes, uncomplicated  Broad Range PCR-Bacteria    Broad Range PCR-Bacteria    Broad Range PCR-Bacteria      32. Obstructive sleep apnea (adult) (pediatric)  Broad Range PCR-Bacteria    Broad Range PCR-Bacteria    Broad Range PCR-Bacteria      33. PCO (posterior capsular opacification), right  Broad Range PCR-Bacteria    Broad Range PCR-Bacteria    Broad Range PCR-Bacteria      34. Peripheral edema  Broad Range PCR-Bacteria    Broad Range PCR-Bacteria    Broad Range PCR-Bacteria      35. S/P CABG x 1  Broad  Range PCR-Bacteria    Broad Range PCR-Bacteria    Broad Range PCR-Bacteria      36. S/P coronary artery stent placement  Broad Range PCR-Bacteria    Broad Range PCR-Bacteria    Broad Range PCR-Bacteria      37. Status post total right knee replacement  Broad Range PCR-Bacteria    Broad Range PCR-Bacteria    Broad Range PCR-Bacteria      38. Venous thrombosis  Broad Range PCR-Bacteria    Broad Range PCR-Bacteria    Broad Range PCR-Bacteria      39. Type 2 diabetes mellitus with hyperglycemia (Multi)  Broad Range PCR-Bacteria    Broad Range PCR-Bacteria    Broad Range PCR-Bacteria      40. Chronic anemia  Broad Range PCR-Bacteria    Broad Range PCR-Bacteria    Broad Range PCR-Bacteria      41. Pyogenic arthritis of right knee joint, due to unspecified organism (Multi)  Broad Range PCR-Bacteria    Broad Range PCR-Bacteria    Broad Range PCR-Bacteria        General:  General  Reason for Referral: 54 y/o M admitted for fall on B knees. Pt. with recent d/c from SNF.  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 Oreilly in 2019, scrotal cellulitis (two months ago with d/c to SNF).  Family/Caregiver Present: No  Patient Position Received:  (Seated EOB)  Preferred Learning Style: auditory, verbal, visual  General Comment: Agreeable to OT eval.  Precautions:  Hearing/Visual Limitations: appear WFL except pt reporting L eye blurry at baseline (reporting surgery on B eye lenses and L lens slipped).  LE Weight Bearing Status: Weight Bearing as Tolerated (BLEs per ortho consult note.)  Medical Precautions: Fall precautions     Pain:  Pain Assessment  Pain Assessment: 0-10  Pain Score:  (did not rate; reporting pain in RLE and increasing in LLE.)  Pain Interventions: Distraction, Emotional support  Response to Interventions: tolerated OT    Objective   Cognition:  Overall Cognitive Status: Within Functional  "Limits  Orientation Level: Oriented X4  Cognition Comments: Pt. reporting increased stress with CLOF and demo difficulty coping with loss of function and decreased social life. Active listening provided.     Home Living:  Type of Home: Apartment (home from SNF for one day before fall with transfer attempt and unable to get up.)  Lives With: Alone  Home Adaptive Equipment:  (manual w/c, WW, grab bar and bath bench.)  Home Layout: One level  Home Access: Elevator   Prior Function:  Level of [place]:  (Pt. reporting IND with ADLs, most IADLs and pivot transfers for mobility to manual w/c.)  Receives Help From:  (Reporting \"Fouzia\" does cleaning and no other help available.)  Vocational:  (not working)  Hand Dominance: Left     ADL:  Eating Assistance: Independent  Grooming Assistance: Modified independent (Device)  Grooming Deficit: Setup  Bathing Assistance: Moderate  UE Dressing Assistance: Stand by  UE Dressing Deficit: Supervision/safety, Setup  LE Dressing Assistance: Maximal  Toileting Assistance with Device: Maximal       Bed Mobility/Transfers: Bed Mobility  Bed Mobility: No (Pt. sitting EOB. Declined position changes this date.)    Sitting Balance:  Static Sitting Balance  Static Sitting-Comment/Number of Minutes: MOD I seated EOB.       Vision: Vision - Basic Assessment  Current Vision: Does not wear glasses (pt reporting L eye blurry at baseline (reporting surgery on B eye lenses and L lens slipped).)       Strength:  Strength Comments: BUEs WFL with decreased activity tolerance.       Coordination:  Movements are Fluid and Coordinated: Yes   Hand Function:  Hand Function  Gross Grasp: Functional  Coordination: Functional  Extremities: RUE   RUE : Within Functional Limits and LUE   LUE: Within Functional Limits    Outcome Measures: [facility] Daily Activity  Putting on and taking off regular lower body clothing: A lot  Bathing (including washing, rinsing, drying): A lot  Putting on and taking off regular " upper body clothing: A little  Toileting, which includes using toilet, bedpan or urinal: A lot  Taking care of personal grooming such as brushing teeth: None  Eating Meals: None  Daily Activity - Total Score: 17     OT Adult Other Outcome Measures  4AT: NEG  Education Documentation  Body Mechanics, taught by Mary Leyva OT at 4/23/2024 12:27 PM.  Learner: Patient  Readiness: Acceptance  Method: Explanation  Response: Verbalizes Understanding    Home Exercise Program, taught by Mary Leyva OT at 4/23/2024 12:27 PM.  Learner: Patient  Readiness: Acceptance  Method: Explanation  Response: Verbalizes Understanding    Precautions, taught by Mary Leyva OT at 4/23/2024 12:27 PM.  Learner: Patient  Readiness: Acceptance  Method: Explanation  Response: Verbalizes Understanding    ADL Training, taught by Mary Leyva OT at 4/23/2024 12:27 PM.  Learner: Patient  Readiness: Acceptance  Method: Explanation  Response: Verbalizes Understanding    Education Comments  No comments found.      Goals:   Encounter Problems       Encounter Problems (Active)       ADLs       Patient will complete toileting with MIN assist and min cues.   (Progressing)       Start:  04/23/24    Expected End:  05/07/24            Patient will complete LB dressing with MIN assist and min cues.   (Progressing)       Start:  04/23/24    Expected End:  05/07/24            Patient will complete UB dressing with set up assist.   (Progressing)       Start:  04/23/24    Expected End:  05/07/24               COGNITION/SAFETY       Patient will identify and implement 2 coping strategies with MOD I.  (Progressing)       Start:  04/23/24    Expected End:  05/07/24               MOBILITY       Patient will complete bed mobility with MIN A and min cues.    (Not Progressing)       Start:  04/23/24    Expected End:  05/07/24               TRANSFERS       Pt. Will complete stand pivot transfer with MIN assist with min cues and using LRAD.   (Not  Progressing)       Start:  04/23/24    Expected End:  05/07/24                  Mary Leyva OT  04/23/2024

## 2024-04-23 NOTE — CARE PLAN
Problem: Pain  Goal: My pain/discomfort is manageable  Outcome: Progressing     Problem: Safety  Goal: Patient will be injury free during hospitalization  Outcome: Progressing  Goal: I will remain free of falls  Outcome: Progressing     Problem: Psychosocial Needs  Goal: Demonstrates ability to cope with hospitalization/illness  Outcome: Progressing  Goal: Collaborate with me, my family, and caregiver to identify my specific goals  Outcome: Progressing

## 2024-04-23 NOTE — PROGRESS NOTES
Kody Choi is a 53 y.o. adult on day 2 of admission presenting with Prosthetic joint infection, sequela.    Subjective   Met with patient at bedside to further discuss discharge plan with YUE Govea and resident Dr. Raygoza. Patient is currently recommended for moderate intensity/SNF at discharge. Received update from Lyudmila Dykes that patient would be responsible for $204/day OOP coinsurance due to using all of his SNF days. Patient reports that he is unable to afford this out of pocket cost. Discussed LTC with patient and he reports that he is not interested in this; patient would need to get Medicaid pending as his current Medicaid is only QMB.     Discussed with patient that the only other discharge plan would be to return to his apartment with [facility] if able to secure an agency. Patient reports that he does not feel safe to return home as he does not have any support. Patient aware that there are limited options since he has used his skilled days & is not willing to work on LTC.     TCC/SW will continue to work on safe discharge plan for patient.    -Cayla ÁLVAREZ, MA, LSW  [phone] or [facility]  Care Transitions

## 2024-04-23 NOTE — PROGRESS NOTES
"TCC met with pt to discuss dispo plans. TCC made pt aware if he goes to SNF he will have a co-pay since he has used up all of his SNF days and pt states he is not going to pay a co-pay. Pt also states he does not want ICF even if he qualifies and would only want to do SNF but if he has to go to SNF he is not going to pay a co-pay. Pt states he has no family or friends that will be able to assist him full-time if needed and told TCC \"You need to figure it out\". TCC made pt aware his options are limited if he does not want to pay a co-pay or go to ICF. Medical team and SW aware.   1604-Pt agreeable with HC referrals being sent out to see if [facility] is able to find an accepting HC agency.   1629-TCC verified pt's phone number and address with pt and pt states he will need transportation home on discharge. Will continue to follow.     "

## 2024-04-24 ENCOUNTER — DOCUMENTATION (OUTPATIENT)
Dept: HOME HEALTH SERVICES | Facility: HOME HEALTH | Age: 54
End: 2024-04-24
Payer: COMMERCIAL

## 2024-04-24 ENCOUNTER — PHARMACY VISIT (OUTPATIENT)
Dept: PHARMACY | Facility: CLINIC | Age: 54
End: 2024-04-24
Payer: COMMERCIAL

## 2024-04-24 ENCOUNTER — HOME HEALTH ADMISSION (OUTPATIENT)
Dept: HOME HEALTH SERVICES | Facility: HOME HEALTH | Age: 54
End: 2024-04-24
Payer: COMMERCIAL

## 2024-04-24 LAB
ANION GAP SERPL CALC-SCNC: 15 MMOL/L (ref 10–20)
BASOPHILS # BLD MANUAL: 0.05 X10*3/UL (ref 0–0.1)
BASOPHILS NFR BLD MANUAL: 0.8 %
BUN SERPL-MCNC: 45 MG/DL (ref 6–23)
CALCIUM SERPL-MCNC: 8.2 MG/DL (ref 8.6–10.6)
CHLORIDE SERPL-SCNC: 105 MMOL/L (ref 98–107)
CO2 SERPL-SCNC: 23 MMOL/L (ref 21–32)
CREAT SERPL-MCNC: 2.12 MG/DL (ref 0.5–1.3)
EGFRCR SERPLBLD CKD-EPI 2021: 27 ML/MIN/1.73M*2
EOSINOPHIL # BLD MANUAL: 0.33 X10*3/UL (ref 0–0.7)
EOSINOPHIL NFR BLD MANUAL: 5 %
ERYTHROCYTE [DISTWIDTH] IN BLOOD BY AUTOMATED COUNT: 23.3 % (ref 11.5–14.5)
GLUCOSE BLD MANUAL STRIP-MCNC: 107 MG/DL (ref 74–99)
GLUCOSE BLD MANUAL STRIP-MCNC: 120 MG/DL (ref 74–99)
GLUCOSE BLD MANUAL STRIP-MCNC: 210 MG/DL (ref 74–99)
GLUCOSE BLD MANUAL STRIP-MCNC: 48 MG/DL (ref 74–99)
GLUCOSE BLD MANUAL STRIP-MCNC: 54 MG/DL (ref 74–99)
GLUCOSE BLD MANUAL STRIP-MCNC: 65 MG/DL (ref 74–99)
GLUCOSE BLD MANUAL STRIP-MCNC: 88 MG/DL (ref 74–99)
GLUCOSE SERPL-MCNC: 71 MG/DL (ref 74–99)
HCT VFR BLD AUTO: 32.5 % (ref 36–52)
HGB BLD-MCNC: 10 G/DL (ref 12–17.5)
IMM GRANULOCYTES # BLD AUTO: 0.01 X10*3/UL (ref 0–0.7)
IMM GRANULOCYTES NFR BLD AUTO: 0.2 % (ref 0–0.9)
LYMPHOCYTES # BLD MANUAL: 0.83 X10*3/UL (ref 1.2–4.8)
LYMPHOCYTES NFR BLD MANUAL: 12.5 %
MAGNESIUM SERPL-MCNC: 1.93 MG/DL (ref 1.6–2.4)
MCH RBC QN AUTO: 23.3 PG (ref 26–34)
MCHC RBC AUTO-ENTMCNC: 30.8 G/DL (ref 32–36)
MCV RBC AUTO: 76 FL (ref 80–100)
MONOCYTES # BLD MANUAL: 0.5 X10*3/UL (ref 0.1–1)
MONOCYTES NFR BLD MANUAL: 7.5 %
NEUTS SEG # BLD MANUAL: 4.9 X10*3/UL (ref 1.2–7)
NEUTS SEG NFR BLD MANUAL: 74.2 %
NRBC BLD-RTO: 0 /100 WBCS (ref 0–0)
OVALOCYTES BLD QL SMEAR: ABNORMAL
PLATELET # BLD AUTO: 303 X10*3/UL (ref 150–450)
PLATELET CLUMP BLD QL SMEAR: PRESENT
POLYCHROMASIA BLD QL SMEAR: ABNORMAL
POTASSIUM SERPL-SCNC: 4.3 MMOL/L (ref 3.5–5.3)
RBC # BLD AUTO: 4.29 X10*6/UL (ref 4–5.9)
RBC MORPH BLD: ABNORMAL
SCHISTOCYTES BLD QL SMEAR: ABNORMAL
SODIUM SERPL-SCNC: 139 MMOL/L (ref 136–145)
TARGETS BLD QL SMEAR: ABNORMAL
TOTAL CELLS COUNTED BLD: 120
WBC # BLD AUTO: 6.6 X10*3/UL (ref 4.4–11.3)

## 2024-04-24 PROCEDURE — 2500000004 HC RX 250 GENERAL PHARMACY W/ HCPCS (ALT 636 FOR OP/ED): Performed by: INTERNAL MEDICINE

## 2024-04-24 PROCEDURE — 2500000001 HC RX 250 WO HCPCS SELF ADMINISTERED DRUGS (ALT 637 FOR MEDICARE OP): Performed by: INTERNAL MEDICINE

## 2024-04-24 PROCEDURE — RXMED WILLOW AMBULATORY MEDICATION CHARGE

## 2024-04-24 PROCEDURE — 2500000001 HC RX 250 WO HCPCS SELF ADMINISTERED DRUGS (ALT 637 FOR MEDICARE OP)

## 2024-04-24 PROCEDURE — 2500000005 HC RX 250 GENERAL PHARMACY W/O HCPCS

## 2024-04-24 PROCEDURE — 99232 SBSQ HOSP IP/OBS MODERATE 35: CPT

## 2024-04-24 PROCEDURE — 82947 ASSAY GLUCOSE BLOOD QUANT: CPT

## 2024-04-24 PROCEDURE — 85027 COMPLETE CBC AUTOMATED: CPT | Performed by: INTERNAL MEDICINE

## 2024-04-24 PROCEDURE — 80048 BASIC METABOLIC PNL TOTAL CA: CPT | Performed by: INTERNAL MEDICINE

## 2024-04-24 PROCEDURE — 36415 COLL VENOUS BLD VENIPUNCTURE: CPT | Performed by: INTERNAL MEDICINE

## 2024-04-24 PROCEDURE — 1100000001 HC PRIVATE ROOM DAILY

## 2024-04-24 PROCEDURE — 97530 THERAPEUTIC ACTIVITIES: CPT | Mod: GP

## 2024-04-24 PROCEDURE — 83735 ASSAY OF MAGNESIUM: CPT | Performed by: INTERNAL MEDICINE

## 2024-04-24 PROCEDURE — 85007 BL SMEAR W/DIFF WBC COUNT: CPT | Performed by: INTERNAL MEDICINE

## 2024-04-24 RX ORDER — DEXTROSE 50 % IN WATER (D50W) INTRAVENOUS SYRINGE
Status: COMPLETED
Start: 2024-04-24 | End: 2024-04-24

## 2024-04-24 RX ORDER — DEXTROSE 50 % IN WATER (D50W) INTRAVENOUS SYRINGE
12.5
Status: DISCONTINUED | OUTPATIENT
Start: 2024-04-24 | End: 2024-04-25 | Stop reason: HOSPADM

## 2024-04-24 RX ORDER — DOXYCYCLINE HYCLATE 100 MG
100 TABLET ORAL 2 TIMES DAILY
Qty: 60 TABLET | Refills: 0 | Status: SHIPPED | OUTPATIENT
Start: 2024-06-10

## 2024-04-24 RX ORDER — DEXTROSE 50 % IN WATER (D50W) INTRAVENOUS SYRINGE
25
Status: DISCONTINUED | OUTPATIENT
Start: 2024-04-24 | End: 2024-04-25 | Stop reason: HOSPADM

## 2024-04-24 RX ADMIN — PANTOPRAZOLE SODIUM 40 MG: 40 TABLET, DELAYED RELEASE ORAL at 07:15

## 2024-04-24 RX ADMIN — APIXABAN 5 MG: 5 TABLET, FILM COATED ORAL at 21:55

## 2024-04-24 RX ADMIN — SACUBITRIL AND VALSARTAN 1 TABLET: 49; 51 TABLET, FILM COATED ORAL at 21:55

## 2024-04-24 RX ADMIN — SACUBITRIL AND VALSARTAN 1 TABLET: 49; 51 TABLET, FILM COATED ORAL at 08:54

## 2024-04-24 RX ADMIN — APIXABAN 5 MG: 5 TABLET, FILM COATED ORAL at 08:54

## 2024-04-24 RX ADMIN — DEXTROSE MONOHYDRATE 25 G: 25 INJECTION, SOLUTION INTRAVENOUS at 08:00

## 2024-04-24 RX ADMIN — DEXTROSE MONOHYDRATE 25 G: 25 INJECTION, SOLUTION INTRAVENOUS at 08:57

## 2024-04-24 RX ADMIN — ASPIRIN 81 MG CHEWABLE TABLET 81 MG: 81 TABLET CHEWABLE at 08:54

## 2024-04-24 RX ADMIN — ACETAMINOPHEN 650 MG: 325 TABLET ORAL at 11:37

## 2024-04-24 RX ADMIN — ATORVASTATIN CALCIUM 40 MG: 40 TABLET, FILM COATED ORAL at 08:54

## 2024-04-24 RX ADMIN — DEXTROSE MONOHYDRATE 25 G: 25 INJECTION, SOLUTION INTRAVENOUS at 12:37

## 2024-04-24 RX ADMIN — INSULIN GLARGINE 35 UNITS: 100 INJECTION, SOLUTION SUBCUTANEOUS at 21:55

## 2024-04-24 RX ADMIN — FUROSEMIDE 60 MG: 10 INJECTION, SOLUTION INTRAMUSCULAR; INTRAVENOUS at 02:33

## 2024-04-24 RX ADMIN — METOPROLOL SUCCINATE 50 MG: 50 TABLET, FILM COATED, EXTENDED RELEASE ORAL at 08:54

## 2024-04-24 RX ADMIN — FUROSEMIDE 60 MG: 10 INJECTION, SOLUTION INTRAMUSCULAR; INTRAVENOUS at 14:57

## 2024-04-24 ASSESSMENT — COGNITIVE AND FUNCTIONAL STATUS - GENERAL
STANDING UP FROM CHAIR USING ARMS: A LOT
MOBILITY SCORE: 14
MOVING FROM LYING ON BACK TO SITTING ON SIDE OF FLAT BED WITH BEDRAILS: A LITTLE
DRESSING REGULAR UPPER BODY CLOTHING: A LITTLE
MOVING TO AND FROM BED TO CHAIR: A LOT
CLIMB 3 TO 5 STEPS WITH RAILING: TOTAL
DRESSING REGULAR LOWER BODY CLOTHING: A LITTLE
PERSONAL GROOMING: A LITTLE
STANDING UP FROM CHAIR USING ARMS: A LOT
MOVING TO AND FROM BED TO CHAIR: A LOT
DAILY ACTIVITIY SCORE: 18
DRESSING REGULAR LOWER BODY CLOTHING: A LITTLE
WALKING IN HOSPITAL ROOM: TOTAL
DRESSING REGULAR UPPER BODY CLOTHING: A LITTLE
WALKING IN HOSPITAL ROOM: TOTAL
HELP NEEDED FOR BATHING: A LITTLE
DAILY ACTIVITIY SCORE: 18
HELP NEEDED FOR BATHING: A LITTLE
CLIMB 3 TO 5 STEPS WITH RAILING: TOTAL
WALKING IN HOSPITAL ROOM: TOTAL
STANDING UP FROM CHAIR USING ARMS: A LOT
TURNING FROM BACK TO SIDE WHILE IN FLAT BAD: A LITTLE
CLIMB 3 TO 5 STEPS WITH RAILING: TOTAL
TOILETING: A LOT
MOVING TO AND FROM BED TO CHAIR: A LOT
PERSONAL GROOMING: A LITTLE
MOBILITY SCORE: 12
MOBILITY SCORE: 14
TOILETING: A LOT

## 2024-04-24 ASSESSMENT — PAIN SCALES - GENERAL
PAINLEVEL_OUTOF10: 2
PAINLEVEL_OUTOF10: 0 - NO PAIN

## 2024-04-24 ASSESSMENT — PAIN DESCRIPTION - LOCATION: LOCATION: HEAD

## 2024-04-24 ASSESSMENT — PAIN - FUNCTIONAL ASSESSMENT
PAIN_FUNCTIONAL_ASSESSMENT: 0-10

## 2024-04-24 NOTE — PROGRESS NOTES
Still no accepting HC agency for pt. Dr. Pietro Alexander agreeable to follow pt's HCO once pt has a follow appt at [facility]. Per medical team pt has a scheduled PCP appt on/  at 1500. [facility] updated [facility], pending acceptance and SOC date.   [facility] confirmed SOC for 4/25-4/26. Medical team updated and TCC requested a transport time of 1800, awaiting final confirmation. Pt's RN updated.   1538-[facility] confirmed transport time for 1900. Updated medical team and pt's RN.

## 2024-04-24 NOTE — HH CARE COORDINATION
Home Care received a Referral for Nursing, Physical Therapy, Occupational Therapy, Home Health Aide, and Medical Social Work. We have processed the referral for a Start of Care on 4/26-4/27.     If you have any questions or concerns, please feel free to contact us at [phone]. Follow the prompts, enter your five digit zip code, and you will be directed to your care team on C[facility] 3.

## 2024-04-24 NOTE — DISCHARGE INSTRUCTIONS
[facility]    Plan start: 4/29/2024   Plan provider: Perfecto Page MD  Infusion Center at [facility] will call you regarding the time of your appointment  You must call your insurance company in order to arrange transportation to this appointment    Hospital follow up appointment with Primary care provider:  With: Dr. Hanson  Date: 4/30/2024 @ 3PM  Location: [facility]  Address: [address] [place] [address]

## 2024-04-24 NOTE — PROGRESS NOTES
Physical Therapy    Physical Therapy Treatment    Patient Name: Kody Choi  MRN: [id number]  Today's Date: 4/24/2024  Time Calculation  Start Time: 1150  Stop Time: 1203  Time Calculation (min): 13 min       Assessment/Plan   PT Assessment  PT Assessment Results: Decreased strength, Decreased range of motion, Decreased endurance, Impaired balance, Decreased mobility, Decreased coordination, Pain, Obesity, Impaired judgement, Decreased safety awareness  Rehab Prognosis: Fair  Barriers to Discharge: none  End of Session Communication: Bedside nurse  End of Session Patient Position: Alarm off, not on at start of session  PT Plan  Inpatient/Swing Bed or Outpatient: Inpatient  PT Plan  PT Plan: Skilled PT  PT Frequency: 3 times per week  PT Discharge Recommendations: Moderate intensity level of continued care  PT Recommended Transfer Status: Assist x2  PT - OK to Discharge: Yes      General Visit Information:   PT  Visit  PT Received On: 04/24/24  General  Family/Caregiver Present: No  Prior to Session Communication: PCT/NA/CTA  Patient Position Received: Bed, 2 rail up (Seated EOB pre/post visit at pt's preference)  Preferred Learning Style: verbal, auditory  General Comment: Pt seated at EOB upon PT's entry. Pt initially reluctant to attempt mobility/attempts to stand with therapist though willing after much encouragement from therapist.    Subjective   Precautions:  Precautions  Medical Precautions: Fall precautions    Objective   Pain:  Pain Assessment  Pain Assessment: 0-10  Pain Score: 0 - No pain (No complaints of pain during visit.)  Cognition:  Cognition  Overall Cognitive Status: Within Functional Limits  Orientation Level: Oriented X4  Cognition Comments: Pt appearing at baseline though w odd affect  Insight: Within function limits  Postural Control:  Postural Control  Postural Control: Within Functional Limits  Posture Comment: independent static sitting posture  Static Sitting Balance  Static Sitting-Balance  Support: Feet supported, Bilateral upper extremity supported  Static Sitting-Level of Assistance: Independent  Static Standing Balance  Static Standing-Balance Support: Bilateral upper extremity supported (On a wheeled walker)  Static Standing-Level of Assistance: Maximum assistance  Static Standing-Comment/Number of Minutes: Pt able to achieve near full static stance, 75-90% of erect standing posture for approximately 10 seconds prior to return to sitting due to rapid onset of fatigue. Pt unable to completely extend hips and trunk due to weakness.    Activity Tolerance:  Activity Tolerance  Endurance: Tolerates less than 10 min exercise, no significant change in vital signs  Treatments:    Therapeutic Activity  Therapeutic Activity Performed: Yes  Therapeutic Activity 1: sit-stand transfers, static stance    Bed Mobility  Bed Mobility: No  Bed Mobility 1  Bed Mobility Comments 1: Pt seated EOB pre/post visit and wanting to remain seated upon PT's exit.    Ambulation/Gait Training  Ambulation/Gait Training Performed: No (Pt not yet able to tolerate attempts to stand.)  Transfers  Transfer: Yes  Transfer 1  Transfer From 1: Sit to, Stand to  Transfer to 1: Stand, Sit  Transfer Device 1: Walker  Transfer Level of Assistance 1: Maximum assistance, Moderate verbal cues, Moderate tactile cues  Trials/Comments 1: Able to clear buttocks and complete near full transfer though unable to completely extend hips/trunk.    Outcome Measures:  [facility] Basic Mobility  Turning from your back to your side while in a flat bed without using bedrails: None  Moving from lying on your back to sitting on the side of a flat bed without using bedrails: None  Moving to and from bed to chair (including a wheelchair): A lot  Standing up from a chair using your arms (e.g. wheelchair or bedside chair): A lot  To walk in hospital room: Total  Climbing 3-5 steps with railing: Total  Basic Mobility - Total Score: 14    Education  Documentation  Mobility Training, taught by Pietro Beltran, PT at 4/24/2024 12:36 PM.  Learner: Patient  Readiness: Acceptance  Method: Explanation  Response: Verbalizes Understanding    Education Comments  No comments found.      Encounter Problems       Encounter Problems (Active)       PT Transfers       STG - Transfer from bed to chair with mod assist  with sliding board. 3/5 trials.  (Progressing)       Start:  04/18/24    Expected End:  04/24/24            STG - Patient will perform bed mobility with CGA (Progressing)       Start:  04/18/24    Expected End:  04/24/24

## 2024-04-24 NOTE — PROGRESS NOTES
"Kody Choi is a 53 y.o. adult on day 3 of admission presenting with Prosthetic joint infection, sequela.    Subjective   No ONE  No new concerns     Objective   Physical Exam  Vitals reviewed.   Constitutional:       General: He is not in acute distress.     Appearance: Normal appearance. He is obese.   HENT:      Mouth/Throat:      Mouth: Mucous membranes are moist.   Cardiovascular:      Rate and Rhythm: Normal rate and regular rhythm.      Pulses: Normal pulses.      Heart sounds: Normal heart sounds.   Pulmonary:      Effort: Pulmonary effort is normal.      Breath sounds: Normal breath sounds.   Abdominal:      General: Abdomen is flat.      Palpations: Abdomen is soft.      Tenderness: There is no abdominal tenderness.   Genitourinary:     Comments: Swollen scrotum-oedema   Musculoskeletal:         General: Swelling, tenderness and deformity present. No signs of injury.      Right lower leg: Edema present.      Left lower leg: Edema present.   Skin:     Findings: Erythema and rash present.      Comments: Right knee cellulitis improving   Neurological:      Mental Status: He is alert and oriented to person, place, and time.   Psychiatric:         Mood and Affect: Mood normal.         Behavior: Behavior normal.       Last Recorded Vitals  Blood pressure 120/73, pulse 90, temperature 36.3 °C (97.3 °F), resp. rate 18, height 1.676 m (5' 5.98\"), weight (!) 167 kg (368 lb 2.7 oz), SpO2 91%, unknown if currently breastfeeding.  Intake/Output last 3 Shifts:  I/O last 3 completed shifts:  In: - (0 mL/kg)   Out: 875 (5.2 mL/kg) [Urine:875 (0.1 mL/kg/hr)]  Weight: 167 kg     Relevant Results    Assessment/Plan   Principal Problem:    Prosthetic joint infection, sequela  Active Problems:    Chronic infection of knee joint prosthesis, initial encounter (CMS-[facility])    Pt is a 52 yo M with chronic R PJI and hx of MRSA, previously on daily suppressive antibiotics presenting with acute on chronic R knee pain and swelling " exacerbated by a fall yesterday. Pt is afebrile and HDS without leukocytosis however pt has markedly elevated inflammatory markers with warm/swollen R knee joint and overlying cellulitis. 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 R knee arthrocentesis positive for infection and treating per ID recs. Will continue with diuresis due to elevated BNP and fluid overload.     4/23:   - pt remains afebrile, HDS   - per ID recs   - vanx + ctx discontinued  - s/p 1 dose dalbavancin yesterday; 2/2 dose in six days + daily indefinite doxycycline to start in June   - pt with significant clinical improvement;   - pt medically ready pending placement     #Chronic R Knee Prosthetic Joint Infection   #R Knee Pain  #Deconditioning   :: no fracture on xray  :: no indication for surgical intervention/AKA at this time per ortho  - continue home tramadol 50mg BID   - pt/ot     #Cellulitis   #Hx of MRSA  - extensive antibiotic use history  - hx of MRSA infection  - pt previously on suppressive antibiotics but not during the last 30 days or so  - blood cultures NGTD     #Afib   - continue home eliquis 5mg BID  - continue home ASA 81mg      #T2DM  - continue Lantus 35u nightly   - SSI #1     #HFrEF  - hold home PO lasix 40mg and PO torsemide 40mg   - start IV lasix 40mg daily  - continue home metoprolol succ 50mg  - continue home Entresto 49-51    Gustavo Raygoza, DO  PGY-1 Family Medicine

## 2024-04-25 ENCOUNTER — HOSPITAL ENCOUNTER (EMERGENCY)
Facility: HOSPITAL | Age: 54
Discharge: HOME | End: 2024-04-26
Attending: EMERGENCY MEDICINE
Payer: COMMERCIAL

## 2024-04-25 VITALS
HEIGHT: 66 IN | WEIGHT: 315 LBS | HEART RATE: 89 BPM | TEMPERATURE: 97.9 F | BODY MASS INDEX: 50.62 KG/M2 | SYSTOLIC BLOOD PRESSURE: 135 MMHG | DIASTOLIC BLOOD PRESSURE: 77 MMHG | RESPIRATION RATE: 19 BRPM | OXYGEN SATURATION: 96 %

## 2024-04-25 VITALS
RESPIRATION RATE: 20 BRPM | TEMPERATURE: 98.9 F | DIASTOLIC BLOOD PRESSURE: 77 MMHG | HEART RATE: 92 BPM | OXYGEN SATURATION: 95 % | SYSTOLIC BLOOD PRESSURE: 135 MMHG

## 2024-04-25 DIAGNOSIS — T84.50XS PROSTHETIC JOINT INFECTION, SEQUELA: Primary | ICD-10-CM

## 2024-04-25 DIAGNOSIS — M00.9 CHRONIC INFECTION OF RIGHT KNEE (MULTI): Primary | ICD-10-CM

## 2024-04-25 LAB
ALBUMIN SERPL BCP-MCNC: 2.8 G/DL (ref 3.4–5)
ALP SERPL-CCNC: 141 U/L (ref 33–120)
ALT SERPL W P-5'-P-CCNC: 4 U/L (ref 7–52)
ANION GAP SERPL CALC-SCNC: 16 MMOL/L (ref 10–20)
AST SERPL W P-5'-P-CCNC: 13 U/L (ref 9–39)
BASOPHILS # BLD AUTO: 0.08 X10*3/UL (ref 0–0.1)
BASOPHILS NFR BLD AUTO: 1 %
BILIRUB SERPL-MCNC: 0.5 MG/DL (ref 0–1.2)
BUN SERPL-MCNC: 42 MG/DL (ref 6–23)
CALCIUM SERPL-MCNC: 8.5 MG/DL (ref 8.6–10.6)
CHLORIDE SERPL-SCNC: 105 MMOL/L (ref 98–107)
CO2 SERPL-SCNC: 19 MMOL/L (ref 21–32)
CREAT SERPL-MCNC: 1.61 MG/DL (ref 0.5–1.3)
EGFRCR SERPLBLD CKD-EPI 2021: 38 ML/MIN/1.73M*2
EOSINOPHIL # BLD AUTO: 0.24 X10*3/UL (ref 0–0.7)
EOSINOPHIL NFR BLD AUTO: 3 %
ERYTHROCYTE [DISTWIDTH] IN BLOOD BY AUTOMATED COUNT: 23.3 % (ref 11.5–14.5)
GLUCOSE BLD MANUAL STRIP-MCNC: 111 MG/DL (ref 74–99)
GLUCOSE BLD MANUAL STRIP-MCNC: 142 MG/DL (ref 74–99)
GLUCOSE SERPL-MCNC: 155 MG/DL (ref 74–99)
HCT VFR BLD AUTO: 34.2 % (ref 36–52)
HGB BLD-MCNC: 10.8 G/DL (ref 12–17.5)
HYPOCHROMIA BLD QL SMEAR: NORMAL
IMM GRANULOCYTES # BLD AUTO: 0.03 X10*3/UL (ref 0–0.7)
IMM GRANULOCYTES NFR BLD AUTO: 0.4 % (ref 0–0.9)
LYMPHOCYTES # BLD AUTO: 1.36 X10*3/UL (ref 1.2–4.8)
LYMPHOCYTES NFR BLD AUTO: 16.7 %
MCH RBC QN AUTO: 23.2 PG (ref 26–34)
MCHC RBC AUTO-ENTMCNC: 31.6 G/DL (ref 32–36)
MCV RBC AUTO: 73 FL (ref 80–100)
MONOCYTES # BLD AUTO: 0.82 X10*3/UL (ref 0.1–1)
MONOCYTES NFR BLD AUTO: 10.1 %
NEUTROPHILS # BLD AUTO: 5.6 X10*3/UL (ref 1.2–7.7)
NEUTROPHILS NFR BLD AUTO: 68.8 %
NRBC BLD-RTO: 0 /100 WBCS (ref 0–0)
OVALOCYTES BLD QL SMEAR: NORMAL
PLATELET # BLD AUTO: 282 X10*3/UL (ref 150–450)
POTASSIUM SERPL-SCNC: 4.6 MMOL/L (ref 3.5–5.3)
PROT SERPL-MCNC: 7.1 G/DL (ref 6.4–8.2)
RBC # BLD AUTO: 4.66 X10*6/UL (ref 4–5.9)
RBC MORPH BLD: NORMAL
SODIUM SERPL-SCNC: 135 MMOL/L (ref 136–145)
TARGETS BLD QL SMEAR: NORMAL
WBC # BLD AUTO: 8.1 X10*3/UL (ref 4.4–11.3)

## 2024-04-25 PROCEDURE — 36415 COLL VENOUS BLD VENIPUNCTURE: CPT

## 2024-04-25 PROCEDURE — 80053 COMPREHEN METABOLIC PANEL: CPT

## 2024-04-25 PROCEDURE — 85025 COMPLETE CBC W/AUTO DIFF WBC: CPT

## 2024-04-25 PROCEDURE — 99239 HOSP IP/OBS DSCHRG MGMT >30: CPT

## 2024-04-25 PROCEDURE — 99283 EMERGENCY DEPT VISIT LOW MDM: CPT

## 2024-04-25 PROCEDURE — 2500000001 HC RX 250 WO HCPCS SELF ADMINISTERED DRUGS (ALT 637 FOR MEDICARE OP)

## 2024-04-25 PROCEDURE — 82947 ASSAY GLUCOSE BLOOD QUANT: CPT | Mod: 91

## 2024-04-25 PROCEDURE — 99284 EMERGENCY DEPT VISIT MOD MDM: CPT | Performed by: EMERGENCY MEDICINE

## 2024-04-25 RX ORDER — ALBUTEROL SULFATE 0.83 MG/ML
3 SOLUTION RESPIRATORY (INHALATION) AS NEEDED
OUTPATIENT
Start: 2024-05-06

## 2024-04-25 RX ORDER — EPINEPHRINE 0.3 MG/.3ML
0.3 INJECTION SUBCUTANEOUS EVERY 5 MIN PRN
OUTPATIENT
Start: 2024-05-06

## 2024-04-25 RX ORDER — FAMOTIDINE 10 MG/ML
20 INJECTION INTRAVENOUS ONCE AS NEEDED
OUTPATIENT
Start: 2024-05-06

## 2024-04-25 RX ORDER — DIPHENHYDRAMINE HYDROCHLORIDE 50 MG/ML
50 INJECTION INTRAMUSCULAR; INTRAVENOUS AS NEEDED
OUTPATIENT
Start: 2024-05-06

## 2024-04-25 RX ORDER — TORSEMIDE 20 MG/1
60 TABLET ORAL DAILY
Status: DISCONTINUED | OUTPATIENT
Start: 2024-04-25 | End: 2024-04-25 | Stop reason: HOSPADM

## 2024-04-25 RX ADMIN — TRAMADOL HYDROCHLORIDE 50 MG: 50 TABLET, COATED ORAL at 13:19

## 2024-04-25 ASSESSMENT — COGNITIVE AND FUNCTIONAL STATUS - GENERAL
MOBILITY SCORE: 14
DRESSING REGULAR UPPER BODY CLOTHING: A LITTLE
WALKING IN HOSPITAL ROOM: TOTAL
HELP NEEDED FOR BATHING: A LITTLE
DRESSING REGULAR LOWER BODY CLOTHING: A LITTLE
CLIMB 3 TO 5 STEPS WITH RAILING: TOTAL
STANDING UP FROM CHAIR USING ARMS: A LOT
PERSONAL GROOMING: A LITTLE
MOVING TO AND FROM BED TO CHAIR: A LOT
TOILETING: A LOT
DAILY ACTIVITIY SCORE: 18

## 2024-04-25 ASSESSMENT — COLUMBIA-SUICIDE SEVERITY RATING SCALE - C-SSRS
2. HAVE YOU ACTUALLY HAD ANY THOUGHTS OF KILLING YOURSELF?: NO
1. IN THE PAST MONTH, HAVE YOU WISHED YOU WERE DEAD OR WISHED YOU COULD GO TO SLEEP AND NOT WAKE UP?: NO
6. HAVE YOU EVER DONE ANYTHING, STARTED TO DO ANYTHING, OR PREPARED TO DO ANYTHING TO END YOUR LIFE?: NO

## 2024-04-25 ASSESSMENT — PAIN - FUNCTIONAL ASSESSMENT
PAIN_FUNCTIONAL_ASSESSMENT: 0-10
PAIN_FUNCTIONAL_ASSESSMENT: 0-10

## 2024-04-25 ASSESSMENT — PAIN SCALES - GENERAL
PAINLEVEL_OUTOF10: 0 - NO PAIN
PAINLEVEL_OUTOF10: 10 - WORST POSSIBLE PAIN

## 2024-04-25 ASSESSMENT — PAIN DESCRIPTION - ORIENTATION: ORIENTATION: RIGHT

## 2024-04-25 ASSESSMENT — PAIN DESCRIPTION - LOCATION: LOCATION: KNEE

## 2024-04-25 NOTE — NURSING NOTE
[facility] came to pick patient up to go home. Patient was placed on stretcher. Patient stated to ambulance crew and bedside RN that he did not have keys to get into his house. RN called ER Charge RN to see if they were left in the ER when patient was admitted on 4/18/24. Rn went down to ER and searched lost and found. No keys were found.  Police was asked if they had the keys and no keys were found. Ambulance crew left without patient. TCC, ANM and doctor made aware that patient had no keys and would not be able to discharge. Doctor Pritchard working with Nursing Supervisor and on call Social Work to find solution. Patient will remain on unit, in room, until tomorrow 4/25/24 when case management can be involved.

## 2024-04-25 NOTE — PROGRESS NOTES
Pt will discharge home today. TCC updated [facility] and [facility] confirmed SOC between 4/26-4/27. [facility] confirmed transport for 1400. Medical team and pt's RN aware.

## 2024-04-25 NOTE — NURSING NOTE
04/25/24 Nursing Note:  Patient discharged to home today [facility],  RN discussed discharge instructions with Patient and copy of After Visit summary given to Patient.

## 2024-04-25 NOTE — CARE PLAN
The patient's goals for the shift include pt will tolerate diet    The clinical goals for the shift include pt will discharge home today    Over the shift, the patient did not make progress toward the following goals. Barriers to progression include ***. Recommendations to address these barriers include ***.      Problem: Safety  Goal: Patient will be injury free during hospitalization  Outcome: Progressing  Goal: I will remain free of falls  Outcome: Progressing     Problem: Daily Care  Goal: Daily care needs are met  Outcome: Progressing

## 2024-04-25 NOTE — PROGRESS NOTES
"Kody Choi is a 53 y.o. adult on day 4 of admission presenting with Prosthetic joint infection, sequela.    Subjective   No ONE  No new concerns     Objective   Physical Exam  Vitals reviewed.   Constitutional:       General: He is not in acute distress.     Appearance: Normal appearance. He is obese.   HENT:      Mouth/Throat:      Mouth: Mucous membranes are moist.   Cardiovascular:      Rate and Rhythm: Normal rate and regular rhythm.      Pulses: Normal pulses.      Heart sounds: Normal heart sounds.   Pulmonary:      Effort: Pulmonary effort is normal.      Breath sounds: Normal breath sounds.   Abdominal:      General: Abdomen is flat.      Palpations: Abdomen is soft.      Tenderness: There is no abdominal tenderness.   Genitourinary:     Comments: Swollen scrotum-oedema   Musculoskeletal:         General: Swelling, tenderness and deformity present. No signs of injury.      Right lower leg: Edema present.      Left lower leg: Edema present.   Skin:     Findings: Erythema and rash present.      Comments: Right knee cellulitis improving   Neurological:      Mental Status: He is alert and oriented to person, place, and time.   Psychiatric:         Mood and Affect: Mood normal.         Behavior: Behavior normal.       Last Recorded Vitals  Blood pressure 135/77, pulse 89, temperature 36.6 °C (97.9 °F), temperature source Temporal, resp. rate 19, height 1.676 m (5' 5.98\"), weight (!) 167 kg (368 lb 2.7 oz), SpO2 96%, unknown if currently breastfeeding.  Intake/Output last 3 Shifts:  I/O last 3 completed shifts:  In: - (0 mL/kg)   Out: 1775 (10.6 mL/kg) [Urine:1775 (0.3 mL/kg/hr)]  Weight: 167 kg     Relevant Results    Assessment/Plan   Principal Problem:    Prosthetic joint infection, sequela  Active Problems:    Chronic infection of knee joint prosthesis, initial encounter (CMS-[facility])    Pt is a 54 yo M with chronic R PJI and hx of MRSA, previously on daily suppressive antibiotics presenting with acute on chronic " R knee pain and swelling exacerbated by a fall yesterday. Pt is afebrile and HDS without leukocytosis however pt has markedly elevated inflammatory markers with warm/swollen R knee joint and overlying cellulitis. 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 R knee arthrocentesis positive for infection and treating per ID recs. Will continue with diuresis due to elevated BNP and fluid overload.     4/24:   - pt remains afebrile, HDS   - pt medically ready     #Chronic R Knee Prosthetic Joint Infection   #R Knee Pain  #Deconditioning   :: no fracture on xray  :: no indication for surgical intervention/AKA at this time per ortho  - continue home tramadol 50mg BID   - pt/ot     #Cellulitis   #Hx of MRSA  - extensive antibiotic use history  - hx of MRSA infection  - pt previously on suppressive antibiotics but not during the last 30 days or so  - blood cultures NGTD     #Afib   - continue home eliquis 5mg BID  - continue home ASA 81mg      #T2DM  - continue Lantus 35u nightly   - SSI #1     #HFrEF  - hold home PO lasix 40mg and PO torsemide 40mg   - start IV lasix 40mg daily  - continue home metoprolol succ 50mg  - continue home Entresto 49-51    Gustavo Raygoza, DO  PGY-1 Family Medicine

## 2024-04-25 NOTE — HOSPITAL COURSE
Pt is a 54 yo M with chronic R PJI and hx of MRSA, previously on daily suppressive antibiotics presenting with acute on chronic R knee pain and swelling exacerbated by a fall. Pt is afebrile and HDS without leukocytosis however pt has markedly elevated inflammatory markers with warm/swollen R knee joint and overlying cellulitis. 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 R knee arthrocentesis positive for infection. Pt evaluated by orthopedics who said there is no acute indication for AKA at this time, although it had previously been offered to pt in the past. Pt was treated initially with vancomycin and ceftriaxone and then per ID recs, pt was treated one dose of Dalbavancin (2nd dose to be administered at infusion center 4/29) followed by doxycycline 100mg BID beginning 6/10/24 indefinitely. PT/OT recommended SNF however pt had used all of his insurance allotted SNF days for the year and there would have been a copay that pt was not able to pay. For this reason, pt is going home with home health. Pt has been clinically stable on the floor and is ready for discharge home.

## 2024-04-25 NOTE — DISCHARGE SUMMARY
Discharge Diagnosis  Prosthetic joint infection, sequela    Issues Requiring Follow-Up  - dalbavancin shot at [facility] infusion center 4/29  - doxycycline 100mg BID to start indefinitely 6/10    Test Results Pending At Discharge  Pending Labs       Order Current Status    Broad Range PCR-Bacteria In process    Tissue/Wound Culture/Smear Preliminary result          Hospital Course  Pt is a 54 yo M with chronic R PJI and hx of MRSA, previously on daily suppressive antibiotics presenting with acute on chronic R knee pain and swelling exacerbated by a fall. Pt is afebrile and HDS without leukocytosis however pt has markedly elevated inflammatory markers with warm/swollen R knee joint and overlying cellulitis. 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 R knee arthrocentesis positive for infection. Pt evaluated by orthopedics who said there is no acute indication for AKA at this time, although it had previously been offered to pt in the past. Pt was treated initially with vancomycin and ceftriaxone and then per ID recs, pt was treated one dose of Dalbavancin (2nd dose to be administered at infusion center 4/29) followed by doxycycline 100mg BID beginning 6/10/24 indefinitely. PT/OT recommended SNF however pt had used all of his insurance allotted SNF days for the year and there would have been a copay that pt was not able to pay. For this reason, pt is going home with home health. Pt has been clinically stable on the floor and is ready for discharge home.     Pertinent Physical Exam At Time of Discharge  Physical Exam  Vitals reviewed.   Constitutional:       General: He is not in acute distress.     Appearance: Normal appearance. He is obese.   HENT:      Mouth/Throat:      Mouth: Mucous membranes are moist.   Cardiovascular:      Rate and Rhythm: Normal rate and regular rhythm.      Pulses: Normal pulses.      Heart sounds: Normal heart  sounds.   Pulmonary:      Effort: Pulmonary effort is normal.      Breath sounds: Normal breath sounds.   Abdominal:      General: Abdomen is flat.      Palpations: Abdomen is soft.      Tenderness: There is no abdominal tenderness.   Genitourinary:     Comments: Swollen scrotum-oedema   Musculoskeletal:         General: Swelling, tenderness present. No signs of injury.      Right lower leg: Edema present.      Left lower leg: Edema present.   Skin:     Findings: Erythema present.     Comments: Right knee cellulitis improving   Neurological:      Mental Status: He is alert and oriented to person, place, and time.   Psychiatric:         Mood and Affect: Mood normal.         Behavior: Behavior normal.     Home Medications     Medication List      START taking these medications     dalbavancin 500 mg injection; Commonly known as: Dalvance; Infuse 1,000   mg into a venous catheter 1 time for 1 dose. Do not fill before May 7,   2024.; Start taking on: May 7, 2024   sacubitriL-valsartan 49-51 mg tablet; Commonly known as: Entresto; Take   1 tablet by mouth 2 times a day.     CHANGE how you take these medications     doxycycline 100 mg tablet; Commonly known as: Vibra-Tabs; Take 1 tablet   (100 mg) by mouth 2 times a day. Start taking medication on 6/10/2024 and   continue indefinitely. Take with a full glass of water and do not lie down   for at least 30 minutes after. Do not fill before Lynette 10, 2024.; Start   taking on: Lynette 10, 2024; What changed: additional instructions, These   instructions start on Lynette 10, 2024. If you are unsure what to do until   then, ask your doctor or other care provider.     CONTINUE taking these medications     acetaminophen 325 mg tablet; Commonly known as: Tylenol; Take 3 tablets   (975 mg) by mouth every 8 hours if needed for headaches, mild pain (1 -   3), moderate pain (4 - 6) or fever (temp greater than 38.0 C).   aspirin 81 mg chewable tablet; Chew 1 tablet (81 mg) once daily. Do not  "  start before March 2, 2024.   atorvastatin 40 mg tablet; Commonly known as: Lipitor; Take 1 tablet (40   mg) by mouth once daily. Take in evening.   BD Ultra-Fine Short Pen Needle 31 gauge x 5/16\" needle; Generic drug:   pen needle, diabetic; Use to inject 1-4 times daily as directed.   Eliquis 5 mg tablet; Generic drug: apixaban   fluticasone 50 mcg/actuation nasal spray; Commonly known as: Flonase   guaiFENesin 600 mg 12 hr tablet; Commonly known as: Mucinex   insulin glargine 100 unit/mL injection; Commonly known as: Lantus;   Inject 35 Units under the skin once daily at bedtime. Take as directed per   insulin instructions.   insulin lispro 100 unit/mL injection; Commonly known as: HumaLOG; Inject   0-0.1 mL (0-10 Units) under the skin 3 times a day with meals. Take as   directed per insulin instructions.   ipratropium-albuteroL 0.5-2.5 mg/3 mL nebulizer solution; Commonly known   as: Duo-Neb   metoprolol succinate XL 50 mg 24 hr tablet; Commonly known as:   Toprol-XL; Take 1 tablet (50 mg) by mouth once daily. Do not crush or   chew.   mupirocin 2 % ointment; Commonly known as: Bactroban   nystatin 100,000 unit/gram powder; Commonly known as: Mycostatin   omeprazole 20 mg DR capsule; Commonly known as: PriLOSEC   torsemide 40 mg tablet; Take 40 mg by mouth once daily.   traMADol 50 mg tablet; Commonly known as: Ultram; Take 1 tablet (50 mg)   by mouth every 8 hours if needed for severe pain (7 - 10).     Outpatient Follow-Up  Future Appointments   Date Time Provider Department Center   4/30/2024  3:00 PM Alon Hanson MD [id number] Academic   6/6/2024  9:40 AM Noah Goodwin MD MPH [id number] Academic       Gustavo Raygoza DO  PGY-1 Family Medicine  "

## 2024-04-26 LAB
BACTERIA SPEC CULT: NORMAL
GRAM STN SPEC: NORMAL
GRAM STN SPEC: NORMAL

## 2024-04-26 NOTE — DISCHARGE INSTRUCTIONS
Please follow-up with home health to set up care if need be.  Please return to the emergency department with nausea, vomiting, fevers chills or increased redness or swelling to the knee.

## 2024-04-26 NOTE — ED PROVIDER NOTES
"CC: Joint Swelling (Right knee swelling; history of recurrent infections in right knee)     History provided by: Patient  Limitations to History: None    HPI:  Kody Choi is a 53 y.o. male with history of arthritis, A-fib on Eliquis, chronic prosthetic joint infection status post explant and antibiotic spacer in 2019, just discharged from [facility] today presenting for concern of \"MRSA\".  On conversation patient continues to only answer \"MRSA\".  We discussed that he was just discharged from the hospital and was set up with home health.  Patient states that he wants to go to a SNF however is aware that he has used all of his SNF days.  He denies any changes from when he left the hospital today    External Records Reviewed: Discharge summary from 4/25/2024, patient seen and evaluated by orthopedics and infectious disease.  Patient was initially treated with Vanco and ceftriaxone then treated with 1 dose of dalbavancin, second to be administered at an infusion center April 29 followed by 100 mg of Doxy twice daily indefinitely.   ???????????????????????????????????????????????????????????????  Triage Vitals:  T 37.2 °C (98.9 °F)  HR 92  /77  RR 20  O2 95 % None (Room air)    Vital signs reviewed in nursing triage note, EMR flow sheets, and at patient's bedside.   General: Awake, alert, in no acute distress  Eyes: Disconjugate gaze.  No scleral icterus or injection  HENT: Normo-cephalic, atraumatic. No stridor. No rhinorrhea or epistaxis.  CV: Regular rhythm. No murmurs appreciated. Radial pulses 2+ bilaterally  Respiratory: Breathing non-labored, speaking in full sentences.  Clear to auscultation bilaterally  GI: Soft, non-distended, non-tender. No rebound or guarding.  MSK/Extremities: Erythematous right knee, no significant tenderness to palpation  Skin: Warm. Appropriate color  Neuro: Alert. Oriented. Face symmetric. Speech is fluent.  Gross strength and sensation intact in b/l UE and LEs  Psych: Appropriate " "mood and affect   ???????????????????????????????????????????????????????????????  ED Course/Treatment/Medical Decision Making  MDM:  Kody Choi is a 53 y.o. male with history of chronic right knee joint infection and overlying cellulitis, just recently discharged [place] [facility] for concern of infection.  Patient did meet requirements for SNF however was out of SNF days and could not afford the co-pay.  Patient is representing because he states he wants to go to a SNF.  Lab work 1 day ago did show a worsening BARBIE so we will evaluate for this           ED Course:  ED Course as of 04/25/24 2254   Thu Apr 25, 2024 2220 CBC and Auto Differential(!)  Similar baseline anemia without a leukocytosis [AW]   2246 Comprehensive metabolic panel(!)  Improved creatinine when compared to yesterday [AW]   2250 Discussed lab work at bedside with patient that he had improved kidney function and that he was stable for discharge home.  Patient refuses to leave, states that he needs admission.  When asked what he needs admission for he states \"this is the way it always is\".  Discussed with nursing to arrange for transportation.  His care was discussed with oncoming provider, anticipate discharge home. [AW]      ED Course User Index  [AW] Ibeth Angulo DO         Diagnoses as of 04/25/24 2254   Chronic infection of right knee (Multi)       The patient was monitored for any change in vital signs or symptoms throughout the ED course.     Social Determinants Limiting Care:  None identified    Impression:  Chronic right knee infection    Disposition:  Discharge    Assessment and plan discussed with Dr. Juan Manuel Angulo DO   Emergency Medicine, PGY-1     Disclaimer: This note was dictated by speech recognition. Minor errors in transcription may be present.     Procedures ? SmartLinks last updated 4/25/2024 10:54 PM          Ibeth Angulo DO  Resident  04/25/24 2254    "

## 2024-04-29 ENCOUNTER — HOME CARE VISIT (OUTPATIENT)
Dept: HOME HEALTH SERVICES | Facility: HOME HEALTH | Age: 54
End: 2024-04-29

## 2024-04-29 LAB — SCAN RESULT: ABNORMAL

## 2024-05-13 PROBLEM — S99.921A INJURY OF TOENAIL OF RIGHT FOOT: Status: ACTIVE | Noted: 2024-05-13

## 2024-05-13 PROBLEM — E86.0 DEHYDRATION: Status: ACTIVE | Noted: 2024-05-13

## 2024-05-13 PROBLEM — R52 INADEQUATE PAIN CONTROL: Status: ACTIVE | Noted: 2024-05-13

## 2024-05-13 PROBLEM — Z91.89: Status: ACTIVE | Noted: 2024-05-13

## 2024-05-13 NOTE — PROGRESS NOTES
PROGRESS NOTE    Subjective   Chief complaint: Kody Choi is a 54 y.o. adult who is an acute skilled patient being seen and evaluated for weakness    HPI:  Patient seen and evaluated s/p readmission after IPA for dehydration and confusion.   Remains on ABX for right knee infection.  Wounds continue to drain serous fluid.  No F/C/N/V/D, SOB, cough.  Continuous O2 maintained. .  Patient to restart therapy.  No concerns from nursing.  No concerns from nursing          Objective   Vital signs: 136/84-97.8-90-18-97%    Physical Exam  Constitutional:       Appearance: Normal appearance. He is obese.   HENT:      Head: Normocephalic.      Mouth/Throat:      Mouth: Mucous membranes are moist.   Eyes:      Extraocular Movements: Extraocular movements intact.   Cardiovascular:      Rate and Rhythm: Normal rate and regular rhythm.      Pulses: Normal pulses.      Heart sounds: Normal heart sounds.   Pulmonary:      Effort: Pulmonary effort is normal.      Breath sounds: Normal breath sounds.   Abdominal:      General: Bowel sounds are normal.      Palpations: Abdomen is soft.   Musculoskeletal:         General: Normal range of motion.      Cervical back: Normal range of motion and neck supple.      Right lower leg: Edema present.      Left lower leg: Edema present.      Comments: General weakness- R knee swelling   Skin:     General: Skin is warm and dry.      Capillary Refill: Capillary refill takes less than 2 seconds.   Neurological:      Mental Status: He is alert and oriented to person, place, and time.   Psychiatric:         Mood and Affect: Mood normal.         Behavior: Behavior normal.         Assessment/Plan   Problem List Items Addressed This Visit       Infection of total right knee replacement (CMS-HCC)      continue abx   Wound care to right knee   Monitor for fever, chills   monitor CBC   pain management   elevation         Generalized edema      Reinforced elevation BLE   Noncompliant with Ace wraps    Torsemide   monitor         Diabetes mellitus type 2 without retinopathy (Multi)      Stable   BG 97   Humalog, Lantus   refusing insulin with meals-agreeable to take   Noncompliant with diet   monitor BG         Chronic systolic (congestive) heart failure (Multi) - Primary      Stable   no SOB, Rales   BLE edema   Entresto, torsemide   monitor weights   fluid restriction   SIMA diet           Dehydration      Continue to encourage fluid intake and decrease pop and coffee   monitor urine output          Medications, treatments, and labs reviewed  Continue medications and treatments as listed in EMR    Sherley Byrd, APRN-CNP

## 2024-05-13 NOTE — PROGRESS NOTES
PROGRESS NOTE    Subjective   Chief complaint: Kody Choi is a 54 y.o. adult who is an acute skilled patient being seen and evaluated for weakness    HPI:  4/1/24 Patient seen and evaluated s/p readmission after IPA for dehydration and confusion.   Remains on ABX for right knee infection.  Wounds continue to drain serous fluid.  No F/C/N/V/D, SOB, cough.  Continuous O2 maintained. .  Patient to restart therapy.  No concerns from nursing.  No concerns from nursing    4/3/24  Patient continues to work with therapy for mobility and ADLs  Wound care continues to right knee  Wound care continues to right knee.  Tolerating ABX well.  Reinforced elevation..  Patient more compliant with insulin when needed.  BG 98.  Noncompliant with DM management.  No F/C/N/ V/D.  No concerns per nursing          Objective   Vital signs:  142/96-97.8-86-20-96%    Physical Exam  Constitutional:       Appearance: Normal appearance. He is obese.   HENT:      Head: Normocephalic.      Mouth/Throat:      Mouth: Mucous membranes are moist.   Eyes:      Extraocular Movements: Extraocular movements intact.   Cardiovascular:      Rate and Rhythm: Normal rate and regular rhythm.      Pulses: Normal pulses.      Heart sounds: Normal heart sounds.   Pulmonary:      Effort: Pulmonary effort is normal.      Breath sounds: Normal breath sounds.   Abdominal:      General: Bowel sounds are normal.      Palpations: Abdomen is soft.   Musculoskeletal:         General: Normal range of motion.      Cervical back: Normal range of motion and neck supple.      Right lower leg: Edema present.      Left lower leg: Edema present.      Comments:  generalized weakness - right knee swelling   Skin:     General: Skin is warm and dry.      Capillary Refill: Capillary refill takes less than 2 seconds.      Comments:  open wounds right knee   Neurological:      Mental Status: He is alert and oriented to person, place, and time.   Psychiatric:         Mood and Affect: Mood  normal.         Behavior: Behavior normal.         Assessment/Plan   Problem List Items Addressed This Visit       Infection of total right knee replacement (CMS-HCC)      continue abx   Wound care to right knee   Monitor for fever, chills   monitor CBC   pain management   elevation         Diabetes mellitus type 2 without retinopathy (Multi)      Stable   BG 98   Humalog, Lantus   refusing insulin with meals-agreeable to take   Noncompliant with diet   monitor BG         Chronic systolic (congestive) heart failure (Multi) - Primary      Stable   no SOB, Rales   BLE edema   Entresto, torsemide   monitor weights   fluid restriction   SIMA diet           Muscle weakness (generalized)      continue therapy         Dehydration      Continue to encourage fluid intake and decrease pop and coffee   monitor urine output          Medications, treatments, and labs reviewed  Continue medications and treatments as listed in EMR    Sherley Byrd, APRN-CNP

## 2024-05-13 NOTE — ASSESSMENT & PLAN NOTE
continue abx   Wound care to right knee   Monitor for fever, chills   monitor CBC   pain management   elevation

## 2024-05-13 NOTE — ASSESSMENT & PLAN NOTE
Stable   BG 97   Humalog, Lantus   refusing insulin with meals-agreeable to take   Noncompliant with diet   monitor BG

## 2024-05-14 NOTE — PROGRESS NOTES
PROGRESS NOTE    Subjective   Chief complaint: Kody Choi is a 54 y.o. adult who is an acute skilled patient being seen and evaluated for weakness    HPI:  4/1/24 Patient seen and evaluated s/p readmission after IPA for dehydration and confusion.   Remains on ABX for right knee infection.  Wounds continue to drain serous fluid.  No F/C/N/V/D, SOB, cough.  Continuous O2 maintained. .  Patient to restart therapy.  No concerns from nursing.  No concerns from nursing    4/3/24  Patient continues to work with therapy for mobility and ADLs  Wound care continues to right knee  Wound care continues to right knee.  Tolerating ABX well.  Reinforced elevation..  Patient more compliant with insulin when needed.  BG 98.  Noncompliant with DM management.  No F/C/N/ V/D.  No concerns per nursing    4/9/24 Patient continues to work with therapy for safe mobility, strengthening, balance, endurance.  Patient injured her toenail on right foot second toe.  Toenail removed, cleaned with wound cleanser and wrapped with bandage.  Wound care to follow.  Discussed diet and insulin.  .  Tolerating adequate amounts of fluid.  No concerns per nursing    4/10/24.  Patient states doing well today.  Continues to work with therapy for mobility and transferring.  Wound care continues to right second toe.  DM stable .  Scant amount of drainage from right knee.  No F/C.  No concerns per nursing    4/12/24 Patient seen and evaluated s/p readmission.  Patient complained of right knee pain yesterday and demanded to go to the emergency department for evaluation. States his pcp was giving him Ultram at a higher dose and more frequently than what he is receiving at facility. Patient returned with ABX for right knee infection,  No additional pain medication.  DM stable .  No F/C/N/V/D.  No concerns per nursing    4/15/24.  Patient continues to work towards goals for mobility, strengthening, transferring.  SW has been working with patient  for discharge- scheduled for 4/17.  Patient refusing home care and OP therapy.  States he is going to try to care for himself. Disc LTC, but due to financial concerns f/u patient is not qualified for SW. Patient rolled out of bed earlier this morning - no injuries were reported. DSD maintained to right second toe.  DM stable- BG 74    4/17/24.  Discussed home-going instructions with patient today along with medications.  Patient continues to refuse home care and OP therapy.  Discussed safety issues and concerns.  Patient adamant that he wants to go home without assistance.  States he will managed with wound care.  Acknowledges S/S to return to ED. DM stable BG 70.  No concerns per nursing          Objective   Vital signs:  130/82-97.8-94-18-97%    Physical Exam  Constitutional:       Appearance: Normal appearance. He is obese.   HENT:      Head: Normocephalic.      Mouth/Throat:      Mouth: Mucous membranes are moist.   Eyes:      Extraocular Movements: Extraocular movements intact.   Cardiovascular:      Rate and Rhythm: Normal rate and regular rhythm.      Pulses: Normal pulses.      Heart sounds: Normal heart sounds.   Pulmonary:      Effort: Pulmonary effort is normal.      Breath sounds: Normal breath sounds.   Abdominal:      General: Bowel sounds are normal.      Palpations: Abdomen is soft.   Musculoskeletal:         General: Normal range of motion.      Cervical back: Normal range of motion and neck supple.      Right lower leg: Edema present.      Left lower leg: Edema present.      Comments:  generalized weakness   Skin:     General: Skin is warm and dry.      Capillary Refill: Capillary refill takes less than 2 seconds.      Comments:  open wounds to right knee, right second toenail   Neurological:      Mental Status: He is alert and oriented to person, place, and time.   Psychiatric:         Mood and Affect: Mood normal.         Behavior: Behavior normal.         Assessment/Plan   Problem List Items  Addressed This Visit       Infection of total right knee replacement (CMS-HCC)      continue abx   Wound care to right knee   Monitor for fever, chills   monitor CBC   pain management   elevation         Generalized edema      Reinforced elevation BLE   Noncompliant with Ace wraps   Torsemide   monitor         Difficulty in walking, not elsewhere classified      mobility with wheelchair and  walker    therapy completed   refusing OP therapy         Diabetes mellitus type 2 without retinopathy (Multi)      Stable   BG 70   Humalog, Lantus   Noncompliant with diet   monitor BG         Chronic systolic (congestive) heart failure (Multi)      Stable   no SOB, Rales   BLE edema   Entresto, torsemide   monitor weights   fluid restriction   SIMA diet           Injury of toenail of right foot      Patient removed right foot second toe nail   evaluated by wound care   cleanse with wound cleanser, pat dry, apply mupirocin 2% and cover with DSD.  Change daily and as needed         At risk for readmission to hospital - Primary      concern for patient being at high risk for readmission   refused home care and OP therapy   Patient states he will be caring for himself in his apartment   Patient does not have any assistance in the home to assist with ADLs or wound care   Multiple discussions with MSW regarding spending down to qualify for LTC - patient refuses   Discussed med alert device          Medications, treatments, and labs reviewed  Continue medications and treatments as listed in EMR    Sherley Byrd, APRN-CNP

## 2024-05-14 NOTE — PROGRESS NOTES
PROGRESS NOTE    Subjective   Chief complaint: Kody Choi is a 54 y.o. adult who is an acute skilled patient being seen and evaluated for weakness    HPI:  4/1/24 Patient seen and evaluated s/p readmission after IPA for dehydration and confusion.   Remains on ABX for right knee infection.  Wounds continue to drain serous fluid.  No F/C/N/V/D, SOB, cough.  Continuous O2 maintained. .  Patient to restart therapy.  No concerns from nursing.  No concerns from nursing    4/3/24  Patient continues to work with therapy for mobility and ADLs  Wound care continues to right knee  Wound care continues to right knee.  Tolerating ABX well.  Reinforced elevation..  Patient more compliant with insulin when needed.  BG 98.  Noncompliant with DM management.  No F/C/N/ V/D.  No concerns per nursing    4/9/24 Patient continues to work with therapy for safe mobility, strengthening, balance, endurance.  Patient injured her toenail on right foot second toe.  Toenail removed, cleaned with wound cleanser and wrapped with bandage.  Wound care to follow.  Discussed diet and insulin.  .  Tolerating adequate amounts of fluid.  No concerns per nursing    4/10/24.  Patient states doing well today.  Continues to work with therapy for mobility and transferring.  Wound care continues to right second toe.  DM stable .  Scant amount of drainage from right knee.  No F/C.  No concerns per nursing    4/12/24 Patient seen and evaluated s/p readmission.  Patient complained of right knee pain yesterday and demanded to go to the emergency department for evaluation. States his pcp was giving him Ultram at a higher dose and more frequently than what he is receiving at facility. Patient returned with ABX for right knee infection,  No additional pain medication.  DM stable .  No F/C/N/V/D.  No concerns per nursing    4/15/24.  Patient continues to work towards goals for mobility, strengthening, transferring.  SW has been working with patient  for discharge- scheduled for 4/17.  Patient refusing home care and OP therapy.  States he is going to try to care for himself. Disc LTC, but due to financial concerns f/u patient is not qualified for SW. Patient rolled out of bed earlier this morning - no injuries were reported. DSD maintained to right second toe.  DM stable- BG 74          Objective   Vital signs:  134/86-97.8-88-18-97%    Physical Exam  Constitutional:       Appearance: Normal appearance. He is obese.   HENT:      Head: Normocephalic.      Mouth/Throat:      Mouth: Mucous membranes are moist.   Eyes:      Extraocular Movements: Extraocular movements intact.   Cardiovascular:      Rate and Rhythm: Normal rate and regular rhythm.      Pulses: Normal pulses.      Heart sounds: Normal heart sounds.   Pulmonary:      Effort: Pulmonary effort is normal.      Breath sounds: Normal breath sounds.   Abdominal:      General: Bowel sounds are normal.      Palpations: Abdomen is soft.   Musculoskeletal:         General: Normal range of motion.      Cervical back: Normal range of motion and neck supple.      Right lower leg: Edema present.      Left lower leg: Edema present.      Comments:   Generalized weakness   Skin:     General: Skin is warm and dry.      Capillary Refill: Capillary refill takes less than 2 seconds.      Comments:  right second toenail injury - right knee wound scabbed   Neurological:      Mental Status: He is alert and oriented to person, place, and time.   Psychiatric:         Mood and Affect: Mood normal.         Behavior: Behavior normal.         Assessment/Plan   Problem List Items Addressed This Visit       Infection of total right knee replacement (CMS-HCC)      continue abx   Wound care to right knee   Monitor for fever, chills   monitor CBC   pain management   elevation         Generalized edema      Reinforced elevation BLE   Noncompliant with Ace wraps   Torsemide   monitor         Difficulty in walking, not elsewhere classified       mobility with wheelchair and  walker    therapy completed   refusing OP therapy         Diabetes mellitus type 2 without retinopathy (Multi)      Stable   BG 74   Humalog, Lantus   Noncompliant with diet   monitor BG         Chronic systolic (congestive) heart failure (Multi)      Stable   no SOB, Rales   BLE edema   Entresto, torsemide   monitor weights   fluid restriction   SIMA diet           Injury of toenail of right foot      Patient removed right foot second toe nail   evaluated by wound care   cleanse with wound cleanser, pat dry, apply mupirocin 2% and cover with DSD.  Change daily and as needed         Inadequate pain control      continues taking tramadol and Tylenol   Discussed pain management again - patient refuses         At risk for readmission to hospital - Primary      concern for patient being at high risk for readmission   refused home care and OP therapy   Patient states he will be caring for himself in his apartment   Multiple discussions with MSW regarding spending down to qualify for LTC - patient refuses   Discussed med alert device          Medications, treatments, and labs reviewed  Continue medications and treatments as listed in EMR    Sherley Byrd, APRN-CNP

## 2024-05-14 NOTE — ASSESSMENT & PLAN NOTE
concern for patient being at high risk for readmission   refused home care and OP therapy   Patient states he will be caring for himself in his apartment   Patient does not have any assistance in the home to assist with ADLs or wound care   Multiple discussions with MSW regarding spending down to qualify for LTC - patient refuses   Discussed med alert device

## 2024-05-14 NOTE — ASSESSMENT & PLAN NOTE
concern for patient being at high risk for readmission   refused home care and OP therapy   Patient states he will be caring for himself in his apartment   Multiple discussions with MSW regarding spending down to qualify for LTC - patient refuses   Discussed med alert device

## 2024-05-14 NOTE — PROGRESS NOTES
PROGRESS NOTE    Subjective   Chief complaint: Kody Choi is a 54 y.o. adult who is an acute skilled patient being seen and evaluated for weakness    HPI:  4/1/24 Patient seen and evaluated s/p readmission after IPA for dehydration and confusion.   Remains on ABX for right knee infection.  Wounds continue to drain serous fluid.  No F/C/N/V/D, SOB, cough.  Continuous O2 maintained. .  Patient to restart therapy.  No concerns from nursing.  No concerns from nursing    4/3/24  Patient continues to work with therapy for mobility and ADLs  Wound care continues to right knee  Wound care continues to right knee.  Tolerating ABX well.  Reinforced elevation..  Patient more compliant with insulin when needed.  BG 98.  Noncompliant with DM management.  No F/C/N/ V/D.  No concerns per nursing    4/9/24 Patient continues to work with therapy for safe mobility, strengthening, balance, endurance.  Patient injured her toenail on right foot second toe.  Toenail removed, cleaned with wound cleanser and wrapped with bandage.  Wound care to follow.  Discussed diet and insulin.  .  Tolerating adequate amounts of fluid.  No concerns per nursing    4/10/24.  Patient states doing well today.  Continues to work with therapy for mobility and transferring.  Wound care continues to right second toe.  DM stable .  Scant amount of drainage from right knee.  No F/C.  No concerns per nursing    4/12/24 Patient seen and evaluated s/p readmission.  Patient complained of right knee pain yesterday and demanded to go to the emergency department for evaluation. States his pcp was giving him Ultram at a higher dose and more frequently than what he is receiving at facility. Patient returned with ABX for right knee infection,  No additional pain medication.  DM stable .  No F/C/N/V/D.  No concerns per nursing          Objective   Vital signs:  128/74-97.8--94-18-97%    Physical Exam  Constitutional:       Appearance: Normal appearance. He  is obese.   HENT:      Head: Normocephalic.      Mouth/Throat:      Mouth: Mucous membranes are moist.   Eyes:      Extraocular Movements: Extraocular movements intact.   Cardiovascular:      Rate and Rhythm: Normal rate and regular rhythm.      Pulses: Normal pulses.      Heart sounds: Normal heart sounds.   Pulmonary:      Effort: Pulmonary effort is normal.      Breath sounds: Normal breath sounds.   Abdominal:      General: Bowel sounds are normal.      Palpations: Abdomen is soft.   Musculoskeletal:         General: Normal range of motion.      Cervical back: Normal range of motion and neck supple.      Right lower leg: Edema present.      Left lower leg: Edema present.      Comments:  generalized weakness - right knee swelling   Skin:     General: Skin is warm and dry.      Capillary Refill: Capillary refill takes less than 2 seconds.      Comments:  right second toe followed by wound care for toenail- open wounds to right knee   Neurological:      Mental Status: He is alert and oriented to person, place, and time.   Psychiatric:         Mood and Affect: Mood normal.         Behavior: Behavior normal.         Assessment/Plan   Problem List Items Addressed This Visit       Infection of total right knee replacement (CMS-HCC)      continue abx   Wound care to right knee   Monitor for fever, chills   monitor CBC   pain management   elevation         Diabetes mellitus type 2 without retinopathy (Multi)      Stable      Humalog, Lantus   Noncompliant with diet   monitor BG         Injury of toenail of right foot      Patient removed right foot second toe nail   evaluated by wound care   cleanse with wound cleanser, pat dry, apply mupirocin 2% and cover with DSD.  Change daily and as needed         Inadequate pain control - Primary      Patient demanded ED evaluation 4/11 for right knee pain   continues taking tramadol and Tylenol   Discussed pain management again - patient refuses          Medications,  treatments, and labs reviewed  Continue medications and treatments as listed in EMR    Sherley Byrd, APRN-CNP

## 2024-05-14 NOTE — ASSESSMENT & PLAN NOTE
Stable   BG 98   Humalog, Lantus   refusing insulin with meals-agreeable to take   Noncompliant with diet   monitor BG

## 2024-05-14 NOTE — ASSESSMENT & PLAN NOTE
Patient removed right foot second toe nail   evaluated by wound care   cleanse with wound cleanser, pat dry, apply mupirocin 2% and cover with DSD.  Change daily and as needed

## 2024-05-14 NOTE — PROGRESS NOTES
PROGRESS NOTE    Subjective   Chief complaint: Kody Choi is a 54 y.o. adult who is an acute skilled patient being seen and evaluated for weakness    HPI:  4/1/24 Patient seen and evaluated s/p readmission after IPA for dehydration and confusion.   Remains on ABX for right knee infection.  Wounds continue to drain serous fluid.  No F/C/N/V/D, SOB, cough.  Continuous O2 maintained. .  Patient to restart therapy.  No concerns from nursing.  No concerns from nursing    4/3/24  Patient continues to work with therapy for mobility and ADLs  Wound care continues to right knee  Wound care continues to right knee.  Tolerating ABX well.  Reinforced elevation..  Patient more compliant with insulin when needed.  BG 98.  Noncompliant with DM management.  No F/C/N/ V/D.  No concerns per nursing    4/9/24 Patient continues to work with therapy for safe mobility, strengthening, balance, endurance.  Patient injured her toenail on right foot second toe.  Toenail removed, cleaned with wound cleanser and wrapped with bandage.  Wound care to follow.  Discussed diet and insulin.  .  Tolerating adequate amounts of fluid.  No concerns per nursing    4/10/24.  Patient states doing well today.  Continues to work with therapy for mobility and transferring.  Wound care continues to right second toe.  DM stable .  Scant amount of drainage from right knee.  No F/C.  No concerns per nursing          Objective   Vital signs: 136/82-97.8-90-18-97%    Physical Exam  Constitutional:       Appearance: Normal appearance. He is obese.   HENT:      Head: Normocephalic.      Mouth/Throat:      Mouth: Mucous membranes are moist.   Eyes:      Extraocular Movements: Extraocular movements intact.   Cardiovascular:      Rate and Rhythm: Normal rate and regular rhythm.      Pulses: Normal pulses.      Heart sounds: Normal heart sounds.   Pulmonary:      Effort: Pulmonary effort is normal.      Breath sounds: Normal breath sounds.   Abdominal:       General: Bowel sounds are normal.      Palpations: Abdomen is soft.   Musculoskeletal:         General: Normal range of motion.      Cervical back: Normal range of motion and neck supple.      Right lower leg: Edema present.      Left lower leg: Edema present.      Comments:  generalized weakness   Skin:     General: Skin is warm and dry.      Capillary Refill: Capillary refill takes less than 2 seconds.      Comments:  right knee wounds, draining scant amount of serous drainage - right second toe injury   Neurological:      Mental Status: He is alert and oriented to person, place, and time.   Psychiatric:         Mood and Affect: Mood normal.         Behavior: Behavior normal.         Assessment/Plan   Problem List Items Addressed This Visit       Chronic anemia - Primary      Stable   no SOB   monitor labs         Infection of total right knee replacement (CMS-HCC)      continue abx   Wound care to right knee   Monitor for fever, chills   monitor CBC   pain management   elevation         Diabetes mellitus type 2 without retinopathy (Multi)      Stable      Humalog, Lantus   refusing insulin with meals-agreeable to take   Noncompliant with diet   monitor BG         Dehydration      Continue to encourage fluid intake and decrease pop and coffee   monitor urine output         Injury of toenail of right foot      Patient removed right foot second toe nail   evaluated by wound care   cleanse with wound cleanser, pat dry, apply mupirocin 2% and cover with DSD.  Change daily and as needed          Medications, treatments, and labs reviewed  Continue medications and treatments as listed in EMR    LAURENT Martinez-CNP

## 2024-05-14 NOTE — PROGRESS NOTES
PROGRESS NOTE    Subjective   Chief complaint: Kody Choi is a 54 y.o. adult who is an acute skilled patient being seen and evaluated for weakness    HPI:  4/1/24 Patient seen and evaluated s/p readmission after IPA for dehydration and confusion.   Remains on ABX for right knee infection.  Wounds continue to drain serous fluid.  No F/C/N/V/D, SOB, cough.  Continuous O2 maintained. .  Patient to restart therapy.  No concerns from nursing.  No concerns from nursing    4/3/24  Patient continues to work with therapy for mobility and ADLs  Wound care continues to right knee  Wound care continues to right knee.  Tolerating ABX well.  Reinforced elevation..  Patient more compliant with insulin when needed.  BG 98.  Noncompliant with DM management.  No F/C/N/ V/D.  No concerns per nursing    4/9/24 Patient continues to work with therapy for safe mobility, strengthening, balance, endurance.  Patient injured her toenail on right foot second toe.  Toenail removed, cleaned with wound cleanser and wrapped with bandage.  Wound care to follow.  Discussed diet and insulin.  .  Tolerating adequate amounts of fluid.  No concerns per nursing          Objective   Vital signs:  138/72-97.6-94-18-97%    Physical Exam  Constitutional:       Appearance: Normal appearance.   HENT:      Head: Normocephalic.      Mouth/Throat:      Mouth: Mucous membranes are moist.   Eyes:      Extraocular Movements: Extraocular movements intact.   Cardiovascular:      Rate and Rhythm: Normal rate and regular rhythm.      Pulses: Normal pulses.      Heart sounds: Normal heart sounds.   Pulmonary:      Effort: Pulmonary effort is normal.      Breath sounds: Normal breath sounds.   Abdominal:      General: Bowel sounds are normal.      Palpations: Abdomen is soft.   Musculoskeletal:         General: Normal range of motion.      Cervical back: Normal range of motion and neck supple.      Right lower leg: Edema present.      Left lower leg: Edema present.       Comments:  Generalized weakness - right knee swelling   Skin:     General: Skin is warm and dry.      Capillary Refill: Capillary refill takes less than 2 seconds.      Comments:  Wounds to right knee - right foot second toe- toenail removed by patient   Neurological:      Mental Status: He is alert and oriented to person, place, and time.   Psychiatric:         Mood and Affect: Mood normal.         Behavior: Behavior normal.         Assessment/Plan   Problem List Items Addressed This Visit       Infection of total right knee replacement (CMS-HCC)      continue abx   Wound care to right knee   Monitor for fever, chills   monitor CBC   pain management   elevation         Generalized edema      Reinforced elevation BLE   Noncompliant with Ace wraps   Torsemide   monitor         Diabetes mellitus type 2 without retinopathy (Multi) - Primary      Stable      Humalog, Lantus   refusing insulin with meals-agreeable to take   Noncompliant with diet   monitor BG         Injury of toenail of right foot      Patient removed right foot second toe nail   evaluated by wound care   cleanse with wound cleanser, pat dry, apply mupirocin 2% and cover with DSD.  Change daily and as needed          Medications, treatments, and labs reviewed  Continue medications and treatments as listed in EMR    Sherley Byrd, APRN-CNP

## 2024-05-14 NOTE — ASSESSMENT & PLAN NOTE
Patient demanded ED evaluation 4/11 for right knee pain   continues taking tramadol and Tylenol   Discussed pain management again - patient refuses

## 2024-06-10 ENCOUNTER — TRANSCRIBE ORDERS (OUTPATIENT)
Dept: INFUSION THERAPY | Facility: CLINIC | Age: 54
End: 2024-06-10
Payer: COMMERCIAL

## 2024-09-25 NOTE — PROGRESS NOTES
"Kody Choi is a 53 y.o. adult on day 5 of admission presenting with Pyogenic arthritis of right knee joint, due to unspecified organism (CMS/HCC).    Subjective   Patient reports appetite is good. He was unable to remember what he ate for dinner last night and cannot recall why he did not recall why he did not get insulin with dinner.    I have reviewed histories, allergies and medications have been reviewed and there are no changes       Objective   Review of Systems: 10 point ROS neg unless stated above  Physical Exam  General: Alert, oriented x 3. No acute distress.  HEENT: EOMI, PERRL. Oral cavity mucosa moist  Lungs: No increased WOB   Abd: soft, obese  Ext: Very swollen, disfigured R knee   Skin: warm, dry.  Neuro: AOx3, no focal deficits  Psych: Appeared frustrated     Last Recorded Vitals  Blood pressure 110/70, pulse 92, temperature 36.5 °C (97.7 °F), temperature source Temporal, resp. rate 18, height 1.676 m (5' 6\"), weight 150 kg (330 lb), SpO2 93 %.  Intake/Output last 3 Shifts:  I/O last 3 completed shifts:  In: - (0 mL/kg)   Out: 1680 (11.2 mL/kg) [Urine:1680 (0.3 mL/kg/hr)]  Weight: 149.7 kg     Scheduled medications  aspirin, 81 mg, oral, Daily  atorvastatin, 40 mg, oral, Daily  cefTRIAXone, 2 g, intravenous, q24h  enoxaparin, 60 mg, subcutaneous, BID  insulin glargine, 80 Units, subcutaneous, BID  insulin lispro, 0-10 Units, subcutaneous, TID with meals  insulin lispro, 25 Units, subcutaneous, TID with meals  losartan, 25 mg, oral, Daily  pantoprazole, 20 mg, oral, Daily before breakfast  traZODone, 50 mg, oral, Nightly  vancomycin, 1,250 mg, intravenous, q12h      Continuous medications     PRN medications  PRN medications: acetaminophen, dextrose 10 % in water (D10W), dextrose, fluticasone, glucagon, traMADol     Relevant Results  Results from last 7 days   Lab Units 01/30/24  1539 01/30/24  1305 01/30/24  0748 01/29/24  2049 01/29/24  1713 01/29/24  1136 01/29/24  0957 01/28/24  0810 " 01/28/24  0748 01/27/24  1159 01/27/24  0908 01/27/24  0802 01/27/24  0231 01/26/24  1049 01/26/24  0413   POCT GLUCOSE mg/dL 192* 262* 310* 350* 194*   < >  --    < >  --    < >  --    < >  --    < >  --    GLUCOSE mg/dL  --   --   --   --   --   --  284*  --  362*  --  268*  --  407*  --  394*    < > = values in this interval not displayed.     Results for orders placed or performed during the hospital encounter of 01/25/24 (from the past 24 hour(s))   POCT GLUCOSE   Result Value Ref Range    POCT Glucose 350 (H) 74 - 99 mg/dL   POCT GLUCOSE   Result Value Ref Range    POCT Glucose 310 (H) 74 - 99 mg/dL   Transthoracic Echo (TTE) Complete   Result Value Ref Range    BSA 2.64 m2   POCT GLUCOSE   Result Value Ref Range    POCT Glucose 262 (H) 74 - 99 mg/dL   Vancomycin   Result Value Ref Range    Vancomycin 21.7 (H) 5.0 - 20.0 ug/mL   POCT GLUCOSE   Result Value Ref Range    POCT Glucose 192 (H) 74 - 99 mg/dL              Assessment/Plan   Principal Problem:    Pyogenic arthritis of right knee joint, due to unspecified organism (CMS/[facility])    Kody Choi is a 53 y.o. adult with PMH of uncontrolled T2DM, CAD s/p multiple stents and CABG, HFrEF (EF 35-40% 01/21), A fib, RUPESH, TKA in 2015, hx of multiple septic arthritis infections in R knee who presented on 1/25 with knee pain, again found to have septic arthritis currently on broad spectrum antibiotics. Recommended to have AKA by ID which patient has been adamantly refusing.     Endocrinology consulted for management of T2DM     Endocrinologist: None, managed by PCP (Dr. Eugene)  Last A1c: 16.6% (1/26/24)   Home regimen: Empagliflozin 25mg daily, Insulin glargine 100 units QAM, semaglutide 7mg PO daily  Accuchecks: Does not check his BGs at home      Current Diet: Regular  Steroids: None    Update 1/30: Patient seen at bedside. Noted that BG was 194 with dinner and then became hyperglycemic to the 300s at bedtime. He could not recall what he ate for dinner last night or  "why he did not receive insulin with dinner. Reminded patient that he should receive mealtime coverage for his meals to prevent hyperglycemia. No changes recommended to his insulin regimen as we will see how he responds to yesterday's recommended changes.     #T2DM, uncontrolled  -Goal -180  -c/w insulin glargine 80 units BID  -c/w insulin lispro 25 units TID w/ meals  -c/w insulin lispro sliding scale #2 TID w/ meals  -Hold home semaglutide  -Hold home empagliflozin  -Accuchecks ACHS  -Hypoglycemia protocol   -Recommend diabetic diet if patient agreeable      Tentative discharge recommendations:  -Discussed potential for mealtime insulin at home with patient. He was not sure and would like to revisit this at another time. May consider changing to mixed insulin such that patient gets some prandial coverage with minimal injections per day.   -Likely restart home semaglutide 7mg daily  -Decision regarding empagliflozin TBD   -Accuchecks ACHS (patient not currently checking BG at home), needs new glucometer. Please ensure new glucometer ordered at discharge along with testing supplies:  --->please order glucose testing supplies for QID glucose measurement, 90 days = 1 glucose meter, 400 lancets and 400 strips  ---> please order alcohol swabs  ---> please order lancets   **write in comment section on all supplies ordered: \"substitutions may be made by pharmacist depending on insurance coverage and what the pharmacy has available\"      Will see if patient is willing to follow-up with endocrinology. Otherwise, patient may continue to follow with PCP Dr. Eugene.      Endocrine will continue to follow.  Please message on secure chat (8AM-5PM) or page [phone] with any questions or concerns.  Patient discussed with Dr. Nelson Schumacher who agrees with the management plan.           " Private car

## 2024-12-12 NOTE — PROGRESS NOTES
[facility]: Pharmacy Education - Direct Oral Anticoagulant - Eliquis    Inessa Peoples has been ordered Eliquis for DVT/afib.     Counseling points included the following:  Eliquis's indication, patient's need for the medication, and dosing/frequency of this medication.  Enforced the importance of taking their medication as instructed every day and the reason why the medication is dosed that way.  Explained possible side effects of anticoagulation therapy, including increased risk of bleeding, and s/sx of bleeding. Also talked about ways to control bleeding for minor cuts and scrapes.  Emphasized the importance of going to the emergency room if any of the following occur: Falling and hitting your head; noticing bright red blood in urine or dark/tarry stools; vomiting up blood or vomit has a coffee-ground like texture; coughing up blood.  Discussed the importance of informing any physician or dentist that they have been started on a DOAC, in case they need to be taken off for a procedure.  Discussed all important drug interactions, including over-the-counter medications and supplements.  Instructed the patient not to begin or discontinue any medications without informing his/her physician/pharmacist.     I also explained to the patient that this medication may have a high copay associated with it and to let the provider know if it is unaffordable.     Patient expressed understanding and had no further questions.      Flavia Mendez [facility]  Clinical Pharmacist     Transitional Care Coordinator Note: Per medical team patient is not medically ready, EDOD 2/1. Per [facility] team plan for patient to discharge to SNF for IV antibiotics. TCC met with patient to discuss discharge plan. Patient declined discharging to a SNF or home with homecare infusion. [facility] team informed of conversation with patient, awaiting updates.       Catherine Osuna RN BSN   Transitional Care Coordinator